# Patient Record
Sex: MALE | Race: WHITE | Employment: FULL TIME | URBAN - METROPOLITAN AREA
[De-identification: names, ages, dates, MRNs, and addresses within clinical notes are randomized per-mention and may not be internally consistent; named-entity substitution may affect disease eponyms.]

---

## 2017-01-06 ENCOUNTER — OFFICE VISIT (OUTPATIENT)
Dept: PEDIATRICS | Facility: CLINIC | Age: 47
End: 2017-01-06
Payer: COMMERCIAL

## 2017-01-06 VITALS
TEMPERATURE: 97.9 F | HEIGHT: 70 IN | BODY MASS INDEX: 40.16 KG/M2 | SYSTOLIC BLOOD PRESSURE: 120 MMHG | WEIGHT: 280.5 LBS | DIASTOLIC BLOOD PRESSURE: 90 MMHG | OXYGEN SATURATION: 98 % | HEART RATE: 91 BPM

## 2017-01-06 DIAGNOSIS — S76.011A STRAIN OF HIP FLEXOR, RIGHT, INITIAL ENCOUNTER: ICD-10-CM

## 2017-01-06 DIAGNOSIS — E78.5 HYPERLIPIDEMIA LDL GOAL <100: ICD-10-CM

## 2017-01-06 DIAGNOSIS — Z23 NEED FOR PROPHYLACTIC VACCINATION AND INOCULATION AGAINST INFLUENZA: ICD-10-CM

## 2017-01-06 DIAGNOSIS — I10 ESSENTIAL HYPERTENSION: ICD-10-CM

## 2017-01-06 PROCEDURE — 99214 OFFICE O/P EST MOD 30 MIN: CPT | Mod: 25 | Performed by: INTERNAL MEDICINE

## 2017-01-06 PROCEDURE — 90471 IMMUNIZATION ADMIN: CPT | Performed by: INTERNAL MEDICINE

## 2017-01-06 PROCEDURE — 90686 IIV4 VACC NO PRSV 0.5 ML IM: CPT | Performed by: INTERNAL MEDICINE

## 2017-01-06 RX ORDER — LISINOPRIL 20 MG/1
10 TABLET ORAL DAILY
Qty: 45 TABLET | Refills: 3 | Status: SHIPPED | OUTPATIENT
Start: 2017-01-06 | End: 2018-05-22

## 2017-01-06 RX ORDER — METFORMIN HCL 500 MG
1000 TABLET, EXTENDED RELEASE 24 HR ORAL 2 TIMES DAILY WITH MEALS
Qty: 360 TABLET | Refills: 3 | Status: SHIPPED | OUTPATIENT
Start: 2017-01-06 | End: 2018-05-22

## 2017-01-06 RX ORDER — SIMVASTATIN 20 MG
20 TABLET ORAL DAILY
Qty: 90 TABLET | Refills: 3 | Status: SHIPPED | OUTPATIENT
Start: 2017-01-06 | End: 2018-04-10

## 2017-01-06 NOTE — NURSING NOTE
"Chief Complaint   Patient presents with     Diabetes       Initial /90 mmHg  Pulse 91  Temp(Src) 97.9  F (36.6  C) (Oral)  Ht 5' 10\" (1.778 m)  Wt 280 lb 8 oz (127.234 kg)  BMI 40.25 kg/m2  SpO2 98% Estimated body mass index is 40.25 kg/(m^2) as calculated from the following:    Height as of this encounter: 5' 10\" (1.778 m).    Weight as of this encounter: 280 lb 8 oz (127.234 kg).  BP completed using cuff size: large   Karen Christianson MA// January 6, 2017 3:58 PM              "

## 2017-01-06 NOTE — MR AVS SNAPSHOT
After Visit Summary   1/6/2017    Jairo Escamilla    MRN: [id number]           Patient Information     Date Of Birth          1970        Visit Information        Provider Department      1/6/2017 4:00 PM Sandoval Wilkinson MD [facility] [place]        Today's Diagnoses     Need for prophylactic vaccination and inoculation against influenza    -  1     Uncontrolled type 2 diabetes mellitus without complication, with long-term current use of insulin (H)         Hyperlipidemia LDL goal <100         Essential hypertension           Care Instructions    Set up a follow up in 2 months. Fast for this.    IN meantime, increase your lantus ot 60, then 70, then 80, etc, until your AM sugars are nearer to 100.      Check you sugars before and after larger meals to ensure your humalog dosing is sufficient.    Sandoval Wilkinson MD  Internal Medicine and Pediatrics                    '  Hip Flexor Strain         What is a hip flexor strain?   A strain is a stretch or tear of a muscle or tendon, a band of tissue that connects muscle to bone. The tendon may be inflamed. Inflammation of a tendon is called tendonitis. The hip flexor muscles allow you to lift your knee and bend at the waist.   How does it occur?   Hip flexor strain occurs from overuse of the muscles that help you flex your knee or do high kicks. This injury occurs in bicyclists, athletes who jump or run with high knee kicks, athletes like soccer players who do forceful kicking activities, and people who practice the martial arts.   What are the symptoms?   You have pain in the upper groin region where the thigh meets the pelvis.   How is it diagnosed?   Your healthcare provider will examine your hip and thigh. You will have tenderness at the muscle and tendon.   How is it treated?   To treat this condition:   Put an ice pack, gel pack, or package of frozen vegetables, wrapped in a cloth on the area every 3 to 4 hours, for up to 20 minutes at a time.   You  could also do ice massage. To do this, first freeze water in a Styrofoam cup, then peel the top of the cup away to expose the ice. Hold the bottom of the cup and rub the ice over the area for 5 to 10 minutes. Do this 3 to 5 times a day for the first 2 days.   Take an anti-inflammatory medicine such as ibuprofen, or other medicine as directed by your provider. Nonsteroidal anti-inflammatory medicines (NSAIDs) may cause stomach bleeding and other problems. These risks increase with age. Read the label and take as directed. Unless recommended by your healthcare provider, do not take for more than 10 days.   Follow your provider's instructions for doing exercises to help you recover.   After you recover from your acute injury, use moist heat for 10 to 15 minutes at a time before you do warm-up and stretching exercises. Do not use heat if you have swelling.   While you are recovering from your injury, you will need to change your sport or activity to one that does not make your condition worse. For example, you may need to swim instead of bicycling or running.   How long will the effects last?   The length of recovery depends on many factors such as your age, health, and if you have had a previous hip flexor injury. Recovery time also depends on the severity of the injury. A mild hip flexor strain may recover within a few weeks, whereas a severe injury may take 6 weeks or longer to recover. You need to stop doing the activities that cause pain until the hip has healed. If you continue doing activities that cause pain, your symptoms will return and it will take longer to recover.   When can I return to my normal activities?   Everyone recovers from an injury at a different rate. Return to your activities depends on how soon your hip recovers, not by how many days or weeks it has been since your injury has occurred. In general, the longer you have symptoms before you start treatment, the longer it will take to get better.  The goal is to return to your normal activities as soon as is safely possible. If you return too soon you may worsen your injury.   You may safely return to your activities when, starting from the top of the list and progressing to the end, each of the following is true:   You have full range of motion in the leg on the injured side compared to the leg on the uninjured side.   You have full strength of the leg on the injured side compared to the leg on the uninjured side.   You can walk straight ahead without pain or limping.   How can I prevent a hip flexor strain?   Hip flexor strains are best prevented by warming up properly and doing stretching exercises before your activity. If you are a bicyclist make sure your seat is raised to the proper height.   Hip Flexor Strain Rehabilitation Exercises         You can begin stretching your hip muscles right away by doing the first 2 exercises. Make sure you feel just a mild discomfort during the stretches and not sharp pain. You may do the last 3 exercises when the pain is gone.   Hip flexor stretch: Kneel and then put one leg forward. Keep your foot flat on the floor. Flatten your lower back and lean your hips forward slightly until you feel a stretch at the front of your hip. Try to keep your body upright as you do this. Hold this position for 15 to 30 seconds. Repeat 3 times with each leg.   Quadriceps stretch: Stand an arm's length away from the wall with your injured leg farthest from the wall. Facing straight ahead, brace yourself by keeping one hand against the wall. With your other hand, grasp the ankle of your injured leg and pull your heel toward your buttocks. Don't arch or twist your back. Keep your knees together. Hold this stretch for 15 to 30 seconds.   Heel slide: Sit on a firm surface with your legs straight in front of you. Slowly slide the heel of your injured leg toward your buttock by pulling your knee toward your chest as you slide the heel. Return  to the starting position. Do 3 sets of 10.   Straight leg raise: Lie on your back with your legs straight out in front of you. Bend the knee on your uninjured side and place the foot flat on the floor. Tighten the thigh muscle on your injured side and lift your leg about 8 inches off the floor. Keep your leg straight and your thigh muscle tight. Slowly lower your leg back down to the floor. Do 3 sets of 10.   Resisted hip flexion: Stand facing away from a door. Tie a loop in one end of a piece of elastic tubing and put it around the ankle on your injured side. Tie a knot in the other end of the tubing and shut the knot in the door near the floor. Tighten the front of your thigh muscle and bring the leg with the tubing forward, keeping your leg straight. Return to the starting position. Do 3 sets of 10.   Published by [employer].  This content is reviewed periodically and is subject to change as new health information becomes available. The information is intended to inform and educate and is not a replacement for medical evaluation, advice, diagnosis or treatment by a healthcare professional.   Written by Jocelyne Ludwig MS, PT, and Duyen Christianson PT, [facility], [facility], for [employer].   ? 2010 [employer] and/or its affiliates. All Rights Reserved.   Copyright   [employer] 2011          Published by [employer].  This content is reviewed periodically and is subject to change as new health information becomes available. The information is intended to inform and educate and is not a replacement for medical evaluation, advice, diagnosis or treatment by a healthcare professional.   Written by Maurice Jose MD, for [employer].   ? 2010 [employer] and/or its affiliates. All Rights Reserved.   Copyright   [employer] 2011              Follow-ups after your visit        Who to contact     If you have questions or need follow up information about today's clinic visit or your schedule please  "contact [facility] [place] directly at [phone].  Normal or non-critical lab and imaging results will be communicated to you by MyChart, letter or phone within 4 business days after the clinic has received the results. If you do not hear from us within 7 days, please contact the clinic through [employer] or phone. If you have a critical or abnormal lab result, we will notify you by phone as soon as possible.  Submit refill requests through [employer] or call your pharmacy and they will forward the refill request to us. Please allow 3 business days for your refill to be completed.          Additional Information About Your Visit        [email or web address] Information     [employer] gives you secure access to your electronic health record. If you see a primary care provider, you can also send messages to your care team and make appointments. If you have questions, please call your primary care clinic.  If you do not have a primary care provider, please call [phone] and they will assist you.        Care EveryWhere ID     This is your Care EveryWhere ID. This could be used by other organizations to access your [place] medical records  [id number]        Your Vitals Were     Pulse Temperature Height BMI (Body Mass Index) Pulse Oximetry       91 97.9  F (36.6  C) (Oral) 5' 10\" (1.778 m) 40.25 kg/m2 98%        Blood Pressure from Last 3 Encounters:   01/06/17 120/90   07/11/16 118/76   05/09/16 133/87    Weight from Last 3 Encounters:   01/06/17 280 lb 8 oz (127.234 kg)   07/11/16 286 lb (129.729 kg)   05/09/16 283 lb (128.368 kg)              Today, you had the following     No orders found for display         Today's Medication Changes          These changes are accurate as of: 1/6/17  4:27 PM.  If you have any questions, ask your nurse or doctor.               Start taking these medicines.        Dose/Directions    order for DME   Used for:  Uncontrolled type 2 diabetes mellitus without complication, with long-term current " use of insulin (H)   Started by:  Sandoval Wilkinson MD        Equipment being ordered:   1 glucometer  90 Test strips for three times daily testing with 11 RF.  90 lancets for three times daily testing.   Quantity:  1 each   Refills:  0         These medicines have changed or have updated prescriptions.        Dose/Directions    * insulin glargine 100 UNIT/ML injection   Commonly known as:  LANTUS   This may have changed:  Another medication with the same name was added. Make sure you understand how and when to take each.   Used for:  Uncontrolled type 2 diabetes mellitus without complication, with long-term current use of insulin (H)   Changed by:  Sandoval Wilkinson MD        Dose:  50 Units   Inject 50 Units Subcutaneous At Bedtime   Quantity:  1 Month   Refills:  0       * insulin glargine 100 UNIT/ML injection   Commonly known as:  LANTUS SOLOSTAR   This may have changed:  You were already taking a medication with the same name, and this prescription was added. Make sure you understand how and when to take each.   Used for:  Uncontrolled type 2 diabetes mellitus without complication, with long-term current use of insulin (H)   Changed by:  Sandoval Wilkinson MD        60 units at bedtime.  .Increase by 10 units every 3 days, until your AM sugars are at or below 100.   Quantity:  30 mL   Refills:  11       * Notice:  This list has 2 medication(s) that are the same as other medications prescribed for you. Read the directions carefully, and ask your doctor or other care provider to review them with you.         Where to get your medicines      These medications were sent to [id number] - [place] - [address]  AT [address] & [address] [place] [address]     Phone:  [phone]    - insulin glargine 100 UNIT/ML injection  - insulin lispro 100 UNIT/ML injection  - lisinopril 20 MG tablet  - metFORMIN 500 MG 24 hr tablet  - simvastatin 20 MG tablet      Some of these will  need a paper prescription and others can be bought over the counter.  Ask your nurse if you have questions.     Bring a paper prescription for each of these medications    - order for DME             Primary Care Provider Office Phone # Fax #    Sandoval Wilkinson [phone]       [facility] [address] [place] [address]        Thank you!     Thank you for choosing [facility]  for your care. Our goal is always to provide you with excellent care. Hearing back from our patients is one way we can continue to improve our services. Please take a few minutes to complete the written survey that you may receive in the mail after your visit with us. Thank you!             Your Updated Medication List - Protect others around you: Learn how to safely use, store and throw away your medicines at www.disposemymeds.org.          This list is accurate as of: 1/6/17  4:27 PM.  Always use your most recent med list.                   Brand Name Dispense Instructions for use    ASPIRIN NOT PRESCRIBED    INTENTIONAL    0 each    Antiplatelet medication not prescribed intentionally due to subdural hematoma       * blood glucose monitoring test strip    ONE TOUCH ULTRA    100 each    Use to test blood sugars tid or as directed.       * blood glucose monitoring test strip    ONE TOUCH ULTRA    100 each    Use to test blood sugars tid or as directed.       * insulin glargine 100 UNIT/ML injection    LANTUS    1 Month    Inject 50 Units Subcutaneous At Bedtime       * insulin glargine 100 UNIT/ML injection    LANTUS SOLOSTAR    30 mL    60 units at bedtime.  .Increase by 10 units every 3 days, until your AM sugars are at or below 100.       insulin lispro 100 UNIT/ML injection    HumaLOG PEN    3 mL    For sugars 150-200: 2 units.  For 200-250, 4 units.  For 250-300, 6 unis.  For 300-350, 8 units.  Above 350, 10 units and call physician.       insulin pen needle 32G X 4 MM     360 each    Use 4 pen  needles daily or as directed.       lisinopril 20 MG tablet    PRINIVIL/ZESTRIL    45 tablet    Take 0.5 tablets (10 mg) by mouth daily       metFORMIN 500 MG 24 hr tablet    GLUCOPHAGE-XR    360 tablet    Take 2 tablets (1,000 mg) by mouth 2 times daily (with meals) lima.       order for DME      AIRSENSE 10 7-11 CM/H20 NASAL WISP FABRIC       order for DME     1 each    Equipment being ordered:   1 glucometer  90 Test strips for three times daily testing with 11 RF.  90 lancets for three times daily testing.       simvastatin 20 MG tablet    ZOCOR    90 tablet    Take 1 tablet (20 mg) by mouth daily       * Notice:  This list has 4 medication(s) that are the same as other medications prescribed for you. Read the directions carefully, and ask your doctor or other care provider to review them with you.

## 2017-01-06 NOTE — PROGRESS NOTES
SUBJECTIVE:                                                    Jairo Escamilla is a 46 year old male who presents to clinic today for the following health issues:      Diabetes Follow-up      Patient is checking blood sugars: not at all.  Has not checked in the 3 weeks due to misplaced meter, in the past checked twice daily     Diabetic concerns: other - medication not being effective      Symptoms of hypoglycemia (low blood sugar): none     Paresthesias (numbness or burning in feet) or sores: Yes burn when standing for long period of time      Date of last diabetic eye exam: 2016        Amount of exercise or physical activity: 2-3 days/week for an average of greater than 60 minutes    Problems taking medications regularly: no    Medication side effects: none    Diet: carbohydrate counting    Problems with right hip when active gets extremely sore in a short amount time x 8-9 months ago on and off     Problem list and histories reviewed & adjusted, as indicated.  Additional history: as documented    Lost glucometer; not testing sugars.  Feels like levels have remained high.  Last checked sugars were about 180-200 in the AM;     Checking after meals (2 hours) and     Patient Active Problem List   Diagnosis     Hyperlipidemia LDL goal <100     Hypertension  BP goal <140/90     Type 2 diabetes mellitus without complication (H)     Morbid obesity (H)     Subdural hematoma (H)     SDH (subdural hematoma) (H)     Past Surgical History   Procedure Laterality Date     No history of surgery         Social History   Substance Use Topics     Smoking status: Never Smoker      Smokeless tobacco: Never Used     Alcohol Use: 0.0 oz/week     0 Standard drinks or equivalent per week      Comment: rarely     Family History   Problem Relation Age of Onset     DIABETES Mother      DIABETES Maternal Aunt       of Diabetic complications     C.A.D. Father      in mid 60's     Cancer - colorectal No family hx of      Prostate Cancer  No family hx of      CEREBROVASCULAR DISEASE No family hx of          Current Outpatient Prescriptions   Medication Sig Dispense Refill     order for DME Equipment being ordered:     1 glucometer    90 Test strips for three times daily testing with 11 RF.    90 lancets for three times daily testing. 1 each 0     insulin lispro (HUMALOG PEN) 100 UNIT/ML injection For sugars 150-200: 2 units.  For 200-250, 4 units.  For 250-300, 6 unis.  For 300-350, 8 units.  Above 350, 10 units and call physician. 3 mL 11     simvastatin (ZOCOR) 20 MG tablet Take 1 tablet (20 mg) by mouth daily 90 tablet 3     metFORMIN (GLUCOPHAGE-XR) 500 MG 24 hr tablet Take 2 tablets (1,000 mg) by mouth 2 times daily (with meals) lima. 360 tablet 3     lisinopril (PRINIVIL/ZESTRIL) 20 MG tablet Take 0.5 tablets (10 mg) by mouth daily 45 tablet 3     insulin glargine (LANTUS SOLOSTAR) 100 UNIT/ML injection 60 units at bedtime.  .Increase by 10 units every 3 days, until your AM sugars are at or below 100. 30 mL 11     blood glucose monitoring (ONE TOUCH ULTRA) test strip Use to test blood sugars tid or as directed. 100 each 0     insulin pen needle 32G X 4 MM Use 4 pen needles daily or as directed. 360 each 3     insulin glargine (LANTUS) 100 UNIT/ML injection Inject 50 Units Subcutaneous At Bedtime 1 Month 0     [DISCONTINUED] insulin lispro (HUMALOG PEN) 100 UNIT/ML injection For sugars 150-200: 2 units.  For 200-250, 4 units.  For 250-300, 6 unis.  For 300-350, 8 units.  Above 350, 10 units and call physician. 1 Month 0     [DISCONTINUED] simvastatin (ZOCOR) 20 MG tablet Take 1 tablet (20 mg) by mouth daily 30 tablet 0     [DISCONTINUED] lisinopril (PRINIVIL/ZESTRIL) 20 MG tablet Take 0.5 tablets (10 mg) by mouth daily 15 tablet 0     [DISCONTINUED] metFORMIN (GLUCOPHAGE-XR) 500 MG 24 hr tablet Take 2 tablets (1,000 mg) by mouth 2 times daily (with meals) lima. 120 tablet 0     order for DME AIRSENSE 10  7-11 CM/H20  NASAL WISP FABRIC        "ASPIRIN NOT PRESCRIBED (INTENTIONAL) Antiplatelet medication not prescribed intentionally due to subdural hematoma 0 each 0     blood glucose monitoring (ONE TOUCH ULTRA) test strip Use to test blood sugars tid or as directed. 100 each prn     No Known Allergies  BP Readings from Last 3 Encounters:   01/06/17 120/90   07/11/16 118/76   05/09/16 133/87    Wt Readings from Last 3 Encounters:   01/06/17 280 lb 8 oz (127.234 kg)   07/11/16 286 lb (129.729 kg)   05/09/16 283 lb (128.368 kg)                  Labs reviewed in EPIC  Problem list, Medication list, Allergies, and Medical/Social/Surgical histories [facility] and updated as appropriate.    ROS:  C: NEGATIVE for fever, chills, change in weight  E/M: NEGATIVE for ear, mouth and throat problems  R: NEGATIVE for significant cough or SOB  CV: NEGATIVE for chest pain, palpitations or peripheral edema    OBJECTIVE:                                                    /90 mmHg  Pulse 91  Temp(Src) 97.9  F (36.6  C) (Oral)  Ht 5' 10\" (1.778 m)  Wt 280 lb 8 oz (127.234 kg)  BMI 40.25 kg/m2  SpO2 98%  Body mass index is 40.25 kg/(m^2).   GENERAL: healthy, alert, well nourished, well hydrated, no distress  HENT: ear canals- normal; TMs- normal; Nose- normal; Mouth- no ulcers, no lesions  NECK: no tenderness, no adenopathy, no asymmetry, no masses, no stiffness; thyroid- normal to palpation  RESP: lungs clear to auscultation - no rales, no rhonchi, no wheezes  CV: regular rates and rhythm, normal S1 S2, no S3 or S4 and no murmur, no click or rub -  ABDOMEN: soft, no tenderness, no  hepatosplenomegaly, no masses, normal bowel sounds    Diagnostic test results:  Diagnostic Test Results:  none      ASSESSMENT/PLAN:                                                    1. Need for prophylactic vaccination and inoculation against influenza      2. Uncontrolled type 2 diabetes mellitus without complication, with long-term current use of insulin (H)  Poor control.  " Titrate up on lantus and follow up in 2 months.  Monitor pre and post meal sugars better.   - order for DME; Equipment being ordered:     1 glucometer    90 Test strips for three times daily testing with 11 RF.    90 lancets for three times daily testing.  Dispense: 1 each; Refill: 0  - insulin lispro (HUMALOG PEN) 100 UNIT/ML injection; For sugars 150-200: 2 units.  For 200-250, 4 units.  For 250-300, 6 unis.  For 300-350, 8 units.  Above 350, 10 units and call physician.  Dispense: 3 mL; Refill: 11  - metFORMIN (GLUCOPHAGE-XR) 500 MG 24 hr tablet; Take 2 tablets (1,000 mg) by mouth 2 times daily (with meals) lima.  Dispense: 360 tablet; Refill: 3  - insulin glargine (LANTUS SOLOSTAR) 100 UNIT/ML injection; 60 units at bedtime.  .Increase by 10 units every 3 days, until your AM sugars are at or below 100.  Dispense: 30 mL; Refill: 11    3. Hyperlipidemia LDL goal <100  At LDL cholesterol goal.  Continue statin.   - simvastatin (ZOCOR) 20 MG tablet; Take 1 tablet (20 mg) by mouth daily  Dispense: 90 tablet; Refill: 3    4. Essential hypertension  At goal.   - lisinopril (PRINIVIL/ZESTRIL) 20 MG tablet; Take 0.5 tablets (10 mg) by mouth daily  Dispense: 45 tablet; Refill: 3    5. Strain of hip flexor, right, initial encounter  Exercises given; begin aleve (naproxen) and ice.       See Patient Instructions    Sandoval Wilkinson MD  [facility]    Injectable Influenza Immunization Documentation    1.  Is the person to be vaccinated sick today?  No    2. Does the person to be vaccinated have an allergy to eggs or to a component of the vaccine?  No    3. Has the person to be vaccinated today ever had a serious reaction to influenza vaccine in the past?  No    4. Has the person to be vaccinated ever had Guillain-[place] syndrome?  No     Form completed by Jaja

## 2017-02-06 ENCOUNTER — DOCUMENTATION ONLY (OUTPATIENT)
Dept: PEDIATRICS | Facility: CLINIC | Age: 47
End: 2017-02-06

## 2017-02-06 NOTE — PROGRESS NOTES
Reason for Call:  Form, our goal is to have forms completed with 72 hours, however, some forms may require a visit or additional information.    Type of letter, form or note:  medical    Who is the form from?: Patient    Where did the form come from: Patient or family brought in       What clinic location was the form placed at?: [place]    Where the form was placed: 's Box    What number is listed as a contact on the form?: [phone]       Additional comments: Please fax completed form to [phone].    Call taken on 2/6/2017 at 11:22 AM by Amanda Ernst

## 2017-03-06 ENCOUNTER — OFFICE VISIT (OUTPATIENT)
Dept: PEDIATRICS | Facility: CLINIC | Age: 47
End: 2017-03-06
Payer: COMMERCIAL

## 2017-03-06 VITALS
BODY MASS INDEX: 40.59 KG/M2 | DIASTOLIC BLOOD PRESSURE: 70 MMHG | WEIGHT: 283.56 LBS | HEIGHT: 70 IN | TEMPERATURE: 98.2 F | SYSTOLIC BLOOD PRESSURE: 110 MMHG | HEART RATE: 91 BPM | OXYGEN SATURATION: 96 %

## 2017-03-06 DIAGNOSIS — E11.65 TYPE 2 DIABETES MELLITUS WITH HYPERGLYCEMIA, WITH LONG-TERM CURRENT USE OF INSULIN (H): Primary | ICD-10-CM

## 2017-03-06 DIAGNOSIS — E66.01 MORBID OBESITY, UNSPECIFIED OBESITY TYPE (H): ICD-10-CM

## 2017-03-06 DIAGNOSIS — S06.5XAA SDH (SUBDURAL HEMATOMA) (H): ICD-10-CM

## 2017-03-06 DIAGNOSIS — Z79.4 TYPE 2 DIABETES MELLITUS WITH HYPERGLYCEMIA, WITH LONG-TERM CURRENT USE OF INSULIN (H): Primary | ICD-10-CM

## 2017-03-06 LAB
ALBUMIN SERPL-MCNC: 3.9 G/DL (ref 3.4–5)
ALP SERPL-CCNC: 49 U/L (ref 40–150)
ALT SERPL W P-5'-P-CCNC: 46 U/L (ref 0–70)
ANION GAP SERPL CALCULATED.3IONS-SCNC: 9 MMOL/L (ref 3–14)
AST SERPL W P-5'-P-CCNC: 23 U/L (ref 0–45)
BILIRUB SERPL-MCNC: 0.7 MG/DL (ref 0.2–1.3)
BUN SERPL-MCNC: 19 MG/DL (ref 7–30)
CALCIUM SERPL-MCNC: 8.9 MG/DL (ref 8.5–10.1)
CHLORIDE SERPL-SCNC: 105 MMOL/L (ref 94–109)
CHOLEST SERPL-MCNC: 142 MG/DL
CO2 SERPL-SCNC: 25 MMOL/L (ref 20–32)
CREAT SERPL-MCNC: 0.66 MG/DL (ref 0.66–1.25)
CREAT UR-MCNC: 76 MG/DL
GFR SERPL CREATININE-BSD FRML MDRD: ABNORMAL ML/MIN/1.7M2
GLUCOSE SERPL-MCNC: 151 MG/DL (ref 70–99)
HBA1C MFR BLD: 7.2 % (ref 4.3–6)
HDLC SERPL-MCNC: 35 MG/DL
LDLC SERPL CALC-MCNC: 65 MG/DL
MICROALBUMIN UR-MCNC: 5 MG/L
MICROALBUMIN/CREAT UR: 7.01 MG/G CR (ref 0–17)
NONHDLC SERPL-MCNC: 107 MG/DL
POTASSIUM SERPL-SCNC: 4.3 MMOL/L (ref 3.4–5.3)
PROT SERPL-MCNC: 7.7 G/DL (ref 6.8–8.8)
SODIUM SERPL-SCNC: 139 MMOL/L (ref 133–144)
TRIGL SERPL-MCNC: 209 MG/DL
TSH SERPL DL<=0.005 MIU/L-ACNC: 1.94 MU/L (ref 0.4–4)

## 2017-03-06 PROCEDURE — 99214 OFFICE O/P EST MOD 30 MIN: CPT | Performed by: INTERNAL MEDICINE

## 2017-03-06 PROCEDURE — 99207 C FOOT EXAM  NO CHARGE: CPT | Performed by: INTERNAL MEDICINE

## 2017-03-06 PROCEDURE — 84443 ASSAY THYROID STIM HORMONE: CPT | Performed by: INTERNAL MEDICINE

## 2017-03-06 PROCEDURE — 83036 HEMOGLOBIN GLYCOSYLATED A1C: CPT | Performed by: INTERNAL MEDICINE

## 2017-03-06 PROCEDURE — 80053 COMPREHEN METABOLIC PANEL: CPT | Performed by: INTERNAL MEDICINE

## 2017-03-06 PROCEDURE — 82043 UR ALBUMIN QUANTITATIVE: CPT | Performed by: INTERNAL MEDICINE

## 2017-03-06 PROCEDURE — 80061 LIPID PANEL: CPT | Performed by: INTERNAL MEDICINE

## 2017-03-06 PROCEDURE — 36415 COLL VENOUS BLD VENIPUNCTURE: CPT | Performed by: INTERNAL MEDICINE

## 2017-03-06 NOTE — NURSING NOTE
"Chief Complaint   Patient presents with     Diabetes       Initial /70 (BP Location: Right arm, Patient Position: Chair, Cuff Size: Adult Large)  Pulse 91  Temp 98.2  F (36.8  C) (Tympanic)  Ht 5' 10\" (1.778 m)  Wt 283 lb 9 oz (128.6 kg)  SpO2 96%  BMI 40.69 kg/m2 Estimated body mass index is 40.69 kg/(m^2) as calculated from the following:    Height as of this encounter: 5' 10\" (1.778 m).    Weight as of this encounter: 283 lb 9 oz (128.6 kg).  Medication Reconciliation: complete   Nataly Dunn CMA    "

## 2017-03-06 NOTE — MR AVS SNAPSHOT
After Visit Summary   3/6/2017    Jairo Escamilla    MRN: [id number]           Patient Information     Date Of Birth          1970        Visit Information        Provider Department      3/6/2017 8:40 AM Sandoval Wilkinson MD [facility]        Today's Diagnoses     Type 2 diabetes mellitus with hyperglycemia, with long-term current use of insulin (H)    -  1    Morbid obesity, unspecified obesity type (H)        SDH (subdural hematoma) (H)          Care Instructions    diabetes blood test (A1c) was 7.2  Today.    No change in meds.     Follow up in August for recheck.    Sandoval Wilkinson MD  Internal Medicine and Pediatrics           Follow-ups after your visit        Who to contact     If you have questions or need follow up information about today's clinic visit or your schedule please contact [facility] directly at [phone].  Normal or non-critical lab and imaging results will be communicated to you by Hoontohart, letter or phone within 4 business days after the clinic has received the results. If you do not hear from us within 7 days, please contact the clinic through Hoontohart or phone. If you have a critical or abnormal lab result, we will notify you by phone as soon as possible.  Submit refill requests through ImaginAb or call your pharmacy and they will forward the refill request to us. Please allow 3 business days for your refill to be completed.          Additional Information About Your Visit        MyChart Information     [employer] gives you secure access to your electronic health record. If you see a primary care provider, you can also send messages to your care team and make appointments. If you have questions, please call your primary care clinic.  If you do not have a primary care provider, please call [phone] and they will assist you.        Care EveryWhere ID     This is your Care EveryWhere ID. This could be used by other organizations to access your [facility]  "medical records  [id number]        Your Vitals Were     Pulse Temperature Height Pulse Oximetry BMI (Body Mass Index)       91 98.2  F (36.8  C) (Tympanic) 5' 10\" (1.778 m) 96% 40.69 kg/m2        Blood Pressure from Last 3 Encounters:   03/06/17 110/70   01/06/17 120/90   07/11/16 118/76    Weight from Last 3 Encounters:   03/06/17 283 lb 9 oz (128.6 kg)   01/06/17 280 lb 8 oz (127.2 kg)   07/11/16 286 lb (129.7 kg)              We Performed the Following     Albumin Random Urine Quantitative     Comprehensive metabolic panel (BMP + Alb, Alk Phos, ALT, AST, Total. Bili, TP)     FOOT EXAM     Hemoglobin A1c     Lipid Profile with reflex to direct LDL     TSH with free T4 reflex        Primary Care Provider Office Phone # Fax #    Sandoval Wilkinson [phone]       [address] [place] [address]        Thank you!     Thank you for choosing [facility]  for your care. Our goal is always to provide you with excellent care. Hearing back from our patients is one way we can continue to improve our services. Please take a few minutes to complete the written survey that you may receive in the mail after your visit with us. Thank you!             Your Updated Medication List - Protect others around you: Learn how to safely use, store and throw away your medicines at www.disposemymeds.org.          This list is accurate as of: 3/6/17  9:41 AM.  Always use your most recent med list.                   Brand Name Dispense Instructions for use    ASPIRIN NOT PRESCRIBED    INTENTIONAL    0 each    Antiplatelet medication not prescribed intentionally due to subdural hematoma       * blood glucose monitoring test strip    ONE TOUCH ULTRA    100 each    Use to test blood sugars tid or as directed.       * blood glucose monitoring test strip    ONE TOUCH ULTRA    100 each    Use to test blood sugars tid or as directed.       * insulin glargine 100 UNIT/ML injection    " LANTUS    1 Month    Inject 50 Units Subcutaneous At Bedtime       * insulin glargine 100 UNIT/ML injection    LANTUS SOLOSTAR    30 mL    60 units at bedtime.  .Increase by 10 units every 3 days, until your AM sugars are at or below 100.       insulin lispro 100 UNIT/ML injection    HumaLOG PEN    3 mL    For sugars 150-200: 2 units.  For 200-250, 4 units.  For 250-300, 6 unis.  For 300-350, 8 units.  Above 350, 10 units and call physician.       insulin pen needle 32G X 4 MM     360 each    Use 4 pen needles daily or as directed.       lisinopril 20 MG tablet    PRINIVIL/ZESTRIL    45 tablet    Take 0.5 tablets (10 mg) by mouth daily       metFORMIN 500 MG 24 hr tablet    GLUCOPHAGE-XR    360 tablet    Take 2 tablets (1,000 mg) by mouth 2 times daily (with meals) lima.       order for DME      AIRSENSE 10 7-11 CM/H20 NASAL WISP FABRIC       order for DME     1 each    Equipment being ordered:   1 glucometer  90 Test strips for three times daily testing with 11 RF.  90 lancets for three times daily testing.       simvastatin 20 MG tablet    ZOCOR    90 tablet    Take 1 tablet (20 mg) by mouth daily       * Notice:  This list has 4 medication(s) that are the same as other medications prescribed for you. Read the directions carefully, and ask your doctor or other care provider to review them with you.

## 2017-03-06 NOTE — PROGRESS NOTES
"  SUBJECTIVE:                                                    Jairo Escamilla is a 47 year old male who presents to clinic today for the following health issues:      Diabetes Follow-up    Patient is checking blood sugars: three times daily.   Blood sugar testing frequency justification: Uncontrolled diabetes  Results are as follows:         am - 172s         lunchtime - 162         dinner - 147    Diabetic concerns: other - discuss medications not working well for pt     Symptoms of hypoglycemia (low blood sugar): none     Paresthesias (numbness or burning in feet) or sores: Yes      Date of last diabetic eye exam: pt is due       Amount of exercise or physical activity: 3-4 days/week for an average of at least 1 hr daily    Problems taking medications regularly: No    Medication side effects: none  Diet: carbohydrate counting    Hip worsened after last visit; seen by chiropracter and physical therapy; much improved now.  Did a lot of muscle massage and exercising; adjusted back and hip.    diabetes mellitus:  Using humalog, at about a \"triple dose\"; using 10-12 units per meal.  After dinner will be 150s.      Lantus now at 60.  Sugars are still at about 172 before breakfast.       Problem list and histories reviewed & adjusted, as indicated.  Additional history: as documented    Patient Active Problem List   Diagnosis     Hyperlipidemia LDL goal <100     Hypertension  BP goal <140/90     Morbid obesity (H)     Subdural hematoma (H)     SDH (subdural hematoma) (H)     Type 2 diabetes mellitus with hyperglycemia, with long-term current use of insulin (H)     Past Surgical History   Procedure Laterality Date     No history of surgery         Social History   Substance Use Topics     Smoking status: Never Smoker     Smokeless tobacco: Never Used     Alcohol use 0.0 oz/week     0 Standard drinks or equivalent per week      Comment: rarely     Family History   Problem Relation Age of Onset     DIABETES Mother      " MARIEL. Father      in mid 60's     DIABETES Maternal Aunt       of Diabetic complications     Cancer - colorectal No family hx of      Prostate Cancer No family hx of      CEREBROVASCULAR DISEASE No family hx of          Current Outpatient Prescriptions   Medication Sig Dispense Refill     order for DME Equipment being ordered:     1 glucometer    90 Test strips for three times daily testing with 11 RF.    90 lancets for three times daily testing. 1 each 0     insulin lispro (HUMALOG PEN) 100 UNIT/ML injection For sugars 150-200: 2 units.  For 200-250, 4 units.  For 250-300, 6 unis.  For 300-350, 8 units.  Above 350, 10 units and call physician. 3 mL 11     simvastatin (ZOCOR) 20 MG tablet Take 1 tablet (20 mg) by mouth daily 90 tablet 3     metFORMIN (GLUCOPHAGE-XR) 500 MG 24 hr tablet Take 2 tablets (1,000 mg) by mouth 2 times daily (with meals) lima. 360 tablet 3     lisinopril (PRINIVIL/ZESTRIL) 20 MG tablet Take 0.5 tablets (10 mg) by mouth daily 45 tablet 3     insulin glargine (LANTUS SOLOSTAR) 100 UNIT/ML injection 60 units at bedtime.  .Increase by 10 units every 3 days, until your AM sugars are at or below 100. 30 mL 11     blood glucose monitoring (ONE TOUCH ULTRA) test strip Use to test blood sugars tid or as directed. 100 each 0     insulin glargine (LANTUS) 100 UNIT/ML injection Inject 50 Units Subcutaneous At Bedtime 1 Month 0     order for DME AIRSENSE 10  7-11 CM/H20  NASAL WISP FABRIC       ASPIRIN NOT PRESCRIBED (INTENTIONAL) Antiplatelet medication not prescribed intentionally due to subdural hematoma 0 each 0     insulin pen needle 32G X 4 MM Use 4 pen needles daily or as directed. 360 each 3     blood glucose monitoring (ONE TOUCH ULTRA) test strip Use to test blood sugars tid or as directed. 100 each prn     No Known Allergies  Recent Labs   Lab Test  16   1028  16   0827  16   0445   12/11/15   1612   12/02/15   1812  10/20/15   1303   14   1031   13   0835  "  A1C  9.2*   --   8.6*   --   8.4*   --    --   9.2*   < >  8.5*   < >  7.1*   LDL   --    --    --    --    --    --    --   54   --   53   --   56   HDL   --    --    --    --    --    --    --   39*   --   31*   --   28*   TRIG   --    --    --    --    --    --    --   212*   --   192*   --   173*   ALT   --    --    --    --    --    --   57  65   --   57   --    --    CR   --   0.70  0.72   < >   --    < >  0.73  0.71   < >  0.77   --   0.68   GFRESTIMATED   --   >90  Non  GFR Calc    >90  Non  GFR Calc     < >   --    < >  >90  Non  GFR Calc    >90  Non  GFR Calc     < >  >90   --   >90   GFRESTBLACK   --   >90   GFR Calc    >90   GFR Calc     < >   --    < >  >90   GFR Calc    >90   GFR Calc     < >  >90   --   >90   POTASSIUM   --   4.3  4.2   < >   --    < >  4.2  4.6   < >  4.7   --   4.7   TSH   --    --    --    --    --    --    --   1.52   --    --    --   1.87    < > = values in this interval not displayed.      BP Readings from Last 3 Encounters:   03/06/17 110/70   01/06/17 120/90   07/11/16 118/76    Wt Readings from Last 3 Encounters:   03/06/17 283 lb 9 oz (128.6 kg)   01/06/17 280 lb 8 oz (127.2 kg)   07/11/16 286 lb (129.7 kg)                  Labs reviewed in EPIC    Reviewed and updated as needed this visit by clinical staff  Tobacco  Allergies  Meds  Med Hx  Surg Hx  Fam Hx  Soc Hx      Reviewed and updated as needed this visit by Provider         ROS:  C: NEGATIVE for fever, chills, change in weight  E/M: NEGATIVE for ear, mouth and throat problems  R: NEGATIVE for significant cough or SOB  CV: NEGATIVE for chest pain, palpitations or peripheral edema    OBJECTIVE:                                                    /70 (BP Location: Right arm, Patient Position: Chair, Cuff Size: Adult Large)  Pulse 91  Temp 98.2  F (36.8  C) (Tympanic)  Ht 5' 10\" " (1.778 m)  Wt 283 lb 9 oz (128.6 kg)  SpO2 96%  BMI 40.69 kg/m2  Body mass index is 40.69 kg/(m^2).   GENERAL: healthy, alert, well nourished, well hydrated, no distress  HENT: ear canals- normal; TMs- normal; Nose- normal; Mouth- no ulcers, no lesions  NECK: no tenderness, no adenopathy, no asymmetry, no masses, no stiffness; thyroid- normal to palpation  RESP: lungs clear to auscultation - no rales, no rhonchi, no wheezes  CV: regular rates and rhythm, normal S1 S2, no S3 or S4 and no murmur, no click or rub -  ABDOMEN: soft, no tenderness, no  hepatosplenomegaly, no masses, normal bowel sounds    Diagnostic test results:  Diagnostic Test Results:  none      ASSESSMENT/PLAN:                                                    1. Type 2 diabetes mellitus with hyperglycemia, with long-term current use of insulin (H)  Improved control today.  Congratulated.  Parameters well controlled.  Continue secondary risk factor modification for BP, cholesterol, anticoagulation, and smoking cessation.   - Albumin Random Urine Quantitative  - Lipid Profile with reflex to direct LDL  - FOOT EXAM  - Comprehensive metabolic panel (BMP + Alb, Alk Phos, ALT, AST, Total. Bili, TP)  - TSH with free T4 reflex    2. Morbid obesity, unspecified obesity type (H)  Continue to focus on weight loss.     3. SDH:  Symptoms have resolved.  No further treatment needed.     See Patient Instructions    Sandoval Wilkinson MD  [facility]

## 2017-03-06 NOTE — PATIENT INSTRUCTIONS
diabetes blood test (A1c) was 7.2  Today.    No change in meds.     Follow up in August for recheck.    Sandoval Wilkinson MD  Internal Medicine and Pediatrics

## 2018-03-28 NOTE — TELEPHONE ENCOUNTER
"Requested Prescriptions   Pending Prescriptions Disp Refills     insulin glargine (LANTUS SOLOSTAR) 100 UNIT/ML injection  Last Written Prescription Date:  2017  Last Fill Quantity: 30mL,  # refills: 11   Last office visit: 3/6/2017 with prescribing provider:  Sandoval Wilkinson MD    Future Office Visit:     30 mL 11     Si units at bedtime.  .Increase by 10 units every 3 days, until your AM sugars are at or below 100.    Long Acting Insulin Protocol Failed    3/28/2018 11:21 AM       Failed - Blood pressure less than 140/90 in past 6 months    BP Readings from Last 3 Encounters:   17 110/70   17 120/90   16 118/76                Failed - LDL on file in past 12 months    Recent Labs   Lab Test  17   0912   LDL  65            Failed - Microalbumin on file in past 12 months    Recent Labs   Lab Test  17   0913   MICROL  5   UMALCR  7.01            Failed - Serum creatinine on file in past 12 months    Recent Labs   Lab Test  17   0912   CR  0.66            Failed - HgbA1C in past 3 or 6 months    Recent Labs   Lab Test  17   0912   A1C  7.2*            Failed - Recent (6 mo) or future (30 days) visit within the authorizing provider's specialty    Patient had office visit in the last 6 months or has a visit in the next 30 days with authorizing provider or within the authorizing provider's specialty.  See \"Patient Info\" tab in inbasket, or \"Choose Columns\" in Meds & Orders section of the refill encounter.           Passed - Patient is age 18 or older          "

## 2018-04-10 ENCOUNTER — TELEPHONE (OUTPATIENT)
Dept: PEDIATRICS | Facility: CLINIC | Age: 48
End: 2018-04-10

## 2018-04-10 DIAGNOSIS — E78.5 HYPERLIPIDEMIA LDL GOAL <100: ICD-10-CM

## 2018-04-10 NOTE — TELEPHONE ENCOUNTER
"Requested Prescriptions   Pending Prescriptions Disp Refills     simvastatin (ZOCOR) 20 MG tablet  Last Written Prescription Date:  1/6/17  Last Fill Quantity: 90 TABLET,  # refills: 3   Last office visit: 3/6/17 with prescribing provider:  LORRAINE   Future Office Visit:     90 tablet 3     Sig: Take 1 tablet (20 mg) by mouth daily    Statins Protocol Failed    4/10/2018  8:37 AM       Failed - LDL on file in past 12 months    Recent Labs   Lab Test  03/06/17   0912   LDL  65            Failed - Recent (12 mo) or future (30 days) visit within the authorizing provider's specialty    Patient had office visit in the last 12 months or has a visit in the next 30 days with authorizing provider or within the authorizing provider's specialty.  See \"Patient Info\" tab in inbasket, or \"Choose Columns\" in Meds & Orders section of the refill encounter.           Passed - No abnormal creatine kinase in past 12 months    No lab results found.            Passed - Patient is age 18 or older          "

## 2018-04-11 RX ORDER — SIMVASTATIN 20 MG
20 TABLET ORAL DAILY
Qty: 90 TABLET | Refills: 3 | Status: SHIPPED | OUTPATIENT
Start: 2018-04-11 | End: 2018-05-22

## 2018-04-11 NOTE — TELEPHONE ENCOUNTER
Routing refill request to provider for review/approval because:  Patient needs to be seen because it has been more than 1 year since last office visit.  Pt no showed to recent OV and was aware he needs OV  Benoit Calix RN, BSN

## 2018-04-16 NOTE — TELEPHONE ENCOUNTER
Message on [employer] viewed, closing encounter    Wilda Leblanc MA   April 16, 2018,  9:09 AM

## 2018-04-28 NOTE — TELEPHONE ENCOUNTER
"Requested Prescriptions   Pending Prescriptions Disp Refills     LANTUS SOLOSTAR 100 UNIT/ML soln [Pharmacy Med Name: LANTUS SOLOSTAR PEN INJ 3ML]  Last Written Prescription Date:  03/29/2018  Last Fill Quantity: 30 mL,  # refills: 0   Last office visit: 3/6/2017 with prescribing provider:  Sandoval Wilkinson MD    Future Office Visit:     30 mL 0     Sig: TAKE 60 UNITS AT AT BEDTIME. INCREASE BY 10 UNITS EVERY 3 DAYS, UNTIL YOUR SUGARS ARE AT OR BELOW 100    Long Acting Insulin Protocol Failed    4/28/2018  3:34 AM       Failed - Blood pressure less than 140/90 in past 6 months    BP Readings from Last 3 Encounters:   03/06/17 110/70   01/06/17 120/90   07/11/16 118/76                Failed - LDL on file in past 12 months    Recent Labs   Lab Test  03/06/17   0912   LDL  65            Failed - Microalbumin on file in past 12 months    Recent Labs   Lab Test  03/06/17   0913   MICROL  5   UMALCR  7.01            Failed - Serum creatinine on file in past 12 months    Recent Labs   Lab Test  03/06/17   0912   CR  0.66            Failed - HgbA1C in past 3 or 6 months    Recent Labs   Lab Test  03/06/17   0912   A1C  7.2*            Failed - Recent (6 mo) or future (30 days) visit within the authorizing provider's specialty    Patient had office visit in the last 6 months or has a visit in the next 30 days with authorizing provider or within the authorizing provider's specialty.  See \"Patient Info\" tab in inbasket, or \"Choose Columns\" in Meds & Orders section of the refill encounter.           Passed - Patient is age 18 or older          "

## 2018-05-03 RX ORDER — INSULIN GLARGINE 100 [IU]/ML
INJECTION, SOLUTION SUBCUTANEOUS
Qty: 30 ML | Refills: 0 | Status: SHIPPED | OUTPATIENT
Start: 2018-05-03 | End: 2018-12-24

## 2018-05-03 NOTE — TELEPHONE ENCOUNTER
Vaishnavi refill given 3/29/2018. No show for appt 3/30.    Please send to pharmacy if approved.    Sangita Ascencio RN

## 2018-05-08 ENCOUNTER — TELEPHONE (OUTPATIENT)
Dept: PEDIATRICS | Facility: CLINIC | Age: 48
End: 2018-05-08

## 2018-05-08 NOTE — TELEPHONE ENCOUNTER
Dr. Wilkinson will enter lab orders during his appointment.    Wilda Leblanc MA   May 8, 2018,  10:20 AM

## 2018-05-22 ENCOUNTER — OFFICE VISIT (OUTPATIENT)
Dept: PEDIATRICS | Facility: CLINIC | Age: 48
End: 2018-05-22
Payer: COMMERCIAL

## 2018-05-22 VITALS
SYSTOLIC BLOOD PRESSURE: 102 MMHG | DIASTOLIC BLOOD PRESSURE: 70 MMHG | OXYGEN SATURATION: 95 % | HEART RATE: 86 BPM | WEIGHT: 276 LBS | HEIGHT: 70 IN | TEMPERATURE: 98.2 F | BODY MASS INDEX: 39.51 KG/M2

## 2018-05-22 DIAGNOSIS — I10 ESSENTIAL HYPERTENSION: ICD-10-CM

## 2018-05-22 DIAGNOSIS — G62.9 PERIPHERAL POLYNEUROPATHY: ICD-10-CM

## 2018-05-22 DIAGNOSIS — E78.5 HYPERLIPIDEMIA LDL GOAL <100: ICD-10-CM

## 2018-05-22 DIAGNOSIS — E66.01 MORBID OBESITY (H): ICD-10-CM

## 2018-05-22 LAB
ALBUMIN SERPL-MCNC: 3.9 G/DL (ref 3.4–5)
ALP SERPL-CCNC: 54 U/L (ref 40–150)
ALT SERPL W P-5'-P-CCNC: 51 U/L (ref 0–70)
ANION GAP SERPL CALCULATED.3IONS-SCNC: 7 MMOL/L (ref 3–14)
AST SERPL W P-5'-P-CCNC: 31 U/L (ref 0–45)
BILIRUB SERPL-MCNC: 1.2 MG/DL (ref 0.2–1.3)
BUN SERPL-MCNC: 17 MG/DL (ref 7–30)
CALCIUM SERPL-MCNC: 9 MG/DL (ref 8.5–10.1)
CHLORIDE SERPL-SCNC: 104 MMOL/L (ref 94–109)
CHOLEST SERPL-MCNC: 132 MG/DL
CO2 SERPL-SCNC: 27 MMOL/L (ref 20–32)
CREAT SERPL-MCNC: 0.73 MG/DL (ref 0.66–1.25)
CREAT UR-MCNC: 97 MG/DL
GFR SERPL CREATININE-BSD FRML MDRD: >90 ML/MIN/1.7M2
GLUCOSE SERPL-MCNC: 158 MG/DL (ref 70–99)
HBA1C MFR BLD: 8.7 % (ref 0–5.6)
HDLC SERPL-MCNC: 30 MG/DL
LDLC SERPL CALC-MCNC: 52 MG/DL
MICROALBUMIN UR-MCNC: 5 MG/L
MICROALBUMIN/CREAT UR: 5.56 MG/G CR (ref 0–17)
NONHDLC SERPL-MCNC: 102 MG/DL
POTASSIUM SERPL-SCNC: 4.8 MMOL/L (ref 3.4–5.3)
PROT SERPL-MCNC: 7.8 G/DL (ref 6.8–8.8)
SODIUM SERPL-SCNC: 138 MMOL/L (ref 133–144)
TRIGL SERPL-MCNC: 248 MG/DL
TSH SERPL DL<=0.005 MIU/L-ACNC: 1.78 MU/L (ref 0.4–4)

## 2018-05-22 PROCEDURE — 83036 HEMOGLOBIN GLYCOSYLATED A1C: CPT | Performed by: INTERNAL MEDICINE

## 2018-05-22 PROCEDURE — 36415 COLL VENOUS BLD VENIPUNCTURE: CPT | Performed by: INTERNAL MEDICINE

## 2018-05-22 PROCEDURE — 80061 LIPID PANEL: CPT | Performed by: INTERNAL MEDICINE

## 2018-05-22 PROCEDURE — 84443 ASSAY THYROID STIM HORMONE: CPT | Performed by: INTERNAL MEDICINE

## 2018-05-22 PROCEDURE — 80053 COMPREHEN METABOLIC PANEL: CPT | Performed by: INTERNAL MEDICINE

## 2018-05-22 PROCEDURE — 82043 UR ALBUMIN QUANTITATIVE: CPT | Performed by: INTERNAL MEDICINE

## 2018-05-22 PROCEDURE — 99214 OFFICE O/P EST MOD 30 MIN: CPT | Performed by: INTERNAL MEDICINE

## 2018-05-22 RX ORDER — SIMVASTATIN 20 MG
20 TABLET ORAL DAILY
Qty: 90 TABLET | Refills: 3 | Status: SHIPPED | OUTPATIENT
Start: 2018-05-22 | End: 2019-01-11

## 2018-05-22 RX ORDER — METFORMIN HCL 500 MG
1000 TABLET, EXTENDED RELEASE 24 HR ORAL 2 TIMES DAILY WITH MEALS
Qty: 360 TABLET | Refills: 3 | Status: SHIPPED | OUTPATIENT
Start: 2018-05-22 | End: 2019-01-11

## 2018-05-22 RX ORDER — LISINOPRIL 20 MG/1
10 TABLET ORAL DAILY
Qty: 45 TABLET | Refills: 3 | Status: ON HOLD | OUTPATIENT
Start: 2018-05-22 | End: 2018-12-27

## 2018-05-22 NOTE — MR AVS SNAPSHOT
"              After Visit Summary   5/22/2018    Jairo Escamilla    MRN: [id number]           Patient Information     Date Of Birth          1970        Visit Information        Provider Department      5/22/2018 8:20 AM Sandoval Wilkinson MD [facility] [place]        Today's Diagnoses     Uncontrolled type 2 diabetes mellitus without complication, with long-term current use of insulin (H)    -  1    Essential hypertension        Hyperlipidemia LDL goal <100        Peripheral polyneuropathy        Morbid obesity (H)          Care Instructions    Lab work downstairs today.  Directions:  As you walk through the first floor, you'll see (on the right) first the pharmacy, then some bathrooms, then the \"Lab and Imaging\" area. Give them your name at the window there and wait for them to call you.     Refills for 1 year.    Follow up in 6 months for diabetes mellitus.    Follow up with weight loss center this spring/summer.           Follow-ups after your visit        Additional Services     BARIATRIC ADULT REFERRAL       Your provider has referred you to: PAULOG: [facility] [phone]  [email or web address]    Please be aware that coverage of these services is subject to the terms and limitations of your health insurance plan.  Call member services at your health plan with any benefit or coverage questions.      Please bring the following with you to your appointment:      (1) List of current medications   (2) This referral request   (3) Any documents/labs given to you for this referral                  Follow-up notes from your care team     Return in about 6 months (around 11/22/2018) for Diabetes Followup.      Who to contact     If you have questions or need follow up information about today's clinic visit or your schedule please contact [facility] [place] directly at " "[phone].  Normal or non-critical lab and imaging results will be communicated to you by [employer], letter or phone within 4 business days after the clinic has received the results. If you do not hear from us within 7 days, please contact the clinic through [employer] or phone. If you have a critical or abnormal lab result, we will notify you by phone as soon as possible.  Submit refill requests through [employer] or call your pharmacy and they will forward the refill request to us. Please allow 3 business days for your refill to be completed.          Additional Information About Your Visit        [employer] Information     [employer] gives you secure access to your electronic health record. If you see a primary care provider, you can also send messages to your care team and make appointments. If you have questions, please call your primary care clinic.  If you do not have a primary care provider, please call [phone] and they will assist you.        Care EveryWhere ID     This is your Care EveryWhere ID. This could be used by other organizations to access your [place] medical records  [id number]        Your Vitals Were     Pulse Temperature Height Pulse Oximetry BMI (Body Mass Index)       86 98.2  F (36.8  C) (Oral) 5' 10\" (1.778 m) 95% 39.6 kg/m2        Blood Pressure from Last 3 Encounters:   05/22/18 102/70   03/06/17 110/70   01/06/17 120/90    Weight from Last 3 Encounters:   05/22/18 276 lb (125.2 kg)   03/06/17 283 lb 9 oz (128.6 kg)   01/06/17 280 lb 8 oz (127.2 kg)              We Performed the Following     Albumin Random Urine Quantitative with Creat Ratio     BARIATRIC ADULT REFERRAL     Comprehensive metabolic panel     Hemoglobin A1c     Lipid panel reflex to direct LDL Fasting     TSH with free T4 reflex          Today's Medication Changes          These changes are accurate as of 5/22/18  8:49 AM.  If you have any questions, ask your nurse or doctor.               Start taking these medicines.        " Dose/Directions    insulin aspart 100 UNIT/ML injection   Commonly known as:  NovoLOG FLEXPEN   Used for:  Uncontrolled type 2 diabetes mellitus without complication, with long-term current use of insulin (H)   Started by:  Sandoval Wilkinson MD        15 units before breakfast, 15 units before lunch, 15 units before dinner   Quantity:  45 mL   Refills:  3         These medicines have changed or have updated prescriptions.        Dose/Directions    * insulin glargine 100 UNIT/ML injection   Commonly known as:  LANTUS   This may have changed:  Another medication with the same name was added. Make sure you understand how and when to take each.   Used for:  Uncontrolled type 2 diabetes mellitus without complication, with long-term current use of insulin (H)   Changed by:  Sandoval Wilkinson MD        Dose:  50 Units   Inject 50 Units Subcutaneous At Bedtime   Quantity:  1 Month   Refills:  0       * LANTUS SOLOSTAR 100 UNIT/ML injection   This may have changed:  Another medication with the same name was added. Make sure you understand how and when to take each.   Used for:  Uncontrolled type 2 diabetes mellitus without complication, with long-term current use of insulin (H)   Generic drug:  insulin glargine   Changed by:  Sandoval Wilkinson MD        TAKE 60 UNITS AT AT BEDTIME. INCREASE BY 10 UNITS EVERY 3 DAYS, UNTIL YOUR SUGARS ARE AT OR BELOW 100   Quantity:  30 mL   Refills:  0       * insulin glargine 100 UNIT/ML injection   Commonly known as:  LANTUS SOLOSTAR   This may have changed:  You were already taking a medication with the same name, and this prescription was added. Make sure you understand how and when to take each.   Used for:  Uncontrolled type 2 diabetes mellitus without complication, with long-term current use of insulin (H)   Changed by:  Sandoval Wilkinson MD        Dose:  60 Units   Inject 60 Units Subcutaneous At Bedtime   Quantity:  45 mL   Refills:  3       * Notice:  This list has 3 medication(s) that are the  same as other medications prescribed for you. Read the directions carefully, and ask your doctor or other care provider to review them with you.      Stop taking these medicines if you haven't already. Please contact your care team if you have questions.     insulin lispro 100 UNIT/ML injection   Commonly known as:  HumaLOG PEN   Stopped by:  Sandoval Wilkinson MD                Where to get your medicines      These medications were sent to [id number] - [place], [place] - [address]  AT [address]  [address] [place] [address]     Phone:  [phone]     insulin aspart 100 UNIT/ML injection    insulin glargine 100 UNIT/ML injection    lisinopril 20 MG tablet    metFORMIN 500 MG 24 hr tablet    simvastatin 20 MG tablet                Primary Care Provider Office Phone # Fax #    Sandoval Wilkinson [phone] [address] [place] [address]        Equal Access to Services     [facility]: Hadii aad ku hadasho Soomaali, waaxda luqadaha, qaybta kaalmada adeegyada, waxay idiin hayaan adeeg kharash la'aan . So [facility] [phone].    ATENCIÓN: Si habla español, tiene a comer disposición servicios gratuitos de asistencia lingüística. [facility] [phone].    We comply with applicable federal civil rights laws and [place] laws. We do not discriminate on the basis of race, color, national origin, age, disability, sex, sexual orientation, or gender identity.            Thank you!     Thank you for choosing [facility]  for your care. Our goal is always to provide you with excellent care. Hearing back from our patients is one way we can continue to improve our services. Please take a few minutes to complete the written survey that you may receive in the mail after your visit with us. Thank you!             Your Updated Medication List - Protect others around you: Learn how to safely use, store and throw away your medicines at  www.disposemymeds.org.          This list is accurate as of 5/22/18  8:49 AM.  Always use your most recent med list.                   Brand Name Dispense Instructions for use Diagnosis    ASPIRIN NOT PRESCRIBED    INTENTIONAL    0 each    Antiplatelet medication not prescribed intentionally due to subdural hematoma    Diabetes type 2, uncontrolled (H)       * blood glucose monitoring test strip    ONETOUCH ULTRA    100 each    Use to test blood sugars tid or as directed.    Type 2 diabetes mellitus without complication (H)       * blood glucose monitoring test strip    ONETOUCH ULTRA    100 each    Use to test blood sugars tid or as directed.    Uncontrolled type 2 diabetes mellitus without complication, with long-term current use of insulin (H)       insulin aspart 100 UNIT/ML injection    NovoLOG FLEXPEN    45 mL    15 units before breakfast, 15 units before lunch, 15 units before dinner    Uncontrolled type 2 diabetes mellitus without complication, with long-term current use of insulin (H)       * insulin glargine 100 UNIT/ML injection    LANTUS    1 Month    Inject 50 Units Subcutaneous At Bedtime    Uncontrolled type 2 diabetes mellitus without complication, with long-term current use of insulin (H)       * LANTUS SOLOSTAR 100 UNIT/ML injection   Generic drug:  insulin glargine     30 mL    TAKE 60 UNITS AT AT BEDTIME. INCREASE BY 10 UNITS EVERY 3 DAYS, UNTIL YOUR SUGARS ARE AT OR BELOW 100    Uncontrolled type 2 diabetes mellitus without complication, with long-term current use of insulin (H)       * insulin glargine 100 UNIT/ML injection    LANTUS SOLOSTAR    45 mL    Inject 60 Units Subcutaneous At Bedtime    Uncontrolled type 2 diabetes mellitus without complication, with long-term current use of insulin (H)       insulin pen needle 32G X 4 MM     360 each    Use 4 pen needles daily or as directed.    Type 2 diabetes mellitus without complication (H)       lisinopril 20 MG tablet    PRINIVIL/ZESTRIL    45  tablet    Take 0.5 tablets (10 mg) by mouth daily    Essential hypertension       metFORMIN 500 MG 24 hr tablet    GLUCOPHAGE-XR    360 tablet    Take 2 tablets (1,000 mg) by mouth 2 times daily (with meals) lima.    Uncontrolled type 2 diabetes mellitus without complication, with long-term current use of insulin (H)       order for DME      AIRSENSE 10 7-11 CM/H20 NASAL WISP FABRIC        order for DME     1 each    Equipment being ordered:   1 glucometer  90 Test strips for three times daily testing with 11 RF.  90 lancets for three times daily testing.    Uncontrolled type 2 diabetes mellitus without complication, with long-term current use of insulin (H)       simvastatin 20 MG tablet    ZOCOR    90 tablet    Take 1 tablet (20 mg) by mouth daily    Hyperlipidemia LDL goal <100       * Notice:  This list has 5 medication(s) that are the same as other medications prescribed for you. Read the directions carefully, and ask your doctor or other care provider to review them with you.

## 2018-05-22 NOTE — PROGRESS NOTES
"  SUBJECTIVE:   Jairo Escamilla is a 48 year old male who presents to clinic today for the following health issues:      Follow up Diabetes, HTN and Hyperlipidemia    Pt checks sugars 1-2 times per day, readings typically around 200.  Does not check BP.  Concerns that his medications aren't working as well as they should and that he is getting incorrect readings.  Taking medications as prescribed \"most of the time\", no SE.  *Next Rx will have to switch back to Novolog per insurance*.  Last diabetic eye exam January 2017, normal.  Exercising 3-4 days per week for at least two hours.  Pt states his diet could be better, regular.      Fasting 11 hours.     Feels like does not understand what is going on with his blood sugars; at times, he is doing well with diet, and levels \"dont make sense\".      Frustrated by poor weight loss.  In [employer].      Drinks minimal calories.  Most of his drinks are water or zero catherine drinks.      Eats a small breakfast.  Lunch is usually [employer], Often snacks on Convenience store snacks. Donut or chips.  Will often have dinner at home.  No significant fast foods.      BP Readings from Last 2 Encounters:   03/06/17 110/70   01/06/17 120/90     Hemoglobin A1C (%)   Date Value   03/06/2017 7.2 (H)   12/28/2016 9.2 (H)     LDL Cholesterol Calculated (mg/dL)   Date Value   03/06/2017 65   10/20/2015 54     Needs to change insulin to Novolog due to coverage.       Problem list and histories reviewed & adjusted, as indicated.  Additional history: as documented    Patient Active Problem List   Diagnosis     Hyperlipidemia LDL goal <100     Hypertension  BP goal <140/90     Morbid obesity (H)     SDH (subdural hematoma) (H)     Type 2 diabetes mellitus with hyperglycemia, with long-term current use of insulin (H)     Peripheral polyneuropathy     Past Surgical History:   Procedure Laterality Date     NO HISTORY OF SURGERY         Social History   Substance Use Topics     Smoking status: Never " Smoker     Smokeless tobacco: Never Used     Alcohol use 0.0 oz/week     0 Standard drinks or equivalent per week      Comment: 1-2 drinks every 3-4 weeks     Family History   Problem Relation Age of Onset     DIABETES Mother      C.A.D. Father      in mid 60's     DIABETES Maternal Aunt       of Diabetic complications     Cancer - colorectal No family hx of      Prostate Cancer No family hx of      CEREBROVASCULAR DISEASE No family hx of          Current Outpatient Prescriptions   Medication Sig Dispense Refill     ASPIRIN NOT PRESCRIBED (INTENTIONAL) Antiplatelet medication not prescribed intentionally due to subdural hematoma 0 each 0     blood glucose monitoring (ONE TOUCH ULTRA) test strip Use to test blood sugars tid or as directed. 100 each prn     blood glucose monitoring (ONE TOUCH ULTRA) test strip Use to test blood sugars tid or as directed. 100 each 0     insulin aspart (NOVOLOG FLEXPEN) 100 UNIT/ML injection 15 units before breakfast, 15 units before lunch, 15 units before dinner 45 mL 3     insulin glargine (LANTUS SOLOSTAR) 100 UNIT/ML pen Inject 60 Units Subcutaneous At Bedtime 45 mL 3     insulin glargine (LANTUS) 100 UNIT/ML injection Inject 50 Units Subcutaneous At Bedtime 1 Month 0     insulin pen needle 32G X 4 MM Use 4 pen needles daily or as directed. 360 each 3     LANTUS SOLOSTAR 100 UNIT/ML soln TAKE 60 UNITS AT AT BEDTIME. INCREASE BY 10 UNITS EVERY 3 DAYS, UNTIL YOUR SUGARS ARE AT OR BELOW 100 30 mL 0     lisinopril (PRINIVIL/ZESTRIL) 20 MG tablet Take 0.5 tablets (10 mg) by mouth daily 45 tablet 3     metFORMIN (GLUCOPHAGE-XR) 500 MG 24 hr tablet Take 2 tablets (1,000 mg) by mouth 2 times daily (with meals) lima. 360 tablet 3     order for DME AIRSENSE 10  7-11 CM/H20  NASAL WISP FABRIC       order for DME Equipment being ordered:     1 glucometer    90 Test strips for three times daily testing with 11 RF.    90 lancets for three times daily testing. 1 each 0     simvastatin (ZOCOR)  20 MG tablet Take 1 tablet (20 mg) by mouth daily 90 tablet 3     [DISCONTINUED] lisinopril (PRINIVIL/ZESTRIL) 20 MG tablet Take 0.5 tablets (10 mg) by mouth daily 45 tablet 3     [DISCONTINUED] metFORMIN (GLUCOPHAGE-XR) 500 MG 24 hr tablet Take 2 tablets (1,000 mg) by mouth 2 times daily (with meals) lima. 360 tablet 3     [DISCONTINUED] simvastatin (ZOCOR) 20 MG tablet Take 1 tablet (20 mg) by mouth daily 90 tablet 3     No Known Allergies  Recent Labs   Lab Test  03/06/17   0912  12/28/16   1028  02/29/16   0827  02/28/16   0445   12/02/15   1812  10/20/15   1303   05/30/14   1031   A1C  7.2*  9.2*   --   8.6*   < >   --   9.2*   < >  8.5*   LDL  65   --    --    --    --    --   54   --   53   HDL  35*   --    --    --    --    --   39*   --   31*   TRIG  209*   --    --    --    --    --   212*   --   192*   ALT  46   --    --    --    --   57  65   --   57   CR  0.66   --   0.70  0.72   < >  0.73  0.71   < >  0.77   GFRESTIMATED  >90  Non  GFR Calc     --   >90  Non  GFR Calc    >90  Non  GFR Calc     < >  >90  Non  GFR Calc    >90  Non  GFR Calc     < >  >90   GFRESTBLACK  >90   GFR Calc     --   >90   GFR Calc    >90   GFR Calc     < >  >90   GFR Calc    >90   GFR Calc     < >  >90   POTASSIUM  4.3   --   4.3  4.2   < >  4.2  4.6   < >  4.7   TSH  1.94   --    --    --    --    --   1.52   --    --     < > = values in this interval not displayed.      BP Readings from Last 3 Encounters:   05/22/18 102/70   03/06/17 110/70   01/06/17 120/90    Wt Readings from Last 3 Encounters:   05/22/18 276 lb (125.2 kg)   03/06/17 283 lb 9 oz (128.6 kg)   01/06/17 280 lb 8 oz (127.2 kg)                  Labs reviewed in EPIC    Reviewed and updated as needed this visit by clinical staff  Tobacco  Allergies  Meds  Med Hx  Surg Hx  Fam Hx  Soc Hx      Reviewed  "and updated as needed this visit by Provider         ROS:  CONSTITUTIONAL: NEGATIVE for fever, chills, change in weight  ENT/MOUTH: NEGATIVE for ear, mouth and throat problems  RESP: NEGATIVE for significant cough or SOB  CV: NEGATIVE for chest pain, palpitations or peripheral edema    OBJECTIVE:                                                    /70 (BP Location: Right arm, Cuff Size: Adult Large)  Pulse 86  Temp 98.2  F (36.8  C) (Oral)  Ht 5' 10\" (1.778 m)  Wt 276 lb (125.2 kg)  SpO2 95%  BMI 39.6 kg/m2  Body mass index is 39.6 kg/(m^2).   GENERAL: healthy, alert, well nourished, well hydrated, no distress  HENT: ear canals- normal; TMs- normal; Nose- normal; Mouth- no ulcers, no lesions  NECK: no tenderness, no adenopathy, no asymmetry, no masses, no stiffness; thyroid- normal to palpation  RESP: lungs clear to auscultation - no rales, no rhonchi, no wheezes  CV: regular rates and rhythm, normal S1 S2, no S3 or S4 and no murmur, no click or rub -  ABDOMEN: soft, no tenderness, no  hepatosplenomegaly, no masses, normal bowel sounds  lower extremity:  Mild loss of sensation peripherally    Diagnostic test results:  Diagnostic Test Results:  none      ASSESSMENT/PLAN:                                                    1. Uncontrolled type 2 diabetes mellitus without complication, with long-term current use of insulin (H)  Parameters well controlled.  Continue secondary risk factor modification for BP, cholesterol, anticoagulation, and smoking cessation.   - insulin aspart (NOVOLOG FLEXPEN) 100 UNIT/ML injection; 15 units before breakfast, 15 units before lunch, 15 units before dinner  Dispense: 45 mL; Refill: 3  - insulin glargine (LANTUS SOLOSTAR) 100 UNIT/ML pen; Inject 60 Units Subcutaneous At Bedtime  Dispense: 45 mL; Refill: 3  - metFORMIN (GLUCOPHAGE-XR) 500 MG 24 hr tablet; Take 2 tablets (1,000 mg) by mouth 2 times daily (with meals) lima.  Dispense: 360 tablet; Refill: 3  - Hemoglobin A1c  - TSH " with free T4 reflex  - Albumin Random Urine Quantitative with Creat Ratio    2. Essential hypertension  Well controlled.   - lisinopril (PRINIVIL/ZESTRIL) 20 MG tablet; Take 0.5 tablets (10 mg) by mouth daily  Dispense: 45 tablet; Refill: 3  - Comprehensive metabolic panel    3. Hyperlipidemia LDL goal <100  Continue statin.   - simvastatin (ZOCOR) 20 MG tablet; Take 1 tablet (20 mg) by mouth daily  Dispense: 90 tablet; Refill: 3  - Lipid panel reflex to direct LDL Fasting    4. Peripheral polyneuropathy  Beginnings of lower extremity loss of sensation.     5. Morbid obesity (H)  Referred to weight management for further treatment; patient frustrated with his slow (but good) results.   - BARIATRIC ADULT REFERRAL      See Patient Instructions    Sandoval Wilkinson MD  [facility]

## 2018-05-22 NOTE — PATIENT INSTRUCTIONS
"Lab work downstairs today.  Directions:  As you walk through the first floor, you'll see (on the right) first the pharmacy, then some bathrooms, then the \"Lab and Imaging\" area. Give them your name at the window there and wait for them to call you.     Refills for 1 year.    Follow up in 6 months for diabetes mellitus.    Follow up with weight loss center this spring/summer.   "

## 2018-05-24 ENCOUNTER — MYC MEDICAL ADVICE (OUTPATIENT)
Dept: PEDIATRICS | Facility: CLINIC | Age: 48
End: 2018-05-24

## 2018-05-24 DIAGNOSIS — E66.01 MORBID OBESITY (H): Primary | ICD-10-CM

## 2018-05-24 NOTE — TELEPHONE ENCOUNTER
Abhishek review-patient needs a referral for medical weight loss clinic, pended the option for medical weight loss.  Please sign if ok.  We need to give patient the referral info.  Nataly Aponte RN  Message handled by Nurse Triage.

## 2018-05-24 NOTE — TELEPHONE ENCOUNTER
LM with referral information below  MyChart message sent as well.     Wilda Leblanc MA   May 24, 2018,  3:56 PM

## 2018-05-24 NOTE — TELEPHONE ENCOUNTER
Ordered.    Please refer:    [facility] [phone].

## 2018-08-06 ENCOUNTER — OFFICE VISIT (OUTPATIENT)
Dept: SURGERY | Facility: CLINIC | Age: 48
End: 2018-08-06
Payer: COMMERCIAL

## 2018-08-06 VITALS
WEIGHT: 281.5 LBS | BODY MASS INDEX: 40.3 KG/M2 | OXYGEN SATURATION: 95 % | HEART RATE: 86 BPM | RESPIRATION RATE: 12 BRPM | SYSTOLIC BLOOD PRESSURE: 130 MMHG | HEIGHT: 70 IN | DIASTOLIC BLOOD PRESSURE: 82 MMHG

## 2018-08-06 DIAGNOSIS — E11.65 TYPE 2 DIABETES MELLITUS WITH HYPERGLYCEMIA, WITH LONG-TERM CURRENT USE OF INSULIN (H): ICD-10-CM

## 2018-08-06 DIAGNOSIS — I10 HYPERTENSION, UNSPECIFIED TYPE: ICD-10-CM

## 2018-08-06 DIAGNOSIS — Z79.4 TYPE 2 DIABETES MELLITUS WITH HYPERGLYCEMIA, WITH LONG-TERM CURRENT USE OF INSULIN (H): ICD-10-CM

## 2018-08-06 DIAGNOSIS — E66.01 MORBID OBESITY (H): Primary | ICD-10-CM

## 2018-08-06 PROCEDURE — 99205 OFFICE O/P NEW HI 60 MIN: CPT | Performed by: FAMILY MEDICINE

## 2018-08-06 RX ORDER — PHENTERMINE HYDROCHLORIDE 37.5 MG/1
TABLET ORAL
Qty: 90 TABLET | Refills: 0 | Status: SHIPPED | OUTPATIENT
Start: 2018-08-06 | End: 2018-12-24

## 2018-08-06 NOTE — PATIENT INSTRUCTIONS

## 2018-08-06 NOTE — MR AVS SNAPSHOT
After Visit Summary   8/6/2018    Jairo Escamilla    MRN: [id number]           Patient Information     Date Of Birth          1970        Visit Information        Provider Department      8/6/2018 8:30 AM Angeles Meade MD [facility] - [facility]      Today's Diagnoses     Morbid obesity (H)    -  1    Type 2 diabetes mellitus with hyperglycemia, with long-term current use of insulin (H)        Hypertension, unspecified type          Care Instructions    Eat Better ? Move More ? Live Well    Eat 3 nutrient-rich meals each day     Don t skip meals--it will cause you to overeat later in the day!     Eating fiber (vegetables/fruits/whole grains) and protein with meals helps you stay full longer     Choose foods with less than 10 grams of sugar and 5 grams of fat per serving to prevent excess calories and weight re-gain   Eat around the same times each day to develop a routine eating schedule    Avoid snacking unless physically hungry.   Planned snacks: 1-2 times per day and no more than 150 calories    Eat protein first    Protein helps with healing, maintaining adequate muscle mass, reducing hunger and optimizing nutritional status    Aim for 60-80 grams of protein per day   Fill up on Fiber    Fiber comes from plants--fruits, veggies, whole grains, nuts/seeds and beans    Fiber is low in calories, high in phytonutrients and helps you stay full longer    Aim for 25-35 grams per day by eating fiber with meals and snacks  Eat S-[id number]    Take 20-30 minutes to eat each meal by taking small bites, chewing foods to applesauce consistency or 20-30 times before you swallow    Eating foods too fast can delay satiety/fullness signals and increase overeating   ? Slow down your eating by using toddler utensils, putting your fork/spoon down between bites and not watching TV or emailing during meals!   Keep a Journal          Writing  down what you eat, how you feel and when you are active helps you identify new changes to work on from week to week          Look for ways to cut 100 calories from your current diet 2-3 times per day  Drink 64 ounces of 0-Calorie drinks between meals    Water    Zero calorie Propel  or Vitamin Water      SoBe Lifewater  Zero Calories    Crystal Light , Sugar-Free Chirag-Aid , and other sugar-free lemonade or flavored allen    Keep Caffeine to less than 300mg per day ie: 3-6oz cups coffee     Work up to 45-60 minutes of physical activity most days of the week    Helps with losing weight and prevent regaining those extra pounds!     Do a combo of cardio (walking/water exercises) and strength training (lifting weights/Vinyasa yoga)    Avoid Mindless Eating    Be present when you eat--take note of the smell, taste and quality of your food    Make a list of alternative activities you could do to prevent eating out of boredom/stress  ? Go for a walk, call a friend, chew gum, paint your nails, re-organize the garage, etc              Follow-ups after your visit        Your next 10 appointments already scheduled     Sep 24, 2018  8:30 AM CDT   New Weight Management Visit with Rose Marie Cesar Diet 3, RD   [facility] ([facility])    [address] [place] [id number] [phone]              Who to contact     If you have questions or need follow up information about today's clinic visit or your schedule please contact [facility] directly at [phone].  Normal or non-critical lab and imaging results will be communicated to you by MyChart, letter or phone within 4 business days after the clinic has received the results. If you do not hear from us within 7 days, please contact the clinic through MyChart or phone. If you have a critical or abnormal lab result, we will notify you by phone as soon as possible.  Submit  "refill requests through [employer] or call your pharmacy and they will forward the refill request to us. Please allow 3 business days for your refill to be completed.          Additional Information About Your Visit        [employer] Information     [employer] gives you secure access to your electronic health record. If you see a primary care provider, you can also send messages to your care team and make appointments. If you have questions, please call your primary care clinic.  If you do not have a primary care provider, please call [phone] and they will assist you.        Care EveryWhere ID     This is your Care EveryWhere ID. This could be used by other organizations to access your [facility] medical records  [id number]        Your Vitals Were     Pulse Respirations Height Pulse Oximetry BMI (Body Mass Index)       86 12 5' 10\" (1.778 m) 95% 40.39 kg/m2        Blood Pressure from Last 3 Encounters:   08/06/18 130/82   05/22/18 102/70   03/06/17 110/70    Weight from Last 3 Encounters:   08/06/18 281 lb 8 oz (127.7 kg)   05/22/18 276 lb (125.2 kg)   03/06/17 283 lb 9 oz (128.6 kg)              Today, you had the following     No orders found for display         Today's Medication Changes          These changes are accurate as of 8/6/18  9:24 AM.  If you have any questions, ask your nurse or doctor.               Start taking these medicines.        Dose/Directions    phentermine 37.5 MG tablet   Commonly known as:  ADIPEX-P   Used for:  Morbid obesity (H)   Started by:  Angeles Meade MD        Take 1/2 tablet every morning with breakfast. Increase to 1 tablet after 1 week if needed.   Quantity:  90 tablet   Refills:  0            Where to get your medicines      Some of these will need a paper prescription and others can be bought over the counter.  Ask your nurse if you have questions.     Bring a paper prescription for each of these medications     phentermine 37.5 MG tablet                Primary Care " Provider Office Phone # Fax #    Sandoval Wilkinson [phone] [address] [place] [address]        Equal Access to Services     REMY EH : Hadluis joo paula nettieroxy Solemuel, wasimonda luqadaha, qaybta kaalmada seth, savanah sood laemilygabriella joão. So [facility] [phone].    ATENCIÓN: Si habla español, tiene a comer disposición servicios gratuitos de asistencia lingüística. Llame al [phone].    We comply with applicable federal civil rights laws and [place] laws. We do not discriminate on the basis of race, color, national origin, age, disability, sex, sexual orientation, or gender identity.            Thank you!     Thank you for choosing [facility]  for your care. Our goal is always to provide you with excellent care. Hearing back from our patients is one way we can continue to improve our services. Please take a few minutes to complete the written survey that you may receive in the mail after your visit with us. Thank you!             Your Updated Medication List - Protect others around you: Learn how to safely use, store and throw away your medicines at www.disposemymeds.org.          This list is accurate as of 8/6/18  9:24 AM.  Always use your most recent med list.                   Brand Name Dispense Instructions for use Diagnosis    ASPIRIN NOT PRESCRIBED    INTENTIONAL    0 each    Antiplatelet medication not prescribed intentionally due to subdural hematoma    Diabetes type 2, uncontrolled (H)       * blood glucose monitoring test strip    ONETOUCH ULTRA    100 each    Use to test blood sugars tid or as directed.    Type 2 diabetes mellitus without complication (H)       * blood glucose monitoring test strip    ONETOUCH ULTRA    100 each    Use to test blood sugars tid or as directed.    Uncontrolled type 2 diabetes mellitus without complication, with long-term current use of insulin (H)       insulin aspart 100 UNIT/ML  injection    NovoLOG FLEXPEN    45 mL    15 units before breakfast, 15 units before lunch, 15 units before dinner    Uncontrolled type 2 diabetes mellitus without complication, with long-term current use of insulin (H)       * insulin glargine 100 UNIT/ML injection    LANTUS    1 Month    Inject 50 Units Subcutaneous At Bedtime    Uncontrolled type 2 diabetes mellitus without complication, with long-term current use of insulin (H)       * LANTUS SOLOSTAR 100 UNIT/ML injection   Generic drug:  insulin glargine     30 mL    TAKE 60 UNITS AT AT BEDTIME. INCREASE BY 10 UNITS EVERY 3 DAYS, UNTIL YOUR SUGARS ARE AT OR BELOW 100    Uncontrolled type 2 diabetes mellitus without complication, with long-term current use of insulin (H)       * insulin glargine 100 UNIT/ML injection    LANTUS SOLOSTAR    45 mL    Inject 60 Units Subcutaneous At Bedtime    Uncontrolled type 2 diabetes mellitus without complication, with long-term current use of insulin (H)       insulin pen needle 32G X 4 MM     360 each    Use 4 pen needles daily or as directed.    Type 2 diabetes mellitus without complication (H)       lisinopril 20 MG tablet    PRINIVIL/ZESTRIL    45 tablet    Take 0.5 tablets (10 mg) by mouth daily    Essential hypertension       metFORMIN 500 MG 24 hr tablet    GLUCOPHAGE-XR    360 tablet    Take 2 tablets (1,000 mg) by mouth 2 times daily (with meals) lima.    Uncontrolled type 2 diabetes mellitus without complication, with long-term current use of insulin (H)       order for DME      AIRSENSE 10 7-11 CM/H20 NASAL WISP FABRIC        order for DME     1 each    Equipment being ordered:   1 glucometer  90 Test strips for three times daily testing with 11 RF.  90 lancets for three times daily testing.    Uncontrolled type 2 diabetes mellitus without complication, with long-term current use of insulin (H)       phentermine 37.5 MG tablet    ADIPEX-P    90 tablet    Take 1/2 tablet every morning with breakfast. Increase to 1  tablet after 1 week if needed.    Morbid obesity (H)       simvastatin 20 MG tablet    ZOCOR    90 tablet    Take 1 tablet (20 mg) by mouth daily    Hyperlipidemia LDL goal <100       * Notice:  This list has 5 medication(s) that are the same as other medications prescribed for you. Read the directions carefully, and ask your doctor or other care provider to review them with you.

## 2018-08-06 NOTE — PROGRESS NOTES
HPI:  Jairo Escamilla is a 48 year old year old male who I was asked to see by Dr. Sandoval Wilkinson for medical bariatric consultation. Weight related co-morbidities include   Patient Active Problem List   Diagnosis     Hyperlipidemia LDL goal <100     Hypertension  BP goal <140/90     Morbid obesity (H)     SDH (subdural hematoma) (H)     Type 2 diabetes mellitus with hyperglycemia, with long-term current use of insulin (H)     Peripheral polyneuropathy   .  His BMI is Body mass index is 40.39 kg/(m^2)..      Assessment/ Plan:  Jairo is a 48 year old year old male who presents for medical weight management.     Diagnosis Comments   1. Morbid obesity (H)  We discussed healthy habits to assist with weight loss. We discussed medication that may assist with weight loss. Phentermine was prescribed. Risks/ benefits and possible side effects were discussed and questions were answered. Written information was given. He was encouraged to try to get a new mask for his CPAP machine. Untreated sleep apnea can interfere with weight loss.     2. Type 2 diabetes mellitus with hyperglycemia, with long-term current use of insulin (H)  We discussed insulin resistance and how it can interfere with weight loss efforts. We discussed dietary changes to reduce insulin resistance. We discussed the use of metformin. Patient may benefit from trying liraglutide in the future is it may allow him to decrease his insulin doseage.   3. Hypertension, unspecified type  Controlled on current medication. Weight loss should help with this.         Follow up: in 1 month with our dietician and in 2 months with myself      >60 minutes spent with patient, > 50% spent in counseling          Counseling:  We discussed [facility] Bariatric Basics including:  -eating 3 meals daily  -eating protein first  -eating slowly, chewing food well  -avoiding/limiting calorie containing beverages  -choosing wheat, not white with breads, crackers, pastas, kevin, bagels,  tortillas, rice  -limiting carbohydrates in general  -limiting restaurant or cafeteria eating to twice a week or less    We discussed the importance of restorative sleep and stress management in maintaining a healthy weight.    We reviewed medications associated with weight gain.    We discussed insulin resistance and glycemic index as it relates to appetite and weight control.     We discussed the National Weight Control Registry healthy weight maintenance strategies and ways to optimize metabolism.  We discussed the importance of physical activity including cardiovascular and strength training in maintaining a healthier weight and explored viable options.    We discussed medications available for weight loss including phentermine, phendimetrazine, topamax, qsymia, lorcaserin, contrave, diethylproprion, and orlistat. We discussed the risks and benefits of each. We discussed indications, contraindications, potential side effects, and estimated costs of each. Literature was offered.    History Surrouding Consultation:  Struggles with weight started at age childhood  His weight at age 20 was 200#  He has had several past supervised and unsupervised weight loss attempts  The most weight lost was: 90#  Unfortunately there was not durable weight maintenance.  History of bulimia, anorexia, or binge eating disorder? no  If Present has eating disorder been in remission at least 3 years? na    Dietary History  Meals per day: 2  Snacks: occasional  Typical Snack: donut or candy bar  Who does the grocery shopping? Patient   Who does the cooking? patient  A typical meal includes: B: skips or muffin or cereal (makes him feel more sluggish) L: cheeseburger or chicken sandwich or soup sometimes soup  D: cut back on fast food recently, now pasta or tator tot hotdish  Regular Pop: none  Juice: occasional  Caffeine: diet soda, jung sugar free peach tea  Amount of restaurant eating per week: 12  Eating a the table with the TV off?  occasionally    Physical Activity Patterns  Current physical activity routine includes: professional  (can get over 20,000 steps a day), in the winter he does cardio and weights in the gym, swims    Limitations from being physically active on a regular basis includes: time in the summer limits him getting to the gym, fatigue        PMH:  Past Medical History:   Diagnosis Date     Diabetes mellitus (adult onset)      Diabetes mellitus, type 2 (H) 2008     Hypertension      Subdural hematoma (H)        Past Surgical Hx:  Past Surgical History:   Procedure Laterality Date     NO HISTORY OF SURGERY         Medication:  Current Outpatient Prescriptions   Medication     blood glucose monitoring (ONE TOUCH ULTRA) test strip     blood glucose monitoring (ONE TOUCH ULTRA) test strip     insulin aspart (NOVOLOG FLEXPEN) 100 UNIT/ML injection     insulin glargine (LANTUS SOLOSTAR) 100 UNIT/ML pen     insulin glargine (LANTUS) 100 UNIT/ML injection     insulin pen needle 32G X 4 MM     LANTUS SOLOSTAR 100 UNIT/ML soln     lisinopril (PRINIVIL/ZESTRIL) 20 MG tablet     metFORMIN (GLUCOPHAGE-XR) 500 MG 24 hr tablet     order for DME     simvastatin (ZOCOR) 20 MG tablet     ASPIRIN NOT PRESCRIBED (INTENTIONAL)     order for DME     No current facility-administered medications for this visit.        Allergies:Review of patient's allergies indicates no known allergies.    Family Hx:  Family History   Problem Relation Age of Onset     Diabetes Mother      C.A.D. Father      in mid 60's     Diabetes Maternal Aunt       of Diabetic complications     Cancer - colorectal No family hx of      Prostate Cancer No family hx of      Cerebrovascular Disease No family hx of        Social Hx:  Social History     Social History     Marital status: Single     Spouse name: N/A     Number of children: N/A     Years of education: N/A     Occupational History     Not on file.     Social History Main Topics     Smoking status:  "Never Smoker     Smokeless tobacco: Never Used     Alcohol use 0.0 oz/week     0 Standard drinks or equivalent per week      Comment: 1-2 drinks every 3-4 weeks     Drug use: No     Sexual activity: Yes     Other Topics Concern      Service No     Blood Transfusions No     Caffeine Concern No     Occupational Exposure No     Hobby Hazards No     Sleep Concern No     Stress Concern Yes     work related     Weight Concern Yes     Special Diet Yes     medium carb (40-75g per meal)     Back Care No     Exercise Yes     Bike Helmet No     Seat Belt Yes     Self-Exams No     Parent/Sibling W/ Cabg, Mi Or Angioplasty Before 65f 55m? No     Social History Narrative    3/28/2013: Single, .  Lives with his 20 year old son.  Also has an 18 year old son and 22 year old daughter.  Works as a  for [employer].  Active in sports (playing, officiating, watching): softball, football, hockey.       Tobacco Use Hx:  History   Smoking Status     Never Smoker   Smokeless Tobacco     Never Used       ROS  General  Fatigue: yes  Sleep Quality:poor at home, up every 2-3 hours, can fall back asleep: on the road he sleeps well  HEENT  Hx of glaucoma: no  Vision changes: no  Cardiovascular  Chest Pain with Exertion: no  Palpitations: rarely  Hx of heart disease: no  Pulmonary  Shortness of breath at rest: occasionally  Shortness of breath with exertion: yes  Snoring: yes, known sleep apnea, tried using CPAP 1 1/2 years ago, didn't tolerate, was unable to get a new mask through his insurance  Stop-bang score: na  [place] Score: na  Gastrointestinal  Heartburn: no  Abdominal pain: no  Psychiatric  Moods Stable: yes  Endocrine  Polydipsia: occasionally  Polyuria: yes  Neurologic:  Hx of seizures: no  Dermatologic  Rashes: no      /82 (BP Location: Left arm, Patient Position: Sitting, Cuff Size: Adult Large)  Pulse 86  Resp 12  Ht 5' 10\" (1.778 m)  Wt 281 lb 8 oz (127.7 kg)  SpO2 95%  BMI " 40.39 kg/m2  Wt Readings from Last 2 Encounters:   08/06/18 281 lb 8 oz (127.7 kg)   05/22/18 276 lb (125.2 kg)     Body mass index is 40.39 kg/(m^2).        Physical Exam:    GEN: Alert and oriented in no acute distress.   HEENT: PERRLA, mucous membranes moist. Airway adequate  NECK: Supple without LAD or thyromegaly. Carotid bruits absent.  LUNGS: CTA without wheezes or crackles, good air movement throughout  CV: RRR no MRG  ABDOMEN: significant protuberance, BS normal, non tender to palpation, no rebound or guarding, no HSM  SKIN: no rashes, no skin tags, moderate acanthosis nigrans  EXTREMITIES: no edema, dorsalis pedis palpable    Labs:    Lab Results   Component Value Date    WBC 4.6 02/29/2016    HGB 15.3 02/29/2016    HCT 43.6 02/29/2016    MCV 84 02/29/2016     02/29/2016     Lab Results   Component Value Date    CHOL 132 05/22/2018    CHOL 142 03/06/2017    CHOL 135 10/20/2015     Lab Results   Component Value Date    HDL 30 (L) 05/22/2018    HDL 35 (L) 03/06/2017    HDL 39 (L) 10/20/2015     No components found for: LDLCALC  Lab Results   Component Value Date    TRIG 248 (H) 05/22/2018    TRIG 209 (H) 03/06/2017    TRIG 212 (H) 10/20/2015     No components found for: CHOLHDL  Lab Results   Component Value Date    ALT 51 05/22/2018    AST 31 05/22/2018    ALKPHOS 54 05/22/2018     No components found for: HGBA1C  No components found for: [id number]

## 2018-09-03 ENCOUNTER — RADIANT APPOINTMENT (OUTPATIENT)
Dept: GENERAL RADIOLOGY | Facility: CLINIC | Age: 48
End: 2018-09-03
Attending: PHYSICIAN ASSISTANT
Payer: COMMERCIAL

## 2018-09-03 ENCOUNTER — OFFICE VISIT (OUTPATIENT)
Dept: URGENT CARE | Facility: URGENT CARE | Age: 48
End: 2018-09-03
Payer: COMMERCIAL

## 2018-09-03 VITALS
SYSTOLIC BLOOD PRESSURE: 112 MMHG | TEMPERATURE: 98.6 F | HEART RATE: 104 BPM | WEIGHT: 278 LBS | OXYGEN SATURATION: 95 % | BODY MASS INDEX: 39.89 KG/M2 | DIASTOLIC BLOOD PRESSURE: 84 MMHG

## 2018-09-03 DIAGNOSIS — M25.561 ACUTE PAIN OF RIGHT KNEE: Primary | ICD-10-CM

## 2018-09-03 PROCEDURE — 73562 X-RAY EXAM OF KNEE 3: CPT | Mod: RT

## 2018-09-03 PROCEDURE — 99213 OFFICE O/P EST LOW 20 MIN: CPT | Performed by: PHYSICIAN ASSISTANT

## 2018-09-03 ASSESSMENT — PAIN SCALES - GENERAL: PAINLEVEL: EXTREME PAIN (8)

## 2018-09-03 NOTE — PROGRESS NOTES
SUBJECTIVE:   Jairo Escamilla is a 48 year old male presenting for evaluation of   Chief Complaint   Patient presents with     Knee Pain     right, softball injury x 3 days ago, softball hit it.    .    MS Injury/Pain    Onset of symptoms was 2 day(s) ago.  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      Patient experienced immediate pain. He was able to get up and walk. He still was able to umpire the rest of the weekend umpiring.  Course of symptoms is worsening.    Severity moderately severe  Current and Associated symptoms: medial right knee pain where the direct blow was. pain and stiffness worsening today. He admits to bruising. He is able to move the knee with full ROM, but with some pain. No knee instability- does not feel like it is going to give out on him.   He did travel via plane home this morning. He denies long periods of immobilization- flights were broken up and were short. No PMH blood clots.   Denies calf pain. No hip or ankle pain.  Aggravating Factors: movements, direct pressure  Therapies to improve symptoms include: ice and ibuprofen  This is the first time this type of problem has occurred for this patient.   No PMH knee surgeries. Did partially tear his right ACL in high school but never had surgery.    ROS  See HPI    PMH:  Past Medical History:   Diagnosis Date     Diabetes mellitus (adult onset)      Diabetes mellitus, type 2 (H) 5/14/2008     Hypertension      Subdural hematoma (H)      Patient Active Problem List    Diagnosis Date Noted     Peripheral polyneuropathy 05/22/2018     Priority: Medium     Type 2 diabetes mellitus with hyperglycemia, with long-term current use of insulin (H) 03/06/2017     Priority: Medium     SDH (subdural hematoma) (H) 02/27/2016     Priority: Medium     Morbid obesity (H) 10/21/2015     Priority: Medium     Hyperlipidemia  LDL goal <100 03/28/2013     Priority: Medium     Hypertension  BP goal <140/90 03/28/2013     Priority: Medium         Current medications:  Current Outpatient Prescriptions   Medication Sig Dispense Refill     ASPIRIN NOT PRESCRIBED (INTENTIONAL) Antiplatelet medication not prescribed intentionally due to subdural hematoma 0 each 0     blood glucose monitoring (ONE TOUCH ULTRA) test strip Use to test blood sugars tid or as directed. 100 each 0     blood glucose monitoring (ONE TOUCH ULTRA) test strip Use to test blood sugars tid or as directed. 100 each prn     insulin aspart (NOVOLOG FLEXPEN) 100 UNIT/ML injection 15 units before breakfast, 15 units before lunch, 15 units before dinner 45 mL 3     insulin glargine (LANTUS SOLOSTAR) 100 UNIT/ML pen Inject 60 Units Subcutaneous At Bedtime 45 mL 3     insulin glargine (LANTUS) 100 UNIT/ML injection Inject 50 Units Subcutaneous At Bedtime 1 Month 0     insulin pen needle 32G X 4 MM Use 4 pen needles daily or as directed. 360 each 3     LANTUS SOLOSTAR 100 UNIT/ML soln TAKE 60 UNITS AT AT BEDTIME. INCREASE BY 10 UNITS EVERY 3 DAYS, UNTIL YOUR SUGARS ARE AT OR BELOW 100 30 mL 0     lisinopril (PRINIVIL/ZESTRIL) 20 MG tablet Take 0.5 tablets (10 mg) by mouth daily 45 tablet 3     metFORMIN (GLUCOPHAGE-XR) 500 MG 24 hr tablet Take 2 tablets (1,000 mg) by mouth 2 times daily (with meals) lima. 360 tablet 3     order for DME Equipment being ordered:     1 glucometer    90 Test strips for three times daily testing with 11 RF.    90 lancets for three times daily testing. 1 each 0     order for DME AIRSENSE 10  7-11 CM/H20  NASAL WISP FABRIC       phentermine (ADIPEX-P) 37.5 MG tablet Take 1/2 tablet every morning with breakfast. Increase to 1 tablet after 1 week if needed. 90 tablet 0     simvastatin (ZOCOR) 20 MG tablet Take 1 tablet (20 mg) by mouth daily 90 tablet 3       Surgical History:  Past Surgical History:   Procedure Laterality Date     NO HISTORY OF SURGERY          Family history:  Family History   Problem Relation Age of Onset     Diabetes Mother      C.A.D. Father      in mid 60's     Diabetes Maternal Aunt       of Diabetic complications     Cancer - colorectal No family hx of      Prostate Cancer No family hx of      Cerebrovascular Disease No family hx of          Social History:  Social History   Substance Use Topics     Smoking status: Never Smoker     Smokeless tobacco: Never Used     Alcohol use 0.0 oz/week     0 Standard drinks or equivalent per week      Comment: 1-2 drinks every 3-4 weeks           OBJECTIVE  /84 (BP Location: Right arm, Patient Position: Sitting, Cuff Size: Adult Large)  Pulse 104  Temp 98.6  F (37  C) (Tympanic)  Wt 278 lb (126.1 kg)  SpO2 95%  BMI 39.89 kg/m2    Physical Exam    General: well-appearing, no acute distress.   Neck: supple  Lungs:  No distress. Lungs CTAB.  Heart: RRR. No m/r/g.  Muscloloskeletal: Right lower extremity: large, purple, baseball sized hematoma with associated tenderness and edema on the medial right knee, overlying medial joint line. No knee joint instability. No joint effusion. No tenderness of patella. No calf tenderness. Knee AROM intact for flexion to 110 degrees, extension to 10 degrees. Gait: limp favoring left leg.  Neuro: Alert. Sensation to light touch intact in distal right lower extremity.  Psych: Normal mood and affect.          Labs:  Results for orders placed or performed in visit on 18 (from the past 24 hour(s))   XR Knee Right 3 Views    Narrative    XR KNEE RT 3 VW 9/3/2018 1:58 PM    HISTORY: Pain.    COMPARISON: None.      Impression    IMPRESSION: No evidence of acute fracture or malalignment.     NORA LEVY MD       X-Ray was done, my findings are: no knee fracture or dislocation.         ASSESSMENT/PLAN:      ICD-10-CM    1. Acute pain of right knee M25.561 XR Knee Right 3 Views        Medical Decision Making:    Serious Comorbid Conditions:  none    Differential  Diagnosis:   -knee fracture  -hematoma  -DVT - considered, less likely due to history of direct trauma to the area - direct explanation for the hematoma. Do not think that venous US needs to occur today.      He has a large hematoma at the medial knee. No signs or symptoms of knee joint instability nor ligamentous tear on exam today.  Xrays negative for fracture.  Do not think that drainage of hematoma is indicated today as it has only been present for 2 days due to acute injury and likely will heal on its own in time.  Discussed negative xrays with patient. Recommend ice, compression, ibuprofen.  F/up with PCP if no improvement in 3-5 days.    At the end of the encounter, I discussed all available results with patient. Discussed diagnosis and treatment plan.   Return precautions provided to patient and printed below in patient instructions.  Patient understood and agreed to plan. Patient was appropriate for discharge.        Patient Instructions   Your xrays were negative for fracture today.    Your injury is most consistent with a large hematoma due to the blow.    Please continue icing and compression wrap to help with swelling.    Take ibuprofen 600mg every 6-8 hours with food for pain.    Elevate the leg to help decrease swelling.    Follow up with your primary care provider if no improvement in 3-5 days, sooner if worsening.            Regina Tubbs PA-C  09/03/18 2:08 PM

## 2018-09-03 NOTE — MR AVS SNAPSHOT
After Visit Summary   9/3/2018    Jairo Escamilla    MRN: [id number]           Patient Information     Date Of Birth          1970        Visit Information        Provider Department      9/3/2018 12:10 PM Regina Tubbs PA-C [facility]        Today's Diagnoses     Acute pain of right knee    -  1      Care Instructions    Your xrays were negative for fracture today.    Your injury is most consistent with a large hematoma due to the blow.    Please continue icing and compression wrap to help with swelling.    Take ibuprofen 600mg every 6-8 hours with food for pain.    Elevate the leg to help decrease swelling.    Follow up with your primary care provider if no improvement in 3-5 days, sooner if worsening.          Follow-ups after your visit        Follow-up notes from your care team     Return if symptoms worsen or fail to improve.      Your next 10 appointments already scheduled     Sep 24, 2018  8:30 AM CDT   New Weight Management Visit with Rose Marie Cesar Diet 3, RD   [facility])    [address] [place] [id number] [phone]            Oct 22, 2018 10:00 AM CDT   Return Weight Management Visit with MARGI Meade MD   [facility])    [address] [place] [id number] [phone]              Who to contact     If you have questions or need follow up information about today's clinic visit or your schedule please contact [facility] directly at [phone].  Normal or non-critical lab and imaging results will be communicated to you by MyChart, letter or phone within 4 business days after the clinic has received the results. If you do not hear from us within 7 days, please contact the clinic through MyChart or phone. If you have a critical or abnormal lab result, we  will notify you by phone as soon as possible.  Submit refill requests through [employer] or call your pharmacy and they will forward the refill request to us. Please allow 3 business days for your refill to be completed.          Additional Information About Your Visit        [employer] Information     [employer] gives you secure access to your electronic health record. If you see a primary care provider, you can also send messages to your care team and make appointments. If you have questions, please call your primary care clinic.  If you do not have a primary care provider, please call [phone] and they will assist you.        Care EveryWhere ID     This is your Care EveryWhere ID. This could be used by other organizations to access your [place] medical records  [id number]        Your Vitals Were     Pulse Temperature Pulse Oximetry BMI (Body Mass Index)          104 98.6  F (37  C) (Tympanic) 95% 39.89 kg/m2         Blood Pressure from Last 3 Encounters:   09/03/18 112/84   08/06/18 130/82   05/22/18 102/70    Weight from Last 3 Encounters:   09/03/18 278 lb (126.1 kg)   08/06/18 281 lb 8 oz (127.7 kg)   05/22/18 276 lb (125.2 kg)              We Performed the Following     XR Knee Right 3 Views        Primary Care Provider Office Phone # Fax #    Sandoval Wilkinson [phone] [address] [place] [address]        Equal Access to Services     [facility] AH: Hadii aad ku hadasho Soomaali, waaxda luqadaha, qaybta kaalmada adegustaboda, savanah simpson . So [facility] [phone].    ATENCIÓN: Si habla español, tiene a comer disposición servicios gratuitos de asistencia lingüística. Llame al [phone].    We comply with applicable federal civil rights laws and [place] laws. We do not discriminate on the basis of race, color, national origin, age, disability, sex, sexual orientation, or gender identity.            Thank you!     Thank you for choosing [facility] [place]  [facility]  for your care. Our goal is always to provide you with excellent care. Hearing back from our patients is one way we can continue to improve our services. Please take a few minutes to complete the written survey that you may receive in the mail after your visit with us. Thank you!             Your Updated Medication List - Protect others around you: Learn how to safely use, store and throw away your medicines at www.disposemymeds.org.          This list is accurate as of 9/3/18  2:08 PM.  Always use your most recent med list.                   Brand Name Dispense Instructions for use Diagnosis    ASPIRIN NOT PRESCRIBED    INTENTIONAL    0 each    Antiplatelet medication not prescribed intentionally due to subdural hematoma    Diabetes type 2, uncontrolled (H)       * blood glucose monitoring test strip    ONETOUCH ULTRA    100 each    Use to test blood sugars tid or as directed.    Type 2 diabetes mellitus without complication (H)       * blood glucose monitoring test strip    ONETOUCH ULTRA    100 each    Use to test blood sugars tid or as directed.    Uncontrolled type 2 diabetes mellitus without complication, with long-term current use of insulin (H)       insulin aspart 100 UNIT/ML injection    NovoLOG FLEXPEN    45 mL    15 units before breakfast, 15 units before lunch, 15 units before dinner    Uncontrolled type 2 diabetes mellitus without complication, with long-term current use of insulin (H)       * insulin glargine 100 UNIT/ML injection    LANTUS    1 Month    Inject 50 Units Subcutaneous At Bedtime    Uncontrolled type 2 diabetes mellitus without complication, with long-term current use of insulin (H)       * LANTUS SOLOSTAR 100 UNIT/ML injection   Generic drug:  insulin glargine     30 mL    TAKE 60 UNITS AT AT BEDTIME. INCREASE BY 10 UNITS EVERY 3 DAYS, UNTIL YOUR SUGARS ARE AT OR BELOW 100    Uncontrolled type 2 diabetes mellitus without complication, with long-term current use of insulin  (H)       * insulin glargine 100 UNIT/ML injection    LANTUS SOLOSTAR    45 mL    Inject 60 Units Subcutaneous At Bedtime    Uncontrolled type 2 diabetes mellitus without complication, with long-term current use of insulin (H)       insulin pen needle 32G X 4 MM     360 each    Use 4 pen needles daily or as directed.    Type 2 diabetes mellitus without complication (H)       lisinopril 20 MG tablet    PRINIVIL/ZESTRIL    45 tablet    Take 0.5 tablets (10 mg) by mouth daily    Essential hypertension       metFORMIN 500 MG 24 hr tablet    GLUCOPHAGE-XR    360 tablet    Take 2 tablets (1,000 mg) by mouth 2 times daily (with meals) lima.    Uncontrolled type 2 diabetes mellitus without complication, with long-term current use of insulin (H)       order for DME      AIRSENSE 10 7-11 CM/H20 NASAL WISP FABRIC        order for DME     1 each    Equipment being ordered:   1 glucometer  90 Test strips for three times daily testing with 11 RF.  90 lancets for three times daily testing.    Uncontrolled type 2 diabetes mellitus without complication, with long-term current use of insulin (H)       phentermine 37.5 MG tablet    ADIPEX-P    90 tablet    Take 1/2 tablet every morning with breakfast. Increase to 1 tablet after 1 week if needed.    Morbid obesity (H)       simvastatin 20 MG tablet    ZOCOR    90 tablet    Take 1 tablet (20 mg) by mouth daily    Hyperlipidemia LDL goal <100       * Notice:  This list has 5 medication(s) that are the same as other medications prescribed for you. Read the directions carefully, and ask your doctor or other care provider to review them with you.

## 2018-09-03 NOTE — PATIENT INSTRUCTIONS
Your xrays were negative for fracture today.    Your injury is most consistent with a large hematoma due to the blow.    Please continue icing and compression wrap to help with swelling.    Take ibuprofen 600mg every 6-8 hours with food for pain.    Elevate the leg to help decrease swelling.    Follow up with your primary care provider if no improvement in 3-5 days, sooner if worsening.

## 2018-12-23 ENCOUNTER — HOSPITAL ENCOUNTER (INPATIENT)
Facility: CLINIC | Age: 48
LOS: 4 days | Discharge: HOME | DRG: 392 | End: 2018-12-27
Attending: EMERGENCY MEDICINE | Admitting: HOSPITALIST
Payer: COMMERCIAL

## 2018-12-23 ENCOUNTER — APPOINTMENT (OUTPATIENT)
Dept: CT IMAGING | Facility: CLINIC | Age: 48
DRG: 392 | End: 2018-12-23
Attending: EMERGENCY MEDICINE
Payer: COMMERCIAL

## 2018-12-23 DIAGNOSIS — R65.21 SEPTIC SHOCK (H): ICD-10-CM

## 2018-12-23 DIAGNOSIS — A41.9 SEPTIC SHOCK (H): ICD-10-CM

## 2018-12-23 DIAGNOSIS — E08.10 DIABETIC KETOACIDOSIS WITHOUT COMA ASSOCIATED WITH DIABETES MELLITUS DUE TO UNDERLYING CONDITION (H): ICD-10-CM

## 2018-12-23 LAB
ALBUMIN SERPL-MCNC: 3.8 G/DL (ref 3.4–5)
ALBUMIN SERPL-MCNC: NORMAL G/DL (ref 3.4–5)
ALP SERPL-CCNC: 63 U/L (ref 40–150)
ALP SERPL-CCNC: NORMAL U/L (ref 40–150)
ALT SERPL W P-5'-P-CCNC: 45 U/L (ref 0–70)
ALT SERPL W P-5'-P-CCNC: NORMAL U/L (ref 0–70)
ANION GAP SERPL CALCULATED.3IONS-SCNC: 14 MMOL/L (ref 3–14)
ANION GAP SERPL CALCULATED.3IONS-SCNC: NORMAL MMOL/L (ref 6–17)
AST SERPL W P-5'-P-CCNC: 28 U/L (ref 0–45)
AST SERPL W P-5'-P-CCNC: NORMAL U/L (ref 0–45)
BASOPHILS # BLD AUTO: 0 10E9/L (ref 0–0.2)
BASOPHILS NFR BLD AUTO: 0.1 %
BILIRUB SERPL-MCNC: 0.7 MG/DL (ref 0.2–1.3)
BILIRUB SERPL-MCNC: NORMAL MG/DL (ref 0.2–1.3)
BUN SERPL-MCNC: 25 MG/DL (ref 7–30)
BUN SERPL-MCNC: NORMAL MG/DL (ref 7–30)
CALCIUM SERPL-MCNC: 9.5 MG/DL (ref 8.5–10.1)
CALCIUM SERPL-MCNC: NORMAL MG/DL (ref 8.5–10.1)
CHLORIDE SERPL-SCNC: 100 MMOL/L (ref 94–109)
CHLORIDE SERPL-SCNC: NORMAL MMOL/L (ref 94–109)
CO2 SERPL-SCNC: 20 MMOL/L (ref 20–32)
CO2 SERPL-SCNC: NORMAL MMOL/L (ref 20–32)
CREAT SERPL-MCNC: 1.5 MG/DL (ref 0.66–1.25)
CREAT SERPL-MCNC: NORMAL MG/DL (ref 0.66–1.25)
DIFFERENTIAL METHOD BLD: ABNORMAL
EOSINOPHIL # BLD AUTO: 0 10E9/L (ref 0–0.7)
EOSINOPHIL NFR BLD AUTO: 0.1 %
ERYTHROCYTE [DISTWIDTH] IN BLOOD BY AUTOMATED COUNT: 13.3 % (ref 10–15)
GFR SERPL CREATININE-BSD FRML MDRD: 55 ML/MIN/{1.73_M2}
GFR SERPL CREATININE-BSD FRML MDRD: NORMAL ML/MIN/{1.73_M2}
GLUCOSE BLDC GLUCOMTR-MCNC: 328 MG/DL (ref 70–99)
GLUCOSE BLDC GLUCOMTR-MCNC: 373 MG/DL (ref 70–99)
GLUCOSE SERPL-MCNC: 393 MG/DL (ref 70–99)
GLUCOSE SERPL-MCNC: NORMAL MG/DL (ref 70–99)
HCT VFR BLD AUTO: 53.9 % (ref 40–53)
HGB BLD-MCNC: 19 G/DL (ref 13.3–17.7)
IMM GRANULOCYTES # BLD: 0.1 10E9/L (ref 0–0.4)
IMM GRANULOCYTES NFR BLD: 0.5 %
INTERPRETATION ECG - MUSE: NORMAL
LACTATE BLD-SCNC: 5.2 MMOL/L (ref 0.7–2)
LIPASE SERPL-CCNC: 132 U/L (ref 73–393)
LIPASE SERPL-CCNC: NORMAL U/L (ref 73–393)
LYMPHOCYTES # BLD AUTO: 1.2 10E9/L (ref 0.8–5.3)
LYMPHOCYTES NFR BLD AUTO: 5.1 %
MCH RBC QN AUTO: 30.1 PG (ref 26.5–33)
MCHC RBC AUTO-ENTMCNC: 35.3 G/DL (ref 31.5–36.5)
MCV RBC AUTO: 85 FL (ref 78–100)
MONOCYTES # BLD AUTO: 1.7 10E9/L (ref 0–1.3)
MONOCYTES NFR BLD AUTO: 7.5 %
NEUTROPHILS # BLD AUTO: 19.7 10E9/L (ref 1.6–8.3)
NEUTROPHILS NFR BLD AUTO: 86.7 %
NRBC # BLD AUTO: 0 10*3/UL
NRBC BLD AUTO-RTO: 0 /100
PLATELET # BLD AUTO: 265 10E9/L (ref 150–450)
POTASSIUM SERPL-SCNC: 5.5 MMOL/L (ref 3.4–5.3)
POTASSIUM SERPL-SCNC: NORMAL MMOL/L (ref 3.4–5.3)
PROT SERPL-MCNC: 8.3 G/DL (ref 6.8–8.8)
PROT SERPL-MCNC: NORMAL G/DL (ref 6.8–8.8)
RBC # BLD AUTO: 6.31 10E12/L (ref 4.4–5.9)
SODIUM SERPL-SCNC: 134 MMOL/L (ref 133–144)
SODIUM SERPL-SCNC: NORMAL MMOL/L (ref 133–144)
WBC # BLD AUTO: 22.8 10E9/L (ref 4–11)

## 2018-12-23 PROCEDURE — 96375 TX/PRO/DX INJ NEW DRUG ADDON: CPT

## 2018-12-23 PROCEDURE — 99285 EMERGENCY DEPT VISIT HI MDM: CPT | Mod: 25

## 2018-12-23 PROCEDURE — 80053 COMPREHEN METABOLIC PANEL: CPT | Performed by: EMERGENCY MEDICINE

## 2018-12-23 PROCEDURE — 21400002 ZZH R&B CCU CICU CRITICAL

## 2018-12-23 PROCEDURE — 74177 CT ABD & PELVIS W/CONTRAST: CPT

## 2018-12-23 PROCEDURE — 85025 COMPLETE CBC W/AUTO DIFF WBC: CPT | Performed by: EMERGENCY MEDICINE

## 2018-12-23 PROCEDURE — 87040 BLOOD CULTURE FOR BACTERIA: CPT | Performed by: EMERGENCY MEDICINE

## 2018-12-23 PROCEDURE — 87181 SC STD AGAR DILUTION PER AGT: CPT | Performed by: EMERGENCY MEDICINE

## 2018-12-23 PROCEDURE — 25000128 H RX IP 250 OP 636: Performed by: EMERGENCY MEDICINE

## 2018-12-23 PROCEDURE — 00000146 ZZHCL STATISTIC GLUCOSE BY METER IP

## 2018-12-23 PROCEDURE — 36415 COLL VENOUS BLD VENIPUNCTURE: CPT

## 2018-12-23 PROCEDURE — 82010 KETONE BODYS QUAN: CPT | Performed by: EMERGENCY MEDICINE

## 2018-12-23 PROCEDURE — 83605 ASSAY OF LACTIC ACID: CPT | Performed by: EMERGENCY MEDICINE

## 2018-12-23 PROCEDURE — 87493 C DIFF AMPLIFIED PROBE: CPT | Performed by: EMERGENCY MEDICINE

## 2018-12-23 PROCEDURE — 87076 CULTURE ANAEROBE IDENT EACH: CPT | Performed by: EMERGENCY MEDICINE

## 2018-12-23 PROCEDURE — 93005 ELECTROCARDIOGRAM TRACING: CPT

## 2018-12-23 PROCEDURE — 83690 ASSAY OF LIPASE: CPT | Performed by: EMERGENCY MEDICINE

## 2018-12-23 PROCEDURE — 25000125 ZZHC RX 250: Performed by: EMERGENCY MEDICINE

## 2018-12-23 PROCEDURE — 87800 DETECT AGNT MULT DNA DIREC: CPT | Performed by: EMERGENCY MEDICINE

## 2018-12-23 PROCEDURE — 25000128 H RX IP 250 OP 636

## 2018-12-23 PROCEDURE — 96361 HYDRATE IV INFUSION ADD-ON: CPT

## 2018-12-23 PROCEDURE — 96365 THER/PROPH/DIAG IV INF INIT: CPT | Mod: 59

## 2018-12-23 RX ORDER — ONDANSETRON 2 MG/ML
4 INJECTION INTRAMUSCULAR; INTRAVENOUS EVERY 30 MIN PRN
Status: DISCONTINUED | OUTPATIENT
Start: 2018-12-23 | End: 2018-12-24

## 2018-12-23 RX ORDER — PIPERACILLIN SODIUM, TAZOBACTAM SODIUM 4; .5 G/20ML; G/20ML
4.5 INJECTION, POWDER, LYOPHILIZED, FOR SOLUTION INTRAVENOUS ONCE
Status: COMPLETED | OUTPATIENT
Start: 2018-12-23 | End: 2018-12-23

## 2018-12-23 RX ORDER — SODIUM CHLORIDE, SODIUM LACTATE, POTASSIUM CHLORIDE, CALCIUM CHLORIDE 600; 310; 30; 20 MG/100ML; MG/100ML; MG/100ML; MG/100ML
INJECTION, SOLUTION INTRAVENOUS CONTINUOUS
Status: DISCONTINUED | OUTPATIENT
Start: 2018-12-23 | End: 2018-12-24

## 2018-12-23 RX ORDER — IOPAMIDOL 755 MG/ML
135 INJECTION, SOLUTION INTRAVASCULAR ONCE
Status: COMPLETED | OUTPATIENT
Start: 2018-12-23 | End: 2018-12-23

## 2018-12-23 RX ORDER — SODIUM CHLORIDE 9 MG/ML
1000 INJECTION, SOLUTION INTRAVENOUS CONTINUOUS
Status: DISCONTINUED | OUTPATIENT
Start: 2018-12-23 | End: 2018-12-24

## 2018-12-23 RX ADMIN — VANCOMYCIN HYDROCHLORIDE 1500 MG: 5 INJECTION, POWDER, LYOPHILIZED, FOR SOLUTION INTRAVENOUS at 22:20

## 2018-12-23 RX ADMIN — SODIUM CHLORIDE, POTASSIUM CHLORIDE, SODIUM LACTATE AND CALCIUM CHLORIDE 3879 ML: 600; 310; 30; 20 INJECTION, SOLUTION INTRAVENOUS at 21:56

## 2018-12-23 RX ADMIN — IOPAMIDOL 135 ML: 755 INJECTION, SOLUTION INTRAVENOUS at 22:10

## 2018-12-23 RX ADMIN — ONDANSETRON 4 MG: 2 INJECTION INTRAMUSCULAR; INTRAVENOUS at 22:09

## 2018-12-23 RX ADMIN — SODIUM CHLORIDE 80 ML: 9 INJECTION, SOLUTION INTRAVENOUS at 22:10

## 2018-12-23 RX ADMIN — SODIUM CHLORIDE 1000 ML: 9 INJECTION, SOLUTION INTRAVENOUS at 21:43

## 2018-12-23 RX ADMIN — PIPERACILLIN SODIUM,TAZOBACTAM SODIUM 4.5 G: 4; .5 INJECTION, POWDER, FOR SOLUTION INTRAVENOUS at 21:55

## 2018-12-23 ASSESSMENT — ENCOUNTER SYMPTOMS
NAUSEA: 1
ABDOMINAL PAIN: 1
DYSURIA: 0
DIFFICULTY URINATING: 0
FEVER: 1
DIARRHEA: 1
CHILLS: 1
ABDOMINAL DISTENTION: 0
VOMITING: 1

## 2018-12-24 PROBLEM — K52.9 GASTROENTERITIS: Status: ACTIVE | Noted: 2018-12-24

## 2018-12-24 LAB
ALBUMIN SERPL-MCNC: 3.2 G/DL (ref 3.4–5)
ALBUMIN UR-MCNC: 100 MG/DL
ALP SERPL-CCNC: 46 U/L (ref 40–150)
ALT SERPL W P-5'-P-CCNC: 40 U/L (ref 0–70)
ANION GAP SERPL CALCULATED.3IONS-SCNC: 10 MMOL/L (ref 3–14)
APPEARANCE UR: CLEAR
AST SERPL W P-5'-P-CCNC: 30 U/L (ref 0–45)
BASOPHILS # BLD AUTO: 0 10E9/L (ref 0–0.2)
BASOPHILS NFR BLD AUTO: 0.1 %
BILIRUB SERPL-MCNC: 1 MG/DL (ref 0.2–1.3)
BILIRUB UR QL STRIP: NEGATIVE
BUN SERPL-MCNC: 22 MG/DL (ref 7–30)
C DIFF TOX B STL QL: NEGATIVE
CALCIUM SERPL-MCNC: 8.5 MG/DL (ref 8.5–10.1)
CHLORIDE SERPL-SCNC: 105 MMOL/L (ref 94–109)
CO2 SERPL-SCNC: 24 MMOL/L (ref 20–32)
COLOR UR AUTO: ABNORMAL
CREAT SERPL-MCNC: 1.05 MG/DL (ref 0.66–1.25)
DIFFERENTIAL METHOD BLD: ABNORMAL
EOSINOPHIL # BLD AUTO: 0 10E9/L (ref 0–0.7)
EOSINOPHIL NFR BLD AUTO: 0.1 %
ERYTHROCYTE [DISTWIDTH] IN BLOOD BY AUTOMATED COUNT: 13.6 % (ref 10–15)
GFR SERPL CREATININE-BSD FRML MDRD: 84 ML/MIN/{1.73_M2}
GLUCOSE BLDC GLUCOMTR-MCNC: 104 MG/DL (ref 70–99)
GLUCOSE BLDC GLUCOMTR-MCNC: 107 MG/DL (ref 70–99)
GLUCOSE BLDC GLUCOMTR-MCNC: 110 MG/DL (ref 70–99)
GLUCOSE BLDC GLUCOMTR-MCNC: 115 MG/DL (ref 70–99)
GLUCOSE BLDC GLUCOMTR-MCNC: 129 MG/DL (ref 70–99)
GLUCOSE BLDC GLUCOMTR-MCNC: 134 MG/DL (ref 70–99)
GLUCOSE BLDC GLUCOMTR-MCNC: 136 MG/DL (ref 70–99)
GLUCOSE BLDC GLUCOMTR-MCNC: 140 MG/DL (ref 70–99)
GLUCOSE BLDC GLUCOMTR-MCNC: 149 MG/DL (ref 70–99)
GLUCOSE BLDC GLUCOMTR-MCNC: 168 MG/DL (ref 70–99)
GLUCOSE BLDC GLUCOMTR-MCNC: 169 MG/DL (ref 70–99)
GLUCOSE BLDC GLUCOMTR-MCNC: 171 MG/DL (ref 70–99)
GLUCOSE BLDC GLUCOMTR-MCNC: 189 MG/DL (ref 70–99)
GLUCOSE BLDC GLUCOMTR-MCNC: 194 MG/DL (ref 70–99)
GLUCOSE BLDC GLUCOMTR-MCNC: 206 MG/DL (ref 70–99)
GLUCOSE BLDC GLUCOMTR-MCNC: 227 MG/DL (ref 70–99)
GLUCOSE BLDC GLUCOMTR-MCNC: 237 MG/DL (ref 70–99)
GLUCOSE BLDC GLUCOMTR-MCNC: 252 MG/DL (ref 70–99)
GLUCOSE BLDC GLUCOMTR-MCNC: 266 MG/DL (ref 70–99)
GLUCOSE BLDC GLUCOMTR-MCNC: 287 MG/DL (ref 70–99)
GLUCOSE BLDC GLUCOMTR-MCNC: 342 MG/DL (ref 70–99)
GLUCOSE BLDC GLUCOMTR-MCNC: 346 MG/DL (ref 70–99)
GLUCOSE BLDC GLUCOMTR-MCNC: 366 MG/DL (ref 70–99)
GLUCOSE SERPL-MCNC: 269 MG/DL (ref 70–99)
GLUCOSE UR STRIP-MCNC: >1000 MG/DL
HBA1C MFR BLD: 10.1 % (ref 0–5.6)
HCT VFR BLD AUTO: 46.2 % (ref 40–53)
HGB BLD-MCNC: 15.7 G/DL (ref 13.3–17.7)
HGB UR QL STRIP: NEGATIVE
IMM GRANULOCYTES # BLD: 0 10E9/L (ref 0–0.4)
IMM GRANULOCYTES NFR BLD: 0.3 %
KETONES BLD-SCNC: 1.1 MMOL/L (ref 0–0.6)
KETONES UR STRIP-MCNC: 10 MG/DL
LACTATE BLD-SCNC: 3.5 MMOL/L (ref 0.7–2)
LACTATE BLD-SCNC: 3.6 MMOL/L (ref 0.7–2)
LACTATE BLD-SCNC: 4.9 MMOL/L (ref 0.7–2)
LACTATE BLD-SCNC: 5.4 MMOL/L (ref 0.7–2)
LEUKOCYTE ESTERASE UR QL STRIP: NEGATIVE
LYMPHOCYTES # BLD AUTO: 0.3 10E9/L (ref 0.8–5.3)
LYMPHOCYTES NFR BLD AUTO: 2.9 %
MAGNESIUM SERPL-MCNC: 1.8 MG/DL (ref 1.6–2.3)
MCH RBC QN AUTO: 29.3 PG (ref 26.5–33)
MCHC RBC AUTO-ENTMCNC: 34 G/DL (ref 31.5–36.5)
MCV RBC AUTO: 86 FL (ref 78–100)
MONOCYTES # BLD AUTO: 0.7 10E9/L (ref 0–1.3)
MONOCYTES NFR BLD AUTO: 6.5 %
MUCOUS THREADS #/AREA URNS LPF: PRESENT /LPF
NEUTROPHILS # BLD AUTO: 10.1 10E9/L (ref 1.6–8.3)
NEUTROPHILS NFR BLD AUTO: 90.1 %
NITRATE UR QL: NEGATIVE
NRBC # BLD AUTO: 0 10*3/UL
NRBC BLD AUTO-RTO: 0 /100
PH UR STRIP: 5.5 PH (ref 5–7)
PHOSPHATE SERPL-MCNC: 3.3 MG/DL (ref 2.5–4.5)
PLATELET # BLD AUTO: 183 10E9/L (ref 150–450)
POTASSIUM SERPL-SCNC: 4.2 MMOL/L (ref 3.4–5.3)
PROCALCITONIN SERPL-MCNC: 1.2 NG/ML
PROT SERPL-MCNC: 6.8 G/DL (ref 6.8–8.8)
RBC # BLD AUTO: 5.35 10E12/L (ref 4.4–5.9)
RBC #/AREA URNS AUTO: 1 /HPF (ref 0–2)
SODIUM SERPL-SCNC: 139 MMOL/L (ref 133–144)
SOURCE: ABNORMAL
SP GR UR STRIP: 1.04 (ref 1–1.03)
SPECIMEN SOURCE: NORMAL
SQUAMOUS #/AREA URNS AUTO: <1 /HPF (ref 0–1)
UROBILINOGEN UR STRIP-MCNC: NORMAL MG/DL (ref 0–2)
VANCOMYCIN SERPL-MCNC: 11.4 MG/L
WBC # BLD AUTO: 11.2 10E9/L (ref 4–11)
WBC #/AREA URNS AUTO: 1 /HPF (ref 0–5)

## 2018-12-24 PROCEDURE — 80202 ASSAY OF VANCOMYCIN: CPT | Performed by: HOSPITALIST

## 2018-12-24 PROCEDURE — 81001 URINALYSIS AUTO W/SCOPE: CPT | Performed by: EMERGENCY MEDICINE

## 2018-12-24 PROCEDURE — 00000146 ZZHCL STATISTIC GLUCOSE BY METER IP

## 2018-12-24 PROCEDURE — 25000128 H RX IP 250 OP 636: Performed by: HOSPITALIST

## 2018-12-24 PROCEDURE — 25000128 H RX IP 250 OP 636: Performed by: EMERGENCY MEDICINE

## 2018-12-24 PROCEDURE — 87086 URINE CULTURE/COLONY COUNT: CPT | Performed by: EMERGENCY MEDICINE

## 2018-12-24 PROCEDURE — 84100 ASSAY OF PHOSPHORUS: CPT | Performed by: HOSPITALIST

## 2018-12-24 PROCEDURE — 21400002 ZZH R&B CCU CICU CRITICAL

## 2018-12-24 PROCEDURE — 83735 ASSAY OF MAGNESIUM: CPT | Performed by: HOSPITALIST

## 2018-12-24 PROCEDURE — 84145 PROCALCITONIN (PCT): CPT | Performed by: HOSPITALIST

## 2018-12-24 PROCEDURE — 83605 ASSAY OF LACTIC ACID: CPT | Performed by: HOSPITALIST

## 2018-12-24 PROCEDURE — 96376 TX/PRO/DX INJ SAME DRUG ADON: CPT

## 2018-12-24 PROCEDURE — 80053 COMPREHEN METABOLIC PANEL: CPT | Performed by: HOSPITALIST

## 2018-12-24 PROCEDURE — 80048 BASIC METABOLIC PNL TOTAL CA: CPT | Performed by: HOSPITALIST

## 2018-12-24 PROCEDURE — 85025 COMPLETE CBC W/AUTO DIFF WBC: CPT | Performed by: HOSPITALIST

## 2018-12-24 PROCEDURE — 36415 COLL VENOUS BLD VENIPUNCTURE: CPT | Performed by: HOSPITALIST

## 2018-12-24 PROCEDURE — 25000131 ZZH RX MED GY IP 250 OP 636 PS 637: Performed by: EMERGENCY MEDICINE

## 2018-12-24 PROCEDURE — 96367 TX/PROPH/DG ADDL SEQ IV INF: CPT

## 2018-12-24 PROCEDURE — 99223 1ST HOSP IP/OBS HIGH 75: CPT | Mod: AI | Performed by: HOSPITALIST

## 2018-12-24 PROCEDURE — 83036 HEMOGLOBIN GLYCOSYLATED A1C: CPT | Performed by: HOSPITALIST

## 2018-12-24 PROCEDURE — 99222 1ST HOSP IP/OBS MODERATE 55: CPT | Performed by: SURGERY

## 2018-12-24 PROCEDURE — 25000131 ZZH RX MED GY IP 250 OP 636 PS 637: Performed by: HOSPITALIST

## 2018-12-24 PROCEDURE — 40000886 ZZH STATISTIC STEP DOWN HRS DAY

## 2018-12-24 PROCEDURE — 40000885 ZZH STATISTIC STEP DOWN HRS EVENING

## 2018-12-24 RX ORDER — LIDOCAINE 40 MG/G
CREAM TOPICAL
Status: DISCONTINUED | OUTPATIENT
Start: 2018-12-24 | End: 2018-12-25 | Stop reason: ALTCHOICE

## 2018-12-24 RX ORDER — DEXTROSE MONOHYDRATE 25 G/50ML
25-50 INJECTION, SOLUTION INTRAVENOUS
Status: DISCONTINUED | OUTPATIENT
Start: 2018-12-24 | End: 2018-12-27 | Stop reason: HOSPADM

## 2018-12-24 RX ORDER — PROCHLORPERAZINE 25 MG
25 SUPPOSITORY, RECTAL RECTAL EVERY 12 HOURS PRN
Status: DISCONTINUED | OUTPATIENT
Start: 2018-12-24 | End: 2018-12-27 | Stop reason: HOSPADM

## 2018-12-24 RX ORDER — POTASSIUM CHLORIDE 29.8 MG/ML
20 INJECTION INTRAVENOUS
Status: DISCONTINUED | OUTPATIENT
Start: 2018-12-24 | End: 2018-12-27 | Stop reason: HOSPADM

## 2018-12-24 RX ORDER — MAGNESIUM SULFATE HEPTAHYDRATE 40 MG/ML
4 INJECTION, SOLUTION INTRAVENOUS EVERY 4 HOURS PRN
Status: DISCONTINUED | OUTPATIENT
Start: 2018-12-24 | End: 2018-12-27 | Stop reason: HOSPADM

## 2018-12-24 RX ORDER — POTASSIUM CHLORIDE 7.45 MG/ML
10 INJECTION INTRAVENOUS
Status: DISCONTINUED | OUTPATIENT
Start: 2018-12-24 | End: 2018-12-27 | Stop reason: HOSPADM

## 2018-12-24 RX ORDER — NICOTINE POLACRILEX 4 MG
15-30 LOZENGE BUCCAL
Status: DISCONTINUED | OUTPATIENT
Start: 2018-12-24 | End: 2018-12-27 | Stop reason: HOSPADM

## 2018-12-24 RX ORDER — POTASSIUM CL/LIDO/0.9 % NACL 10MEQ/0.1L
10 INTRAVENOUS SOLUTION, PIGGYBACK (ML) INTRAVENOUS
Status: DISCONTINUED | OUTPATIENT
Start: 2018-12-24 | End: 2018-12-27 | Stop reason: HOSPADM

## 2018-12-24 RX ORDER — HEPARIN SODIUM 5000 [USP'U]/.5ML
5000 INJECTION, SOLUTION INTRAVENOUS; SUBCUTANEOUS EVERY 12 HOURS
Status: DISCONTINUED | OUTPATIENT
Start: 2018-12-24 | End: 2018-12-27 | Stop reason: HOSPADM

## 2018-12-24 RX ORDER — PROCHLORPERAZINE MALEATE 5 MG
10 TABLET ORAL EVERY 6 HOURS PRN
Status: DISCONTINUED | OUTPATIENT
Start: 2018-12-24 | End: 2018-12-27 | Stop reason: HOSPADM

## 2018-12-24 RX ORDER — PIPERACILLIN SODIUM, TAZOBACTAM SODIUM 4; .5 G/20ML; G/20ML
4.5 INJECTION, POWDER, LYOPHILIZED, FOR SOLUTION INTRAVENOUS EVERY 6 HOURS
Status: DISCONTINUED | OUTPATIENT
Start: 2018-12-24 | End: 2018-12-25

## 2018-12-24 RX ORDER — HYDROMORPHONE HYDROCHLORIDE 1 MG/ML
.3-.5 INJECTION, SOLUTION INTRAMUSCULAR; INTRAVENOUS; SUBCUTANEOUS
Status: DISCONTINUED | OUTPATIENT
Start: 2018-12-24 | End: 2018-12-27 | Stop reason: HOSPADM

## 2018-12-24 RX ORDER — NALOXONE HYDROCHLORIDE 0.4 MG/ML
.1-.4 INJECTION, SOLUTION INTRAMUSCULAR; INTRAVENOUS; SUBCUTANEOUS
Status: DISCONTINUED | OUTPATIENT
Start: 2018-12-24 | End: 2018-12-27 | Stop reason: HOSPADM

## 2018-12-24 RX ORDER — ONDANSETRON 2 MG/ML
4 INJECTION INTRAMUSCULAR; INTRAVENOUS EVERY 6 HOURS PRN
Status: DISCONTINUED | OUTPATIENT
Start: 2018-12-24 | End: 2018-12-27 | Stop reason: HOSPADM

## 2018-12-24 RX ORDER — POTASSIUM CHLORIDE 1.5 G/1.58G
20-40 POWDER, FOR SOLUTION ORAL
Status: DISCONTINUED | OUTPATIENT
Start: 2018-12-24 | End: 2018-12-27 | Stop reason: HOSPADM

## 2018-12-24 RX ORDER — OXYCODONE HYDROCHLORIDE 5 MG/1
5-10 TABLET ORAL EVERY 6 HOURS PRN
Status: DISCONTINUED | OUTPATIENT
Start: 2018-12-24 | End: 2018-12-27 | Stop reason: HOSPADM

## 2018-12-24 RX ORDER — POTASSIUM CHLORIDE 1500 MG/1
20-40 TABLET, EXTENDED RELEASE ORAL
Status: DISCONTINUED | OUTPATIENT
Start: 2018-12-24 | End: 2018-12-27 | Stop reason: HOSPADM

## 2018-12-24 RX ORDER — ONDANSETRON 4 MG/1
4 TABLET, ORALLY DISINTEGRATING ORAL EVERY 6 HOURS PRN
Status: DISCONTINUED | OUTPATIENT
Start: 2018-12-24 | End: 2018-12-27 | Stop reason: HOSPADM

## 2018-12-24 RX ORDER — SODIUM CHLORIDE 9 MG/ML
INJECTION, SOLUTION INTRAVENOUS CONTINUOUS
Status: DISCONTINUED | OUTPATIENT
Start: 2018-12-24 | End: 2018-12-26

## 2018-12-24 RX ORDER — NITROGLYCERIN 0.4 MG/1
0.4 TABLET SUBLINGUAL EVERY 5 MIN PRN
Status: DISCONTINUED | OUTPATIENT
Start: 2018-12-24 | End: 2018-12-25 | Stop reason: ALTCHOICE

## 2018-12-24 RX ORDER — ACETAMINOPHEN 325 MG/1
650 TABLET ORAL EVERY 4 HOURS PRN
Status: DISCONTINUED | OUTPATIENT
Start: 2018-12-24 | End: 2018-12-27 | Stop reason: HOSPADM

## 2018-12-24 RX ADMIN — HEPARIN SODIUM 5000 UNITS: 10000 INJECTION, SOLUTION INTRAVENOUS; SUBCUTANEOUS at 21:01

## 2018-12-24 RX ADMIN — SODIUM CHLORIDE 4.5 UNITS/HR: 9 INJECTION, SOLUTION INTRAVENOUS at 01:00

## 2018-12-24 RX ADMIN — SODIUM CHLORIDE: 9 INJECTION, SOLUTION INTRAVENOUS at 03:54

## 2018-12-24 RX ADMIN — SODIUM CHLORIDE 1000 ML: 9 INJECTION, SOLUTION INTRAVENOUS at 05:46

## 2018-12-24 RX ADMIN — PIPERACILLIN SODIUM,TAZOBACTAM SODIUM 4.5 G: 4; .5 INJECTION, POWDER, FOR SOLUTION INTRAVENOUS at 03:54

## 2018-12-24 RX ADMIN — SODIUM CHLORIDE 1000 ML: 9 INJECTION, SOLUTION INTRAVENOUS at 18:48

## 2018-12-24 RX ADMIN — ONDANSETRON 4 MG: 2 INJECTION INTRAMUSCULAR; INTRAVENOUS at 01:14

## 2018-12-24 RX ADMIN — PIPERACILLIN SODIUM,TAZOBACTAM SODIUM 4.5 G: 4; .5 INJECTION, POWDER, FOR SOLUTION INTRAVENOUS at 09:54

## 2018-12-24 RX ADMIN — VANCOMYCIN HYDROCHLORIDE 1500 MG: 5 INJECTION, POWDER, LYOPHILIZED, FOR SOLUTION INTRAVENOUS at 21:00

## 2018-12-24 RX ADMIN — SODIUM CHLORIDE 6 UNITS/HR: 9 INJECTION, SOLUTION INTRAVENOUS at 08:11

## 2018-12-24 RX ADMIN — PIPERACILLIN SODIUM,TAZOBACTAM SODIUM 4.5 G: 4; .5 INJECTION, POWDER, FOR SOLUTION INTRAVENOUS at 16:03

## 2018-12-24 RX ADMIN — VANCOMYCIN HYDROCHLORIDE 1500 MG: 5 INJECTION, POWDER, LYOPHILIZED, FOR SOLUTION INTRAVENOUS at 06:42

## 2018-12-24 RX ADMIN — SODIUM CHLORIDE 1000 ML: 9 INJECTION, SOLUTION INTRAVENOUS at 06:55

## 2018-12-24 RX ADMIN — SODIUM CHLORIDE: 9 INJECTION, SOLUTION INTRAVENOUS at 15:25

## 2018-12-24 RX ADMIN — SODIUM CHLORIDE 3 UNITS/HR: 9 INJECTION, SOLUTION INTRAVENOUS at 15:35

## 2018-12-24 RX ADMIN — SODIUM CHLORIDE 1000 ML: 9 INJECTION, SOLUTION INTRAVENOUS at 07:46

## 2018-12-24 RX ADMIN — SODIUM CHLORIDE 4 UNITS/HR: 9 INJECTION, SOLUTION INTRAVENOUS at 16:41

## 2018-12-24 RX ADMIN — VANCOMYCIN HYDROCHLORIDE 1500 MG: 5 INJECTION, POWDER, LYOPHILIZED, FOR SOLUTION INTRAVENOUS at 13:27

## 2018-12-24 RX ADMIN — SODIUM CHLORIDE 1000 ML: 9 INJECTION, SOLUTION INTRAVENOUS at 01:42

## 2018-12-24 RX ADMIN — PIPERACILLIN SODIUM,TAZOBACTAM SODIUM 4.5 G: 4; .5 INJECTION, POWDER, FOR SOLUTION INTRAVENOUS at 23:10

## 2018-12-24 RX ADMIN — HEPARIN SODIUM 5000 UNITS: 10000 INJECTION, SOLUTION INTRAVENOUS; SUBCUTANEOUS at 09:54

## 2018-12-24 ASSESSMENT — ACTIVITIES OF DAILY LIVING (ADL)
ADLS_ACUITY_SCORE: 12
ADLS_ACUITY_SCORE: 12
ADLS_ACUITY_SCORE: 10
ADLS_ACUITY_SCORE: 12
ADLS_ACUITY_SCORE: 10

## 2018-12-24 NOTE — PROGRESS NOTES
SPIRITUAL HEALTH SERVICES Progress Note  [facility]    Visit per pt request at admission. Pt said he when he is feeling good, he doesn't pay much attention to his health, and so his diabetes is not being controlled well.  He said that although he is extremely successful in his professional and personal life, he has a hard time managing his own health.  He said he isn't sure why that is, and he would like to get some help trying to figure that out.  Pt said he has a strong darcy in Derek as his Savior, but doesn't attend Islam and keeps his darcy rather private.  He said he would like to get information about spiritual direction.       offered listening, support and prayer.   complied resources for spiritual direction for pt.      Pt said he realizes that he has to be more aware of his physical health, and stated that his mother has diabetes, and that his aunt  from diabetes complications.  He said he realizes that he has to consider his physical health very seriously.      SH remains available as needs arise.      Marlyn Apple  Chaplain Resident

## 2018-12-24 NOTE — PLAN OF CARE
Pt arrived from ED at 0145 with insulin drip infusing. Pt has remained in algorithm 1 for the duration of the shift. Vital Signs stable ex tachycardic (low 100s). Telemetry sinus tachycardia. Alert and Oriented. Lung sounds clear, pt has strong dry cough, he stated that the cough started 2 weeks ago. Bowel sounds active and audible. Passing flatus, pt had 4 loose/mucus/undigested food in stool, incontinent of stool. Clear liquid diet, having large amount of oral intake. Denies nausea. Adequate urinary output via urinal at bedside. CMS to baseline (baseline numbness in bilateral lower extremities). Up with stand by assist. Pt has denied pain. Lactic still increased at 5.4, MD aware (see provider notification note) lactic recheck at 0830.

## 2018-12-24 NOTE — ED NOTES
DATE:  12/23/2018   TIME OF RECEIPT FROM LAB:  21:48  LAB TEST:  Lactic acid  LAB VALUE:  5.2  RESULTS GIVEN WITH READ-BACK TO (PROVIDER): Dr Valles  TIME LAB VALUE REPORTED TO PROVIDER:   21:48

## 2018-12-24 NOTE — PROGRESS NOTES
Hopsitalist update (please refer to today's H&P):  Improving abd pain  Wbc is trending down.  Cr normalized.  Lactate improving.  Surgery input noted.    Cont current care as outlined by Dr saenz earlier this morning.

## 2018-12-24 NOTE — ED NOTES
Bed: ED21  Expected date: 12/23/18  Expected time: 9:00 PM  Means of arrival: Ambulance  Comments:  HE Amb 48M flu symptoms

## 2018-12-24 NOTE — H&P
Admitted:     12/23/2018      CHIEF COMPLAINT:  Nausea, vomiting, diarrhea and abdominal pain.     HISTORY OF PRESENT ILLNESS:  Jairo Escamilla is a 48-year-old very pleasant male with medical history significant for insulin-dependent diabetes mellitus type 2, hyperlipidemia, peripheral neuropathy who was brought to the ER by EMS for evaluation of above symptoms.  I discussed with the patient and also discussed with Dr. Belkis Bradshaw, ED attending, who evaluated the patient in ER for further information.    According to the patient, he had 2 of the hot dogs today at around 11:00 a.m. and starting 2:00 p.m., he started having abdominal pain. It was generalized constant pain, 7/10, associated with nausea, vomiting and diarrhea.  He had 5-6 episodes of watery diarrhea without any blood or mucus in the stool. He also had 3-4 episodes of nonbloody vomiting.  The patient tried Pepto-Bismol and Imodium at home without any relief.  He was trying to go to the bathroom and while standing, felt dizzy and passed out briefly, which he thinks was just for 1-2 seconds.  Denies fall injury, hitting head.  Denies chest pain, shortness of breath.  Denies any history of recent infection and antibiotic use.  He lives with his girlfriend and son, but they did not have the hot dogs that he ate. He had gotten 2 packets ate one 2 days ago and the second sealed pack was opened yesterday.  He did not have symptoms with the first packet.     The patient felt feverish but did not take temperature.     In ER, the patient was evaluated by Dr. Bradshaw.  He checked in with a systolic blood pressure of 80s, though not noted in Epic.  His temperature was 99.1.  He was tachycardic to 1-teens.  Lab workup including CBC, CMP, lactic acid, blood sugars, UA, CT abdomen, pelvis and chest x-ray were done.  Significant ones include acute kidney injury with hyperkalemia, marked leukocytosis of 22.8 hemoconcentration with elevated Hb/Hct, blood sugar was  elevated at 393 and lactic acid was elevated at 5.2.  Lipase was negative.  UA was without evidence of infection.  CT abdomen and pelvis with contrast as well was negative for any acute intra-abdominal process including perforation, diverticulitis, colitis, ischemic bowel.  The patient is receiving IV hydration per sepsis protocol and was started on vancomycin and Zosyn.  Repeat lactic acid did not worsen, but no significant change and is 4.9.  I discussed with Dr. Mckoy from General Surgery, recommended continued conservative treatment in the absence of concerning finding on CT at this point, and the patient is being [place] [facility] [place]  He was also started on insulin drip in ER.      REVIEW OF SYSTEMS:  All 10-point systems were reviewed and is negative other than mentioned in HPI.      PAST MEDICAL HISTORY:   1.  Insulin-dependent diabetes mellitus.   2.  History of subdural hematoma.   3.  Hyperlipidemia.   4.  Hypertension.      SOCIAL HISTORY:  Never smoked.  Drinks occasionally.  Denies recreational drug use.  Lives with his girlfriend and son.      FAMILY HISTORY:  Father had MI in 60s,  at age 67.  Mom and maternal aunt with diabetes.      ALLERGIES:  NO KNOWN DRUG ALLERGIES.      HOME MEDICATIONS:   1.  NovoLog 15 units premeal x 3.   2.  Insulin Glargine 50 units subQ at bedtime.   3.  Lisinopril 10 mg by mouth daily.   4.  Metformin 1 gram by mouth b.i.d.   5.  Simvastatin 20 mg by mouth daily.      PHYSICAL EXAMINATION:   GENERAL:  The patient is alert, awake, oriented, appears comfortable.   VITAL SIGNS:  Blood pressure 121/52, heart rate 116, temperature 98, respirations 26, pulse ox 97 on room air.   HEENT:  PERRLA, EOMI.  Oral mucosa is very dry.   NECK:  Supple.   LUNGS:  Bilateral equal air entry.  Clear to auscultation.  Normal work of breathing, remains on room air.   CARDIOVASCULAR:  S1, S2, regular, tachycardic, no murmur.   ABDOMEN:  Soft, distended/obese.  Mild diffuse tenderness, but  no guarding or rigidity or rebound tenderness.  Bowel tones are hyperactive.   MUSCULOSKELETAL:  No edema.   NEUROLOGIC:  Alert and oriented x 3.  No focal neurological deficits noted.      LABORATORY DATA:     1.  As above.  Significant ones include white cell count of 22.8, hemoglobin 19, platelet 262.  Blood sugars in 300s.  Lactic acid 5.2, which is 4.9 on repeat.  Ketones serum 1.1.  Potassium 5.5 and creatinine 1.5.  UA with sugar ketones and albumin, but no evidence of infection.   2.  Blood cultures were sent from ER.      IMAGIN.  CT abdomen and pelvis with IV contrast without acute intra-abdominal process.   2.  A 12-lead EKG shows a sinus tachycardia.      ASSESSMENT AND PLAN:  Jairo Escamilla is a 48-year-old male with insulin-dependent diabetes mellitus, obesity, hyperlipidemia, was brought to the ER due to nausea, vomiting, diarrhea, abdominal pain and syncope.      1.  Acute gastroenteritis versus possible sepsis.   -- Lactic acidosis.   Patient with nausea, vomiting, diarrhea after eating hotdog.  Generalized abdominal pain without significant CT finding.  No recent antibiotic exposure.  Lactic acid is markedly elevated, but CT abdomen and pelvis without perforation or ischemia.  No fever.  Elevated WBC, but likely hemoconcentration.  Suspect lactic acidosis is likely due to dehydration, hypotension, poor organ perfusion and metformin related instead of actual sepsis.  The patient is receiving aggressive IV hydration.  Blood pressure has improved and is stable, remains tachycardic.   -- Continue aggressive IV hydration.   -- Was started on vancomycin and Zosyn in the ER after blood culture, I will continue for at least 24 hours until further temperature and blood cultures monitored for 24 hours.  If negative, likely will discontinue.   -- Stool studies for bacteria and virus panel and C. difficile as well ordered.   -- Monitor with telemetry.     -- I have discussed with Dr. Mckoy from  General Surgery, consult evaluation requested.   -- Follow labs including lactic acid, CBC and BMP.   -- [place] [facility] [place]     2.  Syncope.  The patient had dizziness while he was standing and reports a syncopal episode.  Reportedly, his systolic blood pressure was in the 80s when he presented to the ER.  Suspect this is due to dehydration and hypotension.  No neuro deficit and denies other neurological symptoms.  Continue to monitor, blood pressure is stable now.  IV hydration will be continued until tachycardia resolves.     3.  Acute kidney injury and mild hyperkalemia.  His baseline creatinine was normal 6 months ago.  Presents with a creatinine of 1.5 and potassium of 5.5.  Suspect this is due to dehydration in combination with ACE inhibitor.   -- IV hydration as above.   -- Followup BMP.  Expect both his hyperkalemia and ALEJANDRO will resolve with hydration.   -- Holding ACE inhibitor for now.     4.  Diabetes mellitus, insulin-dependent.  The patient takes Insulin Glargine 50 units subQ at bedtime and premeal NovoLog 15 units t.i.d.   -- Given his abdominal pain, nausea, vomiting and diarrhea, I will place him on insulin drip until his GI symptoms improve and able to advance diet.   -- Hold his PTA subQ insulin regimen.   -- Also, hold metformin given acute kidney injury and lactic acidosis.     5.  Reported history of hypertension.   -- Hypertension listed as his medical problem, but the patient says he was never diagnosed with it.  It is possible he is on lisinopril given his diabetes and not hypertension.   -- Holding as above.     6.  Hyperlipidemia.  Resume simvastatin once p.o. intake better.     7.  Obesity: per PCP.     8.  Deep venous thrombosis prophylaxis, SCDs.     -- subcutaneous heparin.      CODE STATUS:  Full code by default.      DISPOSITION:  I anticipate 2-4 days of hospital stay.  Care plan discussed with the patient.         MONTY GALVEZ MD             D: 12/24/2018   T:  2018   MT: NTS      Name:     WENDY MARTINEZ   MRN:      [id number]        Account:      [id number]   :      1970        Admitted:     2018                   Document: [id number]       cc: Sandoval Wilkinson MD

## 2018-12-24 NOTE — ED NOTES
"[facility]  ED Nurse Handoff Report    ED Chief complaint: Nausea, Vomiting, & Diarrhea and Fever      ED Diagnosis:   Final diagnoses:   Septic shock (H)       Code Status: hospitalist to address    Allergies: No Known Allergies    Activity level - Baseline/Home:  Independent    Activity Level - Current:   Stand with Assist     Needed?: No    Isolation: Yes  Infection: Not Applicable  C-Diff Pending  Bariatric?: No    Vital Signs:   Vitals:    12/23/18 2120 12/23/18 2149 12/23/18 2250 12/23/18 2315   BP: 100/55  121/64 121/52   Pulse: 110  115    Resp: 20 17 26   Temp: 99.1  F (37.3  C)  98  F (36.7  C)    TempSrc: Oral  Oral    SpO2: 94%  97% 97%   Weight:  129.3 kg (285 lb)         Cardiac Rhythm: ,        Pain level: 0-10 Pain Scale: 7    Is this patient confused?: No   Does this patient have a guardian?  No         If yes, is there guardianship documents in the Epic \"Code/ACP\" activity?  No         Guardian Notified?  N/A  [place] - Suicide Severity Rating Scale Completed?  Yes  If yes, what color did the patient score?  White    Patient Report: Initial Complaint: abdominal pain with n/v/d  Focused Assessment: sudden onset abdominal pain with n/v/d this afternoon. Elevated glucose. Pt hypotensive on arrival to ED and elevated lactic acid.  Tests Performed:   Results for orders placed or performed during the hospital encounter of 12/23/18   CT Abdomen Pelvis w Contrast    Narrative    CT ABDOMEN AND PELVIS WITH CONTRAST December 23, 2018 10:13 PM     HISTORY: Severe abdominal pain, low back pain.    COMPARISON: March 19, 2013.    TECHNIQUE: Volumetric helical acquisition of CT images from the lung  bases through the symphysis pubis after the administration of 135mL  Isovue-370 intravenous contrast. Radiation dose for this scan was  reduced using automated exposure control, adjustment of the mA and/or  kV according to patient size, or iterative reconstruction technique.    FINDINGS: " The liver, bilateral kidneys and adrenal glands, pancreas,  and spleen demonstrate no worrisome focal lesion. No hydronephrosis.  No diverticulitis. No appendicitis. Normal caliber aorta. Unremarkable  gallbladder. At least moderate fatty infiltration of the liver. There  is no free fluid in the abdomen or pelvis. No free air in the abdomen.  Bone windows reveal no destructive lesions. There are no abdominal or  pelvic lymph nodes that are abnormal by size criteria. The visualized  lung bases are unremarkable. There are no dilated loops of small bowel  or colon.      Impression    IMPRESSION: No acute process demonstrated within the abdomen and  pelvis.    JOHN SINGH MD   CBC with platelets differential   Result Value Ref Range    WBC 22.8 (H) 4.0 - 11.0 10e9/L    RBC Count 6.31 (H) 4.4 - 5.9 10e12/L    Hemoglobin 19.0 (H) 13.3 - 17.7 g/dL    Hematocrit 53.9 (H) 40.0 - 53.0 %    MCV 85 78 - 100 fl    MCH 30.1 26.5 - 33.0 pg    MCHC 35.3 31.5 - 36.5 g/dL    RDW 13.3 10.0 - 15.0 %    Platelet Count 265 150 - 450 10e9/L    Diff Method Automated Method     % Neutrophils 86.7 %    % Lymphocytes 5.1 %    % Monocytes 7.5 %    % Eosinophils 0.1 %    % Basophils 0.1 %    % Immature Granulocytes 0.5 %    Nucleated RBCs 0 0 /100    Absolute Neutrophil 19.7 (H) 1.6 - 8.3 10e9/L    Absolute Lymphocytes 1.2 0.8 - 5.3 10e9/L    Absolute Monocytes 1.7 (H) 0.0 - 1.3 10e9/L    Absolute Eosinophils 0.0 0.0 - 0.7 10e9/L    Absolute Basophils 0.0 0.0 - 0.2 10e9/L    Abs Immature Granulocytes 0.1 0 - 0.4 10e9/L    Absolute Nucleated RBC 0.0    Comprehensive metabolic panel   Result Value Ref Range    Sodium Canceled, Test credited, specimen discarded 133 - 144 mmol/L    Potassium Canceled, Test credited, specimen discarded 3.4 - 5.3 mmol/L    Chloride Canceled, Test credited, specimen discarded 94 - 109 mmol/L    Carbon Dioxide Canceled, Test credited, specimen discarded 20 - 32 mmol/L    Anion Gap Canceled, Test credited, specimen  discarded 6 - 17 mmol/L    Glucose Canceled, Test credited, specimen discarded 70 - 99 mg/dL    Urea Nitrogen Canceled, Test credited, specimen discarded 7 - 30 mg/dL    Creatinine Canceled, Test credited, specimen discarded 0.66 - 1.25 mg/dL    GFR Estimate Canceled, Test credited, specimen discarded >60 mL/min/[1.73_m2]    GFR Estimate If Black Canceled, Test credited, specimen discarded >60 mL/min/[1.73_m2]    Calcium Canceled, Test credited, specimen discarded 8.5 - 10.1 mg/dL    Bilirubin Total Canceled, Test credited, specimen discarded 0.2 - 1.3 mg/dL    Albumin Canceled, Test credited, specimen discarded 3.4 - 5.0 g/dL    Protein Total Canceled, Test credited, specimen discarded 6.8 - 8.8 g/dL    Alkaline Phosphatase Canceled, Test credited, specimen discarded 40 - 150 U/L    ALT Canceled, Test credited, specimen discarded 0 - 70 U/L    AST Canceled, Test credited, specimen discarded 0 - 45 U/L   Lipase   Result Value Ref Range    Lipase Canceled, Test credited, specimen discarded 73 - 393 U/L   Lactic acid whole blood   Result Value Ref Range    Lactic Acid 5.2 (HH) 0.7 - 2.0 mmol/L   Comprehensive metabolic panel   Result Value Ref Range    Sodium 134 133 - 144 mmol/L    Potassium 5.5 (H) 3.4 - 5.3 mmol/L    Chloride 100 94 - 109 mmol/L    Carbon Dioxide 20 20 - 32 mmol/L    Anion Gap 14 3 - 14 mmol/L    Glucose 393 (H) 70 - 99 mg/dL    Urea Nitrogen 25 7 - 30 mg/dL    Creatinine 1.50 (H) 0.66 - 1.25 mg/dL    GFR Estimate 55 (L) >60 mL/min/[1.73_m2]    GFR Estimate If Black 63 >60 mL/min/[1.73_m2]    Calcium 9.5 8.5 - 10.1 mg/dL    Bilirubin Total 0.7 0.2 - 1.3 mg/dL    Albumin 3.8 3.4 - 5.0 g/dL    Protein Total 8.3 6.8 - 8.8 g/dL    Alkaline Phosphatase 63 40 - 150 U/L    ALT 45 0 - 70 U/L    AST 28 0 - 45 U/L   Lipase   Result Value Ref Range    Lipase 132 73 - 393 U/L   Glucose by meter   Result Value Ref Range    Glucose 328 (H) 70 - 99 mg/dL   Glucose by meter   Result Value Ref Range    Glucose 373  (H) 70 - 99 mg/dL   EKG 12 lead   Result Value Ref Range    Interpretation ECG Click View Image link to view waveform and result      Abnormal Results: see above- elevated WBCs, glucose and lactic acid. Diarrhea- c-diff pending.  Treatments provided: see MAR    Family Comments: family went home    OBS brochure/video discussed/provided to patient/family: N/A              Name of person given brochure if not patient:               Relationship to patient:     ED Medications:   Medications   0.9% sodium chloride BOLUS (0 mLs Intravenous Stopped 12/23/18 2217)     Followed by   sodium chloride 0.9% infusion (not administered)   ondansetron (ZOFRAN) injection 4 mg (4 mg Intravenous Given 12/23/18 2209)   lactated ringers infusion (not administered)   vancomycin (VANCOCIN) 1,500 mg in sodium chloride 0.9 % 250 mL intermittent infusion (1,500 mg Intravenous New Bag 12/23/18 2220)   insulin 1 unit/1 mL in NS (NovoLIN, HumuLIN Regular) drip -ED DKA algorithm (not administered)   lactated ringers BOLUS 3,879 mL (3,879 mLs Intravenous New Bag 12/23/18 2156)   piperacillin-tazobactam (ZOSYN) 4.5 g vial to attach to  mL bag (0 g Intravenous Stopped 12/23/18 2200)   iopamidol (ISOVUE-370) solution 135 mL (135 mLs Intravenous Given 12/23/18 2210)   CT scan flush (80 mLs Intravenous Given 12/23/18 2210)       Drips infusing?:  Yes    For the majority of the shift this patient was Green.   Interventions performed were N/A.    Severe Sepsis OR Septic Shock Diagnosis Present:   Yes    Per the ED Provider, Time Zero for severe sepsis or septic shock is:  21:48    3 Hour Severe Sepsis Bundle Completion:  1. Initial Lactic Acid Result:   Recent Labs   Lab Test 12/23/18 2124 12/05/15  0740 12/04/15  1413   LACT 5.2* 2.8* 3.1*     2. Blood Cultures before Antibiotics: Yes  3. Broad Spectrum Antibiotics Administered:     Anti-infectives (From now, onward)    Start     Dose/Rate Route Frequency Ordered Stop    12/23/18 2154   vancomycin (VANCOCIN) 1,500 mg in sodium chloride 0.9 % 250 mL intermittent infusion      1,500 mg  over 90 Minutes Intravenous ONCE 12/23/18 5801          4. 3rd liter of LR infusing ml of IV fluids have been given so far      6 Hour Severe Sepsis Bundle Completion:    1. Repeat Lactic Acid Level:   Last result   Lab Results   Component Value Date    LACT 5.2 (HH) 12/23/2018     2. Patient currently on Vasopressors =  No    To be done/followed up on inpatient unit:  continuation of LR infusion     ED NURSE PHONE NUMBER: [phone]

## 2018-12-24 NOTE — CONSULTS
[facility]  General Surgery Consultation         Manuel Federico Leelee Escamilla MRN# [id number]   YOB: 1970 Age: 48 year old      Date of Admission:  12/23/2018  Date of Consult: 12/24/2018         Assessment and Plan:   Patient is a 48 year old male with nausea, vomiting, and diarrhea    PLAN:  I have personally reviewed his imaging studies which showed no acute abnormalities.  He likely is suffering from a severe gastroenteritis with diffuse diarrhea and vomiting causing severe dehydration.  He has received multiple liters of saline and his lactic acid is starting to decrease.  He does not have any significant abdominal tenderness and overall has greatly improved since admission making ischemia unlikely cause of his current symptoms.  I would recommend continued hydration and care per the hospitalist.  The surgical team will continue to follow for any changes.          Chief Complaint:     Chief Complaint   Patient presents with     Nausea, Vomiting, & Diarrhea     Fever          History of Present Illness:   Patient is a 48 year old male who I was asked to see by Dr. Dennis  for evaluation of abdominal pain.  The patient awoke this morning without complaints.  He had 2 hotdogs around 11:00 this morning and developed immediate abdominal discomfort with sev.  Ere nausea, vomiting, and diarrhea.  He states he went to the bathroom and had multiple episodes of watery diarrhea which she describes as brown with mucus.  He also had multiple episodes of vomiting.  He also had an episode where he passed out briefly due to above symptoms.  He denied any injury from the fall.  He came to the hospital via EMS.  He denies any sick contacts and his family members did not have above ailments.  He denies any dietary changes other than eating hot dogs which she has had in the past without problems.  In the emergency room he was found to have lactic acidosis and underwent a CT that did not show  any abnormalities.  He had multiple liters of saline but unfortunately had a continued lactic acidosis.  He was admitted to [facility] [place] and was monitored overnight.  His lactic acid has now decreased and his symptoms are greatly improved.  He states he has minimal abdominal pain but still has nausea with ongoing diarrhea.  Patient denies fevers, chills, jaundice, headache, SOB, chest pain.          Physical Exam:   Blood pressure 95/45, pulse 105, temperature 98.8  F (37.1  C), resp. rate 10, weight 129.3 kg (285 lb), SpO2 97 %.  285 lbs 0 oz  General: Generally appears well.  Psych: Alert and Oriented.  Normal affect  Neurological: grossly intact  Eyes: Sclera clear  Respiratory:  Lungs with good air excursion  Cardiovascular:  normal peripheral pulses  GI: Abdomen Obese. Mild distention. Minimal tenderness throughout  Lymphatic/Hematologic/Immune:  No femoral or cervical lymphadenopathy.  Integumentary:  No rashes       Past Medical History:     Past Medical History:   Diagnosis Date     Diabetes mellitus (adult onset)      Diabetes mellitus, type 2 (H) 5/14/2008     Hypertension      Subdural hematoma (H)           Past Surgical History:     Past Surgical History:   Procedure Laterality Date     NO HISTORY OF SURGERY            Current Medications:           heparin  5,000 Units Subcutaneous Q12H     [START ON 12/25/2018] influenza vaccine adult (product based on age)  0.5 mL Intramuscular Prior to discharge     piperacillin-tazobactam  4.5 g Intravenous Q6H     sodium chloride (PF)  3 mL Intracatheter Q8H     vancomycin (VANCOCIN) IV  1,500 mg Intravenous Q8H     acetaminophen, IV fluid REPLACEMENT ONLY, glucose **OR** dextrose **OR** glucagon, HYDROmorphone, lidocaine 4%, lidocaine (buffered or not buffered), magnesium sulfate, melatonin, naloxone, nitroGLYcerin, ondansetron **OR** ondansetron, oxyCODONE, potassium chloride, potassium chloride with lidocaine, potassium chloride, potassium chloride, potassium chloride,  prochlorperazine **OR** prochlorperazine **OR** prochlorperazine, sodium chloride (PF)       Home Medications:     Prior to Admission medications    Medication Sig Last Dose Taking? Auth Provider   insulin aspart (NOVOLOG FLEXPEN) 100 UNIT/ML injection 15 units before breakfast, 15 units before lunch, 15 units before dinner 2018 at Unknown time Yes Sandoval Wilkinson MD   insulin glargine (LANTUS SOLOSTAR) 100 UNIT/ML pen Inject 60 Units Subcutaneous At Bedtime 2018 at HS Yes Sandoval Wilkinson MD   lisinopril (PRINIVIL/ZESTRIL) 20 MG tablet Take 0.5 tablets (10 mg) by mouth daily 2018 at PM Yes Sandoval Wilkinson MD   metFORMIN (GLUCOPHAGE-XR) 500 MG 24 hr tablet Take 2 tablets (1,000 mg) by mouth 2 times daily (with meals) lima. 12/15/2018 Yes Sandoval Wilkinson MD   simvastatin (ZOCOR) 20 MG tablet Take 1 tablet (20 mg) by mouth daily 2018 Yes Sandoval Wilkinson MD   ASPIRIN NOT PRESCRIBED (INTENTIONAL) Antiplatelet medication not prescribed intentionally due to subdural hematoma   Sandoval Wilkinson MD   blood glucose monitoring (ONE TOUCH ULTRA) test strip Use to test blood sugars tid or as directed.   Sandoval Wilkinson MD   blood glucose monitoring (ONE TOUCH ULTRA) test strip Use to test blood sugars tid or as directed.   Sandoval Wilkinson MD   insulin pen needle 32G X 4 MM Use 4 pen needles daily or as directed.   Sandoval Wilkinson MD   order for DME Equipment being ordered:     1 glucometer    90 Test strips for three times daily testing with 11 RF.    90 lancets for three times daily testing.   Sandoval Wilkinson MD   order for DME [employer] 10  7-11 CM/H20  [employer]   Reported, Patient          Allergies:   No Known Allergies       Family History:     Family History   Problem Relation Age of Onset     Diabetes Mother      C.A.D. Father         in mid 60's     Diabetes Maternal Aunt          of Diabetic complications     Cancer - colorectal No family hx of      Prostate Cancer No family hx of       Cerebrovascular Disease No family hx of          Social History:   Jairo Escamilla  reports that  has never smoked. he has never used smokeless tobacco. He reports that he drinks alcohol. He reports that he does not use drugs.        Review of Systems:   The 10 point Review of Systems is negative other than noted in the HPI.       Labs/Imaging   All new lab and imaging data was reviewed.   Manuel Mckoy M.D.  [facility]

## 2018-12-24 NOTE — PROVIDER NOTIFICATION
DATE:  12/24/2018   TIME OF RECEIPT FROM LAB:  0533  LAB TEST:  Lactic  LAB VALUE: 5.4  RESULTS GIVEN WITH READ-BACK TO Dr. Rios     TIME LAB VALUE REPORTED TO PROVIDER:

## 2018-12-24 NOTE — ED NOTES
DATE:  12/24/2018   TIME OF RECEIPT FROM LAB:  0004  LAB TEST:  Lactic acid  LAB VALUE:  4.9  RESULTS GIVEN WITH READ-BACK TO (PROVIDER):  Belkis Bradshaw MD  TIME LAB VALUE REPORTED TO PROVIDER:   0005

## 2018-12-24 NOTE — PHARMACY-ADMISSION MEDICATION HISTORY
Admission Medication History    Admission medication history interview status for the 12/23/2018 admission is complete. See EPIC admission navigator for prior to admission medications     Medication history source reliability:Good    Actions taken by pharmacist (provider contacted, etc):None     Additional medication history information not noted on PTA med list :None    Medication reconciliation/reorder completed by provider prior to medication history? No    Time spent in this activity: 10 minutes    Prior to Admission medications    Medication Sig Last Dose Taking? Auth Provider   insulin aspart (NOVOLOG FLEXPEN) 100 UNIT/ML injection 15 units before breakfast, 15 units before lunch, 15 units before dinner 12/22/2018 at Unknown time Yes Sandoval Wilkinson MD   insulin glargine (LANTUS SOLOSTAR) 100 UNIT/ML pen Inject 60 Units Subcutaneous At Bedtime 12/22/2018 at HS Yes Sandoval Wilkinson MD   lisinopril (PRINIVIL/ZESTRIL) 20 MG tablet Take 0.5 tablets (10 mg) by mouth daily 12/22/2018 at PM Yes Sandoval Wilkinson MD   metFORMIN (GLUCOPHAGE-XR) 500 MG 24 hr tablet Take 2 tablets (1,000 mg) by mouth 2 times daily (with meals) lima. 12/15/2018 Yes Sandoval Wilkinson MD   simvastatin (ZOCOR) 20 MG tablet Take 1 tablet (20 mg) by mouth daily 12/22/2018 Yes Sandoval Wilkinson MD   ASPIRIN NOT PRESCRIBED (INTENTIONAL) Antiplatelet medication not prescribed intentionally due to subdural hematoma   Sandoval Wilkinson MD   blood glucose monitoring (ONE TOUCH ULTRA) test strip Use to test blood sugars tid or as directed.   Sandoval Wilkinson MD   blood glucose monitoring (ONE TOUCH ULTRA) test strip Use to test blood sugars tid or as directed.   Sandoval Wilkinson MD   insulin pen needle 32G X 4 MM Use 4 pen needles daily or as directed.   Sandoval Wilkinson MD   order for DME Equipment being ordered:     1 glucometer    90 Test strips for three times daily testing with 11 RF.    90 lancets for three times daily testing.   Sandoval Wilkinson MD   order for DME  [employer] 10  7-11 CM/H20  [facility] [employer]   Reported, Patient       Sandoval Negrete, PharmD

## 2018-12-24 NOTE — PROVIDER NOTIFICATION
Paged Dr. Dennis in regards to Lactic 5.4. Orders obtained to bolus patient, 1000ml over 1 hr. Hemoglobin dropped from 19 to 15.7, Provider stated no concerns at this time as it is most likely dilutional.

## 2018-12-24 NOTE — PROGRESS NOTES
Hospitalist udpate:    Lactic acid remains elevated, 5.2>>4.9>>5.4.  Reevaluated patient.   No nausea, drinking water, still thirsty. BP stable. HR in 110s  4 loose BM since coming to floor, no blood. C diff negative.   Patient feels better overall, abd pain is better and minimal.  Abd is soft and non tender.   He does not appear toxic. And remains afebrile.     Cr better, urine output 500 ml  WBC down to near normal.    I will give him 2 more Liter NS bolus and recheck lactic again after the 2L IVF, ordered for 8:30 am.  Continue to monitor.     Discussed with patient and JUNIOR Dennis MD  Hospitalist

## 2018-12-24 NOTE — ED PROVIDER NOTES
History     Chief Complaint:  Nausea, Emesis, Diarrhea, and Fever     The history is provided by the patient.      Jairo Escamilla is a type II diabetic 48 year old male with past medical history pertinent for hypertension, hyperlipidemia, and subdural hematoma who presents to the emergency department today for evaluation of nausea, emesis, diarrhea, and fever. Patient reports sudden onset of severe nausea at 1400 with associated diarrhea (6-8 episodes as of today of watery stool without blood). He reports he has vomited 4 times since 1600. Patient reports initial secondary abdoimnal pain but claims it is currently improved in the ED. Additionally, patient notes associated fever and chills earlier this afternoon and asserts he has been intermittently having episodes of coughing spells for the last week or so. Patient is currently incontinent of stool. He denies any chest pain, dysuria, difficulty urinating, abdominal distension, other complaints, recent travel antibiotics, history of hernias and abdominal surgeries or  history of similar discomfort and DKA. He states he took his insulin earlier this morning at 1000 and has not taken his long-acting yet as he usually takes it before bed. Patient states he had hot dog at home around noon today.     Allergies:  No Known Drug Allergies    Medications:    Lantus  Prinivil  Metformin  Zocor  Adipex  Novolog      Past Medical History:    Hyperlipidemia  Type 2 diabetes mellitus  Hypertension  Subdural hematoma  Peripheral polyneuropathy    Past Surgical History:    History reviewed. No pertinent past surgical history.     Family History:    Mother: diabetes  Father: CAD  Maternal aunt: diabetes  No family history of cerebrovascular disease, colorectal cancer, and prostate cancer    Social History:  The patient was accompanied to the ED by girlfriend.  Patient works at [employer].  Smoking Status: Never Smoker  Smokeless Tobacco: Never Used  Alcohol Use: Positive     Review  of Systems   Constitutional: Positive for chills and fever.   Cardiovascular: Negative for chest pain.   Gastrointestinal: Positive for abdominal pain, diarrhea, nausea and vomiting. Negative for abdominal distention.   Genitourinary: Negative for difficulty urinating and dysuria.   All other systems reviewed and are negative.      Physical Exam     Patient Vitals for the past 24 hrs:   BP Temp Temp src Pulse Heart Rate Resp SpO2 Weight   12/24/18 0152 104/65 98  F (36.7  C) Oral -- 125 22 94 % --   12/23/18 2315 121/52 -- -- -- 115 26 97 % --   12/23/18 2250 121/64 98  F (36.7  C) Oral 115 115 17 97 % --   12/23/18 2149 -- -- -- -- -- -- -- 129.3 kg (285 lb)   12/23/18 2120 100/55 99.1  F (37.3  C) Oral 110 -- 20 94 % --     Physical Exam  Nursing note and vitals reviewed.  Constitutional:  Appears ill-appearing but well-developed and well-nourished.   HENT:   Head:    Atraumatic.   Mouth/Throat:   Oropharynx is clear and moist. No oropharyngeal exudate.   Eyes:    Pupils are equal, round, and reactive to light.   Neck:    Normal range of motion. Neck supple.      No tracheal deviation present. No thyromegaly present.   Cardiovascular:  Tachycardia rate, regular rhythm, no murmur   Pulmonary/Chest: Breath sounds are clear and equal without wheezes or crackles.  Abdominal:   Soft. Bowel sounds are normal. Exhibits no distension and      no mass. There is diffuse abdominal tenderness with guarding but no rebound.   Musculoskeletal:  Exhibits no edema.   Lymphadenopathy:  No cervical adenopathy.   Neurological:   Alert and oriented to person, place, and time.   Skin:    Skin is warm and dry. No rash noted. No pallor.     Emergency Department Course     ECG:  ECG taken at 2155, ECG read at 2159  Sinus tachycardia  Left anterior fascicular block  Cannot rule out inferior infarct (masked by fascicular block?), age undetermined  Cannot rule out anterior infarct, age undetermined  Abnormal ECG  Rate 109 bpm. IN interval  140 ms. QRS duration 106 ms. QT/QTc 350/471 ms. P-R-T axes 44 -81 45.    Imaging:  Radiology findings were communicated with the patient who voiced understanding of the findings.    CT Abdomen Pelvis w Contrast  No acute process demonstrated within the abdomen and  pelvis.  JOHN SINGH MD  Reading per radiology    Laboratory:  Laboratory findings were communicated with the patient who voiced understanding of the findings.    Glucose by meter (Collected 2251): 373 (H)  Glucose by meter (Collected at 2132): 328 (H)  UA: glucose >1000 (A), urineketon 10 (A), specific gravity 1.041 (H), protein albumin 100 (A), mucous present (A) o/w WNL  Urine culture aerobic bacterial: Pending   Blood culture x2: Pending  Ketone beta-hydroxybutyrate quantitative: 1.1 (H)  C diff toxin B PCR: Negative  CMP: K 5.5 (H), glucose 393 (H), creatinine 1.50 (H), GFR 55 (L) o/w WNL   Lipase: 132  CBC: WBC 22.8 (H), HGB 19.0 (H),   Lactic Acid (Resulted: 2148): 5.2 (HH)  Lactic Acid (Resulted: 0004): 4.9 (HH)    Interventions:  2143 NS 1L IV  2155 Zosyn 4.5 g IV  2156 LR 3879 mL IV  2209 Zofran 4 mg IV  2220 Vancomycin 1500 mg IV  0100 Insulin 4.5 units/hr IV     Emergency Department Course:    2146 IV was inserted and blood was drawn for laboratory testing, results above.    2155 The patient was sent for a CT while in the emergency department, results above.     2157 Nursing notes and vitals reviewed.    2230 I performed an exam of the patient as documented above.     2308 I spoke with Dr. Dennis of the hospitalist service regarding patient's presentation, findings, and plan of care.    0002 Patient's lactate remains elevated. Dr. Dennis expressed need for surgery consult.     0131 I personally reviewed the laboratory and imaging results with the patient and answered all related questions prior to admission.    Impression & Plan      Medical Decision Making:  I found the patient to have signs and symptoms of septic shock with cycling  intraabdominal cause. He was given Zosyn and Vancomycin IV and aggressive fluid hydration and addmited to the [facility] under the care of the hospitalist, Dr. Dennis. His CT abdomen/pelvis is unremarkable and he does not have an acute abdomen. His urine does not show any signs of infection and there is no sign of pneumonia.     Diagnosis:    ICD-10-CM    1. Septic shock (H) A41.9 Blood culture    R65.21 Blood culture     UA with Microscopic     Urine Culture Aerobic Bacterial     Ketone Beta-Hydroxybutyrate Quantitative     Clostridium difficile toxin B PCR     Lactic acid     Glucose by meter     Glucose by meter     Glucose by meter     Glucose by meter     Glucose by meter     Glucose by meter     Glucose by meter     Glucose by meter     Glucose by meter     Glucose by meter     Glucose by meter     Glucose by meter     Lactic acid whole blood     CBC with platelets differential     Hemoglobin A1c     Procalcitonin     Glucose by meter     Glucose by meter     Comprehensive metabolic panel     Comprehensive metabolic panel     Glucose by meter     Glucose by meter     Magnesium     Magnesium     Phosphorus     Phosphorus     Glucose by meter     Glucose by meter     CANCELED: Enteric Bacteria and Virus Panel by PIETER Stool     CANCELED: CBC with platelets differential     CANCELED: Comprehensive metabolic panel     CANCELED: Magnesium     CANCELED: Phosphorus   2. Diabetic ketoacidosis without coma associated with diabetes mellitus due to underlying condition (H) E08.10        Disposition:   The patient is admitted into the care of Dr. Dennis.    CMS Diagnoses:   The patient has signs of Septic Shock as evidenced by:    1. Presence of Sepsis, AND  2. Lactic Acid level greater than or equal to 4    Time septic shock diagnosis confirmed = 2124 as this was the time when Lactate was resulted and the level was greater than or equal to 4    3 Hour Septic Shock Bundle Completion:  1. Initial Lactic Acid Result:   Recent Labs    Lab Test 12/23/18  2344 12/23/18  2124 12/05/15  0740   LACT 4.9* 5.2* 2.8*     2. Blood Cultures before Antibiotics: Yes  3. Broad Spectrum Antibiotics Administered: Yes     Anti-infectives (From now, onward)    Start     Dose/Rate Route Frequency Ordered Stop    12/24/18 0400  piperacillin-tazobactam (ZOSYN) 4.5 g vial to attach to  mL bag      4.5 g  over 30 Minutes Intravenous EVERY 6 HOURS 12/24/18 0138          4. 4879 ml of IV fluids.  Ideal body weight: 73 kg (160 lb 15 oz)  Adjusted ideal body weight: 95.5 kg (210 lb 9 oz)  Severe Sepsis reassessment:  1. Repeat Lactic Acid Level: 0004  2. MAP>65 after initial IVF bolus, will continue to monitor fluid status and vital signs  I attest to having performed a repeat sepsis exam and assessment of perfusion at 0015 and the results demonstrate no change.     Critical Care Time for this patient, exclusive of procedures, is 45 minutes.     Scribe Disclosure:  I, Mic Phan, am serving as a scribe at 10:04 PM on 12/23/2018 to document services personally performed by Belkis Bradshaw MD based on my observations and the provider's statements to me.     EMERGENCY DEPARTMENT       Belkis Bradshaw MD  12/24/18 1462

## 2018-12-25 ENCOUNTER — APPOINTMENT (OUTPATIENT)
Dept: GENERAL RADIOLOGY | Facility: CLINIC | Age: 48
DRG: 392 | End: 2018-12-25
Attending: INTERNAL MEDICINE
Payer: COMMERCIAL

## 2018-12-25 ENCOUNTER — APPOINTMENT (OUTPATIENT)
Dept: CT IMAGING | Facility: CLINIC | Age: 48
DRG: 392 | End: 2018-12-25
Attending: INTERNAL MEDICINE
Payer: COMMERCIAL

## 2018-12-25 LAB
ANION GAP SERPL CALCULATED.3IONS-SCNC: 8 MMOL/L (ref 3–14)
BACTERIA SPEC CULT: NO GROWTH
BUN SERPL-MCNC: 8 MG/DL (ref 7–30)
C COLI+JEJUNI+LARI FUSA STL QL NAA+PROBE: NOT DETECTED
CALCIUM SERPL-MCNC: 7.4 MG/DL (ref 8.5–10.1)
CHLORIDE SERPL-SCNC: 110 MMOL/L (ref 94–109)
CO2 SERPL-SCNC: 24 MMOL/L (ref 20–32)
CREAT SERPL-MCNC: 0.78 MG/DL (ref 0.66–1.25)
EC STX1 GENE STL QL NAA+PROBE: NOT DETECTED
EC STX2 GENE STL QL NAA+PROBE: NOT DETECTED
ENTERIC PATHOGEN COMMENT: ABNORMAL
GFR SERPL CREATININE-BSD FRML MDRD: >90 ML/MIN/{1.73_M2}
GLUCOSE BLDC GLUCOMTR-MCNC: 101 MG/DL (ref 70–99)
GLUCOSE BLDC GLUCOMTR-MCNC: 116 MG/DL (ref 70–99)
GLUCOSE BLDC GLUCOMTR-MCNC: 119 MG/DL (ref 70–99)
GLUCOSE BLDC GLUCOMTR-MCNC: 120 MG/DL (ref 70–99)
GLUCOSE BLDC GLUCOMTR-MCNC: 124 MG/DL (ref 70–99)
GLUCOSE BLDC GLUCOMTR-MCNC: 128 MG/DL (ref 70–99)
GLUCOSE BLDC GLUCOMTR-MCNC: 130 MG/DL (ref 70–99)
GLUCOSE BLDC GLUCOMTR-MCNC: 138 MG/DL (ref 70–99)
GLUCOSE BLDC GLUCOMTR-MCNC: 148 MG/DL (ref 70–99)
GLUCOSE BLDC GLUCOMTR-MCNC: 166 MG/DL (ref 70–99)
GLUCOSE BLDC GLUCOMTR-MCNC: 174 MG/DL (ref 70–99)
GLUCOSE BLDC GLUCOMTR-MCNC: 189 MG/DL (ref 70–99)
GLUCOSE SERPL-MCNC: 134 MG/DL (ref 70–99)
LACTATE BLD-SCNC: 1.3 MMOL/L (ref 0.7–2)
Lab: NORMAL
NOROV GI+II ORF1-ORF2 JNC STL QL NAA+PR: ABNORMAL
POTASSIUM SERPL-SCNC: 3.7 MMOL/L (ref 3.4–5.3)
PROCALCITONIN SERPL-MCNC: 0.59 NG/ML
RVA NSP5 STL QL NAA+PROBE: NOT DETECTED
SALMONELLA SP RPOD STL QL NAA+PROBE: NOT DETECTED
SHIGELLA SP+EIEC IPAH STL QL NAA+PROBE: NOT DETECTED
SODIUM SERPL-SCNC: 142 MMOL/L (ref 133–144)
SPECIMEN SOURCE: NORMAL
V CHOL+PARA RFBL+TRKH+TNAA STL QL NAA+PR: NOT DETECTED
Y ENTERO RECN STL QL NAA+PROBE: NOT DETECTED

## 2018-12-25 PROCEDURE — 40000886 ZZH STATISTIC STEP DOWN HRS DAY

## 2018-12-25 PROCEDURE — 00000146 ZZHCL STATISTIC GLUCOSE BY METER IP

## 2018-12-25 PROCEDURE — 84145 PROCALCITONIN (PCT): CPT | Performed by: INTERNAL MEDICINE

## 2018-12-25 PROCEDURE — 25000132 ZZH RX MED GY IP 250 OP 250 PS 637: Performed by: HOSPITALIST

## 2018-12-25 PROCEDURE — 87506 IADNA-DNA/RNA PROBE TQ 6-11: CPT | Performed by: HOSPITALIST

## 2018-12-25 PROCEDURE — 25000132 ZZH RX MED GY IP 250 OP 250 PS 637: Performed by: INTERNAL MEDICINE

## 2018-12-25 PROCEDURE — 80048 BASIC METABOLIC PNL TOTAL CA: CPT | Performed by: INTERNAL MEDICINE

## 2018-12-25 PROCEDURE — 70450 CT HEAD/BRAIN W/O DYE: CPT

## 2018-12-25 PROCEDURE — 83605 ASSAY OF LACTIC ACID: CPT | Performed by: HOSPITALIST

## 2018-12-25 PROCEDURE — 25000128 H RX IP 250 OP 636: Performed by: HOSPITALIST

## 2018-12-25 PROCEDURE — 99232 SBSQ HOSP IP/OBS MODERATE 35: CPT | Performed by: SURGERY

## 2018-12-25 PROCEDURE — 25000131 ZZH RX MED GY IP 250 OP 636 PS 637: Performed by: INTERNAL MEDICINE

## 2018-12-25 PROCEDURE — 21400002 ZZH R&B CCU CICU CRITICAL

## 2018-12-25 PROCEDURE — 71046 X-RAY EXAM CHEST 2 VIEWS: CPT

## 2018-12-25 PROCEDURE — 36415 COLL VENOUS BLD VENIPUNCTURE: CPT | Performed by: INTERNAL MEDICINE

## 2018-12-25 PROCEDURE — 25000131 ZZH RX MED GY IP 250 OP 636 PS 637: Performed by: HOSPITALIST

## 2018-12-25 PROCEDURE — 36415 COLL VENOUS BLD VENIPUNCTURE: CPT | Performed by: HOSPITALIST

## 2018-12-25 PROCEDURE — 99232 SBSQ HOSP IP/OBS MODERATE 35: CPT | Performed by: INTERNAL MEDICINE

## 2018-12-25 RX ORDER — LOPERAMIDE HCL 2 MG
2 CAPSULE ORAL 4 TIMES DAILY PRN
Status: DISCONTINUED | OUTPATIENT
Start: 2018-12-25 | End: 2018-12-27 | Stop reason: HOSPADM

## 2018-12-25 RX ORDER — DEXTROSE MONOHYDRATE 25 G/50ML
25-50 INJECTION, SOLUTION INTRAVENOUS
Status: DISCONTINUED | OUTPATIENT
Start: 2018-12-25 | End: 2018-12-25

## 2018-12-25 RX ORDER — BENZONATATE 100 MG/1
100 CAPSULE ORAL 3 TIMES DAILY PRN
Status: DISCONTINUED | OUTPATIENT
Start: 2018-12-25 | End: 2018-12-27 | Stop reason: HOSPADM

## 2018-12-25 RX ORDER — SIMVASTATIN 20 MG
20 TABLET ORAL DAILY
Status: DISCONTINUED | OUTPATIENT
Start: 2018-12-25 | End: 2018-12-27 | Stop reason: HOSPADM

## 2018-12-25 RX ORDER — NICOTINE POLACRILEX 4 MG
15-30 LOZENGE BUCCAL
Status: DISCONTINUED | OUTPATIENT
Start: 2018-12-25 | End: 2018-12-25

## 2018-12-25 RX ORDER — SIMVASTATIN 20 MG
20 TABLET ORAL DAILY
Status: CANCELLED | OUTPATIENT
Start: 2018-12-25

## 2018-12-25 RX ADMIN — PIPERACILLIN SODIUM,TAZOBACTAM SODIUM 4.5 G: 4; .5 INJECTION, POWDER, FOR SOLUTION INTRAVENOUS at 04:01

## 2018-12-25 RX ADMIN — SODIUM CHLORIDE: 9 INJECTION, SOLUTION INTRAVENOUS at 02:58

## 2018-12-25 RX ADMIN — SODIUM CHLORIDE 1.5 UNITS/HR: 9 INJECTION, SOLUTION INTRAVENOUS at 04:04

## 2018-12-25 RX ADMIN — ACETAMINOPHEN 650 MG: 325 TABLET, FILM COATED ORAL at 05:39

## 2018-12-25 RX ADMIN — INSULIN GLARGINE 30 UNITS: 100 INJECTION, SOLUTION SUBCUTANEOUS at 22:33

## 2018-12-25 RX ADMIN — INSULIN ASPART 1 UNITS: 100 INJECTION, SOLUTION INTRAVENOUS; SUBCUTANEOUS at 18:28

## 2018-12-25 RX ADMIN — HEPARIN SODIUM 5000 UNITS: 10000 INJECTION, SOLUTION INTRAVENOUS; SUBCUTANEOUS at 12:43

## 2018-12-25 RX ADMIN — SIMVASTATIN 20 MG: 20 TABLET, FILM COATED ORAL at 22:29

## 2018-12-25 RX ADMIN — INSULIN ASPART 1 UNITS: 100 INJECTION, SOLUTION INTRAVENOUS; SUBCUTANEOUS at 12:42

## 2018-12-25 RX ADMIN — VANCOMYCIN HYDROCHLORIDE 1500 MG: 5 INJECTION, POWDER, LYOPHILIZED, FOR SOLUTION INTRAVENOUS at 06:59

## 2018-12-25 ASSESSMENT — ACTIVITIES OF DAILY LIVING (ADL)
ADLS_ACUITY_SCORE: 12

## 2018-12-25 NOTE — PLAN OF CARE
Vtials stable. Pain controlled with tylenol. Insulin drip continued. LS clear and equal. Diet clear Liqs. Up with assist . BS active; loose stools improving.

## 2018-12-25 NOTE — PROGRESS NOTES
Notified by nursing that patient with lactic acid 3.6 (improving from admission). Admitted for acute gastroenteritis vs sepsis. Has been receiving antibiotics and IVF. Soft BPs during the day, but improved during the day. Otherwise has remained afebrile, borderline tachycardic near 100.  Initially elevated lactic acid was thought to be related to dehydration, hypotension, poor organ perfusion, metformin.     Plan: Give 1L NS IVF bolus. Recheck lactic acid with AM labs.  Discussed with nursing.

## 2018-12-25 NOTE — PROGRESS NOTES
General Surgery Progress Note    S:  Feeling much better this am. No longer any nausea or vomiting and abdomen is sore but no significant pain. Continues to have diarrhea but less frequent now.     O:  Physical exam  General: Alert, NAD  B/P: 127/73, T: 98, P: 84, R: 14  CV: Extremities WWP. RRR  Resp: Breathing comfortably on RA  Abd: protuberant, round, soft, non tender to palpation, no rebound, no peritoneal signs  Extremities: Pulses bounding in bilateral radials    Labs - Lactate is 1.3 <--3.5 --5.4    A/P:  Jairo Escamilla is a 48 year old male admitted for nausea, vomiting, and diarrhea likely due to gastroenteritis with elevated lactated due to dehydration. Much improved with improved lactate.     - Continue to monitor for now. No surgical interventions indicated.   - ok to trial clears   - will continue to follow along    Jerome Bhagat MD  General Surgery PGY4  12/25/18  8:49 AM

## 2018-12-25 NOTE — PLAN OF CARE
Pt a/o x 4. Soft BP, tachy HR. Tele was sinus tach with BBB. Denies pain. Pt is IMC status, has had sepsis protocol fires. Lactic is continuing to trend down. IVF infusing. IV antibiotics, insulin gtt infusing. Currently algorithm 3. Has been incontinent of bowel. Tolerating clear liquid diet, up with SBA.

## 2018-12-25 NOTE — PROGRESS NOTES
[facility]    Hospitalist Progress Note    Assessment & Plan      Jairo Escamilla is a 48-year-old male with insulin-dependent diabetes mellitus, obesity, hyperlipidemia, was brought to the ER due to nausea, vomiting, diarrhea, abdominal pain and syncope.     Acute severe gastroenteritis  Lactic acidosis most likely due to #1 above.   -Presenting with nausea, vomiting, diarrhea after eating hotdog.    -Generalized abdominal pain without significant CT finding.  -Appreciate general surgery consult, no concerns for surgical abdomen.   -No recent antibiotic exposure.    -No fever. Elevated WBC, but likely hemoconcentration.    -Suspect lactic acidosis is likely due to dehydration, hypotension, poor organ perfusion and metformin related.  Lactic acidosis now has resolved  -Stool for C. difficile negative  -Patient complaining of dry cough for the past 3 or 4 weeks, chest x-ray without any acute findings  -No other obvious source of infection apparent, discontinue vancomycin and Zosyn and monitor off antibiotics.  -Most likely this was viral acute gastroenteritis  -Enteric panel pending  -Patient still having loose bowel movements but improving, continue to monitor stool output  -Advance diet as tolerated.  -Discontinue IMC.    Syncope.    -The patient had dizziness while he was standing and reports a syncopal episode. Reportedly, his systolic blood pressure was in the 80s when he presented to the ER.    -Suspect this is due to dehydration and hypotension.    -No neuro deficit and denies other neurological symptoms.    -Normal sinus rhythm on telemetry, blood pressure have since been stable, no further presyncope/syncope symptoms.  -Patient complained of headache this morning, reportedly had a history of spontaneous subdural hematoma 3 years ago, appears concerned, will check a CT head.    Acute kidney injury and mild hyperkalemia.    -Presents with a creatinine of 1.5 and potassium of 5.5.  Suspect this  is due to dehydration in combination with ACE inhibitor.   -Improved with IV hydration    Diabetes mellitus, insulin-dependent.    -Hemoglobin A1c of 10.1  -PTA on metformin, insulin Glargine 60 units subQ at bedtime and premeal NovoLog 15 units t.i.d.   -Hold metformin  -High intensity sliding scale  -Resume Lantus at 30 units at bedtime.    Hyperlipidemia.     -Continue simvastatin     Obstructive sleep apnea:  -Noncompliant with CPAP    Morbid obesity:  -BMI more than 40  -Weight loss and lifestyle modification as outpatient.        # Pain Assessment:  Current Pain Score 12/25/2018   Patient currently in pain? denies   Pain score (0-10) -   Pain location -   Pain descriptors -   Jairo ridley pain level was assessed and he currently denies pain.        D/W: RN  DVT Prophylaxis: Heparin SQ  Code Status: Full Code    Disposition: Expected discharge in 1-2 days    Kelvin Persaud MD    Interval History   Still having loose bowel movements although overall improving.  Mild abdominal pain.  Denies nausea or vomiting.  Dry cough.  No chest pain.  Afebrile.    -Data reviewed today: I reviewed all new labs and imaging results over the last 24 hours. I personally reviewed the EKG tracing showing Normal sinus rhythm.      Physical Exam   Temp: 98  F (36.7  C) Temp src: Oral BP: 127/73 Pulse: 84 Heart Rate: 82 Resp: 14 SpO2: 95 % O2 Device: None (Room air)    Vitals:    12/23/18 2149   Weight: 129.3 kg (285 lb)     Vital Signs with Ranges  Temp:  [98  F (36.7  C)-98.3  F (36.8  C)] 98  F (36.7  C)  Pulse:  [] 84  Heart Rate:  [] 82  Resp:  [8-25] 14  BP: ()/(45-76) 127/73  SpO2:  [93 %-100 %] 95 %  I/O last 3 completed shifts:  In: 1850 [P.O.:350; I.V.:1500]  Out: 1000 [Urine:1000]    Constitutional: AAOX3, NAD  HEENT: No pallor or icterus  Neck- Supple, Good ROM, No JVD  Respiratory:  No crackles, No wheezes, CTA B/L, Normal WOB  Cardiovascular: RRR, No murmur  GI: Soft, Non- tender, BS- normoactive, No  Guarding/rebound/rigidity  Skin/Integument: Warm and dry, no rashes  MSK: No joint deformity or swelling, no edema  Neuro: CN- grossly intact        Medications       IV fluid REPLACEMENT ONLY       sodium chloride 150 mL/hr at 12/25/18 0730         heparin  5,000 Units Subcutaneous Q12H     influenza vaccine adult (product based on age)  0.5 mL Intramuscular Prior to discharge     insulin aspart  1-10 Units Subcutaneous TID AC     insulin aspart  1-7 Units Subcutaneous At Bedtime     insulin glargine  30 Units Subcutaneous At Bedtime     piperacillin-tazobactam  4.5 g Intravenous Q6H     simvastatin  20 mg Oral Daily       Data     Recent Labs   Lab 12/24/18  0445 12/23/18  2146 12/23/18  2124   WBC 11.2*  --  22.8*   HGB 15.7  --  19.0*   MCV 86  --  85     --  265    134 Canceled, Test credited, specimen discarded   POTASSIUM 4.2 5.5* Canceled, Test credited, specimen discarded   CHLORIDE 105 100 Canceled, Test credited, specimen discarded   CO2 24 20 Canceled, Test credited, specimen discarded   BUN 22 25 Canceled, Test credited, specimen discarded   CR 1.05 1.50* Canceled, Test credited, specimen discarded   ANIONGAP 10 14 Canceled, Test credited, specimen discarded   LUKASZ 8.5 9.5 Canceled, Test credited, specimen discarded   * 393* Canceled, Test credited, specimen discarded   ALBUMIN 3.2* 3.8 Canceled, Test credited, specimen discarded   PROTTOTAL 6.8 8.3 Canceled, Test credited, specimen discarded   BILITOTAL 1.0 0.7 Canceled, Test credited, specimen discarded   ALKPHOS 46 63 Canceled, Test credited, specimen discarded   ALT 40 45 Canceled, Test credited, specimen discarded   AST 30 28 Canceled, Test credited, specimen discarded   LIPASE  --  132 Canceled, Test credited, specimen discarded       No results found for this or any previous visit (from the past 24 hour(s)).

## 2018-12-25 NOTE — PHARMACY-VANCOMYCIN DOSING SERVICE
Pharmacy Vancomycin Note  Date of Service 2018  Patient's  1970   48 year old, male    Indication: Sepsis  Goal Trough Level: 15-20 mg/L  Day of Therapy: 2  Current Vancomycin regimen:  1500 mg IV q8h    Current estimated CrCl = Estimated Creatinine Clearance: 118.7 mL/min (based on SCr of 1.05 mg/dL).    Creatinine for last 3 days  2018:  9:24 PM Creatinine Canceled, Test credited, specimen discarded mg/dL;  9:46 PM Creatinine 1.50 mg/dL  2018:  4:45 AM Creatinine 1.05 mg/dL    Recent Vancomycin Levels (past 3 days)  2018:  9:07 PM Vancomycin Level 11.4 mg/L    Vancomycin IV Administrations (past 72 hours)                   vancomycin (VANCOCIN) 1,500 mg in sodium chloride 0.9 % 250 mL intermittent infusion (mg) 1,500 mg New Bag 18 2100     1,500 mg New Bag  1327     1,500 mg New Bag  0642    vancomycin (VANCOCIN) 1,500 mg in sodium chloride 0.9 % 250 mL intermittent infusion (mg) 1,500 mg New Bag 18 2220                Nephrotoxins and other renal medications (From now, onward)    Start     Dose/Rate Route Frequency Ordered Stop    18 0600  vancomycin (VANCOCIN) 1,500 mg in sodium chloride 0.9 % 250 mL intermittent infusion      1,500 mg  over 90 Minutes Intravenous EVERY 8 HOURS 18 0311      18 0400  piperacillin-tazobactam (ZOSYN) 4.5 g vial to attach to  mL bag      4.5 g  over 30 Minutes Intravenous EVERY 6 HOURS 18 0138               Contrast Orders - past 72 hours (72h ago, onward)    Start     Dose/Rate Route Frequency Ordered Stop    18 2156  iopamidol (ISOVUE-370) solution 135 mL      135 mL Intravenous ONCE 18 2155 18 2210          Interpretation of levels and current regimen:  Trough level is  Subtherapeutic but not yet at steady state    Has serum creatinine changed > 50% in last 72 hours: No    Urine output:  unable to determine    Renal Function: Improving    Plan:  1.  Continue Current Dose  2.   Pharmacy will check trough levels as appropriate in 1-3 Days.    3. Serum creatinine levels will be ordered daily for the first week of therapy and at least twice weekly for subsequent weeks.      Sandoval RoaD        .

## 2018-12-25 NOTE — PLAN OF CARE
VSS, denies pain, up SBA, tolerating full liquids, IV SL, voiding, BM x 2, watery, BS hyperactive, intermittent strong non productive cough. Head CT, Chest X-ray complete, Echo pending, BG monitored, IMC Discontinued.

## 2018-12-26 ENCOUNTER — APPOINTMENT (OUTPATIENT)
Dept: CARDIOLOGY | Facility: CLINIC | Age: 48
DRG: 392 | End: 2018-12-26
Attending: INTERNAL MEDICINE
Payer: COMMERCIAL

## 2018-12-26 LAB
GLUCOSE BLDC GLUCOMTR-MCNC: 157 MG/DL (ref 70–99)
GLUCOSE BLDC GLUCOMTR-MCNC: 157 MG/DL (ref 70–99)
GLUCOSE BLDC GLUCOMTR-MCNC: 178 MG/DL (ref 70–99)
GLUCOSE BLDC GLUCOMTR-MCNC: 189 MG/DL (ref 70–99)
GLUCOSE BLDC GLUCOMTR-MCNC: 231 MG/DL (ref 70–99)
PROCALCITONIN SERPL-MCNC: 0.36 NG/ML

## 2018-12-26 PROCEDURE — 25000132 ZZH RX MED GY IP 250 OP 250 PS 637: Performed by: INTERNAL MEDICINE

## 2018-12-26 PROCEDURE — 25500064 ZZH RX 255 OP 636: Performed by: HOSPITALIST

## 2018-12-26 PROCEDURE — 36415 COLL VENOUS BLD VENIPUNCTURE: CPT | Performed by: INTERNAL MEDICINE

## 2018-12-26 PROCEDURE — 25000131 ZZH RX MED GY IP 250 OP 636 PS 637: Performed by: INTERNAL MEDICINE

## 2018-12-26 PROCEDURE — 90686 IIV4 VACC NO PRSV 0.5 ML IM: CPT | Performed by: HOSPITALIST

## 2018-12-26 PROCEDURE — 12000000 ZZH R&B MED SURG/OB

## 2018-12-26 PROCEDURE — 25000132 ZZH RX MED GY IP 250 OP 250 PS 637: Performed by: HOSPITALIST

## 2018-12-26 PROCEDURE — 99232 SBSQ HOSP IP/OBS MODERATE 35: CPT | Performed by: INTERNAL MEDICINE

## 2018-12-26 PROCEDURE — 25000128 H RX IP 250 OP 636: Performed by: HOSPITALIST

## 2018-12-26 PROCEDURE — 00000146 ZZHCL STATISTIC GLUCOSE BY METER IP

## 2018-12-26 PROCEDURE — 93306 TTE W/DOPPLER COMPLETE: CPT | Mod: 26 | Performed by: INTERNAL MEDICINE

## 2018-12-26 PROCEDURE — 40000264 ECHOCARDIOGRAM COMPLETE

## 2018-12-26 PROCEDURE — 87040 BLOOD CULTURE FOR BACTERIA: CPT | Performed by: INTERNAL MEDICINE

## 2018-12-26 PROCEDURE — 12000007 ZZH R&B INTERMEDIATE

## 2018-12-26 PROCEDURE — 84145 PROCALCITONIN (PCT): CPT | Performed by: INTERNAL MEDICINE

## 2018-12-26 RX ORDER — PANTOPRAZOLE SODIUM 40 MG/1
40 TABLET, DELAYED RELEASE ORAL
Status: DISCONTINUED | OUTPATIENT
Start: 2018-12-26 | End: 2018-12-27 | Stop reason: HOSPADM

## 2018-12-26 RX ADMIN — SIMVASTATIN 20 MG: 20 TABLET, FILM COATED ORAL at 20:10

## 2018-12-26 RX ADMIN — INSULIN ASPART 2 UNITS: 100 INJECTION, SOLUTION INTRAVENOUS; SUBCUTANEOUS at 13:40

## 2018-12-26 RX ADMIN — INFLUENZA A VIRUS A/MICHIGAN/45/2015 X-275 (H1N1) ANTIGEN (FORMALDEHYDE INACTIVATED), INFLUENZA A VIRUS A/SINGAPORE/INFIMH-16-0019/2016 IVR-186 (H3N2) ANTIGEN (FORMALDEHYDE INACTIVATED), INFLUENZA B VIRUS B/PHUKET/3073/2013 ANTIGEN (FORMALDEHYDE INACTIVATED), AND INFLUENZA B VIRUS B/MARYLAND/15/2016 BX-69A ANTIGEN (FORMALDEHYDE INACTIVATED) 0.5 ML: 15; 15; 15; 15 INJECTION, SUSPENSION INTRAMUSCULAR at 14:06

## 2018-12-26 RX ADMIN — PANTOPRAZOLE SODIUM 40 MG: 40 TABLET, DELAYED RELEASE ORAL at 18:33

## 2018-12-26 RX ADMIN — LOPERAMIDE HYDROCHLORIDE 2 MG: 2 CAPSULE ORAL at 06:58

## 2018-12-26 RX ADMIN — BENZONATATE 100 MG: 100 CAPSULE ORAL at 00:48

## 2018-12-26 RX ADMIN — INSULIN ASPART 1 UNITS: 100 INJECTION, SOLUTION INTRAVENOUS; SUBCUTANEOUS at 18:44

## 2018-12-26 RX ADMIN — HEPARIN SODIUM 5000 UNITS: 10000 INJECTION, SOLUTION INTRAVENOUS; SUBCUTANEOUS at 14:05

## 2018-12-26 RX ADMIN — HUMAN ALBUMIN MICROSPHERES AND PERFLUTREN 3 ML: 10; .22 INJECTION, SOLUTION INTRAVENOUS at 13:58

## 2018-12-26 RX ADMIN — INSULIN ASPART 2 UNITS: 100 INJECTION, SOLUTION INTRAVENOUS; SUBCUTANEOUS at 09:15

## 2018-12-26 RX ADMIN — HEPARIN SODIUM 5000 UNITS: 10000 INJECTION, SOLUTION INTRAVENOUS; SUBCUTANEOUS at 00:48

## 2018-12-26 RX ADMIN — INSULIN GLARGINE 45 UNITS: 100 INJECTION, SOLUTION SUBCUTANEOUS at 21:17

## 2018-12-26 ASSESSMENT — ACTIVITIES OF DAILY LIVING (ADL)
ADLS_ACUITY_SCORE: 12

## 2018-12-26 NOTE — PLAN OF CARE
Transfer from . VSS. Afebrile, no pain. Tele NSR. Positive blood cultures-MD aware. On room air. No BM's since transfer. Blood sugar 178. Good appetite. independently up in the room.

## 2018-12-26 NOTE — PROVIDER NOTIFICATION
Paged Dr Persaud regarding positive blood cultures from right arm on 12/23, gram positive cocci in clusters.

## 2018-12-26 NOTE — PLAN OF CARE
Vtials stable. Denies pain. LS clear. Diet Regular. Up IND. BS active;  1 loose BM overnight. Norovirus positive. Initiated Contact precautions with [facility] from [facility]. Voiding. Tolerating regular diet.

## 2018-12-26 NOTE — PROGRESS NOTES
[facility]    Hospitalist Progress Note    Assessment & Plan      Jairo Escamilla is a 48-year-old male with insulin-dependent diabetes mellitus, obesity, hyperlipidemia, was brought to the ER due to nausea, vomiting, diarrhea, abdominal pain and syncope.     Acute severe gastroenteritis  Lactic acidosis most likely due to #1 above.   -Presenting with nausea, vomiting, diarrhea after eating hotdog.    -Generalized abdominal pain without significant CT finding.  -Appreciate general surgery consult, no concerns for surgical abdomen.   -No recent antibiotic exposure.    -No fever. Elevated WBC, but likely hemoconcentration.    -Suspect lactic acidosis is likely due to dehydration, hypotension, poor organ perfusion and metformin related.  Lactic acidosis now has resolved  -Stool for C. difficile negative  -Initially received vancomycin and Zosyn, off antibiotics since 12/26  -Stool positive for norovirus  -Diarrhea much improved    Positive blood culture:  -Blood culture positive for gram-positive cocci in clusters on third day of incubation.  Likely this represents contamination  -Await final identification  -Hold off on further antibiotics at this time.    Syncope.    -The patient had dizziness while he was standing and reports a syncopal episode. Reportedly, his systolic blood pressure was in the 80s when he presented to the ER.    -Suspect this is due to dehydration and hypotension.    -No neuro deficit and denies other neurological symptoms.    -Normal sinus rhythm on telemetry, blood pressure have since been stable, no further presyncope/syncope symptoms.  -Head CT negative for acute findings  -Awaiting echocardiogram    Acute kidney injury and mild hyperkalemia.    -Presents with a creatinine of 1.5 and potassium of 5.5.  Suspect this is due to dehydration in combination with ACE inhibitor.   -Improved with IV hydration    Diabetes mellitus, insulin-dependent.    -Hemoglobin A1c of 10.1  -PTA on  metformin, insulin Glargine 60 units subQ at bedtime and premeal NovoLog 15 units t.i.d.   -Hold metformin and lisinopril(reportedly on it not for hypertension)  -High intensity sliding scale  -Increase Lantus to 45 units at bedtime.    Hyperlipidemia.     -Continue simvastatin     Obstructive sleep apnea:  -Noncompliant with CPAP    Morbid obesity:  -BMI more than 40  -Weight loss and lifestyle modification as outpatient.        # Pain Assessment:  Current Pain Score 12/26/2018   Patient currently in pain? denies   Pain score (0-10) -   Pain location -   Pain descriptors -   Jairo ridley pain level was assessed and he currently denies pain.        D/W: RN  DVT Prophylaxis: Heparin SQ  Code Status: Full Code    Disposition: Expected discharge 12/27 if positive blood culture is determined to be a contaminant.    Kelvin Persaud MD    Interval History   One episode of loose bowel movement this morning otherwise diarrhea much improved.  Denies abdominal pain.  No nausea or vomiting.  No chest pain lightheadedness or dizziness.  Denies abdominal pain.  Endorses intermittent dry cough.    -Data reviewed today: I reviewed all new labs and imaging results over the last 24 hours. I personally reviewed the EKG tracing showing Normal sinus rhythm.      Physical Exam   Temp: 98  F (36.7  C) Temp src: Oral BP: 126/83 Pulse: 77 Heart Rate: 88 Resp: 18 SpO2: 96 % O2 Device: None (Room air)    Vitals:    12/23/18 2149 12/25/18 0400 12/26/18 0710   Weight: 129.3 kg (285 lb) 133.3 kg (293 lb 14.4 oz) 129.5 kg (285 lb 7.9 oz)     Vital Signs with Ranges  Temp:  [97.8  F (36.6  C)-98.5  F (36.9  C)] 98  F (36.7  C)  Pulse:  [74-91] 77  Heart Rate:  [78-88] 88  Resp:  [16-18] 18  BP: (126-144)/(77-87) 126/83  SpO2:  [95 %-98 %] 96 %  I/O last 3 completed shifts:  In: 1110 [P.O.:780; I.V.:330]  Out: -     Constitutional: AAOX3, NAD  Respiratory:  No crackles, No wheezes, CTA B/L, Normal WOB  Cardiovascular: RRR, No murmur  GI: Soft, Non-  tender, BS- normoactive, No Guarding/rebound/rigidity  Skin/Integument: Warm and dry, no rashes  MSK: No joint deformity or swelling, no edema  Neuro: CN- grossly intact        Medications       IV fluid REPLACEMENT ONLY           heparin  5,000 Units Subcutaneous Q12H     influenza vaccine adult (product based on age)  0.5 mL Intramuscular Prior to discharge     insulin aspart  1-10 Units Subcutaneous TID AC     insulin aspart  1-7 Units Subcutaneous At Bedtime     insulin glargine  30 Units Subcutaneous At Bedtime     simvastatin  20 mg Oral Daily       Data     Recent Labs   Lab 12/25/18  0926 12/24/18  0445 12/23/18  2146 12/23/18  2124   WBC  --  11.2*  --  22.8*   HGB  --  15.7  --  19.0*   MCV  --  86  --  85   PLT  --  183  --  265    139 134 Canceled, Test credited, specimen discarded   POTASSIUM 3.7 4.2 5.5* Canceled, Test credited, specimen discarded   CHLORIDE 110* 105 100 Canceled, Test credited, specimen discarded   CO2 24 24 20 Canceled, Test credited, specimen discarded   BUN 8 22 25 Canceled, Test credited, specimen discarded   CR 0.78 1.05 1.50* Canceled, Test credited, specimen discarded   ANIONGAP 8 10 14 Canceled, Test credited, specimen discarded   LUKASZ 7.4* 8.5 9.5 Canceled, Test credited, specimen discarded   * 269* 393* Canceled, Test credited, specimen discarded   ALBUMIN  --  3.2* 3.8 Canceled, Test credited, specimen discarded   PROTTOTAL  --  6.8 8.3 Canceled, Test credited, specimen discarded   BILITOTAL  --  1.0 0.7 Canceled, Test credited, specimen discarded   ALKPHOS  --  46 63 Canceled, Test credited, specimen discarded   ALT  --  40 45 Canceled, Test credited, specimen discarded   AST  --  30 28 Canceled, Test credited, specimen discarded   LIPASE  --   --  132 Canceled, Test credited, specimen discarded       No results found for this or any previous visit (from the past 24 hour(s)).

## 2018-12-26 NOTE — PROGRESS NOTES
Cross Cover:  Patient requesting cough suppressant for dry cough  Plan: Trial robitussin or benzonatate PRN

## 2018-12-27 VITALS
RESPIRATION RATE: 16 BRPM | WEIGHT: 287.48 LBS | HEART RATE: 76 BPM | TEMPERATURE: 97.8 F | DIASTOLIC BLOOD PRESSURE: 80 MMHG | BODY MASS INDEX: 41.25 KG/M2 | OXYGEN SATURATION: 95 % | SYSTOLIC BLOOD PRESSURE: 120 MMHG

## 2018-12-27 LAB
ERYTHROCYTE [DISTWIDTH] IN BLOOD BY AUTOMATED COUNT: 13 % (ref 10–15)
GLUCOSE BLDC GLUCOMTR-MCNC: 163 MG/DL (ref 70–99)
GLUCOSE BLDC GLUCOMTR-MCNC: 168 MG/DL (ref 70–99)
HCT VFR BLD AUTO: 40.4 % (ref 40–53)
HGB BLD-MCNC: 14.1 G/DL (ref 13.3–17.7)
MCH RBC QN AUTO: 29.1 PG (ref 26.5–33)
MCHC RBC AUTO-ENTMCNC: 34.9 G/DL (ref 31.5–36.5)
MCV RBC AUTO: 84 FL (ref 78–100)
PLATELET # BLD AUTO: 148 10E9/L (ref 150–450)
RBC # BLD AUTO: 4.84 10E12/L (ref 4.4–5.9)
WBC # BLD AUTO: 3.7 10E9/L (ref 4–11)

## 2018-12-27 PROCEDURE — 99239 HOSP IP/OBS DSCHRG MGMT >30: CPT | Performed by: INTERNAL MEDICINE

## 2018-12-27 PROCEDURE — 85027 COMPLETE CBC AUTOMATED: CPT | Performed by: INTERNAL MEDICINE

## 2018-12-27 PROCEDURE — 25000128 H RX IP 250 OP 636: Performed by: HOSPITALIST

## 2018-12-27 PROCEDURE — 25000132 ZZH RX MED GY IP 250 OP 250 PS 637: Performed by: INTERNAL MEDICINE

## 2018-12-27 PROCEDURE — 00000146 ZZHCL STATISTIC GLUCOSE BY METER IP

## 2018-12-27 PROCEDURE — 36415 COLL VENOUS BLD VENIPUNCTURE: CPT | Performed by: INTERNAL MEDICINE

## 2018-12-27 RX ADMIN — INSULIN ASPART 2 UNITS: 100 INJECTION, SOLUTION INTRAVENOUS; SUBCUTANEOUS at 08:57

## 2018-12-27 RX ADMIN — HEPARIN SODIUM 5000 UNITS: 10000 INJECTION, SOLUTION INTRAVENOUS; SUBCUTANEOUS at 01:10

## 2018-12-27 RX ADMIN — PANTOPRAZOLE SODIUM 40 MG: 40 TABLET, DELAYED RELEASE ORAL at 06:25

## 2018-12-27 ASSESSMENT — ACTIVITIES OF DAILY LIVING (ADL)
ADLS_ACUITY_SCORE: 12

## 2018-12-27 NOTE — PLAN OF CARE
A&O x 3 forgetful at times, VSS on RA, pain controlled with oral analgesics, drsg CDI, CMS intact per baseline, voiding adequately in patent dove, right elbow red and inflamed,, continue to monitor.

## 2018-12-27 NOTE — PLAN OF CARE
A&O x 4, VSS on RA,, up independent , voiding adequately in urinal, IV SL,  1 loose BM,  tolerating reg diet  given insulin coverage continue to monitor.

## 2018-12-27 NOTE — PLAN OF CARE
Discharge    Patient discharged to home via car with friend  Care plan note*  Discharge instructions reviewed, copy sent with pt. Questions answered. IV removed. Belongings accounted for.    Listed belongings gathered and returned to patient. yes  Care Plan and Patient education resolved: yes  Prescriptions if needed, hard copies sent with patient  NA  Home and hospital acquired medications returned to patient: yes  Medication Bin checked and emptied on discharge yes  Follow up appointment made for patient: pt will make his appointment

## 2018-12-27 NOTE — PLAN OF CARE
A&O x4. VSS on RA. Ind in room. Pt reports still having diarrhea stools, but less frequently. Denied pain, SOB, N/V. . Enteric iso maintained. PIV SL. If BC neg can dc today.

## 2018-12-27 NOTE — DISCHARGE SUMMARY
[facility]    Discharge Summary  Hospitalist    Date of Admission:  12/23/2018  Date of Discharge:  12/27/2018  Discharging Provider: Kelvin Persaud MD    Discharge Diagnoses     Acute severe gastroenteritis due to Norovirus  Lactic acidosis most likely due to #1 above.   Pseudo-bacteremia  Syncope likely due to severe dehydration and hypotension.    Acute kidney injury  Mild hyperkalemia.    Diabetes mellitus type II, insulin-dependent.    Hyperlipidemia.     Obstructive sleep apnea  Morbid obesity      Hospital Course   Jairo Escamilla is a 48-year-old male with insulin-dependent diabetes mellitus, obesity, hyperlipidemia, was brought to the ER due to nausea, vomiting, diarrhea, abdominal pain and syncope.      Acute severe gastroenteritis due to norovirus  Lactic acidosis most likely due to #1 above.   -Presenting with nausea, vomiting, diarrhea after eating hotdog.    -Generalized abdominal pain without significant CT finding.  -Appreciate general surgery consult, no concerns for surgical abdomen.   -No recent antibiotic exposure.    -No fever. Elevated WBC, but likely hemoconcentration.    -Thick acid most likely due to severe dehydration  -Stool positive for Norovirus  -Stool for C. difficile negative  -Initially received vancomycin and Zosyn, off antibiotics since 12/26  -Diarrhea improving, stool better formed by the time of discharge.  Tolerating oral intake without any problem.     Positive blood culture:  -Blood culture positive for gram-positive cocci in clusters on third day of incubation.   -Preliminary report was positive for staphylococci other than staph aureus, staph epidermidis and staph lugdunensis.  More than likely this represents coagulase-negative staphylococci-a contaminant.  -This was 1 out of 2 sets, repeat blood culture negative.  -Discussed with Dr. Kim, infectious disease with whom I reviewed culture data and patient's presentation, he agreed that this was a  contamination and there was no need for further workup or antibiotics.  Moreover the patient did not have any fever clinically he was improving and the clinical presentation was more than likely due to the viral infection described above.     Syncope.    -The patient had dizziness while he was standing and reports a syncopal episode. Reportedly, his systolic blood pressure was in the 80s when he presented to the ER.    -No neuro deficit and denies other neurological symptoms.    -Normal sinus rhythm on telemetry, blood pressure have since been stable, no further presyncope/syncope symptoms.  -Echo was unremarkable  -Head CT negative for acute findings(which was actually done because he had on hospital day 2 and had a prior history of spontaneous subdural hematoma of which the patient was concerned about.)  -No further headaches.  No other neurological symptoms reported.     Acute kidney injury and mild hyperkalemia.    -Presents with a creatinine of 1.5 and potassium of 5.5.  Suspect this is due to dehydration in combination with ACE inhibitor.   -Improved with IV hydration  -Recommend continuing to hold lisinopril for at least another week.  And resume after PCP follow-up in a week.    Diabetes mellitus, insulin-dependent.    -Hemoglobin A1c of 10.1  -PTA on metformin, insulin Glargine 60 units subQ at bedtime and premeal NovoLog 15 units t.i.d.   -Resume prior to admission regimen at discharge.     Hyperlipidemia.     -Continue simvastatin      Obstructive sleep apnea:  -Noncompliant with CPAP     Morbid obesity:  -BMI more than 40  -Weight loss and lifestyle modification as outpatient.        # Discharge Pain Plan:   - Patient currently has NO PAIN and is not being prescribed pain medications on discharge.      Kelvin Persaud MD    Significant Results and Procedures   See below    Pending Results     Unresulted Labs Ordered in the Past 30 Days of this Admission     Date and Time Order Name Status Description     12/26/2018 1155 Blood culture Preliminary     12/23/2018 2142 Blood culture Preliminary     12/23/2018 2142 Blood culture Preliminary           Code Status   Full Code       Primary Care Physician   Sandoval Wilkinson    Physical Exam   Temp: 97.8  F (36.6  C) Temp src: Oral BP: 120/80 Pulse: 76 Heart Rate: 77 Resp: 16 SpO2: 95 % O2 Device: None (Room air)      Constitutional: AAOX3, NAD  Respiratory: CTA B/L, Normal WOB  Cardiovascular: RRR, No murmur  GI: Soft, Non- tender, BS- normoactive  Skin/Integument: Warm and dry, no rashes  MSK: No joint deformity or swelling, no edema  Neuro: CN- grossly intact     Discharge Disposition   Discharged to home  Condition at discharge: Stable    Consultations This Hospital Stay   PHARMACY TO DOSE VANCO  SOCIAL WORK IP CONSULT  PHARMACY TO DOSE VANCO  PHARMACY IP CONSULT  SURGERY GENERAL IP CONSULT    Time Spent on this Encounter   IKelvin, personally saw the patient today and spent greater than 30 minutes discharging this patient.    Discharge Orders      Follow-up and recommended labs and tests    Follow up with primary care provider, Sandoval Wilkinson, within 7 days for hospital follow- up.     Activity    Your activity upon discharge: activity as tolerated     Full Code     Diet    Follow this diet upon discharge: Orders Placed This Encounter      Advance Diet as Tolerated: Regular Diet Adult; Regular Diet Adult; 1110-1170 Calories: Moderate Consistent CHO (4-6 CHO units/meal)       Discharge Medications   Current Discharge Medication List      CONTINUE these medications which have NOT CHANGED    Details   insulin aspart (NOVOLOG FLEXPEN) 100 UNIT/ML injection 15 units before breakfast, 15 units before lunch, 15 units before dinner  Qty: 45 mL, Refills: 3    Associated Diagnoses: Uncontrolled type 2 diabetes mellitus without complication, with long-term current use of insulin (H)      insulin glargine (LANTUS SOLOSTAR) 100 UNIT/ML pen Inject 60 Units Subcutaneous At  Bedtime  Qty: 45 mL, Refills: 3    Associated Diagnoses: Uncontrolled type 2 diabetes mellitus without complication, with long-term current use of insulin (H)      metFORMIN (GLUCOPHAGE-XR) 500 MG 24 hr tablet Take 2 tablets (1,000 mg) by mouth 2 times daily (with meals) lima.  Qty: 360 tablet, Refills: 3    Associated Diagnoses: Uncontrolled type 2 diabetes mellitus without complication, with long-term current use of insulin (H)      simvastatin (ZOCOR) 20 MG tablet Take 1 tablet (20 mg) by mouth daily  Qty: 90 tablet, Refills: 3    Associated Diagnoses: Hyperlipidemia LDL goal <100      !! blood glucose monitoring (ONE TOUCH ULTRA) test strip Use to test blood sugars tid or as directed.  Qty: 100 each, Refills: 0    Associated Diagnoses: Uncontrolled type 2 diabetes mellitus without complication, with long-term current use of insulin (H)      !! blood glucose monitoring (ONE TOUCH ULTRA) test strip Use to test blood sugars tid or as directed.  Qty: 100 each, Refills: prn    Associated Diagnoses: Type 2 diabetes mellitus without complication (H)      insulin pen needle 32G X 4 MM Use 4 pen needles daily or as directed.  Qty: 360 each, Refills: 3    Associated Diagnoses: Type 2 diabetes mellitus without complication (H)      !! order for DME Equipment being ordered:     1 glucometer    90 Test strips for three times daily testing with 11 RF.    90 lancets for three times daily testing.  Qty: 1 each, Refills: 0    Associated Diagnoses: Uncontrolled type 2 diabetes mellitus without complication, with long-term current use of insulin (H)      !! order for DME [employer] 10  7-11 CM/H20  NASAL [employer]       !! - Potential duplicate medications found. Please discuss with provider.      STOP taking these medications       ASPIRIN NOT PRESCRIBED (INTENTIONAL) Comments:   Reason for Stopping:         lisinopril (PRINIVIL/ZESTRIL) 20 MG tablet Comments:   Reason for Stopping:             Allergies   No Known Allergies  Data    Most Recent 3 CBC's:  Recent Labs   Lab Test 12/27/18  0746 12/24/18  0445 12/23/18  2124   WBC 3.7* 11.2* 22.8*   HGB 14.1 15.7 19.0*   MCV 84 86 85   * 183 265      Most Recent 3 BMP's:  Recent Labs   Lab Test 12/25/18  0926 12/24/18  0445 12/23/18  2146    139 134   POTASSIUM 3.7 4.2 5.5*   CHLORIDE 110* 105 100   CO2 24 24 20   BUN 8 22 25   CR 0.78 1.05 1.50*   ANIONGAP 8 10 14   LUKASZ 7.4* 8.5 9.5   * 269* 393*     Most Recent 2 LFT's:  Recent Labs   Lab Test 12/24/18  0445 12/23/18  2146   AST 30 28   ALT 40 45   ALKPHOS 46 63   BILITOTAL 1.0 0.7     Most Recent INR's and Anticoagulation Dosing History:  Anticoagulation Dose History     Recent Dosing and Labs Latest Ref Rng & Units 2/27/2016    INR 0.86 - 1.14 1.05        Most Recent 3 Troponin's:No lab results found.  Most Recent Cholesterol Panel:  Recent Labs   Lab Test 05/22/18  0852   CHOL 132   LDL 52   HDL 30*   TRIG 248*     Most Recent 6 Bacteria Isolates From Any Culture (See EPIC Reports for Culture Details):  Recent Labs   Lab Test 12/26/18  1216 12/24/18  0012 12/23/18  2146 12/23/18  2124 03/20/13  1900 03/19/13  1755   CULT No growth after 15 hours No growth Cultured on the 3rd day of incubation:  Gram positive cocci in clusters  *  Critical Value/Significant Value, preliminary result only, called to and read back by  Elaine Gill RN  12/26/18 AA    (Note)  POSITIVE for Staphylococci other than S.aureus, S.epidermidis and  S.lugdunensis, by [employer] multiplex nucleic acid test.  Coagulase-negative staphylococci are the most common venipuncture or  collection associated skin CONTAMINANTS grown in blood cultures.  Final identification and antimicrobial susceptibility testing will be  verified by standard methods.    Specimen tested with 8aweekigene multiplex, gram-positive blood culture  nucleic acid test for the following targets: Staph aureus, Staph  epidermidis, Staph lugdunensis, other Staph species,  Enterococcus  faecalis, Enterococcus faecium, Streptococcus species, S. agalactiae,  S. anginosus grp., S. pneumoniae, S. pyogenes, Listeria sp., mecA  (methicillin resistance) and Magalie/B (vancomycin resistance).    Critical Value/Significant Value called to and read back by Selene Gill RN at [id number] on [id number] at 1314.  JRT   No growth after 4 days No Salmonella, Shigella, Campylobacter, E. coli O157, Aeromonas, or Plesiomonas  isolated. No growth     Most Recent TSH, T4 and A1c Labs:  Recent Labs   Lab Test 12/24/18  0445 05/22/18  0852   TSH  --  1.78   A1C 10.1* 8.7*       Results for orders placed or performed during the hospital encounter of 12/23/18   CT Abdomen Pelvis w Contrast    Narrative    CT ABDOMEN AND PELVIS WITH CONTRAST December 23, 2018 10:13 PM     HISTORY: Severe abdominal pain, low back pain.    COMPARISON: March 19, 2013.    TECHNIQUE: Volumetric helical acquisition of CT images from the lung  bases through the symphysis pubis after the administration of 135mL  Isovue-370 intravenous contrast. Radiation dose for this scan was  reduced using automated exposure control, adjustment of the mA and/or  kV according to patient size, or iterative reconstruction technique.    FINDINGS: The liver, bilateral kidneys and adrenal glands, pancreas,  and spleen demonstrate no worrisome focal lesion. No hydronephrosis.  No diverticulitis. No appendicitis. Normal caliber aorta. Unremarkable  gallbladder. At least moderate fatty infiltration of the liver. There  is no free fluid in the abdomen or pelvis. No free air in the abdomen.  Bone windows reveal no destructive lesions. There are no abdominal or  pelvic lymph nodes that are abnormal by size criteria. The visualized  lung bases are unremarkable. There are no dilated loops of small bowel  or colon.      Impression    IMPRESSION: No acute process demonstrated within the abdomen and  pelvis.    JOHN SINGH MD   XR Chest 2 Views    Narrative    CHEST TWO  VIEWS  12/25/2018 9:17 AM     HISTORY: Cough    COMPARISON: 10/9/2015      Impression    IMPRESSION: Normal. No change.    GUANACO LACY MD   CT Head w/o Contrast    Narrative    CT SCAN OF THE HEAD WITHOUT CONTRAST   12/25/2018 10:46 AM     HISTORY: Acute headache, worse headache of life.    TECHNIQUE:  Axial images of the head and coronal reformations without  IV contrast material. Radiation dose for this scan was reduced using  automated exposure control, adjustment of the mA and/or kV according  to patient size, or iterative reconstruction technique.    COMPARISON: 4/26/2016    FINDINGS:  The ventricles are normal in size, shape and configuration.   The brain parenchyma and subarachnoid spaces are normal. There is no  evidence of intracranial hemorrhage, mass, acute infarct or anomaly.     The visualized portions of the sinuses and mastoids appear normal.  There is no evidence of trauma.      Impression    IMPRESSION: Normal CT scan of the head.  No change.      GUANACO LACY MD

## 2018-12-27 NOTE — CONSULTS
SW consult received for discharge planning. SW reviewed chart. Discussed with bedside RN. No discharge needs noted. Pt discharging home today. SW will remain available if needed.      ROSS Wilkins, LGSW  [id number]

## 2018-12-27 NOTE — PLAN OF CARE
A&Ox4. VSS. RA. Lungs clear. Bowel sounds hyperactive. 1x diarrhea today. Independent in room. Tolerating mod carb diet. Denies nausea/vomiting. Denies pain.

## 2018-12-28 ENCOUNTER — TELEPHONE (OUTPATIENT)
Dept: PEDIATRICS | Facility: CLINIC | Age: 48
End: 2018-12-28

## 2018-12-28 NOTE — TELEPHONE ENCOUNTER
"ED / Discharge Outreach Protocol    Patient Contact    Attempt # 1    Was call answered?  Yes.  \"May I please speak with <patient name>\"  Is patient available?   Yes    ED/Discharge Protocol    \"Hi, my name is Paulina Prajapati, a registered nurse, and I am calling on behalf of Dr. Wilkinson's office at [place].  I am calling to follow up and see how things are going for you after your recent visit.\"    \"I see that you were in the (ER/UC/IP) on 12/23/18.    How are you doing now that you are home?\" \"Doing pretty good\"    Is patient experiencing symptoms that may require a hospital visit?  no    Discharge Instructions    \"Let's review your discharge instructions.  What is/are the follow-up recommendations?  Pt. Response: Reviewed discharge instructions    \"Were you instructed to make a follow-up appointment?\"  Pt. Response: Yes.  Pt states that he will call back next week to schedule appt, when he has his schedule.       \"When you see the provider, I would recommend that you bring your discharge instructions with you.    Medications    \"How many new medications are you on since your hospitalization/ED visit?\"    2 or more - Epic MTM referral needed  \"How many of your current medicines changed (dose, timing, name, etc.) while you were in the hospital/ED visit?\"   0-1  \"Do you have questions about your medications?\"   No  \"Were you newly diagnosed with heart failure, COPD, diabetes or did you have a heart attack?\"   No  For patients on insulin: \"Did you start on insulin in the hospital or did you have your insulin dose changed?\"   No    Medication reconciliation completed? Yes    Was MTM referral placed (*Make sure to put transitions as reason for referral)?   No    Call Summary    \"Do you have any questions or concerns about your condition or care plan at the moment?\"    No    Patient was in ER 1 in the past year (assess appropriateness of ER visits.)      \"If you have questions or things don't continue to improve, we " "encourage you contact us through the main clinic number,  [phone].  Even if the clinic is not open, triage nurses are available 24/7 to help you.     We would like you to know that our clinic has extended hours (provide information).  We also have urgent care (provide details on closest location and hours/contact info)\"      \"Thank you for your time and take care!\"      "

## 2018-12-28 NOTE — TELEPHONE ENCOUNTER
Please contact patient for In-patient follow up.  [phone] (home)     Visit date: 12/23- D/C'd 12/27/18  Diagnosis listed:Septic Shock (H)  Number of visits in past 12 months:ED0/IP 1

## 2018-12-29 LAB
BACTERIA SPEC CULT: NO GROWTH
Lab: NORMAL
SPECIMEN SOURCE: NORMAL

## 2018-12-30 LAB
BACTERIA SPEC CULT: ABNORMAL
Lab: ABNORMAL
SPECIMEN SOURCE: ABNORMAL

## 2019-01-01 LAB
BACTERIA SPEC CULT: NO GROWTH
Lab: NORMAL
SPECIMEN SOURCE: NORMAL

## 2019-01-11 ENCOUNTER — OFFICE VISIT (OUTPATIENT)
Dept: PEDIATRICS | Facility: CLINIC | Age: 49
End: 2019-01-11
Payer: COMMERCIAL

## 2019-01-11 VITALS
BODY MASS INDEX: 40.05 KG/M2 | WEIGHT: 279.1 LBS | DIASTOLIC BLOOD PRESSURE: 82 MMHG | TEMPERATURE: 97.7 F | HEART RATE: 90 BPM | SYSTOLIC BLOOD PRESSURE: 123 MMHG

## 2019-01-11 DIAGNOSIS — E78.5 HYPERLIPIDEMIA LDL GOAL <100: ICD-10-CM

## 2019-01-11 DIAGNOSIS — I10 HYPERTENSION, UNSPECIFIED TYPE: ICD-10-CM

## 2019-01-11 DIAGNOSIS — E11.65 TYPE 2 DIABETES MELLITUS WITH HYPERGLYCEMIA, WITH LONG-TERM CURRENT USE OF INSULIN (H): ICD-10-CM

## 2019-01-11 DIAGNOSIS — E87.20 LACTIC ACIDOSIS: Primary | ICD-10-CM

## 2019-01-11 DIAGNOSIS — Z79.4 TYPE 2 DIABETES MELLITUS WITH HYPERGLYCEMIA, WITH LONG-TERM CURRENT USE OF INSULIN (H): ICD-10-CM

## 2019-01-11 LAB — HBA1C MFR BLD: 8.1 % (ref 0–5.6)

## 2019-01-11 PROCEDURE — 80053 COMPREHEN METABOLIC PANEL: CPT | Performed by: INTERNAL MEDICINE

## 2019-01-11 PROCEDURE — 99495 TRANSJ CARE MGMT MOD F2F 14D: CPT | Performed by: INTERNAL MEDICINE

## 2019-01-11 PROCEDURE — 83036 HEMOGLOBIN GLYCOSYLATED A1C: CPT | Performed by: INTERNAL MEDICINE

## 2019-01-11 PROCEDURE — 36415 COLL VENOUS BLD VENIPUNCTURE: CPT | Performed by: INTERNAL MEDICINE

## 2019-01-11 RX ORDER — METFORMIN HCL 500 MG
1000 TABLET, EXTENDED RELEASE 24 HR ORAL 2 TIMES DAILY WITH MEALS
Qty: 360 TABLET | Refills: 3 | Status: SHIPPED | OUTPATIENT
Start: 2019-01-11 | End: 2020-03-17

## 2019-01-11 RX ORDER — LISINOPRIL 10 MG/1
10 TABLET ORAL DAILY
Qty: 90 TABLET | Refills: 3 | Status: SHIPPED | OUTPATIENT
Start: 2019-01-11 | End: 2020-03-17

## 2019-01-11 RX ORDER — SIMVASTATIN 20 MG
20 TABLET ORAL DAILY
Qty: 90 TABLET | Refills: 3 | Status: SHIPPED | OUTPATIENT
Start: 2019-01-11 | End: 2020-03-17

## 2019-01-11 NOTE — PATIENT INSTRUCTIONS
"Lab work downstairs today.  Directions:  As you walk through the first floor, you'll see (on the right) first the pharmacy, then some bathrooms, then the \"Lab and Imaging\" area. Give them your name at the window there and wait for them to call you.     If your diabetes blood test (A1c) is still above 7, let's increase your insulin:    May increase your lantus by 10 units each night to achieve a fasting level of less 100.    May incrase your novolog by 2-4 units with meals to achieve a 1 hour post meal sugar of 150 or less.   "

## 2019-01-11 NOTE — PROGRESS NOTES
SUBJECTIVE:   Jairo Escamilla is a 48 year old male who presents to clinic today for the following health issues:        Hospital Follow-up Visit:    Hospital/Nursing Home/IP Rehab Facility: [facility]  Date of Admission: 12/23/2018  Date of Discharge: 12/27/2018  Reason(s) for Admission: Gastroenteritis            Problems taking medications regularly:  Hospital took him off Lisinopril       Medication changes since discharge: none       Problems adhering to non-medication therapy:  None    Summary of hospitalization:  [facility] discharge summary reviewed  Diagnostic Tests/Treatments reviewed.  Follow up needed: none  Other Healthcare Providers Involved in Patient s Care:         None  Update since discharge: improved.     Post Discharge Medication Reconciliation: discharge medications reconciled, continue medications without change.  Plan of care communicated with patient     Coding guidelines for this visit:  Type of Medical   Decision Making Face-to-Face Visit       within 7 Days of discharge Face-to-Face Visit        within 14 days of discharge   Moderate Complexity [id number]   High Complexity [id number]          Was sick for about 3 hours and vomiting and diarrhea, then developed syncope and dizziness;   Found to be dehydrated with lactic acidosis.     Hospital Course     Jairo Escamilla is a 48-year-old male with insulin-dependent diabetes mellitus, obesity, hyperlipidemia, was brought to the ER due to nausea, vomiting, diarrhea, abdominal pain and syncope.      Acute severe gastroenteritis due to norovirus  Lactic acidosis most likely due to #1 above.   -Presenting with nausea, vomiting, diarrhea after eating hotdog.    -Generalized abdominal pain without significant CT finding.  -Appreciate general surgery consult, no concerns for surgical abdomen.   -No recent antibiotic exposure.    -No fever. Elevated WBC, but likely hemoconcentration.    -Thick acid most likely due to severe  dehydration  -Stool positive for Norovirus  -Stool for C. difficile negative  -Initially received vancomycin and Zosyn, off antibiotics since 12/26  -Diarrhea improving, stool better formed by the time of discharge.  Tolerating oral intake without any problem.     Positive blood culture:  -Blood culture positive for gram-positive cocci in clusters on third day of incubation.   -Preliminary report was positive for staphylococci other than staph aureus, staph epidermidis and staph lugdunensis.  More than likely this represents coagulase-negative staphylococci-a contaminant.  -This was 1 out of 2 sets, repeat blood culture negative.  -Discussed with Dr. Kim, infectious disease with whom I reviewed culture data and patient's presentation, he agreed that this was a contamination and there was no need for further workup or antibiotics.  Moreover the patient did not have any fever clinically he was improving and the clinical presentation was more than likely due to the viral infection described above.     Syncope.    -The patient had dizziness while he was standing and reports a syncopal episode. Reportedly, his systolic blood pressure was in the 80s when he presented to the ER.    -No neuro deficit and denies other neurological symptoms.    -Normal sinus rhythm on telemetry, blood pressure have since been stable, no further presyncope/syncope symptoms.  -Echo was unremarkable  -Head CT negative for acute findings(which was actually done because he had on hospital day 2 and had a prior history of spontaneous subdural hematoma of which the patient was concerned about.)  -No further headaches.  No other neurological symptoms reported.     Acute kidney injury and mild hyperkalemia.    -Presents with a creatinine of 1.5 and potassium of 5.5.  Suspect this is due to dehydration in combination with ACE inhibitor.   -Improved with IV hydration  -Recommend continuing to hold lisinopril for at least another week.  And resume  "after PCP follow-up in a week.     Diabetes mellitus, insulin-dependent.    -Hemoglobin A1c of 10.1  -PTA on metformin, insulin Glargine 60 units subQ at bedtime and premeal NovoLog 15 units t.i.d.   -Resume prior to admission regimen at discharge.     Hyperlipidemia.     -Continue simvastatin      Obstructive sleep apnea:  -Noncompliant with CPAP     Morbid obesity:  -BMI more than 40  -Weight loss and lifestyle modification as outpatient.      Has been feeling very well this past week.  Feels the best he's felt \"in as long as he can remember\".  Good enrgy level.    AM sugars:  Running 147, but this is down from 170-200.  Before dinner was 109.     Insulin dosin lantus, and novolog 5-10 three times daily.    Back on metformin.     Problem list and histories reviewed & adjusted, as indicated.  Additional history: as documented    Patient Active Problem List   Diagnosis     Hyperlipidemia LDL goal <100     Hypertension  BP goal <140/90     Morbid obesity (H)     SDH (subdural hematoma) (H)     Type 2 diabetes mellitus with hyperglycemia, with long-term current use of insulin (H)     Peripheral polyneuropathy     Gastroenteritis     Past Surgical History:   Procedure Laterality Date     NO HISTORY OF SURGERY         Social History     Tobacco Use     Smoking status: Never Smoker     Smokeless tobacco: Never Used   Substance Use Topics     Alcohol use: Yes     Alcohol/week: 0.0 oz     Comment: 1-2 drinks every 3-4 weeks     Family History   Problem Relation Age of Onset     Diabetes Mother      C.A.D. Father         in mid 60's     Diabetes Maternal Aunt          of Diabetic complications     Cancer - colorectal No family hx of      Prostate Cancer No family hx of      Cerebrovascular Disease No family hx of          Current Outpatient Medications   Medication Sig Dispense Refill     blood glucose monitoring (ONE TOUCH ULTRA) test strip Use to test blood sugars tid or as directed. 100 each 0     blood glucose " monitoring (ONE TOUCH ULTRA) test strip Use to test blood sugars tid or as directed. 100 each prn     insulin aspart (NOVOLOG FLEXPEN) 100 UNIT/ML injection 15 units before breakfast, 15 units before lunch, 15 units before dinner 45 mL 3     insulin glargine (LANTUS SOLOSTAR) 100 UNIT/ML pen Inject 60 Units Subcutaneous At Bedtime 45 mL 3     insulin pen needle 32G X 4 MM Use 4 pen needles daily or as directed. 360 each 3     metFORMIN (GLUCOPHAGE-XR) 500 MG 24 hr tablet Take 2 tablets (1,000 mg) by mouth 2 times daily (with meals) lima. 360 tablet 3     order for DME Equipment being ordered:     1 glucometer    90 Test strips for three times daily testing with 11 RF.    90 lancets for three times daily testing. 1 each 0     order for DME AIRSENSE 10  7-11 CM/H20  NASAL WISP FABRIC       simvastatin (ZOCOR) 20 MG tablet Take 1 tablet (20 mg) by mouth daily 90 tablet 3     No Known Allergies  Recent Labs   Lab Test 12/25/18  0926 12/24/18  0445 12/23/18  2146 12/23/18  2124 05/22/18  0852 03/06/17  0912  10/20/15  1303   A1C  --  10.1*  --   --  8.7* 7.2*   < > 9.2*   LDL  --   --   --   --  52 65  --  54   HDL  --   --   --   --  30* 35*  --  39*   TRIG  --   --   --   --  248* 209*  --  212*   ALT  --  40 45 Canceled, Test credited, specimen discarded 51 46   < > 65   CR 0.78 1.05 1.50* Canceled, Test credited, specimen discarded 0.73 0.66   < > 0.71   GFRESTIMATED >90 84 55* Canceled, Test credited, specimen discarded >90 >90  Non African American GFR Calc     < > >90  Non  GFR Calc     GFRESTBLACK >90 >90 63 Canceled, Test credited, specimen discarded >90 >90  African American GFR Calc     < > >90   GFR Calc     POTASSIUM 3.7 4.2 5.5* Canceled, Test credited, specimen discarded 4.8 4.3   < > 4.6   TSH  --   --   --   --  1.78 1.94  --  1.52    < > = values in this interval not displayed.      BP Readings from Last 3 Encounters:   01/11/19 123/82   12/27/18 120/80   09/03/18 112/84     Wt Readings from Last 3 Encounters:   01/11/19 126.6 kg (279 lb 1.6 oz)   12/27/18 130.4 kg (287 lb 7.7 oz)   09/03/18 126.1 kg (278 lb)                    Reviewed and updated as needed this visit by clinical staff       Reviewed and updated as needed this visit by Provider         ROS:  CONSTITUTIONAL: NEGATIVE for fever, chills, change in weight  ENT/MOUTH: NEGATIVE for ear, mouth and throat problems  RESP: NEGATIVE for significant cough or SOB  CV: NEGATIVE for chest pain, palpitations or peripheral edema    OBJECTIVE:                                                    /82   Pulse 90   Temp 97.7  F (36.5  C) (Tympanic)   Wt 126.6 kg (279 lb 1.6 oz)   BMI 40.05 kg/m    Body mass index is 40.05 kg/m .   GENERAL: healthy, alert, well nourished, well hydrated, no distress  HENT: ear canals- normal; TMs- normal; Nose- normal; Mouth- no ulcers, no lesions  NECK: no tenderness, no adenopathy, no asymmetry, no masses, no stiffness; thyroid- normal to palpation  RESP: lungs clear to auscultation - no rales, no rhonchi, no wheezes  CV: regular rates and rhythm, normal S1 S2, no S3 or S4 and no murmur, no click or rub -  ABDOMEN: soft, no tenderness, no  hepatosplenomegaly, no masses, normal bowel sounds    Diagnostic test results:  Diagnostic Test Results:  none      ASSESSMENT/PLAN:                                                    Lactic acidosis:  Symptoms seem to have resolved mostly.  Will rechekc labs today.     1. Type 2 diabetes mellitus with hyperglycemia, with long-term current use of insulin (H)  Poor control based on recent diabetes blood test (A1c), but this could have been falsely elevated with hemoconcentration.   - ASPIRIN NOT PRESCRIBED (INTENTIONAL); Please choose reason not prescribed, below  - Hemoglobin A1c  - Comprehensive metabolic panel    2. Hypertension, unspecified type  At goal.  Resulme low dose ACE inhibitor.    - lisinopril (PRINIVIL/ZESTRIL) 10 MG tablet; Take 1 tablet (10 mg)  "by mouth daily  Dispense: 90 tablet; Refill: 3    3. Hyperlipidemia LDL goal <100    - simvastatin (ZOCOR) 20 MG tablet; Take 1 tablet (20 mg) by mouth daily  Dispense: 90 tablet; Refill: 3    4. Uncontrolled type 2 diabetes mellitus without complication, with long-term current use of insulin (H)  Patient Instructions   Lab work downstairs today.  Directions:  As you walk through the first floor, you'll see (on the right) first the pharmacy, then some bathrooms, then the \"Lab and Imaging\" area. Give them your name at the window there and wait for them to call you.     If your diabetes blood test (A1c) is still above 7, let's increase your insulin:    May increase your lantus by 10 units each night to achieve a fasting level of less 100.    May incrase your novolog by 2-4 units with meals to achieve a 1 hour post meal sugar of 150 or less.      - metFORMIN (GLUCOPHAGE-XR) 500 MG 24 hr tablet; Take 2 tablets (1,000 mg) by mouth 2 times daily (with meals) lima.  Dispense: 360 tablet; Refill: 3      See Patient Instructions    Sandoval Wilkinson MD  [facility] [place]  "

## 2019-01-11 NOTE — LETTER
[facility]  [address]  [place] [address]                  [phone]   January 11, 2019    Jairo Escamilla  [address] [place] [address]  [place] [address]      Jairo,     Your diabetes blood test (A1c) is better than the hospital result showed, although still above goal.  It was 8.1, down from 10.1     Let's still plan to increse your lantus and your novolog as planned, and see you back in 3 months.     Hope you're well.     Sandoval Wilkinson MD   Internal Medicine and Pediatrics        Results for orders placed or performed in visit on 01/11/19   Hemoglobin A1c   Result Value Ref Range    Hemoglobin A1C 8.1 (H) 0 - 5.6 %

## 2019-01-12 LAB
ALBUMIN SERPL-MCNC: 3.9 G/DL (ref 3.4–5)
ALP SERPL-CCNC: 45 U/L (ref 40–150)
ALT SERPL W P-5'-P-CCNC: 73 U/L (ref 0–70)
ANION GAP SERPL CALCULATED.3IONS-SCNC: 6 MMOL/L (ref 3–14)
AST SERPL W P-5'-P-CCNC: 43 U/L (ref 0–45)
BILIRUB SERPL-MCNC: 1.2 MG/DL (ref 0.2–1.3)
BUN SERPL-MCNC: 12 MG/DL (ref 7–30)
CALCIUM SERPL-MCNC: 8.9 MG/DL (ref 8.5–10.1)
CHLORIDE SERPL-SCNC: 107 MMOL/L (ref 94–109)
CO2 SERPL-SCNC: 26 MMOL/L (ref 20–32)
CREAT SERPL-MCNC: 0.75 MG/DL (ref 0.66–1.25)
GFR SERPL CREATININE-BSD FRML MDRD: >90 ML/MIN/{1.73_M2}
GLUCOSE SERPL-MCNC: 161 MG/DL (ref 70–99)
POTASSIUM SERPL-SCNC: 4.3 MMOL/L (ref 3.4–5.3)
PROT SERPL-MCNC: 7.2 G/DL (ref 6.8–8.8)
SODIUM SERPL-SCNC: 139 MMOL/L (ref 133–144)

## 2019-02-06 ENCOUNTER — TRANSFERRED RECORDS (OUTPATIENT)
Dept: HEALTH INFORMATION MANAGEMENT | Facility: CLINIC | Age: 49
End: 2019-02-06

## 2019-02-10 ENCOUNTER — TRANSFERRED RECORDS (OUTPATIENT)
Dept: HEALTH INFORMATION MANAGEMENT | Facility: CLINIC | Age: 49
End: 2019-02-10

## 2019-04-19 NOTE — PROGRESS NOTES
SUBJECTIVE:   Jairo Escamilla is a 49 year old male who presents to clinic today for the following   health issues:      Diabetes Follow-up    Patient is checking blood sugars: trying to do once daily.  Results are as follows:         120-150 last couple days have been around 200    Diabetic concerns: None and blood sugar frequently over 200     Symptoms of hypoglycemia (low blood sugar): none     Paresthesias (numbness or burning in feet) or sores: Yes numbness     Date of last diabetic eye exam: 3/6/19    Diabetes Management Resources    Hyperlipidemia Follow-Up      Rate your low fat/cholesterol diet?: good    Taking statin?  Yes, no muscle aches from statin    Other lipid medications/supplements?:  none    Hypertension Follow-up      Outpatient blood pressures are not being checked.    Low Salt Diet: not monitoring salt    BP Readings from Last 2 Encounters:   01/11/19 123/82   12/27/18 120/80     Hemoglobin A1C (%)   Date Value   01/11/2019 8.1 (H)   12/24/2018 10.1 (H)     LDL Cholesterol Calculated (mg/dL)   Date Value   05/22/2018 52   03/06/2017 65       Amount of exercise or physical activity: 5-6 hours/ week    Problems taking medications regularly: No    Medication side effects: none    Diet: regular (no restrictions)    Felt like had been doing very well, with excellent sugars, up until about 3 weeks ago.      Then his season started up again, and went 2 weeks without checking much at all; most recently, sugars higher than before.  Traveling more.  When was checking, his average as 135.      AMs were 120 or so.  After meals would run 160-180.     Now taking 70 lantus, and 15 three times daily of novolog.      Still with high sugars, thought mostly to be due to dietary indescretions.     Additional history: as documented    Reviewed  and updated as needed this visit by clinical staff         Reviewed and updated as needed this visit by Provider         Patient Active Problem List   Diagnosis      Hyperlipidemia LDL goal <100     Hypertension  BP goal <140/90     Morbid obesity (H)     SDH (subdural hematoma) (H)     Type 2 diabetes mellitus with hyperglycemia, with long-term current use of insulin (H)     Peripheral polyneuropathy     Gastroenteritis     Past Surgical History:   Procedure Laterality Date     NO HISTORY OF SURGERY         Social History     Tobacco Use     Smoking status: Never Smoker     Smokeless tobacco: Never Used   Substance Use Topics     Alcohol use: Yes     Alcohol/week: 0.0 oz     Comment: Very little and infrequently     Family History   Problem Relation Age of Onset     Diabetes Mother      Hypertension Mother      Cerebrovascular Disease Mother      Obesity Mother      C.A.D. Father         in mid 60's     Cerebrovascular Disease Father      Diabetes Maternal Aunt          of Diabetic complications     Cancer - colorectal No family hx of      Prostate Cancer No family hx of      Cerebrovascular Disease No family hx of          Current Outpatient Medications   Medication Sig Dispense Refill     ASPIRIN NOT PRESCRIBED (INTENTIONAL) Please choose reason not prescribed, below       blood glucose monitoring (ONE TOUCH ULTRA) test strip Use to test blood sugars tid or as directed. 100 each 0     blood glucose monitoring (ONE TOUCH ULTRA) test strip Use to test blood sugars tid or as directed. 100 each prn     insulin aspart (NOVOLOG FLEXPEN) 100 UNIT/ML pen Inject 15 Units Subcutaneous 3 times daily (with meals) 15 units before breakfast, 15 units before lunch, 15 units before dinner 45 mL 3     insulin glargine (LANTUS SOLOSTAR PEN) 100 UNIT/ML pen Inject 70 Units Subcutaneous At Bedtime 45 mL 5     insulin pen needle 32G X 4 MM Use 4 pen needles daily or as directed. 360 each 3     lisinopril (PRINIVIL/ZESTRIL) 10 MG tablet Take 1 tablet (10 mg) by mouth daily 90 tablet 3     metFORMIN (GLUCOPHAGE-XR) 500 MG 24 hr tablet Take 2 tablets (1,000 mg) by mouth 2 times daily (with  "meals) lima. 360 tablet 3     order for DME Equipment being ordered:     1 glucometer    90 Test strips for three times daily testing with 11 RF.    90 lancets for three times daily testing. 1 each 0     order for DME AIRSENSE 10  7-11 CM/H20  NASAL WISP FABRIC       simvastatin (ZOCOR) 20 MG tablet Take 1 tablet (20 mg) by mouth daily 90 tablet 3     No Known Allergies  Recent Labs   Lab Test 01/11/19  1052 12/25/18  0926 12/24/18  0445 12/23/18  2146  05/22/18  0852 03/06/17  0912  10/20/15  1303   A1C 8.1*  --  10.1*  --   --  8.7* 7.2*   < > 9.2*   LDL  --   --   --   --   --  52 65  --  54   HDL  --   --   --   --   --  30* 35*  --  39*   TRIG  --   --   --   --   --  248* 209*  --  212*   ALT 73*  --  40 45   < > 51 46   < > 65   CR 0.75 0.78 1.05 1.50*   < > 0.73 0.66   < > 0.71   GFRESTIMATED >90 >90 84 55*   < > >90 >90  Non African American GFR Calc     < > >90  Non  GFR Calc     GFRESTBLACK >90 >90 >90 63   < > >90 >90  African American GFR Calc     < > >90   GFR Calc     POTASSIUM 4.3 3.7 4.2 5.5*   < > 4.8 4.3   < > 4.6   TSH  --   --   --   --   --  1.78 1.94  --  1.52    < > = values in this interval not displayed.      BP Readings from Last 3 Encounters:   04/22/19 120/74   01/11/19 123/82   12/27/18 120/80    Wt Readings from Last 3 Encounters:   04/22/19 123.9 kg (273 lb 1.6 oz)   01/11/19 126.6 kg (279 lb 1.6 oz)   12/27/18 130.4 kg (287 lb 7.7 oz)                  Labs reviewed in EPIC    ROS:  CONSTITUTIONAL: NEGATIVE for fever, chills, change in weight  ENT/MOUTH: NEGATIVE for ear, mouth and throat problems  RESP: NEGATIVE for significant cough or SOB  CV: NEGATIVE for chest pain, palpitations or peripheral edema    OBJECTIVE:                                                    /74 (BP Location: Right arm, Patient Position: Sitting, Cuff Size: Adult Large)   Pulse 90   Temp 98.5  F (36.9  C) (Oral)   Ht 1.778 m (5' 10\")   Wt 123.9 kg (273 lb 1.6 oz)   " SpO2 97%   BMI 39.19 kg/m    Body mass index is 39.19 kg/m .   GENERAL: healthy, alert, well nourished, well hydrated, no distress  HENT: ear canals- normal; TMs- normal; Nose- normal; Mouth- no ulcers, no lesions  NECK: no tenderness, no adenopathy, no asymmetry, no masses, no stiffness; thyroid- normal to palpation  RESP: lungs clear to auscultation - no rales, no rhonchi, no wheezes  CV: regular rates and rhythm, normal S1 S2, no S3 or S4 and no murmur, no click or rub -  ABDOMEN: soft, no tenderness, no  hepatosplenomegaly, no masses, normal bowel sounds    Diagnostic test results:  Diagnostic Test Results:  none      ASSESSMENT/PLAN:                                                    1. Screening for diabetic retinopathy  Done in Feb 2019.     2. Screening for diabetic peripheral neuropathy    - FOOT EXAM  NO CHARGE [28856.114]    3. 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    With respect to novolog, we can also incrase to, say, 17 units three times daily, as long as your post meal sugars are above 150.    But more effective, limiting carbs will help more.    If at goal, we can go 6 months; if not, 3 months.    Sandoavl Wilkinson MD  Internal Medicine and Pediatrics        - insulin glargine (LANTUS SOLOSTAR PEN) 100 UNIT/ML pen; Inject 70 Units Subcutaneous At Bedtime  Dispense: 45 mL; Refill: 5  - insulin aspart (NOVOLOG FLEXPEN) 100 UNIT/ML pen; Inject 15 Units Subcutaneous 3 times daily (with meals) 15 units before breakfast, 15 units before lunch, 15 units before dinner  Dispense: 45 mL; Refill: 3  - Hemoglobin A1c  - Comprehensive metabolic panel  - Lipid panel reflex to direct LDL Fasting  - Albumin Random Urine  Quantitative with Creat Ratio  - TSH with free T4 reflex      See Patient Instructions    Sandoval Wilkinson MD  [facility]

## 2019-04-22 ENCOUNTER — OFFICE VISIT (OUTPATIENT)
Dept: PEDIATRICS | Facility: CLINIC | Age: 49
End: 2019-04-22
Payer: COMMERCIAL

## 2019-04-22 VITALS
HEART RATE: 90 BPM | WEIGHT: 273.1 LBS | SYSTOLIC BLOOD PRESSURE: 120 MMHG | OXYGEN SATURATION: 97 % | DIASTOLIC BLOOD PRESSURE: 74 MMHG | BODY MASS INDEX: 39.1 KG/M2 | TEMPERATURE: 98.5 F | HEIGHT: 70 IN

## 2019-04-22 DIAGNOSIS — Z13.89 SCREENING FOR DIABETIC PERIPHERAL NEUROPATHY: ICD-10-CM

## 2019-04-22 DIAGNOSIS — Z13.5 SCREENING FOR DIABETIC RETINOPATHY: ICD-10-CM

## 2019-04-22 LAB
ALBUMIN SERPL-MCNC: 3.8 G/DL (ref 3.4–5)
ALP SERPL-CCNC: 64 U/L (ref 40–150)
ALT SERPL W P-5'-P-CCNC: 48 U/L (ref 0–70)
ANION GAP SERPL CALCULATED.3IONS-SCNC: 5 MMOL/L (ref 3–14)
AST SERPL W P-5'-P-CCNC: 23 U/L (ref 0–45)
BILIRUB SERPL-MCNC: 0.8 MG/DL (ref 0.2–1.3)
BUN SERPL-MCNC: 12 MG/DL (ref 7–30)
CALCIUM SERPL-MCNC: 8.9 MG/DL (ref 8.5–10.1)
CHLORIDE SERPL-SCNC: 107 MMOL/L (ref 94–109)
CHOLEST SERPL-MCNC: 136 MG/DL
CO2 SERPL-SCNC: 26 MMOL/L (ref 20–32)
CREAT SERPL-MCNC: 0.83 MG/DL (ref 0.66–1.25)
CREAT UR-MCNC: 97 MG/DL
GFR SERPL CREATININE-BSD FRML MDRD: >90 ML/MIN/{1.73_M2}
GLUCOSE SERPL-MCNC: 264 MG/DL (ref 70–99)
HBA1C MFR BLD: 8.2 % (ref 0–5.6)
HDLC SERPL-MCNC: 31 MG/DL
LDLC SERPL CALC-MCNC: 57 MG/DL
MICROALBUMIN UR-MCNC: 10 MG/L
MICROALBUMIN/CREAT UR: 10.57 MG/G CR (ref 0–17)
NONHDLC SERPL-MCNC: 105 MG/DL
POTASSIUM SERPL-SCNC: 4.5 MMOL/L (ref 3.4–5.3)
PROT SERPL-MCNC: 7.9 G/DL (ref 6.8–8.8)
SODIUM SERPL-SCNC: 138 MMOL/L (ref 133–144)
TRIGL SERPL-MCNC: 241 MG/DL
TSH SERPL DL<=0.005 MIU/L-ACNC: 2.35 MU/L (ref 0.4–4)

## 2019-04-22 PROCEDURE — 84443 ASSAY THYROID STIM HORMONE: CPT | Performed by: INTERNAL MEDICINE

## 2019-04-22 PROCEDURE — 80053 COMPREHEN METABOLIC PANEL: CPT | Performed by: INTERNAL MEDICINE

## 2019-04-22 PROCEDURE — 99214 OFFICE O/P EST MOD 30 MIN: CPT | Performed by: INTERNAL MEDICINE

## 2019-04-22 PROCEDURE — 80061 LIPID PANEL: CPT | Performed by: INTERNAL MEDICINE

## 2019-04-22 PROCEDURE — 36415 COLL VENOUS BLD VENIPUNCTURE: CPT | Performed by: INTERNAL MEDICINE

## 2019-04-22 PROCEDURE — 83036 HEMOGLOBIN GLYCOSYLATED A1C: CPT | Performed by: INTERNAL MEDICINE

## 2019-04-22 PROCEDURE — 82043 UR ALBUMIN QUANTITATIVE: CPT | Performed by: INTERNAL MEDICINE

## 2019-04-22 PROCEDURE — 99207 C FOOT EXAM  NO CHARGE: CPT | Performed by: INTERNAL MEDICINE

## 2019-04-22 ASSESSMENT — MIFFLIN-ST. JEOR: SCORE: 2120.02

## 2019-04-22 NOTE — PATIENT INSTRUCTIONS
If your diabetes blood test (A1c) is still high, we can increase your lantus to 75 then 80, keep your AM sugars below 100.    With respect to novolog, we can also incrase to, say, 17 units three times daily, as long as your post meal sugars are above 150.    But more effective, limiting carbs will help more.    If at goal, we can go 6 months; if not, 3 months.    Sandoval Wilkinson MD  Internal Medicine and Pediatrics

## 2019-07-18 ENCOUNTER — TRANSFERRED RECORDS (OUTPATIENT)
Dept: HEALTH INFORMATION MANAGEMENT | Facility: CLINIC | Age: 49
End: 2019-07-18

## 2019-09-28 ENCOUNTER — HEALTH MAINTENANCE LETTER (OUTPATIENT)
Age: 49
End: 2019-09-28

## 2019-11-11 ENCOUNTER — MYC MEDICAL ADVICE (OUTPATIENT)
Dept: PEDIATRICS | Facility: CLINIC | Age: 49
End: 2019-11-11

## 2019-11-11 ENCOUNTER — TELEPHONE (OUTPATIENT)
Dept: PEDIATRICS | Facility: CLINIC | Age: 49
End: 2019-11-11

## 2019-11-11 NOTE — TELEPHONE ENCOUNTER
Panel Management Review      Patient has the following on his problem list:   Diabetes    ASA: Passed - intentionally not prescribed    Last A1C  Lab Results   Component Value Date    A1C 8.2 04/22/2019    A1C 8.1 01/11/2019    A1C 10.1 12/24/2018    A1C 8.7 05/22/2018    A1C 7.2 03/06/2017     A1C tested: FAILED    Last LDL:    Lab Results   Component Value Date    CHOL 136 04/22/2019     Lab Results   Component Value Date    HDL 31 04/22/2019     Lab Results   Component Value Date    LDL 57 04/22/2019     Lab Results   Component Value Date    TRIG 241 04/22/2019     Lab Results   Component Value Date    CHOLHDLRATIO 3.5 10/20/2015     Lab Results   Component Value Date    NHDL 105 04/22/2019       Is the patient on a Statin? YES             Is the patient on Aspirin? YES    Medications     HMG CoA Reductase Inhibitors     simvastatin (ZOCOR) 20 MG tablet       Salicylates     ASPIRIN NOT PRESCRIBED (INTENTIONAL)             Last three blood pressure readings:  BP Readings from Last 3 Encounters:   04/22/19 120/74   01/11/19 123/82   12/27/18 120/80       Date of last diabetes office visit: 04/22/2019     Tobacco History:     History   Smoking Status     Never Smoker   Smokeless Tobacco     Never Used           Composite cancer screening    Summary:    Patient is due/failing the following:   A1C    Action needed:   Patient needs office visit for diabetic check.    Type of outreach:    Sent [employer] message.    Questions for provider review:    None                                                                                                                                    MONTSE THOMAS MA on 11/11/2019 at 2:47 PM

## 2020-03-03 NOTE — PATIENT INSTRUCTIONS
Set up a follow up in 2 months. Fast for this.    IN meantime, increase your lantus ot 60, then 70, then 80, etc, until your AM sugars are nearer to 100.      Check you sugars before and after larger meals to ensure your humalog dosing is sufficient.    Sandoval Wilkinson MD  Internal Medicine and Pediatrics                    '  Hip Flexor Strain         What is a hip flexor strain?   A strain is a stretch or tear of a muscle or tendon, a band of tissue that connects muscle to bone. The tendon may be inflamed. Inflammation of a tendon is called tendonitis. The hip flexor muscles allow you to lift your knee and bend at the waist.   How does it occur?   Hip flexor strain occurs from overuse of the muscles that help you flex your knee or do high kicks. This injury occurs in bicyclists, athletes who jump or run with high knee kicks, athletes like soccer players who do forceful kicking activities, and people who practice the martial arts.   What are the symptoms?   You have pain in the upper groin region where the thigh meets the pelvis.   How is it diagnosed?   Your healthcare provider will examine your hip and thigh. You will have tenderness at the muscle and tendon.   How is it treated?   To treat this condition:   Put an ice pack, gel pack, or package of frozen vegetables, wrapped in a cloth on the area every 3 to 4 hours, for up to 20 minutes at a time.   You could also do ice massage. To do this, first freeze water in a Styrofoam cup, then peel the top of the cup away to expose the ice. Hold the bottom of the cup and rub the ice over the area for 5 to 10 minutes. Do this 3 to 5 times a day for the first 2 days.   Take an anti-inflammatory medicine such as ibuprofen, or other medicine as directed by your provider. Nonsteroidal anti-inflammatory medicines (NSAIDs) may cause stomach bleeding and other problems. These risks increase with age. Read the label and take as directed. Unless recommended by your healthcare  provider, do not take for more than 10 days.   Follow your provider's instructions for doing exercises to help you recover.   After you recover from your acute injury, use moist heat for 10 to 15 minutes at a time before you do warm-up and stretching exercises. Do not use heat if you have swelling.   While you are recovering from your injury, you will need to change your sport or activity to one that does not make your condition worse. For example, you may need to swim instead of bicycling or running.   How long will the effects last?   The length of recovery depends on many factors such as your age, health, and if you have had a previous hip flexor injury. Recovery time also depends on the severity of the injury. A mild hip flexor strain may recover within a few weeks, whereas a severe injury may take 6 weeks or longer to recover. You need to stop doing the activities that cause pain until the hip has healed. If you continue doing activities that cause pain, your symptoms will return and it will take longer to recover.   When can I return to my normal activities?   Everyone recovers from an injury at a different rate. Return to your activities depends on how soon your hip recovers, not by how many days or weeks it has been since your injury has occurred. In general, the longer you have symptoms before you start treatment, the longer it will take to get better. The goal is to return to your normal activities as soon as is safely possible. If you return too soon you may worsen your injury.   You may safely return to your activities when, starting from the top of the list and progressing to the end, each of the following is true:   You have full range of motion in the leg on the injured side compared to the leg on the uninjured side.   You have full strength of the leg on the injured side compared to the leg on the uninjured side.   You can walk straight ahead without pain or limping.   How can I prevent a hip flexor  strain?   Hip flexor strains are best prevented by warming up properly and doing stretching exercises before your activity. If you are a bicyclist make sure your seat is raised to the proper height.   Hip Flexor Strain Rehabilitation Exercises         You can begin stretching your hip muscles right away by doing the first 2 exercises. Make sure you feel just a mild discomfort during the stretches and not sharp pain. You may do the last 3 exercises when the pain is gone.   Hip flexor stretch: Kneel and then put one leg forward. Keep your foot flat on the floor. Flatten your lower back and lean your hips forward slightly until you feel a stretch at the front of your hip. Try to keep your body upright as you do this. Hold this position for 15 to 30 seconds. Repeat 3 times with each leg.   Quadriceps stretch: Stand an arm's length away from the wall with your injured leg farthest from the wall. Facing straight ahead, brace yourself by keeping one hand against the wall. With your other hand, grasp the ankle of your injured leg and pull your heel toward your buttocks. Don't arch or twist your back. Keep your knees together. Hold this stretch for 15 to 30 seconds.   Heel slide: Sit on a firm surface with your legs straight in front of you. Slowly slide the heel of your injured leg toward your buttock by pulling your knee toward your chest as you slide the heel. Return to the starting position. Do 3 sets of 10.   Straight leg raise: Lie on your back with your legs straight out in front of you. Bend the knee on your uninjured side and place the foot flat on the floor. Tighten the thigh muscle on your injured side and lift your leg about 8 inches off the floor. Keep your leg straight and your thigh muscle tight. Slowly lower your leg back down to the floor. Do 3 sets of 10.   Resisted hip flexion: Stand facing away from a door. Tie a loop in one end of a piece of elastic tubing and put it around the ankle on your injured  side. Tie a knot in the other end of the tubing and shut the knot in the door near the floor. Tighten the front of your thigh muscle and bring the leg with the tubing forward, keeping your leg straight. Return to the starting position. Do 3 sets of 10.   Published by [employer].  This content is reviewed periodically and is subject to change as new health information becomes available. The information is intended to inform and educate and is not a replacement for medical evaluation, advice, diagnosis or treatment by a healthcare professional.   Written by Jocelyne Ludwig, MS, PT, and Duyen Christianson PT, [facility], [facility], for [employer].   ? 2010 [facility] and/or its affiliates. All Rights Reserved.   Copyright   [employer] 2011          Published by [employer].  This content is reviewed periodically and is subject to change as new health information becomes available. The information is intended to inform and educate and is not a replacement for medical evaluation, advice, diagnosis or treatment by a healthcare professional.   Written by Maurice Jose MD, for [employer].   ? 2010 [facility] and/or its affiliates. All Rights Reserved.   Copyright   [employer] 2011         No

## 2020-03-15 ENCOUNTER — HEALTH MAINTENANCE LETTER (OUTPATIENT)
Age: 50
End: 2020-03-15

## 2020-03-17 ENCOUNTER — VIRTUAL VISIT (OUTPATIENT)
Dept: FAMILY MEDICINE | Facility: OTHER | Age: 50
End: 2020-03-17

## 2020-03-17 ENCOUNTER — HOSPITAL ENCOUNTER (EMERGENCY)
Facility: CLINIC | Age: 50
Discharge: HOME | End: 2020-03-17
Attending: EMERGENCY MEDICINE | Admitting: EMERGENCY MEDICINE
Payer: COMMERCIAL

## 2020-03-17 ENCOUNTER — NURSE TRIAGE (OUTPATIENT)
Dept: NURSING | Facility: CLINIC | Age: 50
End: 2020-03-17

## 2020-03-17 ENCOUNTER — VIRTUAL VISIT (OUTPATIENT)
Dept: PEDIATRICS | Facility: CLINIC | Age: 50
End: 2020-03-17
Payer: COMMERCIAL

## 2020-03-17 VITALS
SYSTOLIC BLOOD PRESSURE: 150 MMHG | HEIGHT: 70 IN | BODY MASS INDEX: 38.51 KG/M2 | TEMPERATURE: 97.9 F | OXYGEN SATURATION: 96 % | DIASTOLIC BLOOD PRESSURE: 96 MMHG | RESPIRATION RATE: 20 BRPM | WEIGHT: 269 LBS

## 2020-03-17 DIAGNOSIS — E78.5 HYPERLIPIDEMIA LDL GOAL <100: ICD-10-CM

## 2020-03-17 DIAGNOSIS — E11.9 TYPE 2 DIABETES MELLITUS WITHOUT COMPLICATION, WITH LONG-TERM CURRENT USE OF INSULIN (H): ICD-10-CM

## 2020-03-17 DIAGNOSIS — J06.9 UPPER RESPIRATORY TRACT INFECTION, UNSPECIFIED TYPE: ICD-10-CM

## 2020-03-17 DIAGNOSIS — Z79.4 TYPE 2 DIABETES MELLITUS WITHOUT COMPLICATION, WITH LONG-TERM CURRENT USE OF INSULIN (H): ICD-10-CM

## 2020-03-17 DIAGNOSIS — I10 HYPERTENSION, UNSPECIFIED TYPE: ICD-10-CM

## 2020-03-17 PROCEDURE — 99443 ZZC PHYSICIAN TELEPHONE EVALUATION 21-30 MIN: CPT | Performed by: INTERNAL MEDICINE

## 2020-03-17 PROCEDURE — 99282 EMERGENCY DEPT VISIT SF MDM: CPT

## 2020-03-17 RX ORDER — LISINOPRIL 10 MG/1
10 TABLET ORAL DAILY
Qty: 90 TABLET | Refills: 1 | Status: SHIPPED | OUTPATIENT
Start: 2020-03-17 | End: 2020-05-22

## 2020-03-17 RX ORDER — INSULIN ASPART 100 [IU]/ML
20 INJECTION, SOLUTION INTRAVENOUS; SUBCUTANEOUS
Qty: 18 ML | Refills: 11 | Status: SHIPPED | OUTPATIENT
Start: 2020-03-17 | End: 2020-05-22

## 2020-03-17 RX ORDER — SIMVASTATIN 20 MG
20 TABLET ORAL DAILY
Qty: 90 TABLET | Refills: 1 | Status: SHIPPED | OUTPATIENT
Start: 2020-03-17 | End: 2020-05-22

## 2020-03-17 RX ORDER — METFORMIN HCL 500 MG
1000 TABLET, EXTENDED RELEASE 24 HR ORAL 2 TIMES DAILY WITH MEALS
Qty: 360 TABLET | Refills: 1 | Status: SHIPPED | OUTPATIENT
Start: 2020-03-17 | End: 2020-05-22

## 2020-03-17 ASSESSMENT — MIFFLIN-ST. JEOR: SCORE: 2096.43

## 2020-03-17 ASSESSMENT — ENCOUNTER SYMPTOMS
SHORTNESS OF BREATH: 1
COUGH: 1

## 2020-03-17 NOTE — ED TRIAGE NOTES
"1 month ago had \"the flu\" with symptoms of coughing, body aches and fatigue.  Symptoms improved but 2-3 days ago coughing and shortness of breath began again.   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  Patient alert and oriented x3.  Airway, breathing and circulation intact.  "

## 2020-03-17 NOTE — ED PROVIDER NOTES
"  History     Chief Complaint:  Cough and Shortness of Breath    HPI   Jairo Escamilla is a 50 year old male with history of diabetes who presents to the emergency department today with cough and shortness of breath. The patient was sick with the flu about 1 month ago and at that time had cough, body aches, and fatigue. He improved, but then 2-3 days ago he started having cough and shortness of breath again. The shortness of breath is only present during fits of cough, but not otherwise. 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     Allergies:  No Known Drug Allergies     Medications:    Insulin  Lisinopril  Metformin  Zocor     Past Medical History:    Diabetes  Hypertension  Pneumonia   Subdural hematoma   Gastroenteritis   Peripheral polyneuropathy   Morbid obesity  Hyperlipidemia      Past Surgical History:    History reviewed. No pertinent past surgical history.    Family History:    Mother - diabetes, hypertension, cerebrovascular disease, obesity   Father - CAD, cerebrovascular disease  Aunt - diabetes     Social History:  The patient was alone.  Smoking Status: Never  Smokeless Tobacco: Never  Alcohol Use: Yes   Marital Status:  Single     Review of Systems   Respiratory: Positive for cough and shortness of breath.    All other systems reviewed and are negative.        Physical Exam     Patient Vitals for the past 24 hrs:   BP Temp Temp src Heart Rate Resp SpO2 Height Weight   03/17/20 1055 (!) 150/96 97.9  F (36.6  C) Oral 101 20 96 % 1.778 m (5' 10\") 122 kg (269 lb)      Physical Exam  General: The patient is alert, in no respiratory distress. Ross cough. Large BMI.     HENT: Mucous membranes moist.    Cardiovascular: Regular rate and rhythm. Good pulses in all four extremities. Normal capillary refill and skin turgor.     Respiratory: Lungs are clear. No nasal flaring. No retractions. No wheezing, no " crackles.    Gastrointestinal: Abdomen soft. No guarding, no rebound. No palpable hernias.     Musculoskeletal: No gross deformity.     Skin: No rashes or petechiae.     Neurologic: The patient is alert and oriented x3. GCS 15. No testable cranial nerve deficit. Follows commands with clear and appropriate speech. Gives appropriate answers. Good strength in all extremities. No gross neurologic deficit. Gross sensation intact. Pupils are round and reactive. No meningismus.     Lymphatic: No cervical adenopathy. No lower extremity swelling.    Psychiatric: The patient is non-tearful.   Emergency Department Course   Emergency Department Course:  Nursing notes and vitals reviewed.  1108: I performed an exam of the patient as documented above.   1126: Findings and plan explained to the Patient. Patient discharged home with instructions regarding supportive care, medications, and reasons to return. The importance of close follow-up was reviewed.   I personally answered all related questions prior to discharge.      Impression & Plan    Medical Decision Making:  The patient reports a history of diabetes and has had multiple exposures in his works to other people.  We discussed the positive coronavirus unfortunately the [facility] is not recommending testing at this point I discussed this with the patient.  I considered the symptoms and consider possibly of pneumonia PE CHF diabetic ketoacidosis as well as other possible causes.  The patient did not want a chest x-ray or other laboratory studies.  He is otherwise in no signs of respiratory distress.  I did hear his dry cough here and I think it is unlikely he has pneumonia.  He was placed in self quarantine and was discharged home in good condition with strict instruction return if he should worsen or change.    Diagnosis:    ICD-10-CM    1. Upper respiratory tract infection, unspecified type  J06.9        Disposition:  discharged to home     Anni  Disclosure:  I, Emerita Barajas MD, am serving as a scribe at 11:17 AM on 3/17/2020 to document services personally performed by Douglas Grayson MD based on my observations and the provider's statements to me.    3/17/2020   [facility] EMERGENCY DEPARTMENT       Douglas Grayson MD  03/17/20 4225

## 2020-03-17 NOTE — DISCHARGE INSTRUCTIONS
Discharge Instructions  Upper Respiratory Infection    The upper respiratory tract includes the sinuses, nasal passages, pharynx, and larynx. A URI, or upper respiratory infection, is an infection of any of the parts of the upper airway. Symptoms include runny nose, congestion, sore throat, cough, and fever. URIs are almost always caused by a virus. Antibiotics do not help with virus infections, so are not used for an ordinary URI. A URI is very contagious through coughing and nasal secretions; make sure you wash your hands often and clean surfaces after sneezing, coughing or touching them.  Viruses can live on surfaces for up to 3 days.      Return to the Emergency Department if:  Any of the symptoms you have get much worse.  You seem very sick, like being too weak to get up.  You have any new symptoms, especially serious things like chest pain.   You are short of breath.   You have a severe headache.  You are vomiting so much you can t keep fluids or medicines down.  You have confusion or seem unusually drowsy.  You have a seizure or convulsion.    Follow-up:    You should start to improve in 3 - 5 days.  A cough can linger for up to six weeks, but overall you should be feeling much better.  See your doctor if you have a fever for more than 3 days, or if you are not feeling better within 5 days.      What can I do to help myself?  Fill any prescriptions the doctor gave you and take them right away  If you have a fever, get plenty of rest and drink lots of fluids, especially water. Using a humidifier or saline nose spray will also help loosen secretions.   What clothes or blankets you have on won t change your fever. Do what is comfortable for you.  Bathing or sponging in lukewarm water may help you feel better.  Tylenol  (acetaminophen), Motrin  (ibuprofen), or Advil  (ibuprofen) help bring fever down and may help you feel more comfortable. Be sure to read and follow the package directions, and ask your doctor if  you have questions.  Do not drink alcohol.  Decongestants may help you feel better. You may use decongestant nose sprays Afrin  (oxymetazoline) or Carmine-Synephrine  (phenylephrine hydrochloride) for up to 3 days, or may use a decongestant tablet like Sudafed  (pseudoephedrine).  If you were given a prescription for medicine here today, be sure to read all of the information (including the package insert) that comes with your prescription.  This will include important information about the medicine, its side effects, and any warnings that you need to know about.  The pharmacist who fills the prescription can provide more information and answer questions you may have about the medicine.  If you have questions or concerns that the pharmacist cannot address, please call or return to the Emergency Department.   Opioid Medication Information    Pain medications are among the most commonly prescribed medicines, so we are including this information for all our patients. If you did not receive pain medication or get a prescription for pain medicine, you can ignore it.     You may have been given a prescription for an opioid (narcotic) pain medicine and/or have received a pain medicine while here in the Emergency Department. These medicines can make you drowsy or impaired. You must not drive, operate dangerous equipment, or engage in any other dangerous activities while taking these medications. If you drive while taking these medications, you could be arrested for DUI, or driving under the influence. Do not drink any alcohol while you are taking these medications.     Opioid pain medications can cause addiction. If you have a history of chemical dependency of any type, you are at a higher risk of becoming addicted to pain medications.  Only take these prescribed medications to treat your pain when all other options have been tried. Take it for as short a time and as few doses as possible. Store your pain pills in a secure  place, as they are frequently stolen and provide a dangerous opportunity for children or visitors in your house to start abusing these powerful medications. We will not replace any lost or stolen medicine.  As soon as your pain is better, you should flush all your remaining medication.     Many prescription pain medications contain Tylenol  (acetaminophen), including Vicodin , Tylenol #3 , Norco , Lortab , and Percocet .  You should not take any extra pills of Tylenol  if you are using these prescription medications or you can get very sick.  Do not ever take more than 3000 mg of acetaminophen in any 24 hour period.    All opioids tend to cause constipation. Drink plenty of water and eat foods that have a lot of fiber, such as fruits, vegetables, prune juice, apple juice and high fiber cereal.  Take a laxative if you don t move your bowels at least every other day. Miralax , Milk of Magnesia, Colace , or Senna  can be used to keep you regular.      Remember that you can always come back to the Emergency Department if you are not able to see your regular doctor in the amount of time listed above, if you get any new symptoms, or if there is anything that worries you.     Quarantine for 14 days.

## 2020-03-17 NOTE — PROGRESS NOTES
"Date: 2020 06:35:15  Clinician: Wilda Santa  Clinician NPI: [id number]  Patient: Jairo Jaramillo  Patient : 1970  Patient Address: [facility] [place] [address]  Patient Phone: [phone]  Visit Protocol: URI  Patient Summary:  Jairo is a 50 year old ( : 1970 ) male who initiated a Visit for COVID-19 (Coronavirus) evaluation and screening. When asked the question \"Please sign me up to receive news, health information and promotions. \", Jairo responded \"No\".    Jairo states his symptoms started gradually 3-6 days ago. After his symptoms started, they improved and then got worse again.   His symptoms consist of wheezing, a sore throat, a cough, malaise, and rhinitis.   Symptom details     Nasal secretions: The color of his mucus is clear, white, and blood-tinged.    Cough: aJiro coughs almost every minute and his cough is not more bothersome at night. Phlegm does not come into his throat when he coughs. He does not believe his cough is caused by post-nasal drip.     Sore throat: Jairo reports having mild throat pain (1-3 on a 10 point pain scale), does not have exudate on his tonsils, and can swallow liquids. He is not sure if the lymph nodes in his neck are enlarged. A rash has not appeared on the skin since the sore throat started.     Wheezing: Jairo has not ever been diagnosed with asthma. The wheezing does not interfere with his normal daily activities.     Jairo denies having nasal congestion, teeth pain, fever, chills, ear pain, headache, facial pain or pressure, and myalgias. He also denies taking antibiotic medication for the symptoms and having recent facial or sinus surgery in the past 60 days.   Precipitating events  Jaior is not sure if he has been exposed to someone with strep throat. He has recently been exposed to someone with influenza. Jairo has been in close contact with the following high risk individuals: adults 65 or older, pregnant women, and people with " asthma, heart disease or diabetes.   Pertinent COVID-19 (Coronavirus) information  Jairo has not traveled internationally or to the areas where COVID-19 (Coronavirus) is widespread in the last 14 days before the start of his symptoms.   Jairo has not had a close contact with a laboratory-confirmed COVID-19 patient within 14 days of symptom onset. He also has not had a close contact with a suspected COVID-19 patient within 14 days of symptom onset.   Jairo is not a healthcare worker and does not work in a healthcare facility.   Triage Point(s) temporarily suspended for COVID-19 (Coronavirus) screening  Jairo reported the following symptoms which were previously protocol referral points. These protocol referral points have temporarily been removed for purposes of COVID-19 (Coronavirus) screening.   Difficulty breathing even when resting and can only speak in phrase(s)   Pertinent medical history  Jairo does not need a return to work/school note.   Weight: 269 lbs   Jairo does not smoke or use smokeless tobacco.   Additional information as reported by the patient (free text): Very persistent cough, shortness of breath.  Getting worse.  Suspected of having flu about 4 weeks ago was bed riden and out of work for 6 days. 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  I have no idea of status of people I have come in close contact with on daily basis.  Very concerned based on having been Ill a few weeks ago and now getting this and cough and breathing getting worse each day over the last few days. I am diabetic as well.   Weight: 269 lbs    MEDICATIONS: simvastatin oral, lisinopril oral, metformin oral, Lantus Solostar U-100 Insulin subcutaneous, Novolog Flexpen U-100 Insulin aspart subcutaneous, ALLERGIES: NKDA  Clinician Response:  Dear Jairo,   Dear Jairo Jaramillo,  Based on the information you have provided, you do have symptoms that could indicate you have coronavirus, but based on your symptoms,  you need to be seen in the Emergency Department (ED).   Leave now. Drive carefully. Follow these instructions:    You should go to the closest ED (not urgent care or urgency center).  Another adult should drive you to the ED.  If able, call ahead to the ED you will go to ([facility] [phone]) to inform them of your symptoms and possible diagnosis of Coronavirus and get instructions about what entrance you should use to avoid going into the waiting area and risking infecting others.  If you cannot call ahead, send in your  inside first or tell the first person you meet in the ED that you have symptoms of coronavirus so you can be directed to the appropriate entrance and not be in the general waiting room.   From now until you arrive in the ED - Isolate Yourself:    Isolate yourself while traveling.  Do Not allow any visitors within 6 feet.  Do Not make any stops.  Do Not go to work or school.  Do Not go to [employer],  centers, shopping, or other public places.  Do Not shake hands.  Avoid close contact with others (hugging, kissing).   Protect Others:    Cover Your Mouth and Nose with a mask, disposable tissue or wash cloth to avoid spreading germs to others.  Wash your hands and face frequently with soap and water   For more information about COVID19 and options for caring for yourself at home, please visit the CDC website at https://www.cdc.gov/coronavirus/2019-ncov/about/steps-when-sick.html  For more options for care at [facility], please visit our website at [email or web address]       Diagnosis: Cough  Diagnosis ICD: R05

## 2020-03-17 NOTE — LETTER
March 17, 2020      Jairo Escamilla  [address] [address]        To Whom It May Concern:    Jairo Escamilla was seen in our clinic. He was advised to have COVID testing, however, the supply kits are not availalbe at this time.  We are recommending a 14 day quarantine due to the possibility of COVID-19.      Sincerely,        Sandoval Wilkinson MD

## 2020-03-17 NOTE — PROGRESS NOTES
"Jairo Escamilla is a 50 year old male who is being evaluated via a billable telephone visit.      The patient has been notified of following:     \"This telephone visit will be conducted via a call between you and your physician/provider. We have found that certain health care needs can be provided without the need for a physical exam.  This service lets us provide the care you need with a short phone conversation.  If a prescription is necessary we can send it directly to your pharmacy.  If lab work is needed we can place an order for that and you can then stop by our lab to have the test done at a later time.    If during the course of the call the physician/provider feels a telephone visit is not appropriate, you will not be charged for this service.\"       Jairo Escamilla complains of    Chief Complaint   Patient presents with     RECHECK       Patient noted he has been traveling and got in contact with [facility], was referred to go to ER to be tested for COVID -19, patient was sent home for 14 day quarantine due to limited testing.         I have reviewed and updated the patient's Past Medical History, Social History, Family History and Medication List.    ALLERGIES  Patient has no known allergies.    Lakisha Hernandez CMA    (MA signature)    Additional provider notes:     Began with GI symptoms for the last 3-4 months; on and off.  No matter what he eats--larger meals or smaller, will get diarrhea.  One month ago, was out of work with \"influenza\", muscle aches and significant fatigue.  In that time frame, lost 10-12 pounds. Improved, but had a nagging cough.  Last few days, cough has gotten a bit worse; hurts in chest and throat.  Not clearing secretions well.  Called [facility] and was directed to emergency room, but no testing kits available.  So needs a letter stating he should be in quarantine.     Is \"way past due for diabetes care\".  No insurance until recently.  Last diabetes blood test (A1c) was 4/20 and was 8.2.  " Has been out of meds for last 6 weeks.   Had been on 70 units lantus at night, and novolog 15 units three times daily.       Assessment/Plan:  (I10) Hypertension, unspecified type  Comment:   Plan: lisinopril (ZESTRIL) 10 MG tablet        At goal.    (E11.65,  Z79.4) Uncontrolled type 2 diabetes mellitus without complication, with long-term current use of insulin (H)  Comment:   Plan: metFORMIN (GLUCOPHAGE-XR) 500 MG 24 hr tablet,         insulin glargine (LANTUS SOLOSTAR) 100 UNIT/ML         pen, insulin aspart (NOVOLOG FLEXPEN) 100         UNIT/ML pen, order for DME, Comprehensive         metabolic panel, Hemoglobin A1c, Albumin Random        Urine Quantitative with Creat Ratio, TSH with         free T4 reflex, Lipid panel reflex to direct         LDL Fasting        Poor control is likely, due to being off meds.  Resume meds and recheck lab work in next 3 months.     (E78.5) Hyperlipidemia LDL goal <100  Comment:   Plan: simvastatin (ZOCOR) 20 MG tablet        Continue statin.     (E11.9,  Z79.4) Type 2 diabetes mellitus without complication, with long-term current use of insulin (H)  Comment:   Plan: blood glucose (ONETOUCH ULTRA) test strip              I have reviewed the note as documented above.  This accurately captures the substance of my conversation with the patient.      Phone call contact time  Call Started at 1:45  Call Ended at 2:08    Sandoval Wilkinson MD

## 2020-03-17 NOTE — LETTER
March 17, 2020      To Whom It May Concern:      Jairo Escamilla was seen in our Emergency Department today, 03/17/20.  I expect his condition to improve over the next 2-3 days.  He may return to work/school when improved.    Sincerely,        Mao Camejo RN

## 2020-03-17 NOTE — TELEPHONE ENCOUNTER
Patient was contacted by [facility] to go to ED    Symptoms really bad cough, sob , month ago sick with flu , over last few days progressively worse, in retial and work with public , going to ED [facility] now.    Called ahead to [facility] ED     Katelyn Chen RN  [facility] Nurse Advisor

## 2020-03-30 ENCOUNTER — MYC MEDICAL ADVICE (OUTPATIENT)
Dept: PEDIATRICS | Facility: CLINIC | Age: 50
End: 2020-03-30

## 2020-03-30 NOTE — LETTER
March 31, 2020      Jairo Escamilla  [address] [address]        To Whom It May Concern:    Jairo Escamilla was seen in our clinic. He may return to work without restrictions.      Sincerely,        Sandoval Wilkinson MD

## 2020-04-14 ENCOUNTER — MYC REFILL (OUTPATIENT)
Dept: PEDIATRICS | Facility: CLINIC | Age: 50
End: 2020-04-14

## 2020-04-14 DIAGNOSIS — E11.9 TYPE 2 DIABETES MELLITUS WITHOUT COMPLICATION (H): ICD-10-CM

## 2020-04-15 NOTE — TELEPHONE ENCOUNTER
Prescription approved per G Refill Protocol.    Maria De Jesus Salas RN, [facility] Triage

## 2020-05-06 NOTE — TELEPHONE ENCOUNTER
Please see pt's most recent MC message    I apologize for the delay in getting this to you in the proper format.  The Deadline to have this faxed back to the [facility] is May 10th.  Thank you for your help in this very important matter.     MA/TC: please fax the request form for the patient.    Thank you  Rufus Mast RN on 5/6/2020 at 10:15 AM

## 2020-05-18 LAB — HBA1C MFR BLD: 8.2 % (ref 0–5.6)

## 2020-05-18 PROCEDURE — 84443 ASSAY THYROID STIM HORMONE: CPT | Performed by: INTERNAL MEDICINE

## 2020-05-18 PROCEDURE — 80053 COMPREHEN METABOLIC PANEL: CPT | Performed by: INTERNAL MEDICINE

## 2020-05-18 PROCEDURE — 82043 UR ALBUMIN QUANTITATIVE: CPT | Performed by: INTERNAL MEDICINE

## 2020-05-18 PROCEDURE — 36415 COLL VENOUS BLD VENIPUNCTURE: CPT | Performed by: INTERNAL MEDICINE

## 2020-05-18 PROCEDURE — 80061 LIPID PANEL: CPT | Performed by: INTERNAL MEDICINE

## 2020-05-18 PROCEDURE — 83036 HEMOGLOBIN GLYCOSYLATED A1C: CPT | Performed by: INTERNAL MEDICINE

## 2020-05-19 LAB
ALBUMIN SERPL-MCNC: 3.6 G/DL (ref 3.4–5)
ALP SERPL-CCNC: 45 U/L (ref 40–150)
ALT SERPL W P-5'-P-CCNC: 53 U/L (ref 0–70)
ANION GAP SERPL CALCULATED.3IONS-SCNC: 10 MMOL/L (ref 3–14)
AST SERPL W P-5'-P-CCNC: 33 U/L (ref 0–45)
BILIRUB SERPL-MCNC: 1 MG/DL (ref 0.2–1.3)
BUN SERPL-MCNC: 16 MG/DL (ref 7–30)
CALCIUM SERPL-MCNC: 8.6 MG/DL (ref 8.5–10.1)
CHLORIDE SERPL-SCNC: 102 MMOL/L (ref 94–109)
CHOLEST SERPL-MCNC: 114 MG/DL
CO2 SERPL-SCNC: 25 MMOL/L (ref 20–32)
CREAT SERPL-MCNC: 0.69 MG/DL (ref 0.66–1.25)
CREAT UR-MCNC: 68 MG/DL
GFR SERPL CREATININE-BSD FRML MDRD: >90 ML/MIN/{1.73_M2}
GLUCOSE SERPL-MCNC: 160 MG/DL (ref 70–99)
HDLC SERPL-MCNC: 34 MG/DL
LDLC SERPL CALC-MCNC: 52 MG/DL
MICROALBUMIN UR-MCNC: 6 MG/L
MICROALBUMIN/CREAT UR: 9.45 MG/G CR (ref 0–17)
NONHDLC SERPL-MCNC: 80 MG/DL
POTASSIUM SERPL-SCNC: 4 MMOL/L (ref 3.4–5.3)
PROT SERPL-MCNC: 7.5 G/DL (ref 6.8–8.8)
SODIUM SERPL-SCNC: 137 MMOL/L (ref 133–144)
TRIGL SERPL-MCNC: 141 MG/DL
TSH SERPL DL<=0.005 MIU/L-ACNC: 1.25 MU/L (ref 0.4–4)

## 2020-05-22 ENCOUNTER — VIRTUAL VISIT (OUTPATIENT)
Dept: PEDIATRICS | Facility: CLINIC | Age: 50
End: 2020-05-22
Payer: COMMERCIAL

## 2020-05-22 DIAGNOSIS — I10 HYPERTENSION, UNSPECIFIED TYPE: ICD-10-CM

## 2020-05-22 DIAGNOSIS — E78.5 HYPERLIPIDEMIA LDL GOAL <100: ICD-10-CM

## 2020-05-22 PROCEDURE — 99214 OFFICE O/P EST MOD 30 MIN: CPT | Mod: 95 | Performed by: INTERNAL MEDICINE

## 2020-05-22 RX ORDER — SIMVASTATIN 20 MG
20 TABLET ORAL DAILY
Qty: 90 TABLET | Refills: 3 | Status: SHIPPED | OUTPATIENT
Start: 2020-05-22 | End: 2021-06-15

## 2020-05-22 RX ORDER — LISINOPRIL 10 MG/1
10 TABLET ORAL DAILY
Qty: 90 TABLET | Refills: 3 | Status: SHIPPED | OUTPATIENT
Start: 2020-05-22 | End: 2021-06-15

## 2020-05-22 RX ORDER — INSULIN LISPRO 100 [IU]/ML
25 INJECTION, SOLUTION INTRAVENOUS; SUBCUTANEOUS
Qty: 12 ML | Refills: 5 | Status: SHIPPED | OUTPATIENT
Start: 2020-05-22 | End: 2020-09-13

## 2020-05-22 RX ORDER — METFORMIN HCL 500 MG
1000 TABLET, EXTENDED RELEASE 24 HR ORAL 2 TIMES DAILY WITH MEALS
Qty: 360 TABLET | Refills: 3 | Status: SHIPPED | OUTPATIENT
Start: 2020-05-22 | End: 2021-06-15

## 2020-05-22 NOTE — PATIENT INSTRUCTIONS
"It was good \"seeing you\" for our virtual visit today.  Here's what we discussed:      Set up a nurse only blood pressure check:  You can schedule this by calling  or using your [employer] account, if you have it.    Let's change to humalog once novolog runs out.    We'll begin bydureon Once weekly;    Follow up 3 months for another blood pressure, and diabetes mellitus check up.    Sandoval Wilkinson MD  Internal Medicine and Pediatrics         "

## 2020-05-22 NOTE — PROGRESS NOTES
"  Jairo Escamilla is a 50 year old male who is being evaluated via a billable video visit.      The patient has been notified of following:     \"This video visit will be conducted via a call between you and your physician/provider. We have found that certain health care needs can be provided without the need for an in-person physical exam.  This service lets us provide the care you need with a video conversation.  If a prescription is necessary we can send it directly to your pharmacy.  If lab work is needed we can place an order for that and you can then stop by our lab to have the test done at a later time.    Video visits are billed at different rates depending on your insurance coverage.  Please reach out to your insurance provider with any questions.    If during the course of the call the physician/provider feels a video visit is not appropriate, you will not be charged for this service.\"    Patient has given verbal consent for Video visit? Yes    How would you like to obtain your AVS? SitaAntimony    Patient would like the video invitation sent by: Text to cell phone: [phone]    Will anyone else be joining your video visit? No    Subjective     Jairo Escamilla is a 50 year old male who presents today via video visit for the following health issues:    History of Present Illness        Diabetes:   He presents for follow up of diabetes.  He is checking home blood glucose two times daily. He checks blood glucose before meals and at bedtime.  Blood glucose is sometimes over 200 and never under 70. When his blood glucose is low, the patient is asymptomatic for confusion, blurred vision, lethargy and reports not feeling dizzy, shaky, or weak.  He is concerned about blood sugar frequently over 200.  He is having numbness in feet. The patient has not had a diabetic eye exam in the last 12 months.         He eats 0-1 servings of fruits and vegetables daily.He consumes 1 sweetened beverage(s) daily.He exercises with enough " effort to increase his heart rate 20 to 29 minutes per day.  He exercises with enough effort to increase his heart rate 4 days per week. He is missing 1 dose(s) of medications per week.  He is not taking prescribed medications regularly due to remembering to take.    Hyperlipidemia Follow-Up      Are you regularly taking any medication or supplement to lower your cholesterol?   Yes- simvastatin    Are you having muscle aches or other side effects that you think could be caused by your cholesterol lowering medication?  No    Hypertension Follow-up      Do you check your blood pressure regularly outside of the clinic? No     Are you following a low salt diet? No    Last week, sugars had been n the 200s for a few weeks.     CHecks in the AM when wakes;  150 in the AM now, improved from before.      Has been on lantus 70 nightly before bed, and novolog 25 twice daily.  Feels like generally tests twice daily.     Needed to change from novolog to humalog.  Not gaining any weight.           Video Start Time: 9:00        Patient Active Problem List   Diagnosis     Hyperlipidemia LDL goal <100     Hypertension  BP goal <140/90     Morbid obesity (H)     SDH (subdural hematoma) (H)     Uncontrolled type 2 diabetes mellitus without complication, with long-term current use of insulin (H)     Peripheral polyneuropathy     Gastroenteritis     Past Surgical History:   Procedure Laterality Date     NO HISTORY OF SURGERY         Social History     Tobacco Use     Smoking status: Never Smoker     Smokeless tobacco: Never Used   Substance Use Topics     Alcohol use: Yes     Alcohol/week: 0.0 standard drinks     Comment: Very little and infrequently     Family History   Problem Relation Age of Onset     Diabetes Mother      Hypertension Mother      Cerebrovascular Disease Mother      Obesity Mother      C.A.D. Father         in mid 60's     Cerebrovascular Disease Father      Diabetes Maternal Aunt          of Diabetic  complications     Cancer - colorectal No family hx of      Prostate Cancer No family hx of          Current Outpatient Medications   Medication Sig Dispense Refill     exenatide ER (BYDUREON) 2 MG pen Inject 2 mg Subcutaneous every 7 days 4 each 3     insulin glargine (LANTUS SOLOSTAR) 100 UNIT/ML pen Inject 70 Units Subcutaneous At Bedtime 45 mL 5     insulin lispro (HUMALOG KWIKPEN) 100 UNIT/ML (1 unit dial) KWIKPEN Inject 25 Units Subcutaneous 3 times daily (before meals) 12 mL 5     lisinopril (ZESTRIL) 10 MG tablet Take 1 tablet (10 mg) by mouth daily 90 tablet 3     metFORMIN (GLUCOPHAGE-XR) 500 MG 24 hr tablet Take 2 tablets (1,000 mg) by mouth 2 times daily (with meals) lima. 360 tablet 3     simvastatin (ZOCOR) 20 MG tablet Take 1 tablet (20 mg) by mouth daily 90 tablet 3     blood glucose (ONETOUCH ULTRA) test strip Use to test blood sugars tid or as directed. 100 each prn     blood glucose monitoring (ONE TOUCH ULTRA) test strip Use to test blood sugars tid or as directed. 100 each 0     insulin pen needle (32G X 4 MM) 32G X 4 MM miscellaneous Use 4 pen needles daily or as directed. 360 each 0     order for DME Equipment being ordered:     1 glucometer    90 Test strips for three times daily testing with 11 RF.    90 lancets for three times daily testing. 1 each 0     order for DME [employer]IRS[employer] 10  7-11 CM/H20  NA[employer]       No Known Allergies  Recent Labs   Lab Test 05/18/20  1159 04/22/19  0812 01/11/19  1052  05/22/18  0852   A1C 8.2* 8.2* 8.1*   < > 8.7*   LDL 52 57  --   --  52   HDL 34* 31*  --   --  30*   TRIG 141 241*  --   --  248*   ALT 53 48 73*   < > 51   CR 0.69 0.83 0.75   < > 0.73   GFRESTIMATED >90 >90 >90   < > >90   GFRESTBLACK >90 >90 >90   < > >90   POTASSIUM 4.0 4.5 4.3   < > 4.8   TSH 1.25 2.35  --   --  1.78    < > = values in this interval not displayed.      BP Readings from Last 3 Encounters:   03/17/20 (!) 150/96   04/22/19 120/74   01/11/19 123/82    Wt Readings from Last  "3 Encounters:   03/17/20 122 kg (269 lb)   04/22/19 123.9 kg (273 lb 1.6 oz)   01/11/19 126.6 kg (279 lb 1.6 oz)                    Reviewed and updated as needed this visit by Provider         Review of Systems   CONSTITUTIONAL: NEGATIVE for fever, chills, change in weight  INTEGUMENTARY/SKIN: NEGATIVE for worrisome rashes, moles or lesions  EYES: NEGATIVE for vision changes or irritation  ENT/MOUTH: NEGATIVE for ear, mouth and throat problems  RESP: NEGATIVE for significant cough or SOB  BREAST: NEGATIVE for masses, tenderness or discharge  CV: NEGATIVE for chest pain, palpitations or peripheral edema  GI: NEGATIVE for nausea, abdominal pain, heartburn, or change in bowel habits  : NEGATIVE for frequency, dysuria, or hematuria  MUSCULOSKELETAL: NEGATIVE for significant arthralgias or myalgia  NEURO: NEGATIVE for weakness, dizziness or paresthesias  ENDOCRINE: NEGATIVE for temperature intolerance, skin/hair changes  HEME: NEGATIVE for bleeding problems  PSYCHIATRIC: NEGATIVE for changes in mood or affect      Objective    There were no vitals taken for this visit.  Estimated body mass index is 38.6 kg/m  as calculated from the following:    Height as of 3/17/20: 1.778 m (5' 10\").    Weight as of 3/17/20: 122 kg (269 lb).  Physical Exam     GENERAL: Healthy, alert and no distress  EYES: Eyes grossly normal to inspection.  No discharge or erythema, or obvious scleral/conjunctival abnormalities.  RESP: No audible wheeze, cough, or visible cyanosis.  No visible retractions or increased work of breathing.    SKIN: Visible skin clear. No significant rash, abnormal pigmentation or lesions.  NEURO: Cranial nerves grossly intact.  Mentation and speech appropriate for age.  PSYCH: Mentation appears normal, affect normal/bright, judgement and insight intact, normal speech and appearance well-groomed.      Diagnostic Test Results:  Labs reviewed in Epic        Assessment & Plan     1. Uncontrolled type 2 diabetes mellitus " "without complication, with long-term current use of insulin (H)  Still not controlled with respect to his diabetes blood test (A1c).  Will add bydureon and follow up in 3 months.    - insulin lispro (HUMALOG KWIKPEN) 100 UNIT/ML (1 unit dial) KWIKPEN; Inject 25 Units Subcutaneous 3 times daily (before meals)  Dispense: 12 mL; Refill: 5  - exenatide ER (BYDUREON) 2 MG pen; Inject 2 mg Subcutaneous every 7 days  Dispense: 4 each; Refill: 3  - metFORMIN (GLUCOPHAGE-XR) 500 MG 24 hr tablet; Take 2 tablets (1,000 mg) by mouth 2 times daily (with meals) lima.  Dispense: 360 tablet; Refill: 3  - insulin glargine (LANTUS SOLOSTAR) 100 UNIT/ML pen; Inject 70 Units Subcutaneous At Bedtime  Dispense: 45 mL; Refill: 5    2. Hyperlipidemia LDL goal <100  contineu with statin.   - simvastatin (ZOCOR) 20 MG tablet; Take 1 tablet (20 mg) by mouth daily  Dispense: 90 tablet; Refill: 3    3. Hypertension, unspecified type  Not at goal.   To schedule nurse only blood pressure recheck for next week.  Could add hydrochlorothiazide or increase ACE inhibitor.   - lisinopril (ZESTRIL) 10 MG tablet; Take 1 tablet (10 mg) by mouth daily  Dispense: 90 tablet; Refill: 3     BMI:   Estimated body mass index is 38.6 kg/m  as calculated from the following:    Height as of 3/17/20: 1.778 m (5' 10\").    Weight as of 3/17/20: 122 kg (269 lb).           Patient Instructions   It was good \"seeing you\" for our virtual visit today.  Here's what we discussed:      Set up a nurse only blood pressure check:  You can schedule this by calling  or using your [employer] account, if you have it.    Let's change to humalog once novolog runs out.    We'll begin bydureon Once weekly;    Follow up 3 months for another blood pressure, and diabetes mellitus check up.    Sandoval Wilkinson MD  Internal Medicine and Pediatrics             Return in about 3 months (around 8/22/2020) for Diabetes Followup.    Sandoval Wilkinson MD  [facility]      Video-Visit " Details    Type of service:  Video Visit    Video End Time:9:16 AM    Originating Location (pt. Location): Home    Distant Location (provider location):  [facility]     Platform used for Video Visit: [facility]    No follow-ups on file.       Sandoval Wilkinson MD

## 2020-07-20 ENCOUNTER — MYC MEDICAL ADVICE (OUTPATIENT)
Dept: PEDIATRICS | Facility: CLINIC | Age: 50
End: 2020-07-20

## 2020-07-20 DIAGNOSIS — E11.9 TYPE 2 DIABETES MELLITUS WITHOUT COMPLICATION, WITH LONG-TERM CURRENT USE OF INSULIN (H): Primary | ICD-10-CM

## 2020-07-20 DIAGNOSIS — Z79.4 TYPE 2 DIABETES MELLITUS WITHOUT COMPLICATION, WITH LONG-TERM CURRENT USE OF INSULIN (H): Primary | ICD-10-CM

## 2020-07-20 NOTE — TELEPHONE ENCOUNTER
Patient is requesting Bydureon be ordered as 3 month supply to fit with insurance.     Order pended for provider review.     Rob Joy RN on 7/20/2020 at 12:34 PM

## 2020-07-29 ENCOUNTER — MYC MEDICAL ADVICE (OUTPATIENT)
Dept: PEDIATRICS | Facility: CLINIC | Age: 50
End: 2020-07-29

## 2020-07-29 NOTE — TELEPHONE ENCOUNTER
3 month supply sent to patient's pharmacy on 7/20/20. Replied to patient's Sherpaa message.    Thanks!  Silvia CONTRERAS RN, BSN

## 2020-08-07 NOTE — PROGRESS NOTES
"  Pre-Visit Planning     Future Appointments   Date Time Provider Department Center   8/11/2020  8:30 AM Sandoval Wilkinson MD [facility]     Arrival Time for this Appointment:  8:30 AM   Appointment Notes for this encounter:   ED issues    Questionnaires Reviewed/Assigned  No additional questionnaires are needed      Patient preferred phone number: [phone]    Patient contact not needed.     Last visit with Dr. Wilkinson 5/22/20 Virtual Visit:  \"1. Uncontrolled type 2 diabetes mellitus without complication, with long-term current use of insulin (H)  Still not controlled with respect to his diabetes blood test (A1c).  Will add bydureon and follow up in 3 months.    - insulin lispro (HUMALOG KWIKPEN) 100 UNIT/ML (1 unit dial) KWIKPEN; Inject 25 Units Subcutaneous 3 times daily (before meals)  Dispense: 12 mL; Refill: 5  - exenatide ER (BYDUREON) 2 MG pen; Inject 2 mg Subcutaneous every 7 days  Dispense: 4 each; Refill: 3  - metFORMIN (GLUCOPHAGE-XR) 500 MG 24 hr tablet; Take 2 tablets (1,000 mg) by mouth 2 times daily (with meals) lima.  Dispense: 360 tablet; Refill: 3  - insulin glargine (LANTUS SOLOSTAR) 100 UNIT/ML pen; Inject 70 Units Subcutaneous At Bedtime  Dispense: 45 mL; Refill: 5     2. Hyperlipidemia LDL goal <100  contineu with statin.   - simvastatin (ZOCOR) 20 MG tablet; Take 1 tablet (20 mg) by mouth daily  Dispense: 90 tablet; Refill: 3     3. Hypertension, unspecified type  Not at goal.   To schedule nurse only blood pressure recheck for next week.  Could add hydrochlorothiazide or increase ACE inhibitor.   - lisinopril (ZESTRIL) 10 MG tablet; Take 1 tablet (10 mg) by mouth daily  Dispense: 90 tablet; Refill: 3\"    Rob Joy RN on 8/7/2020 at 8:42 AM      "

## 2020-08-10 ASSESSMENT — PATIENT HEALTH QUESTIONNAIRE - PHQ9
10. IF YOU CHECKED OFF ANY PROBLEMS, HOW DIFFICULT HAVE THESE PROBLEMS MADE IT FOR YOU TO DO YOUR WORK, TAKE CARE OF THINGS AT HOME, OR GET ALONG WITH OTHER PEOPLE: SOMEWHAT DIFFICULT
SUM OF ALL RESPONSES TO PHQ QUESTIONS 1-9: 11
SUM OF ALL RESPONSES TO PHQ QUESTIONS 1-9: 11

## 2020-08-11 ENCOUNTER — VIRTUAL VISIT (OUTPATIENT)
Dept: PEDIATRICS | Facility: CLINIC | Age: 50
End: 2020-08-11
Payer: COMMERCIAL

## 2020-08-11 ENCOUNTER — ALLIED HEALTH/NURSE VISIT (OUTPATIENT)
Dept: PEDIATRICS | Facility: CLINIC | Age: 50
End: 2020-08-11

## 2020-08-11 VITALS — SYSTOLIC BLOOD PRESSURE: 120 MMHG | DIASTOLIC BLOOD PRESSURE: 78 MMHG

## 2020-08-11 DIAGNOSIS — N52.9 ERECTILE DYSFUNCTION, UNSPECIFIED ERECTILE DYSFUNCTION TYPE: Primary | ICD-10-CM

## 2020-08-11 DIAGNOSIS — E11.65 TYPE 2 DIABETES MELLITUS WITH HYPERGLYCEMIA, WITH LONG-TERM CURRENT USE OF INSULIN (H): ICD-10-CM

## 2020-08-11 DIAGNOSIS — Z01.30 BP CHECK: Primary | ICD-10-CM

## 2020-08-11 DIAGNOSIS — I10 HYPERTENSION, UNSPECIFIED TYPE: ICD-10-CM

## 2020-08-11 DIAGNOSIS — Z79.4 TYPE 2 DIABETES MELLITUS WITH HYPERGLYCEMIA, WITH LONG-TERM CURRENT USE OF INSULIN (H): ICD-10-CM

## 2020-08-11 DIAGNOSIS — E78.5 HYPERLIPIDEMIA LDL GOAL <100: ICD-10-CM

## 2020-08-11 PROBLEM — E11.9 DIABETES MELLITUS, TYPE 2 (H): Status: ACTIVE | Noted: 2020-08-11

## 2020-08-11 PROCEDURE — 99207 ZZC NO CHARGE NURSE ONLY: CPT | Performed by: INTERNAL MEDICINE

## 2020-08-11 RX ORDER — SILDENAFIL CITRATE 20 MG/1
40 TABLET ORAL DAILY PRN
Qty: 30 TABLET | Refills: 5 | Status: SHIPPED | OUTPATIENT
Start: 2020-08-11 | End: 2022-01-03

## 2020-08-11 ASSESSMENT — PATIENT HEALTH QUESTIONNAIRE - PHQ9: SUM OF ALL RESPONSES TO PHQ QUESTIONS 1-9: 11

## 2020-08-11 NOTE — PROGRESS NOTES
Jairo Escamilla was evaluated at [facility] on August 11, 2020 at which time his blood pressure was:    BP Readings from Last 3 Encounters:   08/11/20 120/78   03/17/20 (!) 150/96   04/22/19 120/74     Pulse Readings from Last 3 Encounters:   04/22/19 90   01/11/19 90   12/27/18 76       Reviewed lifestyle modifications for blood pressure control and reduction: including making healthy food choices, managing weight, getting regular exercise, smoking cessation, reducing alcohol consumption, monitoring blood pressure regularly.     Symptoms: None    BP Goal:< 140/90 mmHg    BP Assessment:  BP at goal    Potential Reasons for BP too high: NA - Not applicable    BP Follow-Up Plan: Recheck BP in 6 months at pharmacy    Recommendation to Provider: pt came to pharmacy for BP check. Was in clinic about 2 months ago and BP was a little higher than normal per patient. MD wanted to have it rechecked. Today it was 120/78, no symptoms or changed to report. He states he takes his meds daily.     Note completed by:   Jennifer Enriquez, Julio C  [facility] [place]  [phone]

## 2020-08-11 NOTE — PATIENT INSTRUCTIONS
"It was good talking with you earlier today.  Here's a summary of what we discussed:    1)  Set up both a \"Lab Appointment\" (to get your blood and/or urine tests done) and a blood pressure recheck at the pharmacy.  You can schedule these by calling  or using your [employer] account, if you have it.     2)  Let's start sildenafil at 40 mg as needed.  Follow up if not workin;    3) if your diabetes blood test (A1c) is good and blood pressure is at goal, then we can wait 6 months for your next check up.    Sandoval Wilkinson MD  Internal Medicine and Pediatrics     "

## 2020-08-11 NOTE — PROGRESS NOTES
"Jairo Escamilla is a 50 year old male who is being evaluated via a billable video visit.      The patient has been notified of following:     \"This video visit will be conducted via a call between you and your physician/provider. We have found that certain health care needs can be provided without the need for an in-person physical exam.  This service lets us provide the care you need with a video conversation.  If a prescription is necessary we can send it directly to your pharmacy.  If lab work is needed we can place an order for that and you can then stop by our lab to have the test done at a later time.    Video visits are billed at different rates depending on your insurance coverage.  Please reach out to your insurance provider with any questions.    If during the course of the call the physician/provider feels a video visit is not appropriate, you will not be charged for this service.\"    Patient has given verbal consent for Video visit? Yes  How would you like to obtain your AVS? MyChart  If you are dropped from the video visit, the video invite should be resent to: Text to cell phone: [phone]  Will anyone else be joining your video visit? No    Subjective     Jairo Escamilla is a 50 year old male who presents today via video visit for the following health issues:    History of Present Illness        He eats 0-1 servings of fruits and vegetables daily.He consumes 0 sweetened beverage(s) daily.He exercises with enough effort to increase his heart rate 10 to 19 minutes per day.  He exercises with enough effort to increase his heart rate 4 days per week. He is missing 1 dose(s) of medications per week.  He is not taking prescribed medications regularly due to remembering to take.    Patient stated ED concerns have been going on for about a year, would like to know options for medications.  Some time ago, seen by a doc and placed on a medicine that he believes started the issues  Can usually achieve an " erection, but not maintain it.  Starts to get frustrated.      Thinks taht the bydureon is helping a lot.  HIs morning sugars are closer to 145 or so for last 4-5 weeks.  Several days has even been near 100.   No significant side effects.  The first week had some pain at injection site.  Some nausea early on as well.     Video Start Time: 8:20        Patient Active Problem List   Diagnosis     Hyperlipidemia LDL goal <100     Hypertension  BP goal <140/90     Morbid obesity (H)     SDH (subdural hematoma) (H)     Uncontrolled type 2 diabetes mellitus without complication, with long-term current use of insulin     Peripheral polyneuropathy     Gastroenteritis     Diabetes mellitus, type 2 (H)     Past Surgical History:   Procedure Laterality Date     NO HISTORY OF SURGERY         Social History     Tobacco Use     Smoking status: Never Smoker     Smokeless tobacco: Never Used   Substance Use Topics     Alcohol use: Yes     Alcohol/week: 0.0 standard drinks     Comment: Very little and infrequently     Family History   Problem Relation Age of Onset     Diabetes Mother      Hypertension Mother      Cerebrovascular Disease Mother      Obesity Mother      C.A.D. Father         in mid 60's     Cerebrovascular Disease Father      Diabetes Maternal Aunt          of Diabetic complications     Cancer - colorectal No family hx of      Prostate Cancer No family hx of          Current Outpatient Medications   Medication Sig Dispense Refill     ASPIRIN NOT PRESCRIBED (INTENTIONAL) Please choose reason not prescribed, below       exenatide ER (BYDUREON) 2 MG pen Inject 2 mg Subcutaneous every 7 days 12 each 3     insulin glargine (LANTUS SOLOSTAR) 100 UNIT/ML pen Inject 70 Units Subcutaneous At Bedtime 45 mL 5     insulin lispro (HUMALOG KWIKPEN) 100 UNIT/ML (1 unit dial) KWIKPEN Inject 25 Units Subcutaneous 3 times daily (before meals) 12 mL 5     lisinopril (ZESTRIL) 10 MG tablet Take 1 tablet (10 mg) by mouth daily 90  tablet 3     metFORMIN (GLUCOPHAGE-XR) 500 MG 24 hr tablet Take 2 tablets (1,000 mg) by mouth 2 times daily (with meals) lima. 360 tablet 3     sildenafil (REVATIO) 20 MG tablet Take 2 tablets (40 mg) by mouth daily as needed (ED) 30 tablet 5     simvastatin (ZOCOR) 20 MG tablet Take 1 tablet (20 mg) by mouth daily 90 tablet 3     blood glucose (ONETOUCH ULTRA) test strip Use to test blood sugars tid or as directed. 100 each prn     blood glucose monitoring (ONE TOUCH ULTRA) test strip Use to test blood sugars tid or as directed. 100 each 0     insulin pen needle (32G X 4 MM) 32G X 4 MM miscellaneous Use 4 pen needles daily or as directed. 360 each 0     order for DME Equipment being ordered:     1 glucometer    90 Test strips for three times daily testing with 11 RF.    90 lancets for three times daily testing. 1 each 0     order for DME AIRS[employer] 10  7-11 CM/H20  [employer]       No Known Allergies  Recent Labs   Lab Test 05/18/20  1159 04/22/19  0812 01/11/19  1052  05/22/18  0852   A1C 8.2* 8.2* 8.1*   < > 8.7*   LDL 52 57  --   --  52   HDL 34* 31*  --   --  30*   TRIG 141 241*  --   --  248*   ALT 53 48 73*   < > 51   CR 0.69 0.83 0.75   < > 0.73   GFRESTIMATED >90 >90 >90   < > >90   GFRESTBLACK >90 >90 >90   < > >90   POTASSIUM 4.0 4.5 4.3   < > 4.8   TSH 1.25 2.35  --   --  1.78    < > = values in this interval not displayed.      BP Readings from Last 3 Encounters:   03/17/20 (!) 150/96   04/22/19 120/74   01/11/19 123/82    Wt Readings from Last 3 Encounters:   03/17/20 122 kg (269 lb)   04/22/19 123.9 kg (273 lb 1.6 oz)   01/11/19 126.6 kg (279 lb 1.6 oz)                    Reviewed and updated as needed this visit by Provider         Review of Systems   CONSTITUTIONAL: NEGATIVE for fever, chills, change in weight  INTEGUMENTARY/SKIN: NEGATIVE for worrisome rashes, moles or lesions  EYES: NEGATIVE for vision changes or irritation  ENT/MOUTH: NEGATIVE for ear, mouth and throat problems  RESP:  NEGATIVE for significant cough or SOB  BREAST: NEGATIVE for masses, tenderness or discharge  CV: NEGATIVE for chest pain, palpitations or peripheral edema  GI: NEGATIVE for nausea, abdominal pain, heartburn, or change in bowel habits  : NEGATIVE for frequency, dysuria, or hematuria  MUSCULOSKELETAL: NEGATIVE for significant arthralgias or myalgia  NEURO: NEGATIVE for weakness, dizziness or paresthesias  ENDOCRINE: NEGATIVE for temperature intolerance, skin/hair changes  HEME: NEGATIVE for bleeding problems  PSYCHIATRIC: NEGATIVE for changes in mood or affect      Objective             Physical Exam     GENERAL: Healthy, alert and no distress  EYES: Eyes grossly normal to inspection.  No discharge or erythema, or obvious scleral/conjunctival abnormalities.  RESP: No audible wheeze, cough, or visible cyanosis.  No visible retractions or increased work of breathing.    SKIN: Visible skin clear. No significant rash, abnormal pigmentation or lesions.  NEURO: Cranial nerves grossly intact.  Mentation and speech appropriate for age.  PSYCH: Mentation appears normal, affect normal/bright, judgement and insight intact, normal speech and appearance well-groomed.      Diagnostic Test Results:  Labs reviewed in Epic        Assessment & Plan     1. Erectile dysfunction, unspecified erectile dysfunction type  New onset 1 year ago.  Begin trial of viagra; follow up if not working.   - sildenafil (REVATIO) 20 MG tablet; Take 2 tablets (40 mg) by mouth daily as needed (ED)  Dispense: 30 tablet; Refill: 5    2. Type 2 diabetes mellitus with hyperglycemia, with long-term current use of insulin (H)  Now on bydureon; if blood pressure and diabetes blood test (A1c) at goal, can continue current meds and follow up in 6 months.   - Hemoglobin A1c; Future    3.  Hypertension:  Poor control; consider increasing meds or adding calcium channel blocker if not at goal at pharmacy.    4.  Cholesterol; controlled. Continue statin.     "  Depression Screening Follow Up    PHQ 8/10/2020   PHQ-9 Total Score 11   Q9: Thoughts of better off dead/self-harm past 2 weeks Not at all     Last PHQ-9 8/10/2020   1.  Little interest or pleasure in doing things 2   2.  Feeling down, depressed, or hopeless 1   3.  Trouble falling or staying asleep, or sleeping too much 2   4.  Feeling tired or having little energy 3   5.  Poor appetite or overeating 2   6.  Feeling bad about yourself 1   7.  Trouble concentrating 0   8.  Moving slowly or restless 0   Q9: Thoughts of better off dead/self-harm past 2 weeks 0   PHQ-9 Total Score 11       Follow Up Actions Taken  Crisis resource information provided in After Visit Summary         Patient Instructions   It was good talking with you earlier today.  Here's a summary of what we discussed:    1)  Set up both a \"Lab Appointment\" (to get your blood and/or urine tests done) and a blood pressure recheck at the pharmacy.  You can schedule these by calling  or using your [employer] account, if you have it.     2)  Let's start sildenafil at 40 mg as needed.  Follow up if not workin;    3) if your diabetes blood test (A1c) is good and blood pressure is at goal, then we can wait 6 months for your next check up.    Sandoval Wilkinson MD  Internal Medicine and Pediatrics         Return in about 6 months (around 2/11/2021) for Diabetes Followup.    Sandoval Wilkinson MD  [facility] [place]      Video-Visit Details    Type of service:  Video Visit    Video End Time:8:39 AM    Originating Location (pt. Location): Home    D[facility]     Platform used for Video Visit: Yosi    No follow-ups on file.       Sandoval Wilkinson MD        "

## 2020-08-17 DIAGNOSIS — Z79.4 TYPE 2 DIABETES MELLITUS WITH HYPERGLYCEMIA, WITH LONG-TERM CURRENT USE OF INSULIN (H): ICD-10-CM

## 2020-08-17 DIAGNOSIS — E11.65 TYPE 2 DIABETES MELLITUS WITH HYPERGLYCEMIA, WITH LONG-TERM CURRENT USE OF INSULIN (H): ICD-10-CM

## 2020-08-17 LAB — HBA1C MFR BLD: 7.6 % (ref 0–5.6)

## 2020-08-17 PROCEDURE — 83036 HEMOGLOBIN GLYCOSYLATED A1C: CPT | Performed by: INTERNAL MEDICINE

## 2020-08-17 PROCEDURE — 36415 COLL VENOUS BLD VENIPUNCTURE: CPT | Performed by: INTERNAL MEDICINE

## 2020-08-18 NOTE — RESULT ENCOUNTER NOTE
Dear WENDY,    Your A1C came back much improved, but not quite at your goal of < 7%.  I recommend you recheck and follow-up with Dr. Wilkinson in 3 months, instead of 6 months.  You may want to schedule a virtual encounter with him sooner to discuss additional changes or increases to your medications.    Please feel free to call with any questions.      Sincerely,    Milind Dugan MD

## 2020-09-13 ENCOUNTER — MYC REFILL (OUTPATIENT)
Dept: PEDIATRICS | Facility: CLINIC | Age: 50
End: 2020-09-13

## 2020-09-13 DIAGNOSIS — Z79.4 TYPE 2 DIABETES MELLITUS WITH HYPERGLYCEMIA, WITH LONG-TERM CURRENT USE OF INSULIN (H): Primary | ICD-10-CM

## 2020-09-13 DIAGNOSIS — E11.65 TYPE 2 DIABETES MELLITUS WITH HYPERGLYCEMIA, WITH LONG-TERM CURRENT USE OF INSULIN (H): Primary | ICD-10-CM

## 2020-09-15 RX ORDER — INSULIN LISPRO 100 [IU]/ML
25 INJECTION, SOLUTION INTRAVENOUS; SUBCUTANEOUS
Qty: 12 ML | Refills: 5 | Status: SHIPPED | OUTPATIENT
Start: 2020-09-15 | End: 2022-01-03

## 2020-09-15 NOTE — TELEPHONE ENCOUNTER
Routing refill request to provider for review/approval because:  Unable to sign script due to diagnosis code issue. Please review and advise.     Luli Clemons RN on 9/15/2020 at 9:18 AM

## 2020-11-18 ENCOUNTER — VIRTUAL VISIT (OUTPATIENT)
Dept: FAMILY MEDICINE | Facility: OTHER | Age: 50
End: 2020-11-18

## 2020-11-18 NOTE — PROGRESS NOTES
"Date: 2020 10:32:27  Clinician: Mitra Omalley  Clinician NPI: [id number]  Patient: Jairo Jaramillo  Patient : 1970  Patient Address: [facility] [place] [address]  Patient Phone: [phone]  Visit Protocol: URI  Patient Summary:  Jairo is a 50 year old ( : 1970 ) male who initiated a [facility] Visit for COVID-19 (Coronavirus) evaluation and screening. When asked the question \"Please sign me up to receive news, health information and promotions. \", Jairo responded \"No\".    Jairo states his symptoms started 1-2 days ago.   His symptoms consist of malaise, diarrhea, and a cough. He is experiencing mild difficulty breathing with activities but can speak normally in full sentences.   Symptom details   Cough: Jairo coughs every 5-10 minutes and his cough is not more bothersome at night. Phlegm does not come into his throat when he coughs. He does not believe his cough is caused by post-nasal drip.    Jairo denies having vomiting, rhinitis, facial pain or pressure, myalgias, chills, sore throat, teeth pain, ageusia, ear pain, headache, wheezing, fever, nasal congestion, nausea, and anosmia. He also denies taking antibiotic medication in the past month and having recent facial or sinus surgery in the past 60 days.   Precipitating events  He has not recently been exposed to someone with influenza. Jairo has not been in close contact with any high risk individuals.   Pertinent COVID-19 (Coronavirus) information  Jairo does not work or volunteer as healthcare worker or a . In the past 14 days, Jairo has not worked or volunteered at a healthcare facility or group living setting.   In the past 14 days, he also has not lived in a congregate living setting.   Jairo has not had a close contact with a laboratory-confirmed COVID-19 patient within 14 days of symptom onset.    Since 2019, Jairo has not been tested for COVID-19 and has had upper respiratory infection (URI) or " influenza-like illness.      Date(s) of previous URI or influenza-like illness (free-text): March 2019     Symptoms Jairo experienced during previous URI or influenza-like illness as reported by the patient (free-text): Cough, shortness of breath, runny nose, no energy, rundown        Pertinent medical history  Jairo does not need a return to work/school note.   Weight: 273 lbs   Jairo does not smoke or use smokeless tobacco.   Weight: 273 lbs    MEDICATIONS: Bydureon subcutaneous, Lantus Solostar U-100 Insulin subcutaneous, Novolog Flexpen U-100 Insulin aspart subcutaneous, simvastatin oral, metformin oral, lisinopril oral, ALLERGIES: NKDA  Clinician Response:  Dear Jairo,         Your symptoms show that you may have coronavirus (COVID-19). This&nbsp;illness can cause fever, cough and trouble breathing. Many people get a mild case and get better on their own. Some people can get very sick.  What should I do?  We would like to test you for this virus.  1. Please call [phone] to schedule your visit. Explain that you were [facility] have [facility] COVID-19 [facility]. Be ready to share your [facility] visit ID number. Do not schedule your appointment until you have had at least 2 days of symptoms or you may receive a false negative result.  The following will serve as your written order for this COVID Test, ordered by me, for the indication of suspected COVID [Z20.828]: The test will be ordered in [facility], our electronic health record, after you are scheduled. It will show as ordered and authorized by Pieter Real MD.  Order: COVID-19 (Coronavirus) PCR for SYMPTOMATIC testing from [facility].    2. When it's time for your COVID test:  Stay at least 6 feet away from others. (If someone will drive you to your test, stay in the backseat, as far away from the  as you can.)  Cover your mouth and nose with a mask, tissue or washcloth.  Go straight to the testing site. Don't make any stops on the way there or back.    3.Starting  "now:&nbsp;Stay home and away from others (self-isolate) until:   You've had&nbsp;no&nbsp;fever---and no medicine that reduces fever---for one full day (24 hours).&nbsp;And...  Your other symptoms have gotten better. For example, your cough or breathing has improved.&nbsp;And...  At least&nbsp;10 days&nbsp;have passed since your symptoms started.    During this time, don't leave the house except for testing or medical care.   Stay in your own room, even for meals. Use your own bathroom if you can.  Stay away from others in your home. No hugging, kissing or shaking hands. No visitors.  Don't go to work, school or anywhere else.   Clean \"high touch\" surfaces often (doorknobs, counters, handles, etc.). Use a household cleaning spray or wipes. You'll find a full list of  on the EPA website:&nbsp;www.epa.gov/pesticide-registration/list-n-disinfectants-use-against-sars-cov-2.   Cover your mouth and nose with a mask, tissue or washcloth to avoid spreading germs.  Wash your hands and face often. Use soap and water.  Caregivers in these groups are at risk for severe illness due to COVID-19:  o People 65 years and older  o People who live in a nursing home or long-term care facility  o People with chronic disease (lung, heart, cancer, diabetes, kidney, liver, immunologic)  o People who have a weakened immune system, including those who:   Are in cancer treatment  Take medicine that weakens the immune system, such as corticosteroids  Had a bone marrow or organ transplant  Have an immune deficiency  Have poorly controlled HIV or AIDS  Are obese (body mass index of 40 or higher)  Smoke regularly   o Caregivers should wear gloves while washing dishes, handling laundry and cleaning bedrooms and bathrooms.  o Use caution when washing and drying laundry: Don't shake dirty laundry, and use the warmest water setting that you can.  o For more tips, go to&nbsp;www.cdc.gov/coronavirus/2019-ncov/downloads/10Things.pdf.   How can I " take care of myself?    Get lots of rest. Drink extra fluids&nbsp;(unless a doctor has told you not to).  Take Tylenol (acetaminophen) for fever or pain.&nbsp;If you have liver or kidney problems, ask your family doctor if it's okay to take Tylenol.   Adults can take either:   650 mg (two 325 mg pills) every 4 to 6 hours,&nbsp;or...  1,000 mg (two 500 mg pills) every 8 hours as needed.  Note:&nbsp;Don't take more than 3,000 mg in one day. Acetaminophen is found in many medicines (both prescribed and over-the-counter medicines). Read all labels to be sure you don't take too much.   For children, check the Tylenol bottle for the right dose. The dose is based on the child's age or weight.   If you have other health problems (like cancer, heart failure, an organ transplant or severe kidney disease):&nbsp;Call your specialty clinic if you don't feel better in the next 2 days.    Know when to call 911.&nbsp;Emergency warning signs include:   Trouble breathing or shortness of breath Pain or pressure in the chest that doesn't go away Feeling confused like you haven't felt before, or not being able to wake up Bluish-colored lips or face.  Where can I get more information?   [facility] -- About COVID-19:&nbsp;[email or web address]  CDC -- What to Do If You're Sick:&nbsp;www.cdc.gov/coronavirus/2019-ncov/about/steps-when-sick.html  CDC -- Ending Home Isolation:&nbsp;www.cdc.gov/coronavirus/2019-ncov/hcp/disposition-in-home-patients.html  CDC -- Caring for Someone:&nbsp;www.cdc.gov/coronavirus/2019-ncov/if-you-are-sick/care-for-someone.html  [facility] -- Interim Guidance for Hospital Discharge to Home:&nbsp;www.health.[facility].[place]/diseases/coronavirus/hcp/hospdischarge.pdf  [facility] clinical trials (COVID-19 research studies):&nbsp;clinicalaffairs.[facility]-clinical-trials  Below are the COVID-19 hotlines at the [facility] of [facility] ([facility]). Interpreters are available.   For health questions:  Call 594-100-1481 or 1-754.323.4790 (7 a.m. to 7 p.m.) For questions about schools and childcare: Call 696-268-3167 or 1-755.377.5487 (7 a.m. to 7 p.m.)           Diagnosis: Contact with and (suspected) exposure to other viral communicable diseases  Diagnosis ICD: Z20.828

## 2020-11-22 DIAGNOSIS — Z20.822 SUSPECTED COVID-19 VIRUS INFECTION: Primary | ICD-10-CM

## 2020-11-23 ENCOUNTER — TELEPHONE (OUTPATIENT)
Dept: PEDIATRICS | Facility: CLINIC | Age: 50
End: 2020-11-23

## 2020-11-23 DIAGNOSIS — Z20.822 SUSPECTED COVID-19 VIRUS INFECTION: ICD-10-CM

## 2020-11-23 DIAGNOSIS — Z79.4 TYPE 2 DIABETES MELLITUS WITH HYPERGLYCEMIA, WITH LONG-TERM CURRENT USE OF INSULIN (H): Primary | ICD-10-CM

## 2020-11-23 DIAGNOSIS — E11.65 TYPE 2 DIABETES MELLITUS WITH HYPERGLYCEMIA, WITH LONG-TERM CURRENT USE OF INSULIN (H): Primary | ICD-10-CM

## 2020-11-23 PROCEDURE — U0003 INFECTIOUS AGENT DETECTION BY NUCLEIC ACID (DNA OR RNA); SEVERE ACUTE RESPIRATORY SYNDROME CORONAVIRUS 2 (SARS-COV-2) (CORONAVIRUS DISEASE [COVID-19]), AMPLIFIED PROBE TECHNIQUE, MAKING USE OF HIGH THROUGHPUT TECHNOLOGIES AS DESCRIBED BY CMS-2020-01-R: HCPCS | Performed by: NURSE PRACTITIONER

## 2020-11-24 LAB
SARS-COV-2 RNA SPEC QL NAA+PROBE: NOT DETECTED
SPECIMEN SOURCE: NORMAL

## 2020-11-24 NOTE — TELEPHONE ENCOUNTER
PA Initiation    Medication: BYDUREON  Insurance Company: Other (see comments)Comment:  [address]/[employer]  Pharmacy Filling the Rx: [id number] - [place], [place] - [address] AT [facility]  Filling Pharmacy Phone: [phone]  Filling Pharmacy Fax: [phone]  Start Date: 11/24/2020

## 2020-11-30 NOTE — TELEPHONE ENCOUNTER
PRIOR AUTHORIZATION DENIED    Medication: BYDUREON    Denial Date: 11/29/2020    Denial Rational: Patient has to first try/fail 1 preferred agent: Ozempic, Trulicity, or Victoza          Appeal Information: If provider would like to appeal this decision we will need a detailed letter of medical necessity to start the process. Then re-route this request back to the PA pool.

## 2020-12-01 RX ORDER — LIRAGLUTIDE 6 MG/ML
1.2 INJECTION SUBCUTANEOUS DAILY
Qty: 18 ML | Refills: 3 | Status: SHIPPED | OUTPATIENT
Start: 2020-12-01 | End: 2022-01-03

## 2020-12-01 NOTE — TELEPHONE ENCOUNTER
Placed call to patient. No answer. Left VM stating I would send a MC message with details.    MC message sent advising PA was denied and alternative was sent to pharmacy. Requested call back with any questions or concerns.    Aileen FABIAN RN

## 2020-12-01 NOTE — TELEPHONE ENCOUNTER
Please call patient.      Bydureon is not covered.    We have to change to daily Victoza.  [facility]    Make sure he's okay with this.    Sandoval Wilkinson MD  Internal Medicine and Pediatrics

## 2021-01-10 ENCOUNTER — HEALTH MAINTENANCE LETTER (OUTPATIENT)
Age: 51
End: 2021-01-10

## 2021-03-13 ENCOUNTER — HEALTH MAINTENANCE LETTER (OUTPATIENT)
Age: 51
End: 2021-03-13

## 2021-03-23 ENCOUNTER — IMMUNIZATION (OUTPATIENT)
Dept: NURSING | Facility: CLINIC | Age: 51
End: 2021-03-23
Payer: OTHER GOVERNMENT

## 2021-03-23 PROCEDURE — 91300 PR COVID VAC PFIZER DIL RECON 30 MCG/0.3 ML IM: CPT

## 2021-03-23 PROCEDURE — 0001A PR COVID VAC PFIZER DIL RECON 30 MCG/0.3 ML IM: CPT

## 2021-04-13 ENCOUNTER — IMMUNIZATION (OUTPATIENT)
Dept: NURSING | Facility: CLINIC | Age: 51
End: 2021-04-13
Attending: INTERNAL MEDICINE
Payer: OTHER GOVERNMENT

## 2021-04-13 PROCEDURE — 91300 PR COVID VAC PFIZER DIL RECON 30 MCG/0.3 ML IM: CPT

## 2021-04-13 PROCEDURE — 0002A PR COVID VAC PFIZER DIL RECON 30 MCG/0.3 ML IM: CPT

## 2021-10-23 ENCOUNTER — HEALTH MAINTENANCE LETTER (OUTPATIENT)
Age: 51
End: 2021-10-23

## 2021-11-19 ENCOUNTER — PATIENT OUTREACH (OUTPATIENT)
Dept: PEDIATRICS | Facility: CLINIC | Age: 51
End: 2021-11-19

## 2021-11-19 DIAGNOSIS — I10 ESSENTIAL HYPERTENSION: Primary | ICD-10-CM

## 2021-11-30 ENCOUNTER — APPOINTMENT (OUTPATIENT)
Dept: URGENT CARE | Facility: CLINIC | Age: 51
End: 2021-11-30

## 2021-12-01 ENCOUNTER — IMMUNIZATION (OUTPATIENT)
Dept: NURSING | Facility: CLINIC | Age: 51
End: 2021-12-01
Payer: OTHER GOVERNMENT

## 2021-12-01 PROCEDURE — 91300 PR COVID VAC PFIZER DIL RECON 30 MCG/0.3 ML IM: CPT

## 2021-12-01 PROCEDURE — 0004A PR COVID VAC PFIZER DIL RECON 30 MCG/0.3 ML IM: CPT

## 2021-12-01 PROCEDURE — 90471 IMMUNIZATION ADMIN: CPT

## 2021-12-01 PROCEDURE — 90686 IIV4 VACC NO PRSV 0.5 ML IM: CPT

## 2021-12-20 ENCOUNTER — MYC MEDICAL ADVICE (OUTPATIENT)
Dept: PEDIATRICS | Facility: CLINIC | Age: 51
End: 2021-12-20

## 2021-12-20 DIAGNOSIS — E11.65 TYPE 2 DIABETES MELLITUS WITH HYPERGLYCEMIA, WITHOUT LONG-TERM CURRENT USE OF INSULIN (H): Primary | ICD-10-CM

## 2022-01-03 DIAGNOSIS — Z79.4 TYPE 2 DIABETES MELLITUS WITH HYPERGLYCEMIA, WITH LONG-TERM CURRENT USE OF INSULIN (H): ICD-10-CM

## 2022-01-03 DIAGNOSIS — N52.9 ERECTILE DYSFUNCTION, UNSPECIFIED ERECTILE DYSFUNCTION TYPE: ICD-10-CM

## 2022-01-03 DIAGNOSIS — I10 HYPERTENSION, UNSPECIFIED TYPE: ICD-10-CM

## 2022-01-03 DIAGNOSIS — E78.5 HYPERLIPIDEMIA LDL GOAL <100: ICD-10-CM

## 2022-01-03 DIAGNOSIS — E11.65 TYPE 2 DIABETES MELLITUS WITH HYPERGLYCEMIA, WITH LONG-TERM CURRENT USE OF INSULIN (H): ICD-10-CM

## 2022-01-03 RX ORDER — SILDENAFIL CITRATE 20 MG/1
40 TABLET ORAL DAILY PRN
Qty: 30 TABLET | Refills: 5 | Status: SHIPPED | OUTPATIENT
Start: 2022-01-03 | End: 2022-05-02

## 2022-01-03 RX ORDER — INSULIN LISPRO 100 [IU]/ML
25 INJECTION, SOLUTION INTRAVENOUS; SUBCUTANEOUS
Qty: 12 ML | Refills: 5 | Status: SHIPPED | OUTPATIENT
Start: 2022-01-03 | End: 2022-01-04

## 2022-01-03 RX ORDER — SIMVASTATIN 20 MG
20 TABLET ORAL DAILY
Qty: 90 TABLET | Refills: 3 | Status: SHIPPED | OUTPATIENT
Start: 2022-01-03 | End: 2022-03-30

## 2022-01-03 RX ORDER — INSULIN GLARGINE 100 [IU]/ML
70 INJECTION, SOLUTION SUBCUTANEOUS AT BEDTIME
Qty: 15 ML | Refills: 5 | Status: SHIPPED | OUTPATIENT
Start: 2022-01-03 | End: 2022-02-08

## 2022-01-03 RX ORDER — LIRAGLUTIDE 6 MG/ML
1.2 INJECTION SUBCUTANEOUS DAILY
Qty: 18 ML | Refills: 3 | Status: SHIPPED | OUTPATIENT
Start: 2022-01-03 | End: 2022-02-08

## 2022-01-03 RX ORDER — LISINOPRIL 10 MG/1
10 TABLET ORAL DAILY
Qty: 90 TABLET | Refills: 3 | Status: SHIPPED | OUTPATIENT
Start: 2022-01-03 | End: 2022-03-30

## 2022-01-03 RX ORDER — METFORMIN HCL 500 MG
1000 TABLET, EXTENDED RELEASE 24 HR ORAL 2 TIMES DAILY WITH MEALS
Qty: 360 TABLET | Refills: 3 | Status: SHIPPED | OUTPATIENT
Start: 2022-01-03 | End: 2022-03-30

## 2022-01-03 NOTE — TELEPHONE ENCOUNTER
Kirstie,    I've reordered all your meds.    You should set up a nurse visit for a Hepatitis A vaccine.    Sandoval Wilkinson MD  Internal Medicine and Pediatrics

## 2022-01-04 ENCOUNTER — TELEPHONE (OUTPATIENT)
Dept: PEDIATRICS | Facility: CLINIC | Age: 52
End: 2022-01-04
Payer: COMMERCIAL

## 2022-01-04 DIAGNOSIS — Z79.4 TYPE 2 DIABETES MELLITUS WITH HYPERGLYCEMIA, WITH LONG-TERM CURRENT USE OF INSULIN (H): Primary | ICD-10-CM

## 2022-01-04 DIAGNOSIS — E11.65 TYPE 2 DIABETES MELLITUS WITH HYPERGLYCEMIA, WITH LONG-TERM CURRENT USE OF INSULIN (H): Primary | ICD-10-CM

## 2022-01-04 NOTE — TELEPHONE ENCOUNTER
[facility] pharmacy is requesting an alternative from Humalog 100 unit/ml to Novolog 100 unit/ml. Please send a new prescription if you approve of the change. Thank you. Milagro Colon CMA on 1/4/2022 at 3:05 PM

## 2022-01-04 NOTE — TELEPHONE ENCOUNTER
N/O appt made for same day at 230p- Pt will see if possible to do vaccine right after labs.    Amanda MILTON  [facility]   [facility]

## 2022-01-11 ENCOUNTER — TELEPHONE (OUTPATIENT)
Dept: PEDIATRICS | Facility: CLINIC | Age: 52
End: 2022-01-11
Payer: COMMERCIAL

## 2022-01-11 NOTE — TELEPHONE ENCOUNTER
Patient was called and asked if he wanted to meet with the diabetic educator. He said he did not want to do that at this time and knows what to do to control his diabetes.

## 2022-01-12 ENCOUNTER — LAB (OUTPATIENT)
Dept: LAB | Facility: CLINIC | Age: 52
End: 2022-01-12
Payer: COMMERCIAL

## 2022-01-12 ENCOUNTER — ALLIED HEALTH/NURSE VISIT (OUTPATIENT)
Dept: PEDIATRICS | Facility: CLINIC | Age: 52
End: 2022-01-12
Payer: COMMERCIAL

## 2022-01-12 ENCOUNTER — MYC MEDICAL ADVICE (OUTPATIENT)
Dept: PEDIATRICS | Facility: CLINIC | Age: 52
End: 2022-01-12

## 2022-01-12 DIAGNOSIS — Z23 NEED FOR HEPATITIS A IMMUNIZATION: Primary | ICD-10-CM

## 2022-01-12 DIAGNOSIS — E11.65 TYPE 2 DIABETES MELLITUS WITH HYPERGLYCEMIA, WITHOUT LONG-TERM CURRENT USE OF INSULIN (H): ICD-10-CM

## 2022-01-12 LAB — HBA1C MFR BLD: 10.2 % (ref 0–5.6)

## 2022-01-12 PROCEDURE — 80053 COMPREHEN METABOLIC PANEL: CPT

## 2022-01-12 PROCEDURE — 83036 HEMOGLOBIN GLYCOSYLATED A1C: CPT

## 2022-01-12 PROCEDURE — 90632 HEPA VACCINE ADULT IM: CPT

## 2022-01-12 PROCEDURE — 99207 PR NO CHARGE NURSE ONLY: CPT

## 2022-01-12 PROCEDURE — 80061 LIPID PANEL: CPT

## 2022-01-12 PROCEDURE — 82043 UR ALBUMIN QUANTITATIVE: CPT

## 2022-01-12 PROCEDURE — 36415 COLL VENOUS BLD VENIPUNCTURE: CPT

## 2022-01-12 PROCEDURE — 90471 IMMUNIZATION ADMIN: CPT

## 2022-01-12 PROCEDURE — 84443 ASSAY THYROID STIM HORMONE: CPT

## 2022-01-13 LAB
ALBUMIN SERPL-MCNC: 4 G/DL (ref 3.4–5)
ALP SERPL-CCNC: 63 U/L (ref 40–150)
ALT SERPL W P-5'-P-CCNC: 40 U/L (ref 0–70)
ANION GAP SERPL CALCULATED.3IONS-SCNC: 3 MMOL/L (ref 3–14)
AST SERPL W P-5'-P-CCNC: 24 U/L (ref 0–45)
BILIRUB SERPL-MCNC: 1 MG/DL (ref 0.2–1.3)
BUN SERPL-MCNC: 16 MG/DL (ref 7–30)
CALCIUM SERPL-MCNC: 9.5 MG/DL (ref 8.5–10.1)
CHLORIDE BLD-SCNC: 101 MMOL/L (ref 94–109)
CHOLEST SERPL-MCNC: 163 MG/DL
CO2 SERPL-SCNC: 30 MMOL/L (ref 20–32)
CREAT SERPL-MCNC: 0.78 MG/DL (ref 0.66–1.25)
CREAT UR-MCNC: 112 MG/DL
FASTING STATUS PATIENT QL REPORTED: YES
GFR SERPL CREATININE-BSD FRML MDRD: >90 ML/MIN/1.73M2
GLUCOSE BLD-MCNC: 217 MG/DL (ref 70–99)
HDLC SERPL-MCNC: 35 MG/DL
LDLC SERPL CALC-MCNC: 48 MG/DL
MICROALBUMIN UR-MCNC: 8 MG/L
MICROALBUMIN/CREAT UR: 7.14 MG/G CR (ref 0–17)
NONHDLC SERPL-MCNC: 128 MG/DL
POTASSIUM BLD-SCNC: 5 MMOL/L (ref 3.4–5.3)
PROT SERPL-MCNC: 8 G/DL (ref 6.8–8.8)
SODIUM SERPL-SCNC: 134 MMOL/L (ref 133–144)
TRIGL SERPL-MCNC: 399 MG/DL
TSH SERPL DL<=0.005 MIU/L-ACNC: 2.11 MU/L (ref 0.4–4)

## 2022-02-08 ENCOUNTER — OFFICE VISIT (OUTPATIENT)
Dept: PEDIATRICS | Facility: CLINIC | Age: 52
End: 2022-02-08
Payer: COMMERCIAL

## 2022-02-08 VITALS
WEIGHT: 277 LBS | DIASTOLIC BLOOD PRESSURE: 50 MMHG | SYSTOLIC BLOOD PRESSURE: 128 MMHG | TEMPERATURE: 97.4 F | OXYGEN SATURATION: 98 % | HEART RATE: 94 BPM | BODY MASS INDEX: 39.75 KG/M2

## 2022-02-08 DIAGNOSIS — Z79.4 TYPE 2 DIABETES MELLITUS WITH HYPERGLYCEMIA, WITH LONG-TERM CURRENT USE OF INSULIN (H): ICD-10-CM

## 2022-02-08 DIAGNOSIS — S06.5XAA SDH (SUBDURAL HEMATOMA) (H): ICD-10-CM

## 2022-02-08 DIAGNOSIS — E11.65 TYPE 2 DIABETES MELLITUS WITH HYPERGLYCEMIA, WITH LONG-TERM CURRENT USE OF INSULIN (H): ICD-10-CM

## 2022-02-08 DIAGNOSIS — E66.01 MORBID OBESITY (H): ICD-10-CM

## 2022-02-08 PROCEDURE — 99214 OFFICE O/P EST MOD 30 MIN: CPT | Performed by: INTERNAL MEDICINE

## 2022-02-08 RX ORDER — INSULIN GLARGINE 100 [IU]/ML
40 INJECTION, SOLUTION SUBCUTANEOUS 2 TIMES DAILY
Qty: 90 ML | Refills: 1 | Status: SHIPPED | OUTPATIENT
Start: 2022-02-08 | End: 2022-05-02

## 2022-02-08 NOTE — PATIENT INSTRUCTIONS
"Let's see if there is another \"GLP-1 Agonist\" that is covered on your insurance:  Either Trulicity, Ozempic, or bydureon, or byetta.  We'll start with trying Ozempic, but ask pharmacy if others might be cheaper.    Let's increase your basaglar to 40 twice daily for 1 week.  If your AM sugars remain above 150, then increase to 45 twice daily for a week.  Then, to 50 twice daily thereafter.     Let's refer you to the [facility] pharmacist who might help us with adjusting your diabetes mellitus meds and possibly discussing a continuous glucose monitor.     Sandoval Wilkinson MD  Internal Medicine and Pediatrics       "

## 2022-02-08 NOTE — PROGRESS NOTES
Assessment & Plan     Type 2 diabetes mellitus with hyperglycemia, with long-term current use of insulin (H)  Seen today for review of medications. Reports high morning blood sugars. Basaglar was increased to 80 Units total (40 Units BID) with a plan to incrementally increase to 50 Units BID for blood sugars > 150 in the next three weeks. Patient requesting Dexcom and with high dose Basaglar referral to [facility] was placed for further support in diabetic cares. Discussed healed wound on foot and provided recommendations for good wound care. Patient not on Aspirin due to Hx of subdural hemorrhage. Refill of medications and supplies sent to pharmacy  - semaglutide (OZEMPIC) 2 MG/1.5ML SOPN pen  Dispense: 1.5 mL; Refill: 2  - insulin glargine (BASAGLAR KWIKPEN) 100 UNIT/ML pen  Dispense: 90 mL; Refill: 1  - ASPIRIN NOT PRESCRIBED (INTENTIONAL)  - insulin pen needle (32G X 6 MM) 32G X 6 MM miscellaneous  Dispense: 180 each; Refill: 3      SDH:  Resolved.    Obesity: Discussed weight loss.          MEDICATIONS:        - Resume Insulin Novolog 2-3 x daily with meals at 25 Units       - Increase Insulin Basaglar to 40 Units BID, with blood sugars over 150 increase to 45 Units BID, then 50 Units BID to obtain sugars around goal range of 100.       - Continue other medications without change       - Prescription sent for Ozempic as Victoza previously not covered, patient to contact insurance regarding options on formulary for GLP-1 agonists that may be covered.   FUTURE APPOINTMENTS:       - Follow-up visit in 3 months, visit with [facility] pharmacist (referral placed)  SELF MONITORING:       - Please check blood glucose readings at least twice daily       - Please continue monitoring for any wounds on the feet  Work on weight loss  Regular exercise    Return in about 3 months (around 5/8/2022).    Loretta Cook, MS3  [facility] Medical Student    DOYLE ROWLEY KRYSTINA R am acting as scribe for Dr. Sandoval CASTANEDA  "Jaja    Physician Attestation   I, Sandoval Wilkinson, was present with the medical/KEENAN student who participated in the service and in the documentation of the note.  I have verified the history and personally performed the physical exam and medical decision making.  I agree with the assessment and plan of care as documented in the note.      Items personally reviewed: vitals and imaging and agree with the interpretation documented in the note.    Sandoval Wilkinson MD    Mireya NGUYEN is a 51 year old who presents for the following health issues    History of Present Illness       Diabetes:   He presents for follow up of diabetes.  He is checking home blood glucose two times daily. He checks blood glucose before meals and at bedtime.  Blood glucose is sometimes over 200 and never under 70. When his blood glucose is low, the patient is asymptomatic for confusion, blurred vision, lethargy and reports not feeling dizzy, shaky, or weak.  He is concerned about blood sugar frequently over 200.  He is having numbness in feet, burning in feet and excessive thirst. The patient has not had a diabetic eye exam in the last 12 months.         He eats 0-1 servings of fruits and vegetables daily.He consumes 1 sweetened beverage(s) daily.He exercises with enough effort to increase his heart rate 10 to 19 minutes per day.  He exercises with enough effort to increase his heart rate 3 or less days per week. He is missing 2 dose(s) of medications per week.  He is not taking prescribed medications regularly due to remembering to take.     Patient is here today to \"get back on track\" with medications. He had a change in career a few years ago and he had a change in health insurance coverage at that time. He restarted  Metformin, Insulin Basaglar 70 Units at bedtime, Insulin Novolog 20 Units twice a day to align with his meals. Has some GI concerns with Metformin including irregular bowel movements and occasional diarrhea though symptoms are " tolerable. 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 Mealtimes often consist with foods out of convenience. He regularly eats two larger meals a day due to his work hours. Checks blood sugars twice on average. He states the numbers are over 200 and sometimes range up to 300. His usual AM number is around 200.    He did some research on Dexcom and is hoping to get a CGM to motivate him to check his numbers, reminder to take his medications and make better decisions for diet. He said checking his numbers at work is difficult as he can't carry the devices with him during the day.     He is back on all medications except Victoza as it cost him $375.00 out of pocket. He has not yet asked for alternatives from his insurance company and is willing to do this.     He also shared one morning he woke up and had a bleeding dark purple wound on his toe. He reports having little feeling in his feet. He said the wound has now healed. Has excessive thirst and increased frequency of urination. No signs of hypoglycemia and numbers are never near low range that patient has noticed at home.          Review of Systems   Constitutional, HEENT, cardiovascular, pulmonary, GI, , musculoskeletal, neuro, skin, endocrine and psych systems are negative, except as otherwise noted.      Objective    /50 (BP Location: Right arm, Patient Position: Sitting, Cuff Size: Adult Large)   Pulse 94   Temp 97.4  F (36.3  C) (Tympanic)   Wt 125.6 kg (277 lb)   SpO2 98%   BMI 39.75 kg/m    Body mass index is 39.75 kg/m .  Physical Exam   GENERAL: healthy, alert and no distress  RESP: lungs clear to auscultation - no rales, rhonchi or wheezes  CV: regular rate and rhythm, normal S1 S2, no S3 or S4, no murmur, click or rub, no peripheral edema and peripheral pulses strong  MS: no gross musculoskeletal defects noted, no edema  SKIN: no suspicious lesions or rashes  NEURO: Normal strength  and tone, mentation intact and speech normal  PSYCH: mentation appears normal, affect normal/bright    Diabetic foot exam: DP reduced bilaterally, PT reduced bilaterally, no monofilament sensation in tarsals, reduced sensation from monofilament in distal metatarsals, dry cracking heels, onychomycosis and dystrophic nails.      Lab on 01/12/2022   Component Date Value Ref Range Status     Hemoglobin A1C 01/12/2022 10.2* 0.0 - 5.6 % Final    Normal <5.7%   Prediabetes 5.7-6.4%    Diabetes 6.5% or higher     Note: Adopted from ADA consensus guidelines.     Sodium 01/12/2022 134  133 - 144 mmol/L Final     Potassium 01/12/2022 5.0  3.4 - 5.3 mmol/L Final     Chloride 01/12/2022 101  94 - 109 mmol/L Final     Carbon Dioxide (CO2) 01/12/2022 30  20 - 32 mmol/L Final     Anion Gap 01/12/2022 3  3 - 14 mmol/L Final     Urea Nitrogen 01/12/2022 16  7 - 30 mg/dL Final     Creatinine 01/12/2022 0.78  0.66 - 1.25 mg/dL Final     Calcium 01/12/2022 9.5  8.5 - 10.1 mg/dL Final     Glucose 01/12/2022 217* 70 - 99 mg/dL Final     Alkaline Phosphatase 01/12/2022 63  40 - 150 U/L Final     AST 01/12/2022 24  0 - 45 U/L Final     ALT 01/12/2022 40  0 - 70 U/L Final     Protein Total 01/12/2022 8.0  6.8 - 8.8 g/dL Final     Albumin 01/12/2022 4.0  3.4 - 5.0 g/dL Final     Bilirubin Total 01/12/2022 1.0  0.2 - 1.3 mg/dL Final     GFR Estimate 01/12/2022 >90  >60 mL/min/1.73m2 Final    Effective December 21, 2021 eGFRcr in adults is calculated using the 2021 CKD-EPI creatinine equation which includes age and gender (Alexi et al., NEJM, DOI: 10.1056/LLILdu5505365)     Cholesterol 01/12/2022 163  <200 mg/dL Final     Triglycerides 01/12/2022 399* <150 mg/dL Final     Direct Measure HDL 01/12/2022 35* >=40 mg/dL Final     LDL Cholesterol Calculated 01/12/2022 48  <=100 mg/dL Final     Non HDL Cholesterol 01/12/2022 128  <130 mg/dL Final     Patient Fasting > 8hrs? 01/12/2022 Yes   Final     TSH 01/12/2022 2.11  0.40 - 4.00 mU/L Final      Creatinine Urine mg/dL 01/12/2022 112  mg/dL Final     Albumin Urine mg/L 01/12/2022 8  mg/L Final     Albumin Urine mg/g Cr 01/12/2022 7.14  0.00 - 17.00 mg/g Cr Final

## 2022-02-12 ENCOUNTER — HEALTH MAINTENANCE LETTER (OUTPATIENT)
Age: 52
End: 2022-02-12

## 2022-04-11 DIAGNOSIS — Z79.4 TYPE 2 DIABETES MELLITUS WITHOUT COMPLICATION, WITH LONG-TERM CURRENT USE OF INSULIN (H): ICD-10-CM

## 2022-04-11 DIAGNOSIS — E11.9 TYPE 2 DIABETES MELLITUS WITHOUT COMPLICATION, WITH LONG-TERM CURRENT USE OF INSULIN (H): ICD-10-CM

## 2022-04-11 DIAGNOSIS — E78.5 HYPERLIPIDEMIA LDL GOAL <100: ICD-10-CM

## 2022-04-11 DIAGNOSIS — I10 HYPERTENSION, UNSPECIFIED TYPE: ICD-10-CM

## 2022-04-11 DIAGNOSIS — E11.65 TYPE 2 DIABETES MELLITUS WITH HYPERGLYCEMIA, WITH LONG-TERM CURRENT USE OF INSULIN (H): ICD-10-CM

## 2022-04-11 DIAGNOSIS — Z79.4 TYPE 2 DIABETES MELLITUS WITH HYPERGLYCEMIA, WITH LONG-TERM CURRENT USE OF INSULIN (H): ICD-10-CM

## 2022-04-11 RX ORDER — LISINOPRIL 10 MG/1
10 TABLET ORAL DAILY
Qty: 90 TABLET | Refills: 3 | Status: SHIPPED | OUTPATIENT
Start: 2022-04-11 | End: 2022-05-02 | Stop reason: ALTCHOICE

## 2022-04-11 RX ORDER — METFORMIN HCL 500 MG
TABLET, EXTENDED RELEASE 24 HR ORAL
Qty: 360 TABLET | Refills: 3 | Status: SHIPPED | OUTPATIENT
Start: 2022-04-11 | End: 2023-01-24

## 2022-04-11 RX ORDER — SIMVASTATIN 20 MG
20 TABLET ORAL DAILY
Qty: 90 TABLET | Refills: 3 | Status: SHIPPED | OUTPATIENT
Start: 2022-04-11 | End: 2023-01-24

## 2022-04-11 NOTE — TELEPHONE ENCOUNTER
Called and confirmed, patient is requesting rx sent to alternative pharmacy. RN resending original scripts sent 3/30/22. Prescription approved per [facility] Refill Protocol.  Evin BREEN RN

## 2022-05-02 ENCOUNTER — OFFICE VISIT (OUTPATIENT)
Dept: PEDIATRICS | Facility: CLINIC | Age: 52
End: 2022-05-02
Payer: COMMERCIAL

## 2022-05-02 ENCOUNTER — OFFICE VISIT (OUTPATIENT)
Dept: PHARMACY | Facility: CLINIC | Age: 52
End: 2022-05-02
Payer: COMMERCIAL

## 2022-05-02 ENCOUNTER — TELEPHONE (OUTPATIENT)
Dept: PEDIATRICS | Facility: CLINIC | Age: 52
End: 2022-05-02

## 2022-05-02 VITALS
SYSTOLIC BLOOD PRESSURE: 134 MMHG | HEIGHT: 70 IN | HEART RATE: 99 BPM | TEMPERATURE: 98.4 F | DIASTOLIC BLOOD PRESSURE: 85 MMHG | BODY MASS INDEX: 40.52 KG/M2 | WEIGHT: 283 LBS | OXYGEN SATURATION: 96 % | RESPIRATION RATE: 20 BRPM

## 2022-05-02 DIAGNOSIS — F43.23 ADJUSTMENT DISORDER WITH MIXED ANXIETY AND DEPRESSED MOOD: ICD-10-CM

## 2022-05-02 DIAGNOSIS — Z12.11 SCREEN FOR COLON CANCER: ICD-10-CM

## 2022-05-02 DIAGNOSIS — F32.A DEPRESSION, UNSPECIFIED DEPRESSION TYPE: ICD-10-CM

## 2022-05-02 DIAGNOSIS — E11.65 TYPE 2 DIABETES MELLITUS WITH HYPERGLYCEMIA, WITH LONG-TERM CURRENT USE OF INSULIN (H): Primary | ICD-10-CM

## 2022-05-02 DIAGNOSIS — E78.5 HYPERLIPIDEMIA LDL GOAL <100: ICD-10-CM

## 2022-05-02 DIAGNOSIS — Z79.4 TYPE 2 DIABETES MELLITUS WITH HYPERGLYCEMIA, WITH LONG-TERM CURRENT USE OF INSULIN (H): Primary | ICD-10-CM

## 2022-05-02 DIAGNOSIS — N52.9 ERECTILE DYSFUNCTION, UNSPECIFIED ERECTILE DYSFUNCTION TYPE: ICD-10-CM

## 2022-05-02 DIAGNOSIS — F41.9 ANXIETY: ICD-10-CM

## 2022-05-02 DIAGNOSIS — R53.83 OTHER FATIGUE: ICD-10-CM

## 2022-05-02 DIAGNOSIS — R05.9 COUGH: ICD-10-CM

## 2022-05-02 DIAGNOSIS — Z11.59 NEED FOR HEPATITIS C SCREENING TEST: ICD-10-CM

## 2022-05-02 DIAGNOSIS — G47.00 INSOMNIA, UNSPECIFIED TYPE: ICD-10-CM

## 2022-05-02 DIAGNOSIS — I10 HYPERTENSION, UNSPECIFIED TYPE: ICD-10-CM

## 2022-05-02 DIAGNOSIS — Z71.85 VACCINE COUNSELING: ICD-10-CM

## 2022-05-02 LAB
BASOPHILS # BLD AUTO: 0 10E3/UL (ref 0–0.2)
BASOPHILS NFR BLD AUTO: 0 %
EOSINOPHIL # BLD AUTO: 0.1 10E3/UL (ref 0–0.7)
EOSINOPHIL NFR BLD AUTO: 2 %
ERYTHROCYTE [DISTWIDTH] IN BLOOD BY AUTOMATED COUNT: 13.7 % (ref 10–15)
HBA1C MFR BLD: 8.9 % (ref 0–5.6)
HCT VFR BLD AUTO: 50.2 % (ref 40–53)
HGB BLD-MCNC: 16.9 G/DL (ref 13.3–17.7)
LYMPHOCYTES # BLD AUTO: 2.1 10E3/UL (ref 0.8–5.3)
LYMPHOCYTES NFR BLD AUTO: 27 %
MCH RBC QN AUTO: 29 PG (ref 26.5–33)
MCHC RBC AUTO-ENTMCNC: 33.7 G/DL (ref 31.5–36.5)
MCV RBC AUTO: 86 FL (ref 78–100)
MONOCYTES # BLD AUTO: 0.7 10E3/UL (ref 0–1.3)
MONOCYTES NFR BLD AUTO: 9 %
NEUTROPHILS # BLD AUTO: 4.8 10E3/UL (ref 1.6–8.3)
NEUTROPHILS NFR BLD AUTO: 61 %
PLATELET # BLD AUTO: 259 10E3/UL (ref 150–450)
RBC # BLD AUTO: 5.83 10E6/UL (ref 4.4–5.9)
WBC # BLD AUTO: 7.9 10E3/UL (ref 4–11)

## 2022-05-02 PROCEDURE — 99605 MTMS BY PHARM NP 15 MIN: CPT | Performed by: PHARMACIST

## 2022-05-02 PROCEDURE — 91305 COVID-19,PF,PFIZER (12+ YRS): CPT | Performed by: INTERNAL MEDICINE

## 2022-05-02 PROCEDURE — 85025 COMPLETE CBC W/AUTO DIFF WBC: CPT | Performed by: INTERNAL MEDICINE

## 2022-05-02 PROCEDURE — 99214 OFFICE O/P EST MOD 30 MIN: CPT | Mod: 25 | Performed by: INTERNAL MEDICINE

## 2022-05-02 PROCEDURE — 86803 HEPATITIS C AB TEST: CPT | Performed by: INTERNAL MEDICINE

## 2022-05-02 PROCEDURE — 83036 HEMOGLOBIN GLYCOSYLATED A1C: CPT | Performed by: INTERNAL MEDICINE

## 2022-05-02 PROCEDURE — 36415 COLL VENOUS BLD VENIPUNCTURE: CPT | Performed by: INTERNAL MEDICINE

## 2022-05-02 PROCEDURE — 99607 MTMS BY PHARM ADDL 15 MIN: CPT | Performed by: PHARMACIST

## 2022-05-02 PROCEDURE — 0054A COVID-19,PF,PFIZER (12+ YRS): CPT | Performed by: INTERNAL MEDICINE

## 2022-05-02 RX ORDER — PROCHLORPERAZINE 25 MG/1
1 SUPPOSITORY RECTAL ONCE
Qty: 1 EACH | Refills: 0 | Status: SHIPPED | OUTPATIENT
Start: 2022-05-02 | End: 2022-05-02

## 2022-05-02 RX ORDER — PROCHLORPERAZINE 25 MG/1
1 SUPPOSITORY RECTAL
Qty: 1 EACH | Refills: 3 | Status: SHIPPED | OUTPATIENT
Start: 2022-05-02 | End: 2023-09-08

## 2022-05-02 RX ORDER — PROCHLORPERAZINE 25 MG/1
1 SUPPOSITORY RECTAL
Qty: 3 EACH | Refills: 11 | Status: SHIPPED | OUTPATIENT
Start: 2022-05-02 | End: 2023-10-23

## 2022-05-02 RX ORDER — SILDENAFIL CITRATE 20 MG/1
40 TABLET ORAL DAILY PRN
Qty: 30 TABLET | Refills: 11 | Status: SHIPPED | OUTPATIENT
Start: 2022-05-02 | End: 2023-01-24

## 2022-05-02 RX ORDER — INSULIN GLARGINE 100 [IU]/ML
50 INJECTION, SOLUTION SUBCUTANEOUS 2 TIMES DAILY
Qty: 90 ML | Refills: 1 | Status: SHIPPED | OUTPATIENT
Start: 2022-05-02 | End: 2023-01-24

## 2022-05-02 RX ORDER — LOSARTAN POTASSIUM 50 MG/1
50 TABLET ORAL DAILY
Qty: 90 TABLET | Refills: 1 | Status: SHIPPED | OUTPATIENT
Start: 2022-05-02 | End: 2022-08-19

## 2022-05-02 ASSESSMENT — ANXIETY QUESTIONNAIRES
2. NOT BEING ABLE TO STOP OR CONTROL WORRYING: MORE THAN HALF THE DAYS
IF YOU CHECKED OFF ANY PROBLEMS ON THIS QUESTIONNAIRE, HOW DIFFICULT HAVE THESE PROBLEMS MADE IT FOR YOU TO DO YOUR WORK, TAKE CARE OF THINGS AT HOME, OR GET ALONG WITH OTHER PEOPLE: SOMEWHAT DIFFICULT
7. FEELING AFRAID AS IF SOMETHING AWFUL MIGHT HAPPEN: NOT AT ALL
GAD7 TOTAL SCORE: 9
1. FEELING NERVOUS, ANXIOUS, OR ON EDGE: MORE THAN HALF THE DAYS
5. BEING SO RESTLESS THAT IT IS HARD TO SIT STILL: SEVERAL DAYS
3. WORRYING TOO MUCH ABOUT DIFFERENT THINGS: MORE THAN HALF THE DAYS
6. BECOMING EASILY ANNOYED OR IRRITABLE: MORE THAN HALF THE DAYS

## 2022-05-02 ASSESSMENT — PATIENT HEALTH QUESTIONNAIRE - PHQ9
SUM OF ALL RESPONSES TO PHQ QUESTIONS 1-9: 11
5. POOR APPETITE OR OVEREATING: NOT AT ALL

## 2022-05-02 ASSESSMENT — PAIN SCALES - GENERAL: PAINLEVEL: NO PAIN (0)

## 2022-05-02 NOTE — PROGRESS NOTES
Medication Therapy Management (MTM) Encounter    ASSESSMENT:                            Medication Adherence/Access: No issues identified    Type 2 Diabetes: A1c not at goal of < 7%. Fasting blood glucose not at goal of  mg/dL.  Patient would benefit from taking metformin with food to avoid nausea/diarrhea and would benefit from taking insulin aspart within 15-30 min of first meal to avoid blood sugar spikes.   As patient is still on low dose of Ozempic, increasing dose to 0.5 mg weekly is recommended to provide additional A1c & blood glucose lowering. In addition, patient would benefit from a continuous glucose monitor to better assess blood glucose spikes & overall control.  Depending on CGM data, future options include increasing Ozempic dose to 1 mg weekly or adding an additional agent such as glipizide.    Hypertension/Persistant cough:  Due to chronic cough, patient may benefit from switching from lisinopril to an ARB such as losartan. ARB will provide similar blood pressure lowering and kidney protection with a low incidence of cough. Blood pressure not at goal of < 130/80 mm Hg but is < 140/90 therefore recommend losartan dose of 50 mg.     Hyperlipidemia: Stable. Patient is on moderate intensity statin per JOSE/AHA guidelines. However given patient has had diabetes for over 10 years this would be an enhanced risk factor and could consider further optimization/titration of dose to lower future risk.     Low mood/History of anxiety/Insomnia-TIANNA: PHQ-9 indicates moderate depression severity > 5. Patient would benefit from restarting counseling for his depressive and anxiety symptoms. Could consider addition of a medication (SSRI/SNRI) to augment counseling, especially if patient cannot get in to see a therapist for a few months. Would prefer agents that do not have weight gain potential.     Erectile Dysfunction: Patient in need of refill of sildenafil. Per provider, appropriate for patient to take 4  "tablets if needed for erectile dysfunction if 2 tablets is not effective.      Vaccines: Patient is due for his 2nd COVID booster and Hep B vaccine.     PLAN:                            Type 2 Diabetes:   1. Increase Ozempic dose to 0.5 mg weekly.  We will consider increasing the dose further (1 mg) in the future.   2.  & start using the Dexcom G6 continuous glucose monitor to monitor blood sugars  - Use the Dexcom li on your phone as the \"\"  - Change out the sensor every 10 days  - Change out the transmitter every 3 months   3. Try to take Novolog within 15-30 minutes of your meal to avoid high blood sugar spikes.   4. Try to take metformin with food to help decrease stomach upset and diarrhea.     Hypertension/cough:  1. Change lisinopril to Losartan 50 mg daily  2. Recheck a BMP and blood pressure in 1 month    Mental Health:   1. Mental health referral for counselor/therapist    Erectile Dysfunction:  1. [facility]     Follow-up: 3 months with labs again to follow-up on change to losartan.     Future considerations:   - Due for COVID-19 2nd booster and Hep B  - Due for diabetes eye exam    SUBJECTIVE/OBJECTIVE:                          Jairo Escamilla is a 52 year old male coming in for an initial visit. Today's visit is a co-visit with Dr. Wilkinson.     Reason for visit: Patient has the following concerns: 1) coughing intermittently, 2) A1c control, has dental procedure scheduled for 5/19 but not sure if able to get done with a1c elevated, 3) wondering about labs - fasting today.    Allergies/ADRs: Reviewed in chart  Past Medical History: Reviewed in chart  Tobacco: He reports that he has never smoked. He has never used smokeless tobacco.  Alcohol: reviewed by rooming staff today    Medication Adherence/Access: No issues reported today, but he did have a lapse in insurance in the past and he tells me how that was challenging.     Type 2 Diabetes: " " Currently taking Ozempic 0.25 mg weekly for 10 weeks so far, metformin XR [id number] twice daily, Basaglar 50 units twice daily (increased at office visit on 2/8/22), and Novolog 25 units twice daily (~30-45 minutes before he eats in the AM & and right when he gets home before dinner).   Patient is experiencing the following side effects: He has intermittent diarrhea and nausea. He notice an increase in nausea with first few doses of Ozempic but the past few weeks has no issues.     Blood sugar monitoring: Usually just in the morning fasting. He admits doesn't check as much as he should be and it's difficult to fit into his schedule. Ranges (patient reported): Always in the 200's. He notes that a CGM would help him assess his sugars better.   He has a lot of dental work that needs to be done and notes that he was told his A1c needs to be 8% or less for dental work to occur.   Eye exam: due  Foot exam: up to date  Diet: Eats twice a day (works 12 - 8 or 9pm), first meal 12-3pm and dinner 9-10pmm. Goes to bed at 1am and wakes up at 9-10 am. He reports lunch is not large. However, admits when he gets home has a larger dinner meal. He reports meals are out of convenience and whatever is easiest for him. He often picks up something for dinner on his way home, not always fast food. Examples such as pre-made meals from the grocery store. Snacks: not always, but sometimes will have some and that snacks varies. He doesn't count carbs. He reports \"tries to be aware\" of carb intake but he likes pasta (his favorite food).   He has worked with many nutritionists in the past and feels he knows what he needs to do but just able to make all of the dietary changes. He even notes working with therapist in the past that helped.     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   Aspirin: Not taking due to Hx of subdural hemorrhage  Statin: Yes: simvastatin   ACEi/ARB: Yes: lisinopril. " "  Urine Albumin:   Lab Results   Component Value Date    UMALCR 7.14 01/12/2022      Lab Results   Component Value Date    A1C 8.9 05/02/2022    A1C 10.2 01/12/2022    A1C 7.6 08/17/2020    A1C 8.2 05/18/2020    A1C 8.2 04/22/2019    A1C 8.1 01/11/2019    A1C 10.1 12/24/2018     Estimated body mass index is 40.61 kg/m  as calculated from the following:    Height as of an earlier encounter on 5/2/22: 5' 10\" (1.778 m).    Weight as of an earlier encounter on 5/2/22: 283 lb (128.4 kg).     TSH   Date Value Ref Range Status   01/12/2022 2.11 0.40 - 4.00 mU/L Final   05/18/2020 1.25 0.40 - 4.00 mU/L Final     Hypertension/Persistant cough: Current medications include lisinopril 10 mg daily - at bedtime.  Patient does not self-monitor blood pressure.  Patient reports no current medication side effects. However from further discussion he does have an ongoing cough that is intermittent for a 3 years now, sometimes it's mild & sometimes it's bad enough that he feels like he cannot swallow. He was wondering if he needs his throat look at due to his friend recently dx with throat cancer.    BP Readings from Last 3 Encounters:   05/02/22 134/85   02/08/22 128/50   08/11/20 120/78       Hyperlipidemia: Current therapy includes simvastatin 20 mg at bedtime.  Patient reports no significant myalgias or other side effects.  The 10-year ASCVD risk score (Flushing RENATO Jr., et al., 2013) is: 9%    Values used to calculate the score:      Age: 52 years      Sex: Male      Is Non- : No      Diabetic: Yes      Tobacco smoker: No      Systolic Blood Pressure: 134 mmHg      Is BP treated: Yes      HDL Cholesterol: 35 mg/dL      Total Cholesterol: 163 mg/dL  Recent Labs   Lab Test 01/12/22  1122 05/18/20  1159 03/06/17  0912 10/20/15  1303 05/30/14  1031   CHOL 163 114   < > 135 122   HDL 35* 34*   < > 39* 31*   LDL 48 52   < > 54 53   TRIG 399* 141   < > 212* 192*   CHOLHDLRATIO  --   --   --  3.5 4.0    < > = values in " "this interval not displayed.     Low mood/History of anxiety/Insomnia-TIANNA: Patient it taking no current medications. He reports working with a counselor briefly before his insurance lapsed then never restarted. He did find counseling helpful, he likes being able to have someone to talk to about his health & general concerns. H reports having low self worth. When he's not at work he doesn't want to do anything, feels like he can't get out of bed has he is very tired but also no motivation.  He also feels lack of motivation to change his diet when he knows there are things he needs to do. He reports concerns of memory issues and poor sleep. He does have a CPAP but not using, no tolerable.    PHQ 8/10/2020 5/2/2022   PHQ-9 Total Score 11 11   Q9: Thoughts of better off dead/self-harm past 2 weeks Not at all Several days     SAGE-7 SCORE 5/2/2022   Total Score 9       ED: Patient is not currently taking any medications. Has been prescribed sildenafil in the past but not filled in a long time. He does want a refill of this and did not report any side effects with therapy. Taking 40 mg was not effective, feels he needs a higher dose.     Vaccines:  Most Recent Immunizations   Administered Date(s) Administered     COVID-19,PF,Pfizer (12+ Yrs) 12/01/2021     HepA-Adult 01/12/2022     Influenza Vaccine IM > 6 months Valent IIV4 (Alfuria,Fluzone) 12/01/2021     Pneumococcal 23 valent 10/20/2015     TDAP Vaccine (Adacel) 05/03/2013       Today's Vitals:   BP Readings from Last 1 Encounters:   05/02/22 134/85     Pulse Readings from Last 1 Encounters:   05/02/22 99     Wt Readings from Last 1 Encounters:   05/02/22 283 lb (128.4 kg)     Ht Readings from Last 1 Encounters:   05/02/22 5' 10\" (1.778 m)     Estimated body mass index is 40.61 kg/m  as calculated from the following:    Height as of an earlier encounter on 5/2/22: 5' 10\" (1.778 m).    Weight as of an earlier encounter on 5/2/22: 283 lb (128.4 kg).    Temp Readings from " Last 1 Encounters:   05/02/22 98.4  F (36.9  C) (Tympanic)     ----------------    I spent 60 minutes with this patient today. All changes were made via verbal approval with Sandoval Wilkinson MD. A copy of the visit note was provided to the patient's provider(s).    The patient was given a summary of these recommendations. See Provider note/AVS from today.     Audrey Downey, PharmD4 Student    Marta Cardoza, PharmD  Medication Therapy Management Pharmacist       Medication Therapy Recommendations  Hypertension    Current Medication: lisinopril (ZESTRIL) 10 MG tablet (Discontinued)   Rationale: Undesirable effect - Adverse medication event - Safety   Recommendation: Change Medication - LOSARTAN POTASSIUM PO   Status: Accepted per Provider         Type 2 diabetes mellitus with hyperglycemia (H)    Current Medication: Continuous Blood Gluc  (DEXCOM G6 ) LUCAS   Rationale: Medication requires monitoring - Needs additional monitoring   Recommendation: Continue to Monitor - Dexcom G6 Sensor Misc   Status: Accepted per Provider          Current Medication: insulin aspart (NOVOLOG PEN) 100 UNIT/ML pen   Rationale: Incorrect administration - Adverse medication event - Safety   Recommendation: Provide Education   Status: Patient Agreed - Adherence/Education   Note: Take within 15-30 minutes of first meal          Current Medication: metFORMIN (GLUCOPHAGE-XR) 500 MG 24 hr tablet   Rationale: Undesirable effect - Adverse medication event - Safety   Recommendation: Provide Education   Status: Patient Agreed - Adherence/Education   Note: Take metformin with food          Current Medication: semaglutide (OZEMPIC) 2 MG/1.5ML SOPN pen   Rationale: Dose too low - Dosage too low - Effectiveness   Recommendation: Increase Dose - Ozempic (0.25 or 0.5 MG/DOSE) 2 MG/1.5ML Sopn   Status: Accepted per CPA   Note: increase dose to 0.5 mg weekly

## 2022-05-02 NOTE — PATIENT INSTRUCTIONS
"Recommendations from today's Care Team visit:                                                      Thank you for participating in our care team visit today!    1. Change lisinopril to Losartan 50 mg daily, we will want to recheck labs and your blood pressure again.   - if unimproved, then will consider referral to ENT specialist    2. Ozempic: increase to 0.5 mg weekly. Future will consider 1 mg but will need a new order when ready to change.     3. Mental health referral today -->   Call for counseling:  Our counselor at [facility] is Pedrito Lui, and the main number for [facility] is 1 .  If he is not available, you can set up an appointment with another [facility] counselor that is.   You can also choose to followup with [facility] at [phone]     the book \"Full Catastrophe Living\", by Jacky Lawson, and begin the course described therein.     4. Rx for Dexcom G6 will check coverage.   - also download an li called Clarity which will be helpful when working with Marta in the future.   - once you have this on board we will work on starting an adjustment Novolog.     5. Send Sildenafil order to pharmacy downstairs. If insurance not covered, ask about paying for it out of pocket with coupon card.     Next care team visit:     It was a pleasure seeing you today!  Please feel free to contact us with any questions or concerns you have.      My Care Team Members                                                      PCP:    Marta Cardoza Medication Therapy Management Pharmacist  Pager: [phone]    You may receive a survey about the services you received.  I would appreciate your feedback to us serve you better in the future. Please fill it out and return it when you can. Your comments will be anonymous.     "

## 2022-05-02 NOTE — TELEPHONE ENCOUNTER
Central Prior Authorization Team   Phone: [phone]      PRIOR AUTHORIZATION DENIED    Medication: sildenafil (REVATIO) 20 MG tablet - EPA Denied     Denial Date: 5/2/2022    Denial Rational:       Appeal Information:

## 2022-05-02 NOTE — PROGRESS NOTES
"  Assessment & Plan     Screen for colon cancer    - Adult Gastro Ref - Procedure Only; Future    Need for hepatitis C screening test    - Hepatitis C Screen Reflex to HCV RNA Quant and Genotype; Future  - Hepatitis C Screen Reflex to HCV RNA Quant and Genotype    Hyperlipidemia LDL goal <100      Erectile dysfunction, unspecified erectile dysfunction type    - sildenafil (REVATIO) 20 MG tablet; Take 2 tablets (40 mg) by mouth daily as needed (ED)    Type 2 diabetes mellitus with hyperglycemia, with long-term current use of insulin (H)  Increase ozempic and begin CGM.   - Hemoglobin A1c; Future  - Hemoglobin A1c    Cough  Trial off lisinopril; if persists, needs otolaryngologist evaluation.   - Otolaryngology Referral; Future    Other fatigue  Ongoing.  Likely due to stress, poor sleep, and depression symptoms.   - CBC with platelets and differential; Future  - CBC with platelets and differential    Depression, unspecified depression type   the book \"Full Catastrophe Living\", by Jacky Lawson, and begin the course described therein. Follow-up with Counseling.  Refuses medkeyana frank.   - Adult Mental Health  Referral; Future             BMI:   Estimated body mass index is 40.61 kg/m  as calculated from the following:    Height as of this encounter: 1.778 m (5' 10\").    Weight as of this encounter: 128.4 kg (283 lb).       See Patient Instructions    Return in about 3 months (around 8/2/2022).    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  He is checking home blood glucose a few times a month. He checks blood glucose before meals.  Blood glucose is sometimes over 200 and never under 70. When his blood glucose is low, the patient is asymptomatic for confusion, blurred vision, lethargy and reports not feeling dizzy, shaky, or " "weak.  He is concerned about blood sugar frequently over 200.  He is having numbness in feet. The patient has not had a diabetic eye exam in the last 12 months.         He eats 0-1 servings of fruits and vegetables daily.He consumes 1 sweetened beverage(s) daily.He exercises with enough effort to increase his heart rate 10 to 19 minutes per day.  He exercises with enough effort to increase his heart rate 3 or less days per week.   He is taking medications regularly.       Ozempic 0.25 mg.  For last 10 weeks.  Ready to incrase to 0.5.  Goal diabetes blood test (A1c) is 8 or less.    To begin CGM, Dexcom (versus Freestyle).    Eating 2 meals per day, convenience meals.      Now doing 50 twice daily glargine, and 25 novolog with meals twice daily>.    Chronic cough; changing to losartan.  Going on 3 years.  Friend with throat cancer.  Occasional swallowing issues.  No smoking or chewing.     Over last year or two has had some memory issues; Grandparents with some alzheimer's but not parents. Some anxiety and dpression symptoms; is \"more aware of his mortality.\"  Sleep is not great; gets 8-9 hours in bed, but wakes up every 2-3 hours--and is awake for about 15 in-half hour.  Cannot tolerate his continuous positive airway pressure.           Review of Systems   CONSTITUTIONAL: NEGATIVE for fever, chills, change in weight  INTEGUMENTARY/SKIN: NEGATIVE for worrisome rashes, moles or lesions  EYES: NEGATIVE for vision changes or irritation  ENT/MOUTH: NEGATIVE for ear, mouth and throat problems  RESP: NEGATIVE for significant cough or SOB  BREAST: NEGATIVE for masses, tenderness or discharge  CV: NEGATIVE for chest pain, palpitations or peripheral edema  GI: NEGATIVE for nausea, abdominal pain, heartburn, or change in bowel habits  : NEGATIVE for frequency, dysuria, or hematuria  MUSCULOSKELETAL: NEGATIVE for significant arthralgias or myalgia  NEURO: NEGATIVE for weakness, dizziness or paresthesias  ENDOCRINE: NEGATIVE " "for temperature intolerance, skin/hair changes  HEME: NEGATIVE for bleeding problems  PSYCHIATRIC: NEGATIVE for changes in mood or affect      Objective    /85   Pulse 99   Temp 98.4  F (36.9  C) (Tympanic)   Resp 20   Ht 1.778 m (5' 10\")   Wt 128.4 kg (283 lb)   SpO2 96%   BMI 40.61 kg/m    Body mass index is 40.61 kg/m .  Physical Exam   GENERAL: healthy, alert and no distress  EYES: Eyes grossly normal to inspection, PERRL and conjunctivae and sclerae normal  HENT: ear canals and TM's normal, nose and mouth without ulcers or lesions  NECK: no adenopathy, no asymmetry, masses, or scars and thyroid normal to palpation  RESP: lungs clear to auscultation - no rales, rhonchi or wheezes  CV: regular rate and rhythm, normal S1 S2, no S3 or S4, no murmur, click or rub, no peripheral edema and peripheral pulses strong  ABDOMEN: soft, nontender, no hepatosplenomegaly, no masses and bowel sounds normal  MS: no gross musculoskeletal defects noted, no edema  SKIN: no suspicious lesions or rashes  NEURO: Normal strength and tone, mentation intact and speech normal  PSYCH: mentation appears normal, affect normal/bright        \plain        "

## 2022-05-03 ENCOUNTER — TELEPHONE (OUTPATIENT)
Dept: PEDIATRICS | Facility: CLINIC | Age: 52
End: 2022-05-03
Payer: COMMERCIAL

## 2022-05-03 LAB — HCV AB SERPL QL IA: NONREACTIVE

## 2022-05-03 ASSESSMENT — ANXIETY QUESTIONNAIRES: GAD7 TOTAL SCORE: 9

## 2022-05-03 NOTE — TELEPHONE ENCOUNTER
I called pharmacy as we still have not received a PA yet. Obtained PA info over the phone given no fax received yesterday or today.      Dexcom G6 , Transmitter, and sensor needs prior authorization.  RXBIN: [id number]  PCN: [id number]  GRP#: [id number]  Plan ph#: [phone]      Routing to SIMON garcia for help.    Marta Cardoza, PharmD  Medication Therapy Management Pharmacist

## 2022-05-04 NOTE — TELEPHONE ENCOUNTER
Called pt LVM with direct number for call back. Told him of medication and pharmacy.     Tiarra Rocha, EMT at 8:35 AM on May 4, 2022  [facility] Health Guide  [phone]

## 2022-05-05 NOTE — TELEPHONE ENCOUNTER
Prior Authorization Approval    Authorization Effective Date:  04/05/2022  Authorization Expiration Date:  05/04/2025  Medication:   Approved Dose/Quantity: 1  Reference #:     Insurance Company: [employer] Phone [phone] Fax [phone]  Expected CoPay:       CoPay Card Available:      Foundation Assistance Needed:    Which Pharmacy is filling the prescription (Not needed for infusion/clinic administered): [id number] - [place], [place] - [address]  AT [address]  Pharmacy Notified:  Yes  Patient Notified:  Pharmacy will notify patient.      PA Initiation    Medication:   Insurance Company: [employer] Phone [phone] Fax [phone]  Pharmacy Filling the Rx: [facility] #[id number] - [place] - [address]  AT [address] [facility]  Filling Pharmacy Phone: [phone]  Filling Pharmacy Fax:    Start Date: 5/5/2022    Central Prior Authorization Team   Phone: [phone]

## 2022-05-10 NOTE — TELEPHONE ENCOUNTER
[employer] called stating that the PA has been approved. Luli Clemons RN on 5/10/2022 at 11:28 AM

## 2022-05-28 DIAGNOSIS — E11.65 TYPE 2 DIABETES MELLITUS WITH HYPERGLYCEMIA, WITH LONG-TERM CURRENT USE OF INSULIN (H): ICD-10-CM

## 2022-05-28 DIAGNOSIS — Z79.4 TYPE 2 DIABETES MELLITUS WITH HYPERGLYCEMIA, WITH LONG-TERM CURRENT USE OF INSULIN (H): ICD-10-CM

## 2022-05-31 ENCOUNTER — VIRTUAL VISIT (OUTPATIENT)
Dept: FAMILY MEDICINE | Facility: CLINIC | Age: 52
End: 2022-05-31
Payer: COMMERCIAL

## 2022-05-31 DIAGNOSIS — U07.1 INFECTION DUE TO 2019 NOVEL CORONAVIRUS: Primary | ICD-10-CM

## 2022-05-31 PROCEDURE — 99214 OFFICE O/P EST MOD 30 MIN: CPT | Mod: 95 | Performed by: FAMILY MEDICINE

## 2022-05-31 ASSESSMENT — ENCOUNTER SYMPTOMS
FATIGUE: 1
HEADACHES: 1
MYALGIAS: 1
COUGH: 1
SORE THROAT: 1
SHORTNESS OF BREATH: 0
FEVER: 1
GASTROINTESTINAL NEGATIVE: 1

## 2022-05-31 NOTE — PROGRESS NOTES
JAIRO is a 52 year old who is being evaluated via a billable video visit.      How would you like to obtain your AVS? MyChart  If the video visit is dropped, the invitation should be resent by: Text to cell phone: [phone]  Will anyone else be joining your video visit? No      Video Start Time: 11:09 AM    Assessment and Plan    (U07.1) Infection due to 2019 novel coronavirus  (primary encounter diagnosis)  Comment: advised to stay quaratined until 24h after feeling improvement.  Advised to hold simvastatin and avoid using sildneafil while using tx.  Plan: nirmatrelvir and ritonavir (PAXLOVID) therapy         pack              RTC in 3m for Dm    Jairo Mclaughlin MD      Subjective   JAIRO is a 52 year old who presents for the following health issues     HPI       COVID-19 Symptom Review  How many days ago did these symptoms start? 05/28/2022    Are any of the following symptoms significant for you?    New or worsening difficulty breathing? No    Worsening cough? Yes, I am coughing up mucus.    Fever or chills? Yes, the highest temperature was 99.7    Headache: YES    Sore throat: no    Chest pain: no    Diarrhea: YES    Body aches? YES    What treatments has patient tried? Acetaminophen   Does patient live in a nursing home, group home, or shelter? no  Does patient have a way to get food/medications during quarantined? Yes, I have a friend or family member who can help me.          Started feeling poorly three days ago.  Was exposed at work last week,  Postive test two days ago.  Cough, but no shortness of breath.    Review of Systems   Constitutional: Positive for fatigue and fever.   HENT: Positive for sore throat.    Respiratory: Positive for cough. Negative for shortness of breath.    Gastrointestinal: Negative.    Musculoskeletal: Positive for myalgias.   Neurological: Positive for headaches.            Objective           Vitals:  No vitals were obtained today due to virtual visit.    Physical Exam    GENERAL: Healthy, alert and no distress  EYES: Eyes grossly normal to inspection.  No discharge or erythema, or obvious scleral/conjunctival abnormalities.  RESP: No audible wheeze, cough, or visible cyanosis.  No visible retractions or increased work of breathing.    SKIN: Visible skin clear. No significant rash, abnormal pigmentation or lesions.  NEURO: Cranial nerves grossly intact.  Mentation and speech appropriate for age.  PSYCH: Mentation appears normal, affect normal/bright, judgement and insight intact, normal speech and appearance well-groomed.                Video-Visit Details    Type of service:  Video Visit    Video End Time:11:16 AM    Originating Location (pt. Location): Home    Distant Location (provider location):  [facility]Stay     Platform used for Video Visit: [employer]

## 2022-06-01 RX ORDER — SEMAGLUTIDE 1.34 MG/ML
INJECTION, SOLUTION SUBCUTANEOUS
Qty: 1.5 ML | Refills: 1 | Status: SHIPPED | OUTPATIENT
Start: 2022-06-01 | End: 2022-07-14

## 2022-06-28 ENCOUNTER — MYC MEDICAL ADVICE (OUTPATIENT)
Dept: PEDIATRICS | Facility: CLINIC | Age: 52
End: 2022-06-28

## 2022-06-29 ENCOUNTER — NURSE TRIAGE (OUTPATIENT)
Dept: PEDIATRICS | Facility: CLINIC | Age: 52
End: 2022-06-29

## 2022-06-29 NOTE — TELEPHONE ENCOUNTER
"S-(situation): Received call back from patient to discuss [employer] message received 6/29/22.     Patient mychart message: Dr Wilkinson, I think I might need to see someone.  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  Because of the issues I'm having and the severe pain from bone spurs on my mouth and cutting into bottom of my tongue, im dealing with severe depression and anxiety.  I'm in a very dark place right now.  Is there anything I can do?    B-(background): Patient had teeth extracted by dentist, was informed it is common to get bone spurs, has bone spur in far right back of mouth that protrudes and when patient is trying to speak or swallow, his tongue rubs across the area and cuts it. Very painful and difficult to talk. Patient has multiple bone spurs in mouth. 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     Patient did receive a call from his dentist this morning, asking if he can go to an appointment tomorrow, patient unable to make appointment tomorrow due to work. Patient feels that dentist is not supplying any care or concern regarding his loss of voice, per patient \"I said that I lost my voice and they basically just said 'well i've never heard of that happening, there wouldn't be any reason for that to happen'\".     A-(assessment): Patient having passive thoughts of suicide. No active plan, no active thoughts of self-harm. Patient states depression symptoms are worsened due to issue with loss of voice and difficulty speaking. Patient denies any other increase in stress/triggers. Patient states he has \"a lot of support at home, but it just doesn't " "help\". Patient feeling overwhelmed and does not know what to do about his situation.     R-(recommendations): Scheduled for Virtual visit to discuss mental health 7/1/22, patient declined visit today or tomorrow as he will be busy with work. Patient has crisis line for county to call if needed. Patient is involved with suicidal prevention ment's support group and states he has resources to contact if needed. RN advised nurse triage line is open 24/7 if needed. Patient thanked RN for their time and will follow up in virtual visit on Friday.       Evin BREEN RN    "

## 2022-06-29 NOTE — TELEPHONE ENCOUNTER
Attempted to reach patient, LVMTCB. See [employer] message, needs triage.     Evin BREEN RN

## 2022-07-01 ENCOUNTER — VIRTUAL VISIT (OUTPATIENT)
Dept: PEDIATRICS | Facility: CLINIC | Age: 52
End: 2022-07-01
Payer: COMMERCIAL

## 2022-07-01 DIAGNOSIS — R49.0 VOICE HOARSENESS: ICD-10-CM

## 2022-07-01 DIAGNOSIS — R47.9 DIFFICULTY WITH SPEECH: ICD-10-CM

## 2022-07-01 DIAGNOSIS — F41.9 ANXIETY: ICD-10-CM

## 2022-07-01 DIAGNOSIS — F33.0 MILD EPISODE OF RECURRENT MAJOR DEPRESSIVE DISORDER (H): Primary | ICD-10-CM

## 2022-07-01 DIAGNOSIS — Z97.2 WEARS DENTURES: ICD-10-CM

## 2022-07-01 PROCEDURE — 99214 OFFICE O/P EST MOD 30 MIN: CPT | Mod: 95 | Performed by: NURSE PRACTITIONER

## 2022-07-01 RX ORDER — ESCITALOPRAM OXALATE 5 MG/1
TABLET ORAL
Qty: 30 TABLET | Refills: 0 | Status: SHIPPED | OUTPATIENT
Start: 2022-07-01 | End: 2022-07-25

## 2022-07-01 RX ORDER — HYDROXYZINE HYDROCHLORIDE 10 MG/1
10 TABLET, FILM COATED ORAL DAILY PRN
Qty: 30 TABLET | Refills: 0 | Status: ON HOLD | OUTPATIENT
Start: 2022-07-01 | End: 2023-08-18

## 2022-07-01 ASSESSMENT — ANXIETY QUESTIONNAIRES
6. BECOMING EASILY ANNOYED OR IRRITABLE: NEARLY EVERY DAY
GAD7 TOTAL SCORE: 8
IF YOU CHECKED OFF ANY PROBLEMS ON THIS QUESTIONNAIRE, HOW DIFFICULT HAVE THESE PROBLEMS MADE IT FOR YOU TO DO YOUR WORK, TAKE CARE OF THINGS AT HOME, OR GET ALONG WITH OTHER PEOPLE: VERY DIFFICULT
5. BEING SO RESTLESS THAT IT IS HARD TO SIT STILL: NOT AT ALL
3. WORRYING TOO MUCH ABOUT DIFFERENT THINGS: SEVERAL DAYS
GAD7 TOTAL SCORE: 8
1. FEELING NERVOUS, ANXIOUS, OR ON EDGE: MORE THAN HALF THE DAYS
7. FEELING AFRAID AS IF SOMETHING AWFUL MIGHT HAPPEN: SEVERAL DAYS
2. NOT BEING ABLE TO STOP OR CONTROL WORRYING: SEVERAL DAYS

## 2022-07-01 ASSESSMENT — PATIENT HEALTH QUESTIONNAIRE - PHQ9
SUM OF ALL RESPONSES TO PHQ QUESTIONS 1-9: 12
5. POOR APPETITE OR OVEREATING: NOT AT ALL

## 2022-07-01 NOTE — PROGRESS NOTES
WENDY is a 52 year old who is being evaluated via a billable video visit.      How would you like to obtain your AVS? MyChart  If the video visit is dropped, the invitation should be resent by: Text to cell phone: [phone]  Will anyone else be joining your video visit? No      Assessment & Plan     Mild episode of recurrent major depressive disorder (H)  Stable.  Improved today after dental procedure.  Started on escitalopram 5mg today.  Started on hydroxyzine as needed  Follow-up in 4 weeks  Message sent to [facility] to follow-up in 2 weeks to check-in on patient  ER if needed  MH referral placed  - Adult Mental Health  Referral; Future  - escitalopram (LEXAPRO) 5 MG tablet; Take 5mg daily for 2 weeks then 10mg daily.  - hydrOXYzine (ATARAX) 10 MG tablet; Take 1 tablet (10 mg) by mouth daily as needed for anxiety    Anxiety  Stable.  - Adult Mental Health  Referral; Future  - escitalopram (LEXAPRO) 5 MG tablet; Take 5mg daily for 2 weeks then 10mg daily.  - hydrOXYzine (ATARAX) 10 MG tablet; Take 1 tablet (10 mg) by mouth daily as needed for anxiety    Voice hoarseness  Possibly due to COVID.  Referred for evaluation.  - Adult ENT  Referral; Future    Difficulty with speech  Referred to speech therapy for evaluation  - Speech Therapy Referral; Future    Wears dentures  - Speech Therapy Referral; Future         Depression Screening Follow Up    PHQ 7/1/2022   PHQ-9 Total Score 12   Q9: Thoughts of better off dead/self-harm past 2 weeks Several days     Last PHQ-9 7/1/2022   1.  Little interest or pleasure in doing things 3   2.  Feeling down, depressed, or hopeless 3   3.  Trouble falling or staying asleep, or sleeping too much 0   4.  Feeling tired or having little energy 3   5.  Poor appetite or overeating 0   6.  Feeling bad about yourself 1   7.  Trouble concentrating 1   8.  Moving slowly or restless 0   Q9: Thoughts of better off dead/self-harm past 2 weeks 1   PHQ-9 Total Score 12    Difficulty at work, home, or with people Very difficult         Follow Up  Follow Up Actions Taken:  Follow-up in 4 weeks or sooner if needed; PAL to follow-up in 2 weeks; referrals placed    Discussed the following ways the patient can remain in a safe environment:  be around others  See Patient Instructions    No follow-ups on file.    Apoorva Mar NP  [facility] [place] NGUY[place] is a 52 year old, presenting for the following health issues:    MH Follow Up and Hoarse    HPI     Loss of voice (primary concern)    Depression and Anxiety Follow-Up    Mostly due from prior teeth pain, recent dental procedure fixed.      How are you doing with your depression since your last visit? Improved     How are you doing with your anxiety since your last visit?  Improved     Are you having other symptoms that might be associated with depression or anxiety? Yes:  see PHQ 9 and SAGE 7    Have you had a significant life event? Health Concerns     Do you have any concerns with your use of alcohol or other drugs? No    Social History     Tobacco Use     Smoking status: Never Smoker     Smokeless tobacco: Never Used   Vaping Use     Vaping Use: Never used   Substance Use Topics     Alcohol use: Yes     Alcohol/week: 0.0 standard drinks     Comment: Very little and infrequently     Drug use: Never     PHQ 8/10/2020 5/2/2022 7/1/2022   PHQ-9 Total Score 11 11 12   Q9: Thoughts of better off dead/self-harm past 2 weeks Not at all Several days Several days     SAGE-7 SCORE 5/2/2022 7/1/2022   Total Score 9 8     Last PHQ-9 7/1/2022   1.  Little interest or pleasure in doing things 3   2.  Feeling down, depressed, or hopeless 3   3.  Trouble falling or staying asleep, or sleeping too much 0   4.  Feeling tired or having little energy 3   5.  Poor appetite or overeating 0   6.  Feeling bad about yourself 1   7.  Trouble concentrating 1   8.  Moving slowly or restless 0   Q9: Thoughts of better off dead/self-harm  "past 2 weeks 1   PHQ-9 Total Score 12   Difficulty at work, home, or with people Very difficult     SAGE-7  7/1/2022   1. Feeling nervous, anxious, or on edge 2   2. Not being able to stop or control worrying 1   3. Worrying too much about different things 1   4. Trouble relaxing 0   5. Being so restless that it is hard to sit still 0   6. Becoming easily annoyed or irritable 3   7. Feeling afraid, as if something awful might happen 1   SAGE-7 Total Score 8   If you checked any problems, how difficult have they made it for you to do your work, take care of things at home, or get along with other people? Very difficult       Suicide Assessment Five-step Evaluation and Treatment (SAFE-T)      Depression:  -History of depression.  Was stable until recently  -This episode started 2-3 weeks ago after his dental procedure (6 weeks ago).  Now wears dentures.  -Feels better today due to appointment with the dentist today and having a procedure to help with \"bone spurs\" that were cutting his tongue making it hard to talk  -Thoughts of passive SI due \"wanting to give up\".  States he has dealt with a lot of suicide in his life.  Involved in suicide support groups.  No active thoughts or plans.   -Very irritable  -Feels less drive for his work.  -Moderate anxiety.  No panic attacks    Speech difficulty:  -Patient just obtained dentures after his procedure 6 weeks ago  -Is having difficulty adjusting to these and talking.    Voice changes:  Hoarseness of voice.  Started 3 weeks ago.  Started 1.5 weeks after patient tested positive for COVID.  Has not improved or worsened.     Review of Systems   Constitutional, HEENT, cardiovascular, pulmonary, gi and gu systems are negative, except as otherwise noted.      Objective       Vitals:  No vitals were obtained today due to virtual visit.    Physical Exam   GENERAL: Healthy, alert and no distress  EYES: Eyes grossly normal to inspection.  No discharge or erythema, or obvious " scleral/conjunctival abnormalities.  RESP: No audible wheeze, cough, or visible cyanosis.  No visible retractions or increased work of breathing.    SKIN: Visible skin clear. No significant rash, abnormal pigmentation or lesions.  NEURO: Cranial nerves grossly intact.  Mentation and speech appropriate for age.  PSYCH: Mentation appears normal, affect normal/bright, judgement and insight intact, normal speech and appearance well-groomed.            Video-Visit Details    Video Start Time: 1:21p    Type of service:  Video Visit    Video End Time:1:41 PM    Originating Location (pt. Location): Home    Distant Location (provider location):  [facility] [place]     Platform used for Video Visit: [employer]    .  ..

## 2022-07-01 NOTE — COMMUNITY RESOURCES LIST (ENGLISH)
07/01/2022   [facility] - Outpatient Clinics  N/A  For questions about this resource list or additional care needs, please contact your primary care clinic or care manager.  Phone: [phone]   Email: N/A   Address: [address] [place] [address]   Hours: N/A        Hotlines and Helplines       Hotline - Crisis help  1  [facility] Crisis Response Unit - Suicide and Crisis Response Hotline Distance: 5.42 miles      COVID-19 Status: Phone/Virtual   [address] [place] [address]  Language: English  Hours: Mon - Sun Open 24 Hours   Phone: (895) 869-4005 Email: cru@[facility] Website: https://www.[facility]/English [employer]/HandlingEmergencies/Help     2  [facility] - Adult Protection - 24-Hour Crisis Response Distance: 5.44 miles      COVID-19 Status: Phone/Virtual   [address] [place] [address]  Language: English  Hours: Mon - Sun Open 24 Hours   Phone: (134) 808-5525 Email: social.services@[facility] Website: https://www.co"[employer]/English [employer]/AdultProtection/Pages/default.aspx          Mental Health       Individual counseling  3  [facility] Distance: 0.67 miles      COVID-19 Status: Regular Operations, COVID-19 Status: Phone/Virtual   [address] [place] [address]  Language: English  Hours: Mon - Thu 8:30 AM - 9:00 PM , Fri 8:30 AM - 5:00 PM , Sat 8:00 AM - 4:00 PM  Fees: Insurance, Self Pay, Sliding Fee   Phone: (875) 836-3279 Email: talon@[employer] Website: https://www.[employer]/locations/[place]     4  [facility] [place] Distance: 0.75 miles      COVID-19 Status: Phone/Virtual   [address] [place] [address]  Language: English, Israeli, Tanzanian  Hours: Mon - Thu 8:30 AM - 5:00 PM , Fri 8:30 AM - 4:00 PM  Fees: Insurance, Self Pay, Sliding Fee   Phone: (270) 794-6938 Website: https://www.[employer]/[place]     [facility]  crisis care  5  [facility] Distance: 0.67 miles      COVID-19 Status: Regular Operations, COVID-19 Status: Phone/Virtual   [address] [place] [address]  Language: English  Hours: Mon - Thu 8:30 AM - 9:00 PM , Fri 8:30 AM - 5:00 PM , Sat 8:00 AM - 4:00 PM  Fees: Insurance, Self Pay, Sliding Fee   Phone: (564) 445-9987 Email: talon@[employer] Website: https://www.[employer]/locations/[place]     6  [employer] [facility] - 24-Hour Mental Health Practitioner Distance: 4.23 miles      COVID-19 Status: Regular Operations, COVID-19 Status: Phone/Virtual   [address] [place] [address]  Language: English, Hmong  Hours: Mon - Fri 8:00 AM - 4:30 PM  Fees: Insurance, Self Pay   Phone: (936) 547-4785 Email: info@[employer] Website: http://www.[employer]/     Mental health support group  7  [facility] Distance: 1.45 miles      COVID-19 Status: Phone/Virtual   [address] [address] [place] [address]  Language: English  Hours: Mon - Fri 9:00 AM - 5:00 PM Appt. Only  Fees: Free   Phone: (710) 378-3185 Email: yaniraCellectargatito@[employer] Website: http://www.[employer].org/     8  [facility] (Main Office) Distance: 5.55 miles      COVID-19 Status: Phone/Virtual   [address] [place] [address]  Language: English  Hours: Mon - Fri 9:00 AM - 5:00 PM  Fees: Free   Phone: (108) 447-2653 Email: info@[employer] Website: http://[employer].org          Important Numbers & Websites       Emergency Services   911  City Services   311  Poison Control   (684) 598-5701  Suicide Prevention Lifeline   (459) 141-6623 (TALK)  Child Abuse Hotline   (838) 135-1592 (4-A-Child)  Sexual Assault Hotline   (472) 810-4793 (HOPE)  National Runaway Safeline   (295) 949-1969 (RUNAWAY)  All-Options Talkline   (673) 309-3813  Substance Abuse Referral   (614) 617-6449 (HELP)

## 2022-07-05 NOTE — TELEPHONE ENCOUNTER
FUTURE VISIT INFORMATION      FUTURE VISIT INFORMATION:    Date: 10/28/2022    Time: 9:30 AM    Location: [facility]VO[facility]  REFERRAL INFORMATION:    Referring provider: Apoorva Mar NP    Referring providers clinic: [facility] [place]    Reason for visit/diagnosis: Difficulty with speech, wears dentures    RECORDS REQUESTED FROM:       Clinic name Comments Records Status Imaging Status   [facility] ([facility]) 8/16/22 - ENT OV with Dr. Avery [place] 7/1/22 - [facility] OV with Apoorva Mar NP  5/2/22 - [facility] OV with Dr. Jaja Ambriz 5/31/22 - [facility] OV with Dr. Mclaughlin [facility]    [facility] - Imaging 12/25/18 - XR Chest Epic PACs

## 2022-07-13 DIAGNOSIS — Z79.4 TYPE 2 DIABETES MELLITUS WITH HYPERGLYCEMIA, WITH LONG-TERM CURRENT USE OF INSULIN (H): ICD-10-CM

## 2022-07-13 DIAGNOSIS — E11.65 TYPE 2 DIABETES MELLITUS WITH HYPERGLYCEMIA, WITH LONG-TERM CURRENT USE OF INSULIN (H): ICD-10-CM

## 2022-07-14 RX ORDER — SEMAGLUTIDE 1.34 MG/ML
INJECTION, SOLUTION SUBCUTANEOUS
Qty: 1.5 ML | Refills: 1 | Status: SHIPPED | OUTPATIENT
Start: 2022-07-14 | End: 2022-08-19

## 2022-07-18 ENCOUNTER — OFFICE VISIT (OUTPATIENT)
Dept: OPTOMETRY | Facility: CLINIC | Age: 52
End: 2022-07-18
Payer: COMMERCIAL

## 2022-07-18 DIAGNOSIS — H02.889 MEIBOMIAN GLAND DYSFUNCTION: ICD-10-CM

## 2022-07-18 DIAGNOSIS — H52.4 PRESBYOPIA: ICD-10-CM

## 2022-07-18 DIAGNOSIS — H40.003 GLAUCOMA SUSPECT, BILATERAL: ICD-10-CM

## 2022-07-18 DIAGNOSIS — E11.3299 NONPROLIFERATIVE DIABETIC RETINOPATHY ASSOCIATED WITH TYPE 2 DIABETES MELLITUS (H): Primary | ICD-10-CM

## 2022-07-18 PROCEDURE — 92004 COMPRE OPH EXAM NEW PT 1/>: CPT | Performed by: OPTOMETRIST

## 2022-07-18 PROCEDURE — 92015 DETERMINE REFRACTIVE STATE: CPT | Performed by: OPTOMETRIST

## 2022-07-18 ASSESSMENT — REFRACTION_MANIFEST
OS_ADD: +2.00
OD_ADD: +2.00
OD_CYLINDER: +0.50
OD_AXIS: 110
OS_SPHERE: -0.25
OS_AXIS: 067
OD_SPHERE: -1.25
OD_SPHERE: -0.50
OD_CYLINDER: +0.25
OS_CYLINDER: +0.50
OS_CYLINDER: SPHERE
OD_AXIS: 088
OS_SPHERE: -1.25
METHOD_AUTOREFRACTION: 1

## 2022-07-18 ASSESSMENT — VISUAL ACUITY
OD_SC: 20/20
OS_SC+: -1
METHOD: SNELLEN - LINEAR
OD_SC+: -2
OD_SC: 20/60
OS_SC: 20/20
OS_SC: 20/60

## 2022-07-18 ASSESSMENT — SLIT LAMP EXAM - LIDS
COMMENTS: MEIBOMIAN GLAND DYSFUNCTION, TELANGIECTASIA
COMMENTS: MEIBOMIAN GLAND DYSFUNCTION, TELANGIECTASIA

## 2022-07-18 ASSESSMENT — CUP TO DISC RATIO
OS_RATIO: 0.6
OD_RATIO: 0.6

## 2022-07-18 ASSESSMENT — TONOMETRY
OD_IOP_MMHG: 16
OS_IOP_MMHG: 16
IOP_METHOD: APPLANATION

## 2022-07-18 ASSESSMENT — CONF VISUAL FIELD
OS_NORMAL: 1
OD_NORMAL: 1
METHOD: COUNTING FINGERS

## 2022-07-18 ASSESSMENT — EXTERNAL EXAM - RIGHT EYE: OD_EXAM: NORMAL

## 2022-07-18 ASSESSMENT — EXTERNAL EXAM - LEFT EYE: OS_EXAM: NORMAL

## 2022-07-18 NOTE — LETTER
7/18/2022         RE: Jairo Escamilla  [address] [place] [address]        Dear Colleague,    Thank you for referring your patient, Jairo Escamilla, to the [facility] [place]. Please see a copy of my visit note below.    Chief Complaint   Patient presents with     Diabetic Eye Exam     Lab Results   Component Value Date    A1C 8.9 05/02/2022    A1C 10.2 01/12/2022    A1C 7.6 08/17/2020    A1C 8.2 05/18/2020    A1C 8.2 04/22/2019    A1C 8.1 01/11/2019    A1C 10.1 12/24/2018            Last Eye Exam: 2 years ago  Dilated Previously: Yes, side effects of dilation explained today    What are you currently using to see?  readers    Distance Vision Acuity: Satisfied with vision    Near Vision Acuity: Not satisfied     Eye Comfort: watery and itchy once in awhile  Do you use eye drops? : No      Julissa Cruz - Optometric Assistant      Medical, surgical and family histories reviewed and updated 7/18/2022.     Past records from [facility] show IOP borderline, CD 0.6  Normal today  No family history of glaucoma   OBJECTIVE: See Ophthalmology exam    ASSESSMENT:    ICD-10-CM    1. Nonproliferative diabetic retinopathy associated with type 2 diabetes mellitus (H)  E11.3299 EYE EXAM (SIMPLE-NONBILLABLE)   2. Presbyopia  H52.4 REFRACTION   3. Meibomian gland dysfunction  H02.889    4. Glaucoma suspect, bilateral  H40.003    isolated heme   Low risk (Past records from [facility] show IOP borderline, CD 0.6)  Normal today  PLAN:  Glucose control  No treatment okay to monitor yearly if controlled   Watch as glaucoma suspect for low tension  Jairo Escamilla aware  eye exam results will be sent to Sandoval Wilkinson OD           Again, thank you for allowing me to participate in the care of your patient.        Sincerely,        Devika Croft, OD

## 2022-07-18 NOTE — PROGRESS NOTES
Chief Complaint   Patient presents with     Diabetic Eye Exam     Lab Results   Component Value Date    A1C 8.9 05/02/2022    A1C 10.2 01/12/2022    A1C 7.6 08/17/2020    A1C 8.2 05/18/2020    A1C 8.2 04/22/2019    A1C 8.1 01/11/2019    A1C 10.1 12/24/2018            Last Eye Exam: 2 years ago  Dilated Previously: Yes, side effects of dilation explained today    What are you currently using to see?  readers    Distance Vision Acuity: Satisfied with vision    Near Vision Acuity: Not satisfied     Eye Comfort: watery and itchy once in awhile  Do you use eye drops? : No      Julissa Cruz - Optometric Assistant      Medical, surgical and family histories reviewed and updated 7/18/2022.     Past records from [facility] show IOP borderline, CD 0.6  Normal today  No family history of glaucoma   OBJECTIVE: See Ophthalmology exam    ASSESSMENT:    ICD-10-CM    1. Nonproliferative diabetic retinopathy associated with type 2 diabetes mellitus (H)  E11.3299 EYE EXAM (SIMPLE-NONBILLABLE)   2. Presbyopia  H52.4 REFRACTION   3. Meibomian gland dysfunction  H02.889    4. Glaucoma suspect, bilateral  H40.003    isolated heme   Low risk (Past records from [facility] show IOP borderline, CD 0.6)  Normal today  PLAN:  Glucose control  No treatment okay to monitor yearly if controlled   Watch as glaucoma suspect for low tension  Jairo LEONEL Andi aware  eye exam results will be sent to Sandoval Wilkinson OD

## 2022-08-14 ASSESSMENT — ANXIETY QUESTIONNAIRES
2. NOT BEING ABLE TO STOP OR CONTROL WORRYING: NOT AT ALL
8. IF YOU CHECKED OFF ANY PROBLEMS, HOW DIFFICULT HAVE THESE MADE IT FOR YOU TO DO YOUR WORK, TAKE CARE OF THINGS AT HOME, OR GET ALONG WITH OTHER PEOPLE?: NOT DIFFICULT AT ALL
GAD7 TOTAL SCORE: 0
3. WORRYING TOO MUCH ABOUT DIFFERENT THINGS: NOT AT ALL
GAD7 TOTAL SCORE: 0
IF YOU CHECKED OFF ANY PROBLEMS ON THIS QUESTIONNAIRE, HOW DIFFICULT HAVE THESE PROBLEMS MADE IT FOR YOU TO DO YOUR WORK, TAKE CARE OF THINGS AT HOME, OR GET ALONG WITH OTHER PEOPLE: NOT DIFFICULT AT ALL
1. FEELING NERVOUS, ANXIOUS, OR ON EDGE: NOT AT ALL
5. BEING SO RESTLESS THAT IT IS HARD TO SIT STILL: NOT AT ALL
6. BECOMING EASILY ANNOYED OR IRRITABLE: NOT AT ALL
4. TROUBLE RELAXING: NOT AT ALL
7. FEELING AFRAID AS IF SOMETHING AWFUL MIGHT HAPPEN: NOT AT ALL
7. FEELING AFRAID AS IF SOMETHING AWFUL MIGHT HAPPEN: NOT AT ALL

## 2022-08-19 ENCOUNTER — OFFICE VISIT (OUTPATIENT)
Dept: PEDIATRICS | Facility: CLINIC | Age: 52
End: 2022-08-19
Payer: COMMERCIAL

## 2022-08-19 VITALS
RESPIRATION RATE: 16 BRPM | OXYGEN SATURATION: 96 % | HEART RATE: 89 BPM | WEIGHT: 269.3 LBS | DIASTOLIC BLOOD PRESSURE: 74 MMHG | BODY MASS INDEX: 38.64 KG/M2 | TEMPERATURE: 97.5 F | SYSTOLIC BLOOD PRESSURE: 110 MMHG

## 2022-08-19 DIAGNOSIS — I10 HYPERTENSION, UNSPECIFIED TYPE: ICD-10-CM

## 2022-08-19 DIAGNOSIS — E11.65 TYPE 2 DIABETES MELLITUS WITH HYPERGLYCEMIA, WITH LONG-TERM CURRENT USE OF INSULIN (H): Primary | ICD-10-CM

## 2022-08-19 DIAGNOSIS — Z12.11 SCREEN FOR COLON CANCER: ICD-10-CM

## 2022-08-19 DIAGNOSIS — F41.9 ANXIETY: ICD-10-CM

## 2022-08-19 DIAGNOSIS — E66.01 MORBID OBESITY (H): ICD-10-CM

## 2022-08-19 DIAGNOSIS — F33.0 MILD EPISODE OF RECURRENT MAJOR DEPRESSIVE DISORDER (H): ICD-10-CM

## 2022-08-19 DIAGNOSIS — Z79.4 TYPE 2 DIABETES MELLITUS WITH HYPERGLYCEMIA, WITH LONG-TERM CURRENT USE OF INSULIN (H): Primary | ICD-10-CM

## 2022-08-19 DIAGNOSIS — S06.5XAA SDH (SUBDURAL HEMATOMA) (H): ICD-10-CM

## 2022-08-19 DIAGNOSIS — N52.9 ERECTILE DYSFUNCTION, UNSPECIFIED ERECTILE DYSFUNCTION TYPE: ICD-10-CM

## 2022-08-19 LAB — HBA1C MFR BLD: 6 % (ref 0–5.6)

## 2022-08-19 PROCEDURE — 99214 OFFICE O/P EST MOD 30 MIN: CPT | Mod: 25 | Performed by: INTERNAL MEDICINE

## 2022-08-19 PROCEDURE — 90472 IMMUNIZATION ADMIN EACH ADD: CPT | Performed by: INTERNAL MEDICINE

## 2022-08-19 PROCEDURE — 36415 COLL VENOUS BLD VENIPUNCTURE: CPT | Performed by: INTERNAL MEDICINE

## 2022-08-19 PROCEDURE — 90471 IMMUNIZATION ADMIN: CPT | Performed by: INTERNAL MEDICINE

## 2022-08-19 PROCEDURE — 83036 HEMOGLOBIN GLYCOSYLATED A1C: CPT | Performed by: INTERNAL MEDICINE

## 2022-08-19 PROCEDURE — 96127 BRIEF EMOTIONAL/BEHAV ASSMT: CPT | Mod: 59 | Performed by: INTERNAL MEDICINE

## 2022-08-19 PROCEDURE — 90750 HZV VACC RECOMBINANT IM: CPT | Performed by: INTERNAL MEDICINE

## 2022-08-19 PROCEDURE — 90677 PCV20 VACCINE IM: CPT | Performed by: INTERNAL MEDICINE

## 2022-08-19 RX ORDER — TADALAFIL 20 MG/1
20 TABLET ORAL DAILY PRN
Qty: 12 TABLET | Refills: 11 | Status: SHIPPED | OUTPATIENT
Start: 2022-08-19 | End: 2023-01-24

## 2022-08-19 RX ORDER — ESCITALOPRAM OXALATE 5 MG/1
5 TABLET ORAL DAILY
Qty: 90 TABLET | Refills: 1 | Status: ON HOLD | COMMUNITY
Start: 2022-08-19 | End: 2022-12-22

## 2022-08-19 RX ORDER — SEMAGLUTIDE 1.34 MG/ML
0.5 INJECTION, SOLUTION SUBCUTANEOUS
Qty: 1.5 ML | Refills: 11 | Status: SHIPPED | OUTPATIENT
Start: 2022-08-19 | End: 2023-01-03

## 2022-08-19 RX ORDER — LOSARTAN POTASSIUM 50 MG/1
50 TABLET ORAL DAILY
Qty: 90 TABLET | Refills: 3 | Status: SHIPPED | OUTPATIENT
Start: 2022-08-19 | End: 2023-08-01

## 2022-08-19 ASSESSMENT — PATIENT HEALTH QUESTIONNAIRE - PHQ9
SUM OF ALL RESPONSES TO PHQ QUESTIONS 1-9: 1
10. IF YOU CHECKED OFF ANY PROBLEMS, HOW DIFFICULT HAVE THESE PROBLEMS MADE IT FOR YOU TO DO YOUR WORK, TAKE CARE OF THINGS AT HOME, OR GET ALONG WITH OTHER PEOPLE: SOMEWHAT DIFFICULT
SUM OF ALL RESPONSES TO PHQ QUESTIONS 1-9: 1

## 2022-08-19 ASSESSMENT — ANXIETY QUESTIONNAIRES: GAD7 TOTAL SCORE: 0

## 2022-08-19 ASSESSMENT — PAIN SCALES - GENERAL: PAINLEVEL: NO PAIN (0)

## 2022-08-19 NOTE — PROGRESS NOTES
Assessment & Plan     Screen for colon cancer    - Colonscopy Screening  Referral; Future    Type 2 diabetes mellitus with hyperglycemia, with long-term current use of insulin (H)  Likely is improved based on CGM numbers.  Will check today.  If not at goal, has room to increase ozempic, and may need CGM details analyzed in more detail by MTM.  Appears to be at goal 86% of the time based on his li. obeisty complicates his treatment.   - semaglutide (OZEMPIC, 0.25 OR 0.5 MG/DOSE,) 2 MG/1.5ML SOPN pen; Inject 0.5 mg Subcutaneous every 7 days  - Hemoglobin A1c; Future  - Hemoglobin A1c    Morbid obesity (H)  Improved, but still severe.     SDH (subdural hematoma) (H)  No recurrence.  No aspirin.     Hypertension, unspecified type  At goal.  Off ACE inhibitor (cough)  - losartan (COZAAR) 50 MG tablet; Take 1 tablet (50 mg) by mouth daily    Erectile dysfunction, unspecified erectile dysfunction type  Change from silenafil to cialis.   - tadalafil (CIALIS) 20 MG tablet; Take 1 tablet (20 mg) by mouth daily as needed (ED)    Mild episode of recurrent major depressive disorder (H)  Much improved.    - escitalopram (LEXAPRO) 5 MG tablet; Take 1 tablet (5 mg) by mouth daily    Anxiety  Doing well on lexapro.   - escitalopram (LEXAPRO) 5 MG tablet; Take 1 tablet (5 mg) by mouth daily             See Patient Instructions    Return in about 6 months (around 2/19/2023) for diabetes followup, with me, in person.    Sandoval Wilkinson MD  [facility] [place] is a 52 year old, presenting for the following health issues:  Diabetes      History of Present Illness       Diabetes:   He presents for follow up of diabetes.  He is checking home blood glucose with a continuous glucose monitor.  He checks blood glucose before and after meals and at bedtime.  Blood glucose is sometimes over 200 and sometimes under 70. He is aware of hypoglycemia symptoms including lethargy. He is concerned about other.   He is having numbness in feet and burning in feet.         He eats 0-1 servings of fruits and vegetables daily.He consumes 1 sweetened beverage(s) daily.He exercises with enough effort to increase his heart rate 10 to 19 minutes per day.  He exercises with enough effort to increase his heart rate 3 or less days per week.   He is taking medications regularly.    Today's PHQ-9         PHQ-9 Total Score: 1    PHQ-9 Q9 Thoughts of better off dead/self-harm past 2 weeks :   Not at all    How difficult have these problems made it for you to do your work, take care of things at home, or get along with other people: Somewhat difficult  Today's SAGE-7 Score: 0       Diabetes Follow-up    How often are you checking your blood sugar? Continuous glucose monitor  What time of day are you checking your blood sugars (select all that apply)?  Not applicable  Have you had any blood sugars above 200?  Yes   Have you had any blood sugars below 70?  YES    What symptoms do you notice when your blood sugar is low?  Weak and Lethargy    What concerns do you have today about your diabetes? None     Do you have any of these symptoms? (Select all that apply)  Numbness in feet, Burning in feet and Blurry vision              Hyperlipidemia Follow-Up      Are you regularly taking any medication or supplement to lower your cholesterol? yes      Are you having muscle aches or other side effects that you think could be caused by your cholesterol lowering medication?  No    Hypertension Follow-up      Do you check your blood pressure regularly outside of the clinic? Yes     Are you following a low salt diet? No    Are your blood pressures ever more than 140 on the top number (systolic) OR more   than 90 on the bottom number (diastolic), for example 140/90? Yes     erectile dysfunction:  Not doing well even on 80 mg daily.    COugh has improved off lisinopril.    Blood sugars much better:  On 25 three times daily novolog, and basaglar 50 twice  daily.  Ozempic 0.5 mg weekly.  CGM:  Has been in range 86% of time.   His diabetes blood test (A1c) was 8.9 in May.  The CGM seems to be a great motivator for him.  Has been making healthy eating choices based on his sugars.      Weight is down 15 pounds from May.  He is very happy about this.      Mood and outlook is better.  Had a great visit with RABIA last month.  Has been on citalopram. This is new for him.  Not needing the hydroxyzine.  PHQ is great.  No side effects.     BP Readings from Last 2 Encounters:   08/19/22 110/74   05/02/22 134/85     Hemoglobin A1C (%)   Date Value   05/02/2022 8.9 (H)   01/12/2022 10.2 (H)   08/17/2020 7.6 (H)   05/18/2020 8.2 (H)     LDL Cholesterol Calculated (mg/dL)   Date Value   01/12/2022 48   05/18/2020 52   04/22/2019 57           Review of Systems   CONSTITUTIONAL: NEGATIVE for fever, chills, change in weight  INTEGUMENTARY/SKIN: NEGATIVE for worrisome rashes, moles or lesions  EYES: NEGATIVE for vision changes or irritation  ENT/MOUTH: NEGATIVE for ear, mouth and throat problems  RESP: NEGATIVE for significant cough or SOB  BREAST: NEGATIVE for masses, tenderness or discharge  CV: NEGATIVE for chest pain, palpitations or peripheral edema  GI: NEGATIVE for nausea, abdominal pain, heartburn, or change in bowel habits  : NEGATIVE for frequency, dysuria, or hematuria  MUSCULOSKELETAL: NEGATIVE for significant arthralgias or myalgia  NEURO: NEGATIVE for weakness, dizziness or paresthesias  ENDOCRINE: NEGATIVE for temperature intolerance, skin/hair changes  HEME: NEGATIVE for bleeding problems  PSYCHIATRIC: NEGATIVE for changes in mood or affect      Objective    /74 (BP Location: Right arm, Patient Position: Sitting, Cuff Size: Adult Regular)   Pulse 89   Temp 97.5  F (36.4  C) (Tympanic)   Resp 16   Wt 122.2 kg (269 lb 4.8 oz)   SpO2 96%   BMI 38.64 kg/m    Body mass index is 38.64 kg/m .  Physical Exam   GENERAL: healthy, alert and no distress  EYES: Eyes  grossly normal to inspection, PERRL and conjunctivae and sclerae normal  HENT: ear canals and TM's normal, nose and mouth without ulcers or lesions  NECK: no adenopathy, no asymmetry, masses, or scars and thyroid normal to palpation  RESP: lungs clear to auscultation - no rales, rhonchi or wheezes  CV: regular rate and rhythm, normal S1 S2, no S3 or S4, no murmur, click or rub, no peripheral edema and peripheral pulses strong  ABDOMEN: soft, nontender, no hepatosplenomegaly, no masses and bowel sounds normal  MS: no gross musculoskeletal defects noted, no edema  SKIN: no suspicious lesions or rashes  NEURO: Normal strength and tone, mentation intact and speech normal  PSYCH: mentation appears normal, affect normal/bright                    .  ..

## 2022-08-19 NOTE — PATIENT INSTRUCTIONS
Let's keep the ozempic at 0.5 for now.  We can incraese this if needed based on weight and your diabetes blood test (A1c).    Lab work today:  We can do labs in the exam room today, or you can get them done downstairs in the lab.        For now, keep your basaglar and novolog at same dose.    Great job with the weight loss!    May stop the sildenafil and begin trial of cialis.  No need to redose for 48-72 hours.      2 shots today.    They will call you for colonoscopy.

## 2022-10-09 ENCOUNTER — HEALTH MAINTENANCE LETTER (OUTPATIENT)
Age: 52
End: 2022-10-09

## 2022-10-28 ENCOUNTER — PRE VISIT (OUTPATIENT)
Dept: OTOLARYNGOLOGY | Facility: CLINIC | Age: 52
End: 2022-10-28

## 2022-11-02 ENCOUNTER — TELEPHONE (OUTPATIENT)
Dept: GASTROENTEROLOGY | Facility: CLINIC | Age: 52
End: 2022-11-02

## 2022-11-02 NOTE — TELEPHONE ENCOUNTER
Screening Questions  BLUE  KIND OF PREP RED  LOCATION [review exclusion criteria] GREEN  SEDATION TYPE        Y Are you active on mychart?       Sandoval Wilkinson  Ordering/Referring Provider?        [employer] What type of coverage do you have?      n Have you had a positive covid test in the last 90 days?     38.6 1. BMI  [BMI 40+ - review exclusion criteria]    Y  2. Are you able to give consent for your medical care? [IF NO,RN REVIEW]        N  3. Are you taking any prescription pain medications on a routine schedule?        3a. EXTENDED PREP What kind of prescription?     N 4. Do you have any chemical dependencies such as alcohol, street drugs, or methadone?    N 5. Do you have any history of post-traumatic stress syndrome, severe anxiety or history of psychosis?      **If yes 3- 5 , please schedule with MAC sedation.**          IF YES TO ANY 6 - 10 - HOSPITAL SETTING ONLY.     N 6.   Do you need assistance transferring?     N 7.   Have you had a heart or lung transplant?    N 8.   Are you currently on dialysis?   N 9.   Do you use daily home oxygen?   N 10. Do you take nitroglycerin?   10a. N If yes, how often?     11. [FEMALES]  N Are you currently pregnant?    11a. N If yes, how many weeks? [ Greater than 12 weeks, OR NEEDED]    N 12. Do you have Pulmonary Hypertension? *NEED PAC APPT AT [facility]*     N 13. [review exclusion criteria]  Do you have any implantable devices in your body (pacemaker, defib, LVAD)?    N 14. In the past 6 months, have you had any heart related issues including cardiomyopathy or heart attack?     14a. N If yes, did it require cardiac stenting if so when?     N 15. Have you had a stroke or Transient ischemic attack (TIA - aka  mini stroke ) within 6 months?      N 16. Do you have mod to severe Obstructive Sleep Apnea?  [Hospital only - Ok at [facility]]    N 17. Do you have SEVERE AND UNCONTROLLED asthma? *NEED PAC APPT AT [facility]*     N 18. Are you currently taking any blood thinners?     18a.  "If yes, inform patient to \"follow up w/ ordering provider for bridging instructions.\"    N 19. Do you take the medication Phentermine?    19a. If yes, \"Hold for 7 days before procedure.  Please consult your prescribing provider if you have questions about holding this medication.\"     N  20. Do you have chronic kidney disease?      Y TYPE 2  21. Do you have a diagnosis of diabetes?     N  22. On a regular basis do you go 3-5 days between bowel movements?      23. Preferred LOCAL Pharmacy for Pre Prescription    [ LIST ONLY ONE PHARMACY]        [id number] - [place], [place] - [address]  [address]          - CLOSING REMINDERS -    Informed patient they will need an adult    Cannot take any type of public or medical transportation alone    Conscious Sedation- Needs  for 6 hours after the procedure       MAC/General-Needs  for 24 hours after procedure    Pre-Procedure Covid test to be completed [[facility] PCR Testing Required]    Confirmed Nurse will call to complete assessment       - SCHEDULING DETAILS -     TONY  Surgeon    12/22/22  Date of Procedure  Lower Endoscopy [Colonoscopy]  Type of Procedure Scheduled   [facility]-If you answer yes to questions #8, #20, #21Which Colonoscopy Prep was Sent?     CS Sedation Type      PAC / Pre-op Required         Additional comments:            "

## 2022-11-25 DIAGNOSIS — R53.83 OTHER FATIGUE: Primary | ICD-10-CM

## 2022-11-27 ENCOUNTER — HOSPITAL ENCOUNTER (EMERGENCY)
Facility: CLINIC | Age: 52
Discharge: HOME | End: 2022-11-27
Attending: EMERGENCY MEDICINE | Admitting: EMERGENCY MEDICINE
Payer: COMMERCIAL

## 2022-11-27 ENCOUNTER — APPOINTMENT (OUTPATIENT)
Dept: GENERAL RADIOLOGY | Facility: CLINIC | Age: 52
End: 2022-11-27
Attending: EMERGENCY MEDICINE
Payer: COMMERCIAL

## 2022-11-27 VITALS
SYSTOLIC BLOOD PRESSURE: 142 MMHG | RESPIRATION RATE: 16 BRPM | OXYGEN SATURATION: 98 % | TEMPERATURE: 98.2 F | DIASTOLIC BLOOD PRESSURE: 80 MMHG | HEART RATE: 100 BPM

## 2022-11-27 DIAGNOSIS — S22.32XA CLOSED FRACTURE OF ONE RIB OF LEFT SIDE, INITIAL ENCOUNTER: ICD-10-CM

## 2022-11-27 PROCEDURE — 250N000013 HC RX MED GY IP 250 OP 250 PS 637: Performed by: EMERGENCY MEDICINE

## 2022-11-27 PROCEDURE — 99283 EMERGENCY DEPT VISIT LOW MDM: CPT | Mod: 25

## 2022-11-27 PROCEDURE — 71101 X-RAY EXAM UNILAT RIBS/CHEST: CPT | Mod: LT

## 2022-11-27 RX ORDER — LIDOCAINE 4 G/G
1 PATCH TOPICAL ONCE
Status: DISCONTINUED | OUTPATIENT
Start: 2022-11-27 | End: 2022-11-27 | Stop reason: HOSPADM

## 2022-11-27 RX ORDER — IBUPROFEN 600 MG/1
600 TABLET, FILM COATED ORAL ONCE
Status: COMPLETED | OUTPATIENT
Start: 2022-11-27 | End: 2022-11-27

## 2022-11-27 RX ORDER — HYDROCODONE BITARTRATE AND ACETAMINOPHEN 5; 325 MG/1; MG/1
1 TABLET ORAL EVERY 6 HOURS PRN
Qty: 6 TABLET | Refills: 0 | Status: ON HOLD | OUTPATIENT
Start: 2022-11-27 | End: 2022-12-22

## 2022-11-27 RX ORDER — ACETAMINOPHEN 500 MG
1000 TABLET ORAL ONCE
Status: COMPLETED | OUTPATIENT
Start: 2022-11-27 | End: 2022-11-27

## 2022-11-27 RX ADMIN — LIDOCAINE 1 PATCH: 246 PATCH TOPICAL at 21:37

## 2022-11-27 RX ADMIN — ACETAMINOPHEN 1000 MG: 500 TABLET ORAL at 21:37

## 2022-11-27 RX ADMIN — IBUPROFEN 600 MG: 600 TABLET, FILM COATED ORAL at 21:37

## 2022-11-27 ASSESSMENT — ENCOUNTER SYMPTOMS
SHORTNESS OF BREATH: 0
VOMITING: 0
FEVER: 0

## 2022-11-27 ASSESSMENT — ACTIVITIES OF DAILY LIVING (ADL): ADLS_ACUITY_SCORE: 35

## 2022-11-27 NOTE — ED TRIAGE NOTES
Pt states he was wrestling with a friend a week ago ad left a pop in his left rib.  No complains of pain with mvmt and dep breaths.     Triage Assessment     Row Name 11/27/22 1901       Triage Assessment (Adult)    Airway WDL WDL       Respiratory WDL    Respiratory WDL WDL

## 2022-11-28 NOTE — ED PROVIDER NOTES
History   Chief Complaint:  Rib Pain     The history is provided by the patient.      Jairo Escamilla is a 52 year old male with history of hypertension and type II diabetes mellitus who presents with left rib pain. He was wrestling a friend a week ago and heard a pop in his left chest followed by mild pain. A couple days later he felt this popping again and yesterday a final time followed by an increase in pain to 7/10. His pain is worsened by movement and deep inspiration. He denies vomiting, fever, or shortness of breath. He traveled to [place] three weeks ago. He denies any recent surgeries. He took Tylenol and Advil this morning.     Review of Systems   Constitutional: Negative for fever.   Respiratory: Negative for shortness of breath.    Gastrointestinal: Negative for vomiting.   Musculoskeletal:        Rib pain (+)   All other systems reviewed and are negative.    Allergies:  Lisinopril    Medications:  Escitalopram   Hydroxyzine   Insulin  Losartan   Metformin   Semaglutide   Sildenafil   Simvastatin   Tadalafil     Past Medical History:     Hyperlipidemia   Hypertension   Morbid obesity   Subdural hematoma   Type II diabetes mellitus   Peripheral polyneuropathy   Gastroenteritis      Family History:    Mother: diabetes, hypertension, cerebrovascular disease, obesity   Father: CAD, cerebrovascular disease     Social History:  The patient presents to the ED by means of car alone.   Physical Exam     Patient Vitals for the past 24 hrs:   BP Temp Pulse Resp SpO2   11/27/22 1740 (!) 142/80 98.2  F (36.8  C) 100 16 98 %     Physical Exam  General: Well-developed and well-nourished. Well appearing middle aged  man. Cooperative.  Head:  Atraumatic.  Eyes:  Conjunctivae, lids, and sclerae are normal.  Neck:  Supple. Normal range of motion.  CV:  Regular rate and rhythm. Normal heart sounds with no murmurs, rubs, or gallops detected.  Resp:  No respiratory distress. Clear to auscultation bilaterally  "without decreased breath sounds, wheezing, rales, or rhonchi.  GI:  Soft.  Protuberant.   MS:  Normal ROM. No bilateral lower extremity edema or calf tenderness.  Tenderness to palpation over the left mid to inferior anterolateral ribs without crepitus or step-off.  Skin:  Warm. Non-diaphoretic. No pallor.  Neuro:  Awake. A&Ox3. Normal strength.  Psych: Normal mood and affect. Normal speech.  Vitals reviewed.    Emergency Department Course   Imaging:  Ribs XR, unilat 3 views + PA chest,  left   Final Result   IMPRESSION: The visualized heart and lungs are negative. There is a nondisplaced fracture of the left ninth rib which is in close proximity to the external marker.        Report per radiology    Emergency Department Course:  Reviewed:  I reviewed nursing notes, vitals, and past medical history.    Assessments:  2120 I obtained history and examined the patient as noted above.     Interventions:  2137 Lidocaine 4% patch 1 patch transdermal  2137 Tylenol 1,000 mg PO  2137 Ibuprofen 600 mg PO     Disposition:  The patient was discharged.     Impression & Plan   Medical Decision Making:  Jairo is a 52 year old man who initially felt a left-sided rib pain and \"popping\" a week ago and has had worsening of this pain particularly after another \"popping\" yesterday which prompted his visit.  He does not have shortness of breath or any other concerns.  He appears well on exam although he does have tenderness over the left anterolateral ribs without crepitus or step-offs.  He was sent for x-ray which confirms a nondisplaced fracture of the left ninth rib.  I believe this rib fracture explains his pain and is consistent with his history of a popping sensation while wrestling.  He did have recent travel which could raise suspicion for pulmonary embolism but that is felt to be unlikely given his focal tenderness and imaging findings.  At this point there is no indication for further emergent work-up.  We will treat his " fracture expectantly and he was given first dose of Tylenol, ibuprofen, and a Lidoderm patch in the emergency department and instructed to continue these.  He was also provided with an incentive spirometer and I recommended he use this frequently.  I provided him with Norco as needed for more severe pain and encouraged him to follow-up with his primary care provider to ensure he is improving as expected.  He does understand indications for return and all of his questions were answered.  He verbalized understanding and is amenable to discharge.    Diagnosis:    ICD-10-CM    1. Closed fracture of one rib of left side, initial encounter  S22.32XA           Discharge Medications:  Discharge Medication List as of 11/27/2022  9:37 PM      START taking these medications    Details   HYDROcodone-acetaminophen (NORCO) 5-325 MG tablet Take 1 tablet by mouth every 6 hours as needed for severe pain (7-10), Disp-6 tablet, R-0, Local Print             Scribe Disclosure:  I, Joshua Kjer, am serving as a scribe at 9:17 PM on 11/27/2022 to document services personally performed by Macrina Foster MD based on my observations and the provider's statements to me.            Macrina Foster MD  12/20/22 0127

## 2022-11-28 NOTE — DISCHARGE INSTRUCTIONS
600 mg of ibuprofen every 6 hours for pain.  You may want take this with food as it can upset your stomach.  Purchase over-the-counter patches containing lidocaine.  Norco as needed for severe pain.  Incentive spirometer frequently.  Follow-up with primary care to ensure you are improving as expected.  Return immediately if you have worsening pain, fever, shortness of breath, or any other new or concerning symptoms.

## 2022-11-30 ENCOUNTER — TELEPHONE (OUTPATIENT)
Dept: PEDIATRICS | Facility: CLINIC | Age: 52
End: 2022-11-30

## 2022-11-30 NOTE — TELEPHONE ENCOUNTER
Prior Authorization Retail Medication Request    Medication/Dose: Ozempic 0.25 or 0.5mg/dose 2ml/1.5ml sopn pen  ICD code (if different than what is on RX):  E11.65, Z79.4  Previously Tried and Failed: NA  Rationale:  NA    Insurance Name:  [facility]  Insurance ID:  [id number]       Pharmacy Information (if different than what is on RX)  Name:  [facility]  Phone:  [phone]

## 2022-12-01 NOTE — TELEPHONE ENCOUNTER
Central Prior Authorization Team   Phone: [phone]      PA Initiation    Medication: Ozempic  Insurance Company: [employer] - Phone [phone] Fax [phone]  Pharmacy Filling the Rx: [id number] - [place], [place] - [address]  AT [address]  Filling Pharmacy Phone: [phone]  Filling Pharmacy Fax:    Start Date: 12/1/2022    Started PA on CMM and a response of Drug is covered by current benefit plan. No further PA activity needed.  Called pharmacy, she shows that a different [facility] filled the medication.

## 2022-12-15 RX ORDER — BISACODYL 5 MG
TABLET, DELAYED RELEASE (ENTERIC COATED) ORAL
Qty: 4 TABLET | Refills: 0 | Status: SHIPPED | OUTPATIENT
Start: 2022-12-15 | End: 2023-01-24

## 2022-12-22 ENCOUNTER — HOSPITAL ENCOUNTER (OUTPATIENT)
Facility: CLINIC | Age: 52
Discharge: HOME | End: 2022-12-22
Attending: SPECIALIST | Admitting: SPECIALIST
Payer: COMMERCIAL

## 2022-12-22 VITALS
HEART RATE: 85 BPM | RESPIRATION RATE: 14 BRPM | OXYGEN SATURATION: 92 % | SYSTOLIC BLOOD PRESSURE: 118 MMHG | DIASTOLIC BLOOD PRESSURE: 80 MMHG

## 2022-12-22 DIAGNOSIS — Z12.11 SPECIAL SCREENING FOR MALIGNANT NEOPLASMS, COLON: Primary | ICD-10-CM

## 2022-12-22 LAB
COLONOSCOPY: NORMAL
GLUCOSE BLDC GLUCOMTR-MCNC: 143 MG/DL (ref 70–99)

## 2022-12-22 PROCEDURE — 88305 TISSUE EXAM BY PATHOLOGIST: CPT | Mod: TC | Performed by: SPECIALIST

## 2022-12-22 PROCEDURE — 88305 TISSUE EXAM BY PATHOLOGIST: CPT | Mod: 26 | Performed by: PATHOLOGY

## 2022-12-22 PROCEDURE — 250N000011 HC RX IP 250 OP 636: Performed by: SPECIALIST

## 2022-12-22 PROCEDURE — 82962 GLUCOSE BLOOD TEST: CPT

## 2022-12-22 PROCEDURE — G0500 MOD SEDAT ENDO SERVICE >5YRS: HCPCS | Performed by: SPECIALIST

## 2022-12-22 PROCEDURE — 99153 MOD SED SAME PHYS/QHP EA: CPT | Performed by: SPECIALIST

## 2022-12-22 PROCEDURE — 45385 COLONOSCOPY W/LESION REMOVAL: CPT | Mod: PT | Performed by: SPECIALIST

## 2022-12-22 RX ORDER — LIDOCAINE 40 MG/G
CREAM TOPICAL
Status: DISCONTINUED | OUTPATIENT
Start: 2022-12-22 | End: 2022-12-22 | Stop reason: HOSPADM

## 2022-12-22 RX ORDER — ONDANSETRON 2 MG/ML
4 INJECTION INTRAMUSCULAR; INTRAVENOUS
Status: DISCONTINUED | OUTPATIENT
Start: 2022-12-22 | End: 2022-12-22 | Stop reason: HOSPADM

## 2022-12-22 RX ORDER — FENTANYL CITRATE 50 UG/ML
INJECTION, SOLUTION INTRAMUSCULAR; INTRAVENOUS PRN
Status: DISCONTINUED | OUTPATIENT
Start: 2022-12-22 | End: 2022-12-22 | Stop reason: HOSPADM

## 2022-12-22 ASSESSMENT — ACTIVITIES OF DAILY LIVING (ADL): ADLS_ACUITY_SCORE: 37

## 2022-12-22 NOTE — H&P
Pre-Endoscopy History and Physical     Jairo Escamilla MRN# [id number]   YOB: 1970 Age: 52 year old     Date of Procedure: 12/22/2022  Primary care provider: Sandoval Wilkinson  Type of Endoscopy: colonoscopy  Reason for Procedure: screening  Type of Anesthesia Anticipated: Moderate sedation    HPI:    Jairo is a 52 year old male who will be undergoing the above procedure.      A history and physical has been performed. The patient's medications and allergies have been reviewed. The risks and benefits of the procedure and the sedation options and risks were discussed with the patient.  All questions were answered and informed consent was obtained.      He denies a personal or family history of anesthesia complications or bleeding disorders.     Allergies   Allergen Reactions     Lisinopril      Cough          Current Facility-Administered Medications   Medication     fentaNYL (PF) (SUBLIMAZE) injection     lidocaine (LMX4) cream     lidocaine 1 % 0.1-1 mL     midazolam (VERSED) injection     ondansetron (ZOFRAN) injection 4 mg     sodium chloride (PF) 0.9% PF flush 3 mL     sodium chloride (PF) 0.9% PF flush 3 mL       Patient Active Problem List   Diagnosis     Hyperlipidemia LDL goal <100     Hypertension  BP goal <140/90     Morbid obesity (H)     SDH (subdural hematoma)     Type 2 diabetes mellitus with hyperglycemia (H)     Peripheral polyneuropathy     Gastroenteritis     Diabetes mellitus, type 2 (H)        Past Medical History:   Diagnosis Date     Diabetes mellitus (adult onset)      Diabetes mellitus, type 2 (H) 05/14/2008     Fracture of rib 2022     High cholesterol      Hypertension      Peripheral neuropathy      Pneumonia      Sleep apnea      Subdural hematoma         Past Surgical History:   Procedure Laterality Date     NO HISTORY OF SURGERY         Social History     Tobacco Use     Smoking status: Never     Smokeless tobacco: Never   Substance Use Topics     Alcohol use: Yes      "Alcohol/week: 0.0 standard drinks     Comment: Very little and infrequently       Family History   Problem Relation Age of Onset     Diabetes Mother      Hypertension Mother      Cerebrovascular Disease Mother      Obesity Mother      C.A.D. Father         in mid 60's     Cerebrovascular Disease Father      Diabetes Maternal Aunt          of Diabetic complications     Glaucoma Other      Cancer - colorectal No family hx of      Prostate Cancer No family hx of      Macular Degeneration No family hx of        REVIEW OF SYSTEMS:   5 point ROS negative except as noted above in HPI, including Gen., Resp., CV, GI &  system review.    PHYSICAL EXAM:   BP (!) (P) 143/87   Pulse (P) 89   Resp (P) 16   Ht (P) 1.778 m (5' 10\")   Wt (P) 122.5 kg (270 lb)   SpO2 (P) 96%   BMI (P) 38.74 kg/m   Estimated body mass index is 38.74 kg/m  (pended) as calculated from the following:    Height as of this encounter: (P) 1.778 m (5' 10\").    Weight as of this encounter: (P) 122.5 kg (270 lb).   GENERAL APPEARANCE: healthy and over weight  MENTAL STATUS: alert  AIRWAY EXAM: Mallampatti Class II (visualization of the soft palate, fauces, and uvula)  RESP: lungs clear to auscultation - no rales, rhonchi or wheezes  CV: regular rates and rhythm, normal S1 S2, no S3 or S4 and no murmur, click or rub    DIAGNOSTICS:    Not indicated    IMPRESSION   ASA Class 2 - Mild systemic disease    PLAN:   Colonoscopy    The above has been forwarded to the consulting provider.      Signed Electronically by: Josh Reeves MD  2022        "

## 2022-12-23 LAB
PATH REPORT.COMMENTS IMP SPEC: NORMAL
PATH REPORT.COMMENTS IMP SPEC: NORMAL
PATH REPORT.FINAL DX SPEC: NORMAL
PATH REPORT.GROSS SPEC: NORMAL
PATH REPORT.MICROSCOPIC SPEC OTHER STN: NORMAL
PATH REPORT.RELEVANT HX SPEC: NORMAL
PHOTO IMAGE: NORMAL

## 2022-12-26 NOTE — RESULT ENCOUNTER NOTE
Sandoval-    Adenomatous polyps are benign, but have malignant potential. This increases the risk of colon cancer and impacts the frequency of colonoscopy. Based on the updated polypectomy surveillance recommendations, individuals with 1-2 adenomas <10 mm should undergo surveillance colonoscopy in 7-10 years.    Hyperplastic polyps are benign; when small and in the sigmoid colon or rectum, they are regarded as having no malignant potential.     Recommendation: Surveillance colonoscopy in 7 years (2029).

## 2023-01-03 ENCOUNTER — MYC MEDICAL ADVICE (OUTPATIENT)
Dept: PEDIATRICS | Facility: CLINIC | Age: 53
End: 2023-01-03

## 2023-01-03 DIAGNOSIS — E11.65 TYPE 2 DIABETES MELLITUS WITH HYPERGLYCEMIA, WITH LONG-TERM CURRENT USE OF INSULIN (H): ICD-10-CM

## 2023-01-03 DIAGNOSIS — Z79.4 TYPE 2 DIABETES MELLITUS WITH HYPERGLYCEMIA, WITH LONG-TERM CURRENT USE OF INSULIN (H): ICD-10-CM

## 2023-01-03 RX ORDER — SEMAGLUTIDE 1.34 MG/ML
0.5 INJECTION, SOLUTION SUBCUTANEOUS
Qty: 1.5 ML | Refills: 5 | Status: SHIPPED | OUTPATIENT
Start: 2023-01-03 | End: 2023-04-24

## 2023-01-03 NOTE — TELEPHONE ENCOUNTER
Pharmacy transfer. Please see [employer] message.     Called downstairs and they have it in stock, routing high priority so patient has first dibs.     Pending Prescriptions:                       Disp   Refills    semaglutide (OZEMPIC (0.25 OR 0.5 MG/DOSE*1.5 mL 11           Sig: Inject 0.5 mg Subcutaneous every 7 days    QAMAR Newberry  [phone]

## 2023-01-03 NOTE — TELEPHONE ENCOUNTER
New pharmacy, has refills  Prescription approved per [facility] Refill Protocol.  Mari Martinez RN, BSN  [facility]

## 2023-01-23 ASSESSMENT — ANXIETY QUESTIONNAIRES
4. TROUBLE RELAXING: SEVERAL DAYS
1. FEELING NERVOUS, ANXIOUS, OR ON EDGE: NOT AT ALL
7. FEELING AFRAID AS IF SOMETHING AWFUL MIGHT HAPPEN: NOT AT ALL
IF YOU CHECKED OFF ANY PROBLEMS ON THIS QUESTIONNAIRE, HOW DIFFICULT HAVE THESE PROBLEMS MADE IT FOR YOU TO DO YOUR WORK, TAKE CARE OF THINGS AT HOME, OR GET ALONG WITH OTHER PEOPLE: NOT DIFFICULT AT ALL
6. BECOMING EASILY ANNOYED OR IRRITABLE: SEVERAL DAYS
GAD7 TOTAL SCORE: 3
5. BEING SO RESTLESS THAT IT IS HARD TO SIT STILL: NOT AT ALL
2. NOT BEING ABLE TO STOP OR CONTROL WORRYING: NOT AT ALL
8. IF YOU CHECKED OFF ANY PROBLEMS, HOW DIFFICULT HAVE THESE MADE IT FOR YOU TO DO YOUR WORK, TAKE CARE OF THINGS AT HOME, OR GET ALONG WITH OTHER PEOPLE?: NOT DIFFICULT AT ALL
3. WORRYING TOO MUCH ABOUT DIFFERENT THINGS: SEVERAL DAYS
7. FEELING AFRAID AS IF SOMETHING AWFUL MIGHT HAPPEN: NOT AT ALL
GAD7 TOTAL SCORE: 3

## 2023-01-24 ENCOUNTER — OFFICE VISIT (OUTPATIENT)
Dept: PEDIATRICS | Facility: CLINIC | Age: 53
End: 2023-01-24
Payer: COMMERCIAL

## 2023-01-24 VITALS
DIASTOLIC BLOOD PRESSURE: 78 MMHG | RESPIRATION RATE: 16 BRPM | WEIGHT: 283.3 LBS | SYSTOLIC BLOOD PRESSURE: 118 MMHG | TEMPERATURE: 97.7 F | OXYGEN SATURATION: 97 % | BODY MASS INDEX: 40.56 KG/M2 | HEART RATE: 86 BPM | HEIGHT: 70 IN

## 2023-01-24 DIAGNOSIS — N52.9 ERECTILE DYSFUNCTION, UNSPECIFIED ERECTILE DYSFUNCTION TYPE: ICD-10-CM

## 2023-01-24 DIAGNOSIS — I10 HYPERTENSION, UNSPECIFIED TYPE: ICD-10-CM

## 2023-01-24 DIAGNOSIS — E11.65 TYPE 2 DIABETES MELLITUS WITH HYPERGLYCEMIA, WITH LONG-TERM CURRENT USE OF INSULIN (H): Primary | ICD-10-CM

## 2023-01-24 DIAGNOSIS — Z79.4 TYPE 2 DIABETES MELLITUS WITH HYPERGLYCEMIA, WITH LONG-TERM CURRENT USE OF INSULIN (H): Primary | ICD-10-CM

## 2023-01-24 DIAGNOSIS — R53.83 OTHER FATIGUE: ICD-10-CM

## 2023-01-24 DIAGNOSIS — Z13.220 SCREENING FOR HYPERLIPIDEMIA: ICD-10-CM

## 2023-01-24 DIAGNOSIS — E66.01 MORBID OBESITY (H): ICD-10-CM

## 2023-01-24 DIAGNOSIS — E78.5 HYPERLIPIDEMIA LDL GOAL <100: ICD-10-CM

## 2023-01-24 PROBLEM — E11.9 DIABETES MELLITUS, TYPE 2 (H): Status: RESOLVED | Noted: 2020-08-11 | Resolved: 2023-01-24

## 2023-01-24 LAB
ALBUMIN SERPL BCG-MCNC: 4.4 G/DL (ref 3.5–5.2)
ALP SERPL-CCNC: 53 U/L (ref 40–129)
ALT SERPL W P-5'-P-CCNC: 61 U/L (ref 10–50)
ANION GAP SERPL CALCULATED.3IONS-SCNC: 14 MMOL/L (ref 7–15)
AST SERPL W P-5'-P-CCNC: 46 U/L (ref 10–50)
BASOPHILS # BLD AUTO: 0 10E3/UL (ref 0–0.2)
BASOPHILS NFR BLD AUTO: 1 %
BILIRUB SERPL-MCNC: 0.7 MG/DL
BUN SERPL-MCNC: 14.9 MG/DL (ref 6–20)
CALCIUM SERPL-MCNC: 9.8 MG/DL (ref 8.6–10)
CHLORIDE SERPL-SCNC: 103 MMOL/L (ref 98–107)
CHOLEST SERPL-MCNC: 143 MG/DL
CREAT SERPL-MCNC: 0.67 MG/DL (ref 0.67–1.17)
CREAT UR-MCNC: 92.6 MG/DL
DEPRECATED HCO3 PLAS-SCNC: 20 MMOL/L (ref 22–29)
EOSINOPHIL # BLD AUTO: 0.2 10E3/UL (ref 0–0.7)
EOSINOPHIL NFR BLD AUTO: 3 %
ERYTHROCYTE [DISTWIDTH] IN BLOOD BY AUTOMATED COUNT: 13.2 % (ref 10–15)
GFR SERPL CREATININE-BSD FRML MDRD: >90 ML/MIN/1.73M2
GLUCOSE SERPL-MCNC: 162 MG/DL (ref 70–99)
HBA1C MFR BLD: 8 % (ref 0–5.6)
HCT VFR BLD AUTO: 48.5 % (ref 40–53)
HDLC SERPL-MCNC: 35 MG/DL
HGB BLD-MCNC: 16.6 G/DL (ref 13.3–17.7)
LDLC SERPL CALC-MCNC: 52 MG/DL
LYMPHOCYTES # BLD AUTO: 1.7 10E3/UL (ref 0.8–5.3)
LYMPHOCYTES NFR BLD AUTO: 33 %
MCH RBC QN AUTO: 29.2 PG (ref 26.5–33)
MCHC RBC AUTO-ENTMCNC: 34.2 G/DL (ref 31.5–36.5)
MCV RBC AUTO: 85 FL (ref 78–100)
MICROALBUMIN UR-MCNC: <12 MG/L
MICROALBUMIN/CREAT UR: NORMAL MG/G{CREAT}
MONOCYTES # BLD AUTO: 0.6 10E3/UL (ref 0–1.3)
MONOCYTES NFR BLD AUTO: 13 %
NEUTROPHILS # BLD AUTO: 2.6 10E3/UL (ref 1.6–8.3)
NEUTROPHILS NFR BLD AUTO: 51 %
NONHDLC SERPL-MCNC: 108 MG/DL
PLATELET # BLD AUTO: 215 10E3/UL (ref 150–450)
POTASSIUM SERPL-SCNC: 4.5 MMOL/L (ref 3.4–5.3)
PROT SERPL-MCNC: 7.7 G/DL (ref 6.4–8.3)
RBC # BLD AUTO: 5.69 10E6/UL (ref 4.4–5.9)
SODIUM SERPL-SCNC: 137 MMOL/L (ref 136–145)
TRIGL SERPL-MCNC: 279 MG/DL
TSH SERPL DL<=0.005 MIU/L-ACNC: 2.51 UIU/ML (ref 0.3–4.2)
WBC # BLD AUTO: 5.1 10E3/UL (ref 4–11)

## 2023-01-24 PROCEDURE — 0124A COVID-19 VACCINE BIVALENT BOOSTER 12+ (PFIZER): CPT | Performed by: INTERNAL MEDICINE

## 2023-01-24 PROCEDURE — 36415 COLL VENOUS BLD VENIPUNCTURE: CPT | Performed by: INTERNAL MEDICINE

## 2023-01-24 PROCEDURE — 90682 RIV4 VACC RECOMBINANT DNA IM: CPT | Performed by: INTERNAL MEDICINE

## 2023-01-24 PROCEDURE — 80061 LIPID PANEL: CPT | Performed by: INTERNAL MEDICINE

## 2023-01-24 PROCEDURE — 83036 HEMOGLOBIN GLYCOSYLATED A1C: CPT | Performed by: INTERNAL MEDICINE

## 2023-01-24 PROCEDURE — 80050 GENERAL HEALTH PANEL: CPT | Performed by: INTERNAL MEDICINE

## 2023-01-24 PROCEDURE — 84403 ASSAY OF TOTAL TESTOSTERONE: CPT | Performed by: INTERNAL MEDICINE

## 2023-01-24 PROCEDURE — 90750 HZV VACC RECOMBINANT IM: CPT | Performed by: INTERNAL MEDICINE

## 2023-01-24 PROCEDURE — 90632 HEPA VACCINE ADULT IM: CPT | Performed by: INTERNAL MEDICINE

## 2023-01-24 PROCEDURE — 90472 IMMUNIZATION ADMIN EACH ADD: CPT | Performed by: INTERNAL MEDICINE

## 2023-01-24 PROCEDURE — 82043 UR ALBUMIN QUANTITATIVE: CPT | Performed by: INTERNAL MEDICINE

## 2023-01-24 PROCEDURE — 91312 COVID-19 VACCINE BIVALENT BOOSTER 12+ (PFIZER): CPT | Performed by: INTERNAL MEDICINE

## 2023-01-24 PROCEDURE — 99207 PR FOOT EXAM NO CHARGE: CPT | Performed by: INTERNAL MEDICINE

## 2023-01-24 PROCEDURE — 99214 OFFICE O/P EST MOD 30 MIN: CPT | Mod: 25 | Performed by: INTERNAL MEDICINE

## 2023-01-24 PROCEDURE — 90471 IMMUNIZATION ADMIN: CPT | Performed by: INTERNAL MEDICINE

## 2023-01-24 PROCEDURE — 82570 ASSAY OF URINE CREATININE: CPT | Performed by: INTERNAL MEDICINE

## 2023-01-24 RX ORDER — INSULIN GLARGINE 100 [IU]/ML
60 INJECTION, SOLUTION SUBCUTANEOUS 2 TIMES DAILY
Qty: 36 ML | Refills: 11 | Status: SHIPPED | OUTPATIENT
Start: 2023-01-24 | End: 2023-04-24

## 2023-01-24 RX ORDER — SIMVASTATIN 20 MG
20 TABLET ORAL DAILY
Qty: 90 TABLET | Refills: 3 | Status: SHIPPED | OUTPATIENT
Start: 2023-01-24 | End: 2023-04-11

## 2023-01-24 RX ORDER — TADALAFIL 20 MG/1
20 TABLET ORAL DAILY PRN
Qty: 12 TABLET | Refills: 11 | Status: SHIPPED | OUTPATIENT
Start: 2023-01-24 | End: 2023-08-01

## 2023-01-24 RX ORDER — METFORMIN HCL 500 MG
TABLET, EXTENDED RELEASE 24 HR ORAL
Qty: 360 TABLET | Refills: 3 | Status: SHIPPED | OUTPATIENT
Start: 2023-01-24 | End: 2023-04-11

## 2023-01-24 ASSESSMENT — PAIN SCALES - GENERAL: PAINLEVEL: NO PAIN (0)

## 2023-01-24 ASSESSMENT — ANXIETY QUESTIONNAIRES: GAD7 TOTAL SCORE: 3

## 2023-01-24 ASSESSMENT — PATIENT HEALTH QUESTIONNAIRE - PHQ9: SUM OF ALL RESPONSES TO PHQ QUESTIONS 1-9: 3

## 2023-01-24 NOTE — PATIENT INSTRUCTIONS
"If your diabetes blood test (A1c) is high again today, we'll likely go up to 60 units basaglar twice daily, and 1 mg every 7 days of the ozempic.    No change in oral meds.    Try to limit complex carbs (rice, bread, pasta, potatoes) and simple carbs (pop, juice, alcohol.)  Eating more lean proteins (chicken, fish, eggs, beans, nuts) and unlimited vegetables and fruits can definitely help as well.      Cardio exercise is probably the most helpful thing for your heart and future health.  Try to get about 30 minutes of sustained cardio exercise (biking, running, swimming, fast walking) every other day or even every day.  Keeping your heart rate at a \"target\" of (220 - your age) x 0.80 will be your goal.  For example, if you're 60 years old, this would be (220 - 60) x 0.80 = 128 beats per minute for those 30 minutes of exercise.   "

## 2023-01-24 NOTE — PROGRESS NOTES
"  Assessment & Plan     Screening for hyperlipidemia    - Lipid panel reflex to direct LDL Non-fasting; Future  - Lipid panel reflex to direct LDL Non-fasting    Type 2 diabetes mellitus with hyperglycemia, with long-term current use of insulin (H)  Poor control.    Patient Instructions   If your diabetes blood test (A1c) is high again today, we'll likely go up to 60 units basaglar twice daily, and 1 mg every 7 days of the ozempic.    No change in oral meds.    Try to limit complex carbs (rice, bread, pasta, potatoes) and simple carbs (pop, juice, alcohol.)  Eating more lean proteins (chicken, fish, eggs, beans, nuts) and unlimited vegetables and fruits can definitely help as well.      Cardio exercise is probably the most helpful thing for your heart and future health.  Try to get about 30 minutes of sustained cardio exercise (biking, running, swimming, fast walking) every other day or even every day.  Keeping your heart rate at a \"target\" of (220 - your age) x 0.80 will be your goal.  For example, if you're 60 years old, this would be (220 - 60) x 0.80 = 128 beats per minute for those 30 minutes of exercise.        - Albumin Random Urine Quantitative with Creat Ratio; Future  - FOOT EXAM  - Hemoglobin A1c; Future  - TSH with free T4 reflex; Future  - insulin glargine (BASAGLAR KWIKPEN) 100 UNIT/ML pen; Inject 60 Units Subcutaneous 2 times daily  - insulin aspart (NOVOLOG PEN) 100 UNIT/ML pen; Inject 25 Units Subcutaneous 3 times daily (with meals) Along with adjustment scale of 1:50 as directed up to 75 units daily.  - metFORMIN (GLUCOPHAGE XR) 500 MG 24 hr tablet; TAKE 2 TABLETS (1,000 MG) BY MOUTH 2 TIMES DAILY (WITH MEALS) **OFFICE VISIT NEEDED  - Semaglutide, 1 MG/DOSE, 4 MG/3ML SOPN; Inject 1 mg Subcutaneous every 7 days  - Albumin Random Urine Quantitative with Creat Ratio  - Hemoglobin A1c  - TSH with free T4 reflex    Hypertension, unspecified type  At goal.   - Comprehensive metabolic panel (BMP + Alb, " "Alk Phos, ALT, AST, Total. Bili, TP); Future  - Comprehensive metabolic panel (BMP + Alb, Alk Phos, ALT, AST, Total. Bili, TP)    Hyperlipidemia LDL goal <100  Continue statin.   - Comprehensive metabolic panel (BMP + Alb, Alk Phos, ALT, AST, Total. Bili, TP); Future  - simvastatin (ZOCOR) 20 MG tablet; Take 1 tablet (20 mg) by mouth daily  - Comprehensive metabolic panel (BMP + Alb, Alk Phos, ALT, AST, Total. Bili, TP)    Morbid obesity (H)  Discussed 10 pound weight loss; carbs and cardio.     Other fatigue    - Testosterone, total; Future  - TSH with free T4 reflex; Future  - CBC with platelets and differential; Future  - Testosterone, total  - TSH with free T4 reflex  - CBC with platelets and differential    Erectile dysfunction, unspecified erectile dysfunction type    - tadalafil (CIALIS) 20 MG tablet; Take 1 tablet (20 mg) by mouth daily as needed (ED)             BMI:   Estimated body mass index is 40.2 kg/m  as calculated from the following:    Height as of this encounter: 1.788 m (5' 10.39\").    Weight as of this encounter: 128.5 kg (283 lb 4.8 oz).   Weight management plan: Patient was referred to their PCP to discuss a diet and exercise plan.    See Patient Instructions    Return in about 3 months (around 4/24/2023) for diabetes followup, with me, in person.    Sandoval Wilkinson MD  [facility] DANIELA NGUYEN is a 52 year old, presenting for the following health issues:  Diabetes      History of Present Illness       Diabetes:   He presents for follow up of diabetes.  He is checking home blood glucose with a continuous glucose monitor.  He checks blood glucose before meals, after meals, before and after meals and at bedtime.  Blood glucose is sometimes over 200 and never under 70. He is aware of hypoglycemia symptoms including lethargy. He is concerned about blood sugar frequently over 200.  He is having numbness in feet and burning in feet.         Hypertension: He presents for " follow up of hypertension.  He does not check blood pressure  regularly outside of the clinic. Outside blood pressures have been over 140/90. He does not follow a low salt diet.     Hypothyroidism:     Since last visit, patient describes the following symptoms::  Anxiety, Depression, Fatigue and Loose stools   Today's SAGE-7 Score: 3       Diabetes Follow-up      How often are you checking your blood sugar? Not at all    What concerns do you have today about your diabetes? None and Other: medication mot working as good     Do you have any of these symptoms? (Select all that apply)  Numbness in feet, Burning in feet and Weight gain      Sugars:  Had issues with getting ozempic.   Now back on it for last 3 weeks.  Last few weeks has improved.      AM sugars 150s, and after meals running above 200s.    Still on 50 twice daily basaglar, and 25 three times daily novolog with meals.    Eats only twice daily, work schedule makes him eat dinner at 10 PM.  Skips breakfast.  Eats small lunch at work. Minimal pop or juice.  Does diet sodas.                 Hyperlipidemia Follow-Up      Are you regularly taking any medication or supplement to lower your cholesterol?   Yes- Zocor    Are you having muscle aches or other side effects that you think could be caused by your cholesterol lowering medication?  No    Hypertension Follow-up      Do you check your blood pressure regularly outside of the clinic? No     Are you following a low salt diet? No    Are your blood pressures ever more than 140 on the top number (systolic) OR more   than 90 on the bottom number (diastolic), for example 140/90? No    BP Readings from Last 2 Encounters:   01/24/23 118/78   12/22/22 118/80     Hemoglobin A1C (%)   Date Value   08/19/2022 6.0 (H)   05/02/2022 8.9 (H)   08/17/2020 7.6 (H)   05/18/2020 8.2 (H)     LDL Cholesterol Calculated (mg/dL)   Date Value   01/12/2022 48   05/18/2020 52   04/22/2019 57           Review of Systems   CONSTITUTIONAL:  "NEGATIVE for fever, chills, change in weight  INTEGUMENTARY/SKIN: NEGATIVE for worrisome rashes, moles or lesions  EYES: NEGATIVE for vision changes or irritation  ENT/MOUTH: NEGATIVE for ear, mouth and throat problems  RESP: NEGATIVE for significant cough or SOB  BREAST: NEGATIVE for masses, tenderness or discharge  CV: NEGATIVE for chest pain, palpitations or peripheral edema  GI: NEGATIVE for nausea, abdominal pain, heartburn, or change in bowel habits  : NEGATIVE for frequency, dysuria, or hematuria  MUSCULOSKELETAL: NEGATIVE for significant arthralgias or myalgia  NEURO: NEGATIVE for weakness, dizziness or paresthesias  ENDOCRINE: NEGATIVE for temperature intolerance, skin/hair changes  HEME: NEGATIVE for bleeding problems  PSYCHIATRIC: NEGATIVE for changes in mood or affect      Objective    /78   Pulse 86   Temp 97.7  F (36.5  C) (Tympanic)   Resp 16   Ht 1.788 m (5' 10.39\")   Wt 128.5 kg (283 lb 4.8 oz)   SpO2 97%   BMI 40.20 kg/m    Body mass index is 40.2 kg/m .  Physical Exam   GENERAL: healthy, alert and no distress  EYES: Eyes grossly normal to inspection, PERRL and conjunctivae and sclerae normal  HENT: ear canals and TM's normal, nose and mouth without ulcers or lesions  NECK: no adenopathy, no asymmetry, masses, or scars and thyroid normal to palpation  RESP: lungs clear to auscultation - no rales, rhonchi or wheezes  CV: regular rate and rhythm, normal S1 S2, no S3 or S4, no murmur, click or rub, no peripheral edema and peripheral pulses strong  ABDOMEN: soft, nontender, no hepatosplenomegaly, no masses and bowel sounds normal  MS: no gross musculoskeletal defects noted, no edema  SKIN: no suspicious lesions or rashes  NEURO: Normal strength and tone, mentation intact and speech normal  PSYCH: mentation appears normal, affect normal/bright                    "

## 2023-01-24 NOTE — LETTER
January 26, 2023      JAIRO CASTANEDA Andi  [address] [place] [address]        Jairo,     Your testosterone was slightly low, but as we discussed, I'd rather we recheck this with a First AM blood draw to ensure that it is low, before committing you to testosterone shots or gels.     Let's have you set up a repeat lab draw, first AM(7 or 8 AM) to be sure.  You can schedule this by calling  or using your [employer] account, if you have it.     Resulted Orders   Lipid panel reflex to direct LDL Non-fasting   Result Value Ref Range    Cholesterol 143 <200 mg/dL    Triglycerides 279 (H) <150 mg/dL    Direct Measure HDL 35 (L) >=40 mg/dL    LDL Cholesterol Calculated 52 <=100 mg/dL    Non HDL Cholesterol 108 <130 mg/dL    Narrative    Cholesterol  Desirable:  <200 mg/dL    Triglycerides  Normal:  Less than 150 mg/dL  Borderline High:  150-199 mg/dL  High:  200-499 mg/dL  Very High:  Greater than or equal to 500 mg/dL    Direct Measure HDL  Female:  Greater than or equal to 50 mg/dL   Male:  Greater than or equal to 40 mg/dL    LDL Cholesterol  Desirable:  <100mg/dL  Above Desirable:  100-129 mg/dL   Borderline High:  130-159 mg/dL   High:  160-189 mg/dL   Very High:  >= 190 mg/dL    Non HDL Cholesterol  Desirable:  130 mg/dL  Above Desirable:  130-159 mg/dL  Borderline High:  160-189 mg/dL  High:  190-219 mg/dL  Very High:  Greater than or equal to 220 mg/dL   Albumin Random Urine Quantitative with Creat Ratio   Result Value Ref Range    Creatinine Urine mg/dL 92.6 mg/dL      Comment:      The reference ranges have not been established in urine creatinine. The results should be integrated into the clinical context for interpretation.    Albumin Urine mg/L <12.0 mg/L      Comment:      The reference ranges have not been established in urine albumin. The results should be integrated into the clinical context for interpretation.    Albumin Urine mg/g Cr        Comment:      Unable to calculate, urine albumin  and/or urine creatinine is outside detectable limits.  Microalbuminuria is defined as an albumin:creatinine ratio of 17 to 299 for males and 25 to 299 for females. A ratio of albumin:creatinine of 300 or higher is indicative of overt proteinuria.  Due to biologic variability, positive results should be confirmed by a second, first-morning random or 24-hour timed urine specimen. If there is discrepancy, a third specimen is recommended. When 2 out of 3 results are in the microalbuminuria range, this is evidence for incipient nephropathy and warrants increased efforts at glucose control, blood pressure control, and institution of therapy with an angiotensin-converting-enzyme (ACE) inhibitor (if the patient can tolerate it).     Hemoglobin A1c   Result Value Ref Range    Hemoglobin A1C 8.0 (H) 0.0 - 5.6 %      Comment:      Normal <5.7%   Prediabetes 5.7-6.4%    Diabetes 6.5% or higher     Note: Adopted from ADA consensus guidelines.   Testosterone, total   Result Value Ref Range    Testosterone Total 189 (L) 240 - 950 ng/dL   Comprehensive metabolic panel (BMP + Alb, Alk Phos, ALT, AST, Total. Bili, TP)   Result Value Ref Range    Sodium 137 136 - 145 mmol/L    Potassium 4.5 3.4 - 5.3 mmol/L    Chloride 103 98 - 107 mmol/L    Carbon Dioxide (CO2) 20 (L) 22 - 29 mmol/L    Anion Gap 14 7 - 15 mmol/L    Urea Nitrogen 14.9 6.0 - 20.0 mg/dL    Creatinine 0.67 0.67 - 1.17 mg/dL    Calcium 9.8 8.6 - 10.0 mg/dL    Glucose 162 (H) 70 - 99 mg/dL    Alkaline Phosphatase 53 40 - 129 U/L    AST 46 10 - 50 U/L    ALT 61 (H) 10 - 50 U/L    Protein Total 7.7 6.4 - 8.3 g/dL    Albumin 4.4 3.5 - 5.2 g/dL    Bilirubin Total 0.7 <=1.2 mg/dL    GFR Estimate >90 >60 mL/min/1.73m2      Comment:      eGFR calculated using 2021 CKD-EPI equation.   TSH with free T4 reflex   Result Value Ref Range    TSH 2.51 0.30 - 4.20 uIU/mL   CBC with platelets and differential   Result Value Ref Range    WBC Count 5.1 4.0 - 11.0 10e3/uL    RBC Count 5.69  4.40 - 5.90 10e6/uL    Hemoglobin 16.6 13.3 - 17.7 g/dL    Hematocrit 48.5 40.0 - 53.0 %    MCV 85 78 - 100 fL    MCH 29.2 26.5 - 33.0 pg    MCHC 34.2 31.5 - 36.5 g/dL    RDW 13.2 10.0 - 15.0 %    Platelet Count 215 150 - 450 10e3/uL    % Neutrophils 51 %    % Lymphocytes 33 %    % Monocytes 13 %    % Eosinophils 3 %    % Basophils 1 %    Absolute Neutrophils 2.6 1.6 - 8.3 10e3/uL    Absolute Lymphocytes 1.7 0.8 - 5.3 10e3/uL    Absolute Monocytes 0.6 0.0 - 1.3 10e3/uL    Absolute Eosinophils 0.2 0.0 - 0.7 10e3/uL    Absolute Basophils 0.0 0.0 - 0.2 10e3/uL       Hope you're well.     Sandoval Wilkinson MD   Internal Medicine and Pediatrics

## 2023-01-26 LAB — TESTOST SERPL-MCNC: 189 NG/DL (ref 240–950)

## 2023-02-18 ENCOUNTER — HEALTH MAINTENANCE LETTER (OUTPATIENT)
Age: 53
End: 2023-02-18

## 2023-04-05 DIAGNOSIS — Z79.4 TYPE 2 DIABETES MELLITUS WITH HYPERGLYCEMIA, WITH LONG-TERM CURRENT USE OF INSULIN (H): ICD-10-CM

## 2023-04-05 DIAGNOSIS — E11.65 TYPE 2 DIABETES MELLITUS WITH HYPERGLYCEMIA, WITH LONG-TERM CURRENT USE OF INSULIN (H): ICD-10-CM

## 2023-04-06 ENCOUNTER — E-VISIT (OUTPATIENT)
Dept: PEDIATRICS | Facility: CLINIC | Age: 53
End: 2023-04-06
Payer: COMMERCIAL

## 2023-04-06 DIAGNOSIS — T14.8XXA BLISTER: Primary | ICD-10-CM

## 2023-04-06 PROCEDURE — 99421 OL DIG E/M SVC 5-10 MIN: CPT | Performed by: PEDIATRICS

## 2023-04-06 RX ORDER — INSULIN GLARGINE 100 [IU]/ML
INJECTION, SOLUTION SUBCUTANEOUS
Qty: 90 ML | OUTPATIENT
Start: 2023-04-06

## 2023-04-06 NOTE — TELEPHONE ENCOUNTER
Already approved insulin glargine (BASAGLAR KWIKPEN) 100 UNIT/ML pen (36 mLs with 11 refills on 1/24/2023). Should already have refills on file.     Vaishnavi SCHULTE RN   [facility]

## 2023-04-06 NOTE — PATIENT INSTRUCTIONS
"  Dear Jairo Wilkinson is out of the office.    That blister looks painful!    I recommend:    1. Daily cleaning with warm soap/water, pat dry with clean cloth.  2. I recommend getting a \"hydrocolloid\" dressing to then cover the blister.  These are larger bandaids with a gel in it that promotes healing. See picture below (or something similar)  3. Given that you have diabetes, your healing may take longer than normal.  If there is any redness that starts spreading, please let us know right away.            Thanks for choosing us as your health care partner,    Mena Bertrand MD  "

## 2023-04-08 DIAGNOSIS — E78.5 HYPERLIPIDEMIA LDL GOAL <100: ICD-10-CM

## 2023-04-08 DIAGNOSIS — E11.65 TYPE 2 DIABETES MELLITUS WITH HYPERGLYCEMIA, WITH LONG-TERM CURRENT USE OF INSULIN (H): ICD-10-CM

## 2023-04-08 DIAGNOSIS — Z79.4 TYPE 2 DIABETES MELLITUS WITH HYPERGLYCEMIA, WITH LONG-TERM CURRENT USE OF INSULIN (H): ICD-10-CM

## 2023-04-11 ENCOUNTER — E-VISIT (OUTPATIENT)
Dept: PEDIATRICS | Facility: CLINIC | Age: 53
End: 2023-04-11

## 2023-04-11 DIAGNOSIS — M79.675 PAIN OF TOE OF LEFT FOOT: Primary | ICD-10-CM

## 2023-04-11 PROCEDURE — 99207 PR NON-BILLABLE SERV PER CHARTING: CPT | Performed by: INTERNAL MEDICINE

## 2023-04-11 RX ORDER — METFORMIN HCL 500 MG
TABLET, EXTENDED RELEASE 24 HR ORAL
Qty: 360 TABLET | Refills: 2 | Status: SHIPPED | OUTPATIENT
Start: 2023-04-11 | End: 2024-01-15

## 2023-04-11 RX ORDER — SIMVASTATIN 20 MG
TABLET ORAL
Qty: 90 TABLET | Refills: 2 | Status: SHIPPED | OUTPATIENT
Start: 2023-04-11 | End: 2023-12-26

## 2023-04-19 ENCOUNTER — LAB (OUTPATIENT)
Dept: LAB | Facility: CLINIC | Age: 53
End: 2023-04-19
Payer: COMMERCIAL

## 2023-04-19 DIAGNOSIS — R53.83 OTHER FATIGUE: ICD-10-CM

## 2023-04-19 LAB
BASOPHILS # BLD AUTO: 0 10E3/UL (ref 0–0.2)
BASOPHILS NFR BLD AUTO: 1 %
EOSINOPHIL # BLD AUTO: 0.1 10E3/UL (ref 0–0.7)
EOSINOPHIL NFR BLD AUTO: 2 %
ERYTHROCYTE [DISTWIDTH] IN BLOOD BY AUTOMATED COUNT: 13.5 % (ref 10–15)
HBA1C MFR BLD: 6.8 % (ref 0–5.6)
HCT VFR BLD AUTO: 43.7 % (ref 40–53)
HGB BLD-MCNC: 14.6 G/DL (ref 13.3–17.7)
LYMPHOCYTES # BLD AUTO: 1.8 10E3/UL (ref 0.8–5.3)
LYMPHOCYTES NFR BLD AUTO: 30 %
MCH RBC QN AUTO: 28.8 PG (ref 26.5–33)
MCHC RBC AUTO-ENTMCNC: 33.4 G/DL (ref 31.5–36.5)
MCV RBC AUTO: 86 FL (ref 78–100)
MONOCYTES # BLD AUTO: 0.6 10E3/UL (ref 0–1.3)
MONOCYTES NFR BLD AUTO: 9 %
NEUTROPHILS # BLD AUTO: 3.5 10E3/UL (ref 1.6–8.3)
NEUTROPHILS NFR BLD AUTO: 58 %
PLATELET # BLD AUTO: 220 10E3/UL (ref 150–450)
RBC # BLD AUTO: 5.07 10E6/UL (ref 4.4–5.9)
TSH SERPL DL<=0.005 MIU/L-ACNC: 2.3 UIU/ML (ref 0.3–4.2)
WBC # BLD AUTO: 6 10E3/UL (ref 4–11)

## 2023-04-19 PROCEDURE — 36415 COLL VENOUS BLD VENIPUNCTURE: CPT

## 2023-04-19 PROCEDURE — 84403 ASSAY OF TOTAL TESTOSTERONE: CPT

## 2023-04-19 PROCEDURE — 84443 ASSAY THYROID STIM HORMONE: CPT

## 2023-04-19 PROCEDURE — 85025 COMPLETE CBC W/AUTO DIFF WBC: CPT

## 2023-04-19 PROCEDURE — 83036 HEMOGLOBIN GLYCOSYLATED A1C: CPT

## 2023-04-19 NOTE — LETTER
April 24, 2023      WENDY Escamilla  [address] [place] [address]        Dear ,    We are writing to inform you of your test results.      Your testosterone levels came back low again.  Based on this, we should consider starting you on some testosterone replacement.  Please set up a time to discuss this, either in person or virtually.     Hope you're well.     Sandoval Wilkinson MD   Internal Medicine and Pediatrics     Resulted Orders   Testosterone, total   Result Value Ref Range    Testosterone Total 177 (L) 240 - 950 ng/dL   TSH with free T4 reflex   Result Value Ref Range    TSH 2.30 0.30 - 4.20 uIU/mL   Hemoglobin A1c   Result Value Ref Range    Hemoglobin A1C 6.8 (H) 0.0 - 5.6 %      Comment:      Normal <5.7%   Prediabetes 5.7-6.4%    Diabetes 6.5% or higher     Note: Adopted from ADA consensus guidelines.   CBC with platelets and differential   Result Value Ref Range    WBC Count 6.0 4.0 - 11.0 10e3/uL    RBC Count 5.07 4.40 - 5.90 10e6/uL    Hemoglobin 14.6 13.3 - 17.7 g/dL    Hematocrit 43.7 40.0 - 53.0 %    MCV 86 78 - 100 fL    MCH 28.8 26.5 - 33.0 pg    MCHC 33.4 31.5 - 36.5 g/dL    RDW 13.5 10.0 - 15.0 %    Platelet Count 220 150 - 450 10e3/uL    % Neutrophils 58 %    % Lymphocytes 30 %    % Monocytes 9 %    % Eosinophils 2 %    % Basophils 1 %    Absolute Neutrophils 3.5 1.6 - 8.3 10e3/uL    Absolute Lymphocytes 1.8 0.8 - 5.3 10e3/uL    Absolute Monocytes 0.6 0.0 - 1.3 10e3/uL    Absolute Eosinophils 0.1 0.0 - 0.7 10e3/uL    Absolute Basophils 0.0 0.0 - 0.2 10e3/uL

## 2023-04-21 PROBLEM — E29.1 HYPOGONADISM MALE: Status: ACTIVE | Noted: 2023-04-21

## 2023-04-21 LAB — TESTOST SERPL-MCNC: 177 NG/DL (ref 240–950)

## 2023-04-21 NOTE — RESULT ENCOUNTER NOTE
Carito NGUYEN ,    The results from your recent lab work are within normal limits.    -All of your labs are normal.      Thank you for choosing [facility] for your health care needs,      Audrey Hurt PA-C

## 2023-04-24 ENCOUNTER — OFFICE VISIT (OUTPATIENT)
Dept: PEDIATRICS | Facility: CLINIC | Age: 53
End: 2023-04-24
Payer: COMMERCIAL

## 2023-04-24 VITALS
SYSTOLIC BLOOD PRESSURE: 112 MMHG | BODY MASS INDEX: 39.94 KG/M2 | HEART RATE: 92 BPM | OXYGEN SATURATION: 97 % | WEIGHT: 279 LBS | HEIGHT: 70 IN | RESPIRATION RATE: 16 BRPM | DIASTOLIC BLOOD PRESSURE: 78 MMHG | TEMPERATURE: 98.8 F

## 2023-04-24 DIAGNOSIS — E29.1 HYPOGONADISM MALE: ICD-10-CM

## 2023-04-24 DIAGNOSIS — E11.65 TYPE 2 DIABETES MELLITUS WITH HYPERGLYCEMIA, WITH LONG-TERM CURRENT USE OF INSULIN (H): Primary | ICD-10-CM

## 2023-04-24 DIAGNOSIS — E11.65 TYPE 2 DIABETES MELLITUS WITH HYPERGLYCEMIA, WITH LONG-TERM CURRENT USE OF INSULIN (H): ICD-10-CM

## 2023-04-24 DIAGNOSIS — E11.621 DIABETIC ULCER OF TOE OF LEFT FOOT ASSOCIATED WITH TYPE 2 DIABETES MELLITUS, UNSPECIFIED ULCER STAGE (H): ICD-10-CM

## 2023-04-24 DIAGNOSIS — Z79.4 TYPE 2 DIABETES MELLITUS WITH HYPERGLYCEMIA, WITH LONG-TERM CURRENT USE OF INSULIN (H): Primary | ICD-10-CM

## 2023-04-24 DIAGNOSIS — E29.1 HYPOGONADISM MALE: Primary | ICD-10-CM

## 2023-04-24 DIAGNOSIS — Z79.4 TYPE 2 DIABETES MELLITUS WITH HYPERGLYCEMIA, WITH LONG-TERM CURRENT USE OF INSULIN (H): ICD-10-CM

## 2023-04-24 DIAGNOSIS — S06.5XAA SDH (SUBDURAL HEMATOMA) (H): ICD-10-CM

## 2023-04-24 DIAGNOSIS — L97.529 DIABETIC ULCER OF TOE OF LEFT FOOT ASSOCIATED WITH TYPE 2 DIABETES MELLITUS, UNSPECIFIED ULCER STAGE (H): ICD-10-CM

## 2023-04-24 DIAGNOSIS — F33.0 MILD EPISODE OF RECURRENT MAJOR DEPRESSIVE DISORDER (H): ICD-10-CM

## 2023-04-24 PROCEDURE — 90715 TDAP VACCINE 7 YRS/> IM: CPT | Performed by: INTERNAL MEDICINE

## 2023-04-24 PROCEDURE — 99214 OFFICE O/P EST MOD 30 MIN: CPT | Mod: 25 | Performed by: INTERNAL MEDICINE

## 2023-04-24 PROCEDURE — 90471 IMMUNIZATION ADMIN: CPT | Performed by: INTERNAL MEDICINE

## 2023-04-24 PROCEDURE — 96127 BRIEF EMOTIONAL/BEHAV ASSMT: CPT | Performed by: INTERNAL MEDICINE

## 2023-04-24 PROCEDURE — 99207 PR FOOT EXAM NO CHARGE: CPT | Performed by: INTERNAL MEDICINE

## 2023-04-24 RX ORDER — TESTOSTERONE CYPIONATE 1000 MG/10ML
100 INJECTION, SOLUTION INTRAMUSCULAR
Qty: 2 ML | Refills: 2 | Status: SHIPPED | OUTPATIENT
Start: 2023-04-24 | End: 2023-08-01

## 2023-04-24 RX ORDER — INSULIN GLARGINE 100 [IU]/ML
60 INJECTION, SOLUTION SUBCUTANEOUS 2 TIMES DAILY
Qty: 36 ML | Refills: 11 | Status: SHIPPED | OUTPATIENT
Start: 2023-04-24 | End: 2024-02-01

## 2023-04-24 RX ORDER — SEMAGLUTIDE 1.34 MG/ML
0.5 INJECTION, SOLUTION SUBCUTANEOUS
Qty: 4.5 ML | Refills: 3 | Status: ON HOLD | OUTPATIENT
Start: 2023-04-24 | End: 2023-10-01

## 2023-04-24 ASSESSMENT — ANXIETY QUESTIONNAIRES
7. FEELING AFRAID AS IF SOMETHING AWFUL MIGHT HAPPEN: NOT AT ALL
IF YOU CHECKED OFF ANY PROBLEMS ON THIS QUESTIONNAIRE, HOW DIFFICULT HAVE THESE PROBLEMS MADE IT FOR YOU TO DO YOUR WORK, TAKE CARE OF THINGS AT HOME, OR GET ALONG WITH OTHER PEOPLE: NOT DIFFICULT AT ALL
2. NOT BEING ABLE TO STOP OR CONTROL WORRYING: SEVERAL DAYS
8. IF YOU CHECKED OFF ANY PROBLEMS, HOW DIFFICULT HAVE THESE MADE IT FOR YOU TO DO YOUR WORK, TAKE CARE OF THINGS AT HOME, OR GET ALONG WITH OTHER PEOPLE?: NOT DIFFICULT AT ALL
GAD7 TOTAL SCORE: 4
7. FEELING AFRAID AS IF SOMETHING AWFUL MIGHT HAPPEN: NOT AT ALL
5. BEING SO RESTLESS THAT IT IS HARD TO SIT STILL: NOT AT ALL
GAD7 TOTAL SCORE: 4
1. FEELING NERVOUS, ANXIOUS, OR ON EDGE: SEVERAL DAYS
4. TROUBLE RELAXING: NOT AT ALL
GAD7 TOTAL SCORE: 4
6. BECOMING EASILY ANNOYED OR IRRITABLE: SEVERAL DAYS
3. WORRYING TOO MUCH ABOUT DIFFERENT THINGS: SEVERAL DAYS

## 2023-04-24 ASSESSMENT — PAIN SCALES - GENERAL: PAINLEVEL: NO PAIN (0)

## 2023-04-24 ASSESSMENT — PATIENT HEALTH QUESTIONNAIRE - PHQ9
10. IF YOU CHECKED OFF ANY PROBLEMS, HOW DIFFICULT HAVE THESE PROBLEMS MADE IT FOR YOU TO DO YOUR WORK, TAKE CARE OF THINGS AT HOME, OR GET ALONG WITH OTHER PEOPLE: NOT DIFFICULT AT ALL
SUM OF ALL RESPONSES TO PHQ QUESTIONS 1-9: 6
SUM OF ALL RESPONSES TO PHQ QUESTIONS 1-9: 6

## 2023-04-24 NOTE — PROGRESS NOTES
Assessment & Plan     Type 2 diabetes mellitus with hyperglycemia, with long-term current use of insulin (H)  Adequate control, but side effects on 1 mg ozempic; decrease back to 0.5 and follow up in 3 months.  Other Parameters well controlled.  Continue secondary risk factor modification for BP, cholesterol, anticoagulation, and smoking cessation.   - FOOT EXAM  - insulin glargine (BASAGLAR KWIKPEN) 100 UNIT/ML pen; Inject 60 Units Subcutaneous 2 times daily  - semaglutide (OZEMPIC, 0.25 OR 0.5 MG/DOSE,) 2 MG/1.5ML SOPN pen; Inject 0.5 mg Subcutaneous every 7 days    Mild episode of recurrent major depressive disorder (H)  Stable; doing much better with respect to his past symptoms.  On no meds.     SDH (subdural hematoma) (H)  Asymptomatic and no recurrence.     Hypogonadism male  Begin testosterone every 14 days.   - testosterone cypionate (DEPOTESTOSTERONE) 100 MG/ML injection; Inject 1 mL (100 mg) into the muscle every 14 days  - Testosterone, total; Future    Diabetic ulcer of toe of left foot associated with type 2 diabetes mellitus, unspecified ulcer stage (H)  Referred to orthopedics for drainage.   - Orthopedic  Referral; Future             Patient Instructions   Eye doctor in June.    Follow-up with podiatry ASAP.    Let's decrease your ozempic back to 0.5 weekly due to side effects.    No changes in your insulin for now.    With respect to testosterone, begin 100 mg once every 14 days.    Set up a follow up testosterone level 1 week after your second shot.          Sandoval Wilkinson MD  [facility] DANIELA NGUYEN is a 53 year old, presenting for the following health issues:  Diabetes         View : No data to display.              History of Present Illness       Diabetes:   He presents for follow up of diabetes.  He is checking home blood glucose a few times a month. He checks blood glucose before meals and at bedtime.  Blood glucose is sometimes over 200 and never under  70. He is aware of hypoglycemia symptoms including weakness. He is concerned about blood sugar frequently over 200.  He is having numbness in feet, burning in feet, redness, sores, or blisters on feet and blurry vision.         He eats 0-1 servings of fruits and vegetables daily.He consumes 1 sweetened beverage(s) daily.He exercises with enough effort to increase his heart rate 10 to 19 minutes per day.  He exercises with enough effort to increase his heart rate 4 days per week. He is missing 1 dose(s) of medications per week.  He is not taking prescribed medications regularly due to side effects and remembering to take.    Today's PHQ-9         PHQ-9 Total Score: 6    PHQ-9 Q9 Thoughts of better off dead/self-harm past 2 weeks :   Not at all    How difficult have these problems made it for you to do your work, take care of things at home, or get along with other people: Not difficult at all  Today's SAGE-7 Score: 4       Diabetes Follow-up    How often are you checking your blood sugar? Two times daily  Blood sugar testing frequency justification:  NA  What time of day are you checking your blood sugars (select all that apply)?  Not applicable  Have you had any blood sugars above 200?  Yes   Have you had any blood sugars below 70?  No    What symptoms do you notice when your blood sugar is low?  Weak and Lethargy    What concerns do you have today about your diabetes? None     Do you have any of these symptoms? (Select all that apply)  Numbness in feet, Redness, sores, or blisters on feet and Blurry vision    Taking ozempic 1 mg weekly.  Has been having some diarrhea and gas.  Still going on.  diabetes blood test (A1c) has improved from 8.0 to 6.8.  Usually the 3 days after his dose is the worst.    Taking novolog 25 with each meal.    Basaglar 60 units twice daily.     testosterone was low on labs.  Interested in going on testosterone.             Hyperlipidemia Follow-Up      Are you regularly taking any  "medication or supplement to lower your cholesterol?   Yes- Zocor    Are you having muscle aches or other side effects that you think could be caused by your cholesterol lowering medication?  No    Hypertension Follow-up      Do you check your blood pressure regularly outside of the clinic? No     Are you following a low salt diet? No    Are your blood pressures ever more than 140 on the top number (systolic) OR more   than 90 on the bottom number (diastolic), for example 140/90? No    BP Readings from Last 2 Encounters:   04/24/23 112/78   01/24/23 118/78     Hemoglobin A1C (%)   Date Value   04/19/2023 6.8 (H)   01/24/2023 8.0 (H)   08/17/2020 7.6 (H)   05/18/2020 8.2 (H)     LDL Cholesterol Calculated (mg/dL)   Date Value   01/24/2023 52   01/12/2022 48   05/18/2020 52   04/22/2019 57             Review of Systems   CONSTITUTIONAL: NEGATIVE for fever, chills, change in weight  INTEGUMENTARY/SKIN: NEGATIVE for worrisome rashes, moles or lesions  EYES: NEGATIVE for vision changes or irritation  ENT/MOUTH: NEGATIVE for ear, mouth and throat problems  RESP: NEGATIVE for significant cough or SOB  BREAST: NEGATIVE for masses, tenderness or discharge  CV: NEGATIVE for chest pain, palpitations or peripheral edema  GI: NEGATIVE for nausea, abdominal pain, heartburn, or change in bowel habits  : NEGATIVE for frequency, dysuria, or hematuria  MUSCULOSKELETAL: NEGATIVE for significant arthralgias or myalgia  NEURO: NEGATIVE for weakness, dizziness or paresthesias  ENDOCRINE: NEGATIVE for temperature intolerance, skin/hair changes  HEME: NEGATIVE for bleeding problems  PSYCHIATRIC: NEGATIVE for changes in mood or affect      Objective    /78   Pulse 92   Temp 98.8  F (37.1  C) (Tympanic)   Resp 16   Ht 1.788 m (5' 10.39\")   Wt 126.6 kg (279 lb)   SpO2 97%   BMI 39.59 kg/m    Body mass index is 39.59 kg/m .  Physical Exam   GENERAL: healthy, alert and no distress  EYES: Eyes grossly normal to inspection, PERRL " and conjunctivae and sclerae normal  HENT: ear canals and TM's normal, nose and mouth without ulcers or lesions  NECK: no adenopathy, no asymmetry, masses, or scars and thyroid normal to palpation  RESP: lungs clear to auscultation - no rales, rhonchi or wheezes  CV: regular rate and rhythm, normal S1 S2, no S3 or S4, no murmur, click or rub, no peripheral edema and peripheral pulses strong  ABDOMEN: soft, nontender, no hepatosplenomegaly, no masses and bowel sounds normal  MS: toe lesion; as below.  Soft pulses.  Reflexes diminished   SKIN: no suspicious lesions or rashes  NEURO: Normal strength and tone, mentation intact and speech normal  PSYCH: mentation appears normal, affect normal/bright  Diabetic Foot Exam:  As above.

## 2023-04-24 NOTE — TELEPHONE ENCOUNTER
Called the [facility] at [phone] and spoke with eJnnifer Pharmacist   - Jennifer Pharmacist states that the patient needs an order for the testosterone syringe and needles   - Collins Rodriguez states that the patient will need two separate needles ordered as one needle is used to draw up the medication and one needle is used to inject the testosterone     Called the [facility] at [phone] and spoke with Collins De Los Santos   - Collins De Los Santos states that the order should include:     18g x 1 inch needle to withdraw the testosterone medication     1 mL Leur Lock syringe     25g x 1 inch needle for injection of the testosterone medication     - Pended the testosterone supplies     Dr. Wilkinson, please review and order as appropriate.     Vaishnavi SCHULTE RN   [facility]

## 2023-04-24 NOTE — PATIENT INSTRUCTIONS
Eye doctor in June.    Follow-up with podiatry ASAP.    Let's decrease your ozempic back to 0.5 weekly due to side effects.    No changes in your insulin for now.    With respect to testosterone, begin 100 mg once every 14 days.    Set up a follow up testosterone level 1 week after your second shot.

## 2023-04-24 NOTE — TELEPHONE ENCOUNTER
Pharmacy called in to get an order of supplies for Jairo's testosterone medication. Please review and refill as soon as possible as patient is still waiting in pharmacy.     Antonio Real MA

## 2023-04-28 ENCOUNTER — OFFICE VISIT (OUTPATIENT)
Dept: PODIATRY | Facility: CLINIC | Age: 53
End: 2023-04-28
Attending: INTERNAL MEDICINE
Payer: COMMERCIAL

## 2023-04-28 VITALS
DIASTOLIC BLOOD PRESSURE: 72 MMHG | WEIGHT: 279 LBS | SYSTOLIC BLOOD PRESSURE: 120 MMHG | BODY MASS INDEX: 39.94 KG/M2 | HEIGHT: 70 IN

## 2023-04-28 DIAGNOSIS — E11.621 DIABETIC ULCER OF TOE OF LEFT FOOT ASSOCIATED WITH TYPE 2 DIABETES MELLITUS, UNSPECIFIED ULCER STAGE (H): ICD-10-CM

## 2023-04-28 DIAGNOSIS — L97.529 DIABETIC ULCER OF TOE OF LEFT FOOT ASSOCIATED WITH TYPE 2 DIABETES MELLITUS, UNSPECIFIED ULCER STAGE (H): ICD-10-CM

## 2023-04-28 PROCEDURE — 11042 DBRDMT SUBQ TIS 1ST 20SQCM/<: CPT | Performed by: PODIATRIST

## 2023-04-28 PROCEDURE — 99244 OFF/OP CNSLTJ NEW/EST MOD 40: CPT | Mod: 25 | Performed by: PODIATRIST

## 2023-04-28 NOTE — PATIENT INSTRUCTIONS
"Thank you for choosing [facility] Podiatry / Foot & Ankle Surgery!    DR. ARAGON'S CLINIC LOCATIONS:     [facility] (Friday) TRIAGE LINE: [phone] [address]  APPOINTMENTS: [phone]   [place] [address] RADIOLOGY: [phone]    PHYSICAL THERAPY: [phone]    SET UP SURGERY: [phone]   [place] (Mon-Tues AM-Thurs) BILLING QUESTIONS: [phone] [address] FAX: [phone]   [place] [address]           DIABETES AND YOUR FEET  Diabetes can result in several problems in the feet including ulcers (open sores) and amputations. Two of the most important reasons why people develop foot problems when they have diabetes is : 1. Neuropathy (loss of feeling)  2. Vascular disease (loss or decrease of blood flow).    Neuropathy is a term used to describe a loss of nerve function.  Patients with diabetes are at risk of developing neuropathy if their sugars continue to run high and are above the normal value. One theory for neuropathy is that the \"extra\" sugar in the body enters the nerves and is broken down. These by-products build up in the nerve causing it to swell and impairing nerve function. Often times, this can be prevented by controlling your sugars, dieting and exercise.    When a person develops neuropathy, they usually begin to feel numbness or tingling in their feet and sometime in their legs.  Other symptoms may include painful burning or hot feet, tingling or feeling like insects or ants are crawling on your feet or legs.  If the diabetes is sever and the sugars run high for long periods of time, neuropathy can also occur in the hands.    Vascular disease  is a term used to describe a loss or decrease in circulation (blood flow). There is a problem in getting blood and oxygen to areas that need it. Similar to neuropathy, sugars can build up in the walls of the arteries (blood vessels) and cause them to become swollen, thickened and hardened. This " decreases the amount of blood that can go to an area that needs it. Though this is common in the legs of diabetic patients, it can also affect other arteries (blood vessels) in the body such as in the heart and eyes.    In the legs, vascular disease usually results in cramping. Patients who develop leg cramps after walking the same distance every time (i.e. One block, half a mile, ect.) need to let their doctors know so that their circulation may be checked. Cramps causing severe pain in the feet and/or legs while sleeping and the cramps go away when you stand or hang your legs off the side of the bed, may also be a sign of poor blood circulation.  Occasional cramping in cold weather or on rare occasions with activity may not be due to poor circulation, but you should inform your doctor.    PREVENTION OF THESE DISEASES  The key to prevention is good blood sugar control. Poor blood sugar control is a big reason many of these problems start. Physical activity (exercise) is a very good way to help decrease your blood sugars. Exercise can lower your blood sugar, blood pressure, and cholesterol. It also reduces your risk for heart disease and stroke, relieves stress, and strengthens your heart, muscles and bones.  In addition, regular activity helps insulin work better, improves your blood circulation, and keeps your joints flexible. If you're trying to lose weight, a combination of exercise and wise food choices can help you reach your target weight and maintain it.      PAIN MANAGEMENT (**Please speak with your primary doctor about any medications**)  1.Blood Sugar Control - Most important  2. Medications such as:  Amytriptylline, duloxetine, gabapentin, lyrica, tramadol (talk with your primary care doctor about this).     NUTRITION:  Nutrition is also important to help with healing. If your body does not have what it needs, it can't heal.   Increasing your protein intake is important.  With wounds you need 60-90gm of  "protein a day to help with healing. Over the counter protein shakes such as Michael, Glucerna, Ensure, ect... can help to supplement your daily protein intake.   It is also important to take Vitamins to help with healing.  Vitamins such as B12, B6 and Vitamin D3 are important for healing. These can be gotten over the counter at pharmacies or at stores like [employer] or the [employer].    I can also prescribe a dietary supplement called \"Rheumate\" that has a lot of essential vitamins in one capsule.  This may not be covered by insurance though.     FOOT CARE RECOMMENDATIONS   1. Wash your feet with lukewarm water and a mild soap and then dry them thoroughly, especially between the toes.     2. Examine your feet daily looking for cuts, corns, blisters, cracks, ect, especially after wearing new shoes. Make sure to look between your toes. If you cannot see the bottom of your feet, set a mirror on the floor and hold your foot over it, or ask a spouse, friend or family member to examine your feet for you. Contact your doctor immediately if new problems are noted or if sores are not healing.     3. Immediately apply moisturizer to the tops and bottoms of your feet, avoiding areas between the toes. Hand lotion (Intesive Care, Nadine, Eucerin, Neutrogena, Curel, ect) is sufficient unless your doctor prescribes a medicated lotion. Apply sunscreen to your feet when going swimming outside.     4. Use clean comfortable shoes, wear white socks (if you have any bleeding or drainage, you will see it on white socks). Socks should not have thick seams or cut off the circulation around the leg. Break in new shoes slowly and rotate with older shoes until broken in. Check the inside of your shoes with your hand to look for areas of irritation or objects that may have fallen into your shoes.       5. Keep slippers by the side of your bed for use during the night.     6.  Shoes should be fitted by a professional and should not cause areas of " irritation.  Check your feet regularly when wearing a new pair of shoes and replace them as needed.     7.  Talk to your doctor about proper exercise. Exercise and stretching stimulate blood flow to your feet and maintain proper glucose levels.     8.  Monitor your blood glucose level as instructed by your doctor. Notify your doctor immediately if your blood sugar is abnormally high or low.    9. Cut your nails straight across, but then gently round any sharp edges with a cardboard nail file. If you have neuropathy, peripheral vascular disease or cannot see that well to trim your own toenails contact [facility] ([phone]) or [facility] ([phone]).      THINGS TO AVOID DOING   1.  Do not soak your feet if you have an open sore. Use only lukewarm water and always check the temperature with your hand as hot water can easily burn your feet.       2.  Never use a hot water bottle or heating pad on your feet. Also do not apply cold compresses to your feet. With decreased sensation, you could burn or freeze your feet.       3.  Do not apply any of these to your feet:    -  Over the counter medicine for corns or warts    -  Harsh chemicals like boric acid    -  Do not self-treat corns, cuts, blisters or infections. Always consult your doctor.       4.  Do not wear sandals, slippers or walk barefoot, especially on hot sand or concrete or other harsh surfaces.     5.  If you smoke, stop!!!    Here is a list of routine foot care resources, which includes toenail trimming and callus/corn management.     This is not a referral. It is your responsibility to contact the organization and your insurance to confirm cost and coverage.      ROUTINE FOOT CARE (NAIL TRIMMING / CALLUSES)      [facility]  [phone]   [facility]  [phone]  Multiple locations   [facility]  [phone] Dr. Saleem and Dr. Lemons  [address]  [place] [address]  [phone]         PRICE Therapy    Many aches and  "pains throughout the foot and ankle can be helped with many simple treatments.  This is usually described as PRICE Therapy.      P - Protection - often times, inflammation/pain in the lower extremity is not able to improve simply because the areas involved are never allowed to rest.  Every step we take can bother the problematic area.  Protecting those areas is an important step in the healing process.  This may involve a walking cast boot, a special insert/orthotic device, an ankle brace, or simply avoiding barefoot walking.    R - Rest - in addition to protecting the foot/ankle, resting is an important, but often times difficult, treatment option.  Getting off your feet when they bother you, and specifically avoiding activities that cause pain/discomfort, are very beneficial to prevent, and treat, foot/ankle pain.  \"If there's something that makes it hurt(eg activities, shoe gear), and you keep doing the thing that makes it hurt, it's just going to keep hurting\".      I - Ice - icing regularly can help to decrease inflammation and swelling in the foot, thus decreasing pain.  Using an ice pack or a bag of frozen peas works very well.  Ice for 20 minutes multiple times per day as needed.  Do not place the ice directly on the skin as this can cause tissue damage.    C - Compression - using a compression wrap or an ACE wrap can help to decrease swelling, which can help to decrease pain.  Wearing the wraps is generally not needed at night, but they should be worn on a regular basis when you are going to be on your feet for prolonged periods as gravity tends to pull fluids down to your feet/ankles.    E - Elevation - elevating your lower extremities multiple times daily for 15-20 minutes can help to decrease swelling, which works well in decreasing pain levels.      NSAID/Tylenol - An anti-inflammatory, like Aleve or ibuprofen, and/or a pain medication, such as Tylenol, can help to improve pain levels and get the issue " resolved sooner rather than later.  Also, topical anti-inflammatory medications like Voltaren gel can be used for local treatment, with the benefit of avoiding system issues with oral medications.  Anyone with liver issues should be careful with Tylenol, and anyone with high blood pressure or heart, stomach or kidney issues should be careful with anti-inflammatories.  Please ask if you have questions about these medications, including dosage.     OVER THE COUNTER MEDICATIONS  Insurance usually does not cover these. Please check your local drug store and/or [employer]. Sometimes home medical equiptment stores carry the wound ointments as well.  Iodosorb topical wound care gel    Topical lidocaine gel     Woun'Dres topical wound care gel   Voltaren Gel       Over the Counter Inserts      [employer] are the most common and easiest to find.  Locations include any [employer] Store, [employer] in [place] on [address]  and in [place] on [address], [employer] in [facility] on [address] Running Room in [facility] on [address], [address] [facility] [address] Running Room in [place] on [address], [employer] in [place] on [address] and [employer] in [facility] on [address] and in [place] on [address].    Spenco can be found online and at [employer] in [facility] on [address], [employer] in [facility] on [address], Gear [employer] Store in [place] on [address], [employer] in [place] on [address] and [employer] in [place] on [address].    Power Step can be a little harder to find.  Locations include [employer] on [address] in [place], [employer] in [place] on [address], [place] in [facility], [employer] Store in [place] on [address] and online    **  A good high quality over the counter insert can cost around $30-$40.

## 2023-04-28 NOTE — PROGRESS NOTES
"Foot & Ankle Surgery  April 28, 2023    CC: \"Open wound on left big toe\"    I was asked to see Jairo Escamilla regarding the chief complaint by: Dr. Sandoval Wilkinson    HPI:  Pt is a 53 year old male who presents with above complaint.  Patient has had longstanding history of a callus on the left great toe.  On April 1, he states this developed into a wound without injury or inciting event.  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  \"It is definitely better than it was\".  He has been washing this with soapy water and has been using a hydrocolloid Band-Aid he also mentions that he had \"developed incredible pain in my right toes\".  He took Tylenol\" after 24 hours, it went away\".  Last night, he noticed some tenderness along the right arch.  No injury or inciting events.    ROS:   Pos for CC.  The patient denies current nausea, vomiting, chills, fevers, belly pain, calf pain, chest pain or SOB.  Complete remainder of ROS is otherwise neg.    VITALS:  There were no vitals filed for this visit.    PMH:    Past Medical History:   Diagnosis Date     Diabetes mellitus (adult onset)      Diabetes mellitus, type 2 (H) 05/14/2008     Fracture of rib 2022     High cholesterol      Hypertension      Peripheral neuropathy      Pneumonia      Sleep apnea      Subdural hematoma (H)        SXHX:    Past Surgical History:   Procedure Laterality Date     COLONOSCOPY N/A 12/22/2022    Procedure: COLONOSCOPY, FLEXIBLE, WITH LESION REMOVAL USING SNARE;  Surgeon: Josh Reeves MD;  Location:  GI     NO HISTORY OF SURGERY          MEDS:    Current Outpatient Medications   Medication     blood glucose (ONETOUCH ULTRA) test strip     Continuous Blood Gluc Sensor (DEXCOM G6 SENSOR) MISC     Continuous Blood Gluc Transmit (DEXCOM G6 TRANSMITTER) MISC     hydrOXYzine (ATARAX) 10 MG tablet     insulin aspart (NOVOLOG PEN) 100 UNIT/ML pen     insulin glargine (BASAGLAR KWIKPEN) 100 UNIT/ML pen     insulin pen needle (32G X 6 " "MM) 32G X 6 MM miscellaneous     losartan (COZAAR) 50 MG tablet     metFORMIN (GLUCOPHAGE XR) 500 MG 24 hr tablet     needle, disp, 18G X 1\" MISC     Needle, Disp, 25G X 1\" MISC     semaglutide (OZEMPIC, 0.25 OR 0.5 MG/DOSE,) 2 MG/1.5ML SOPN pen     Semaglutide, 1 MG/DOSE, 4 MG/3ML SOPN     simvastatin (ZOCOR) 20 MG tablet     syringe, disposable, (bluebottlebiz SYRINGE LUER LOCK) 1 ML MISC     tadalafil (CIALIS) 20 MG tablet     testosterone cypionate (DEPOTESTOSTERONE) 100 MG/ML injection     No current facility-administered medications for this visit.       ALL:     Allergies   Allergen Reactions     Lisinopril      Cough         FMH:    Family History   Problem Relation Age of Onset     Diabetes Mother      Hypertension Mother      Cerebrovascular Disease Mother      Obesity Mother      C.A.D. Father         in mid 60's     Cerebrovascular Disease Father      Diabetes Maternal Aunt          of Diabetic complications     Glaucoma Other      Cancer - colorectal No family hx of      Prostate Cancer No family hx of      Macular Degeneration No family hx of        SocHx:    Social History     Socioeconomic History     Marital status: Single     Spouse name: Not on file     Number of children: Not on file     Years of education: Not on file     Highest education level: Not on file   Occupational History     Not on file   Tobacco Use     Smoking status: Never     Smokeless tobacco: Never   Vaping Use     Vaping status: Never Used   Substance and Sexual Activity     Alcohol use: Yes     Alcohol/week: 0.0 standard drinks of alcohol     Comment: Very little and infrequently     Drug use: Never     Sexual activity: Yes     Partners: Female     Birth control/protection: Female Surgical, None   Other Topics Concern      Service No     Blood Transfusions No     Caffeine Concern No     Occupational Exposure No     Hobby Hazards No     Sleep Concern No     Stress Concern Yes     Comment: work related     Weight Concern " Yes     Special Diet Yes     Comment: medium carb (40-75g per meal)     Back Care No     Exercise Yes     Bike Helmet No     Seat Belt Yes     Self-Exams No     Parent/sibling w/ CABG, MI or angioplasty before 65F 55M? No   Social History Narrative    3/28/2013: Single, .  Lives with his 20 year old son.  Also has an 18 year old son and 22 year old daughter.  Works as a  for [employer].  Active in sports (playing, officiating, watching): softball, football, hockey.     Social Determinants of Health     Financial Resource Strain: Not on file   Food Insecurity: Not on file   Transportation Needs: Not on file   Physical Activity: Not on file   Stress: Not on file   Social Connections: Not on file   Intimate Partner Violence: Not on file   Housing Stability: Not on file           EXAMINATION:  Gen:   No apparent distress  Neuro:   A&Ox3, no deficits  Psych:    Answering questions appropriately for age and situation with normal affect  Head:    NCAT  Eye:    Visual scanning without deficit  Ear:    Response to auditory stimuli wnl  Lung:    Non-labored breathing on RA noted  Abd:    NTND per patient report  Lymph:    Neg for pitting/non-pitting edema BLE  Vasc:    Pulses weakly palpable, CFT minimally delayed  Neuro:    Light touch sensation diminished distally  Derm:    Left great toe ulceration measures 14 x 11 mm with exposed fat.  There is mild serous drainage but no probing to bone.  There is mild surrounding erythema.  There is dorsal left hallux erythema that appears to be more inflammatory or dermatological than cellulitis  MSK:    Right lower extremity -no acute musculoskeletal pain.  Mild hallux limitus with prominent dorsal medial eminence and evidence of midfoot arthritis including dorsal spurring  Calf:    Neg for redness, swelling or tenderness    Assessment:  53 year old male with left great toe ulceration with exposed fat and low-grade surrounding erythema without  exposed bone in setting of diabetes mellitus with peripheral neuropathy; intermittent right foot pain in setting of diabetes mellitus with peripheral neuropathy      Medical Decision Making/Plan:  Discussed etiologies, anatomy and options  1.  Left great toe ulceration with exposed fat and low-grade surrounding infection without exposed bone in setting of diabetes mellitus with peripheral neuropathy  -I personally reviewed and interpreted the patient's lower extremity history pertinent to today's visit, including imaging/labs, in preparation for initiating a treatment program.  -Regarding the patient's diabetes, we dispensed and discussed proper diabetic foot care.  This includes closely monitoring their blood sugars, closely monitoring their blood pressures, and evaluating their feet at least once per day.  We also discussed potential problems associated with barefoot/sock ambulation and soaking their feet.    Excisional debridement was performed, full-thickness, sharply debriding the wound down to and including exposed sub-cutaneous tissue/fat, excising nonviable tissue to the above dimensions with a 15 blade  -Wound care: Wash thoroughly, dry thoroughly, Iodosorb and bandage daily/q. OD.  The patient was given a prescription for Iodosorb as well as our OTC med handout  -Offloading: Minimize shoeless walking and minimize unnecessary/extracurricular time on feet.  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  He will follow-up in 2 weeks or sooner with acute issues    2.  Intermittent right foot pain in setting of diabetes mellitus with peripheral neuropathy  -No acute findings on exam today.  Hallux limitus is present and he does have evidence of midfoot arthritis.  -Comfortable shoes and minimize shoeless walking  -OTC  insert for arch support and stress relief.  We will plan to likely proceed with diabetic shoes and orthotics in the future  -RICE/NSAID versus Tylenol as needed based on pain.  He states that Tylenol helps with his pain in the past.  -I see no evidence of gout on exam today    Follow up: 2 weeks or sooner with acute issues      Patient's medical history was reviewed today      Filiberto Mercedes DPM [facility] FACFAOM  Podiatric Foot & Ankle Surgeon  [facility]  [phone]    Disclaimer: This note consists of symbols derived from keyboarding, dictation and/or voice recognition software. As a result, there may be errors in the script that have gone undetected. Please consider this when interpreting information found in this chart.

## 2023-05-15 ENCOUNTER — LAB (OUTPATIENT)
Dept: LAB | Facility: CLINIC | Age: 53
End: 2023-05-15
Payer: COMMERCIAL

## 2023-05-15 DIAGNOSIS — E29.1 HYPOGONADISM MALE: ICD-10-CM

## 2023-05-15 PROCEDURE — 84403 ASSAY OF TOTAL TESTOSTERONE: CPT

## 2023-05-15 PROCEDURE — 36415 COLL VENOUS BLD VENIPUNCTURE: CPT

## 2023-05-17 LAB — TESTOST SERPL-MCNC: 210 NG/DL (ref 240–950)

## 2023-05-17 RX ORDER — TESTOSTERONE CYPIONATE 200 MG/ML
50 INJECTION, SOLUTION INTRAMUSCULAR
Qty: 2 ML | Refills: 3 | Status: SHIPPED | OUTPATIENT
Start: 2023-05-17 | End: 2023-05-19

## 2023-05-19 ENCOUNTER — OFFICE VISIT (OUTPATIENT)
Dept: PODIATRY | Facility: CLINIC | Age: 53
End: 2023-05-19
Payer: COMMERCIAL

## 2023-05-19 VITALS — BODY MASS INDEX: 39.58 KG/M2 | SYSTOLIC BLOOD PRESSURE: 122 MMHG | DIASTOLIC BLOOD PRESSURE: 78 MMHG | WEIGHT: 279 LBS

## 2023-05-19 DIAGNOSIS — L97.529 DIABETIC ULCER OF TOE OF LEFT FOOT ASSOCIATED WITH TYPE 2 DIABETES MELLITUS, UNSPECIFIED ULCER STAGE (H): Primary | ICD-10-CM

## 2023-05-19 DIAGNOSIS — E11.621 DIABETIC ULCER OF TOE OF LEFT FOOT ASSOCIATED WITH TYPE 2 DIABETES MELLITUS, UNSPECIFIED ULCER STAGE (H): Primary | ICD-10-CM

## 2023-05-19 DIAGNOSIS — E29.1 HYPOGONADISM MALE: ICD-10-CM

## 2023-05-19 DIAGNOSIS — L85.9 HYPERKERATOSIS: ICD-10-CM

## 2023-05-19 PROCEDURE — 11042 DBRDMT SUBQ TIS 1ST 20SQCM/<: CPT | Performed by: PODIATRIST

## 2023-05-19 PROCEDURE — 99213 OFFICE O/P EST LOW 20 MIN: CPT | Mod: 25 | Performed by: PODIATRIST

## 2023-05-19 PROCEDURE — 11055 PARING/CUTG B9 HYPRKER LES 1: CPT | Mod: 51 | Performed by: PODIATRIST

## 2023-05-19 RX ORDER — TESTOSTERONE CYPIONATE 200 MG/ML
200 INJECTION, SOLUTION INTRAMUSCULAR
Qty: 2 ML | Refills: 3 | Status: SHIPPED | OUTPATIENT
Start: 2023-05-19 | End: 2023-11-01

## 2023-05-19 NOTE — PROGRESS NOTES
Foot & Ankle Surgery   May 19, 2023    S:  Pt is seen today for evaluation of diabetic left great toe ulceration.  He has been using the Iodosorb and has taken the Augmentin.  He has a new problem on the right foot that he would like assessed.    Vitals:    05/19/23 0851   BP: 122/78   Weight: 126.6 kg (279 lb)   '      ROS - Pos for CC.  Patient denies current nausea, vomiting, chills, fevers, belly pain, calf pain, chest pain or SOB.  Complete remainder of ROS it otherwise neg.      PE:  Gen:   No apparent distress  Eye:    Visual scanning without deficit  Ear:    Response to auditory stimuli wnl  Lung:    Non-labored breathing on RA noted  Abd:    NTND per patient report  Lymph:    Neg for pitting/non-pitting edema BLE  Vasc:    Pulses palpable, CFT minimally delayed  Neuro:    Light touch sensation diminished distally  Derm:    The left great toe ulceration presents with a 15 x 9 mm full-thickness ulcer with exposed fat.  He again has baseline pink discoloration to the toe but the cellulitis appears resolved.  There is no drainage noted today.  The right great toe presents with hyperkeratotic tissue and intralesional hemorrhaging, but no ulceration was noted underneath  MSK:    Limited dorsiflexion of the left first MPJ.  The right great toe presents with a very prominent plantar medial eminence  Calf:    Neg for redness, swelling or tenderness    Assessment:  53 year old male with left great toe ulceration with exposed fat in setting of diabetes mellitus with peripheral neuropathy; preulcerative lesion right hallux      Medical Decision Making/Plan:  Discussed etiologies, anatomy and options  1.  Left great toe ulceration with exposed fat in setting of diabetes mellitus with peripheral neuropathy and hallux limitus  -Excisional debridement was performed, full-thickness, sharply debriding the wound down to and including exposed sub-cutaneous tissue/fat, excising nonviable tissue to the above dimensions with a  tissue nipper  -The patient indicates that he never walks around barefoot, he usually has at least socks on in the house.  I advised minimizing shoeless walking, meaning that he should always have something on his foot, at minimum a slipper, to avoid excessive pressure on the wound  -Continue wound care: Wash thoroughly, dry thoroughly, Iodosorb and a bandage  -No indication for further antibiotics at this point  -Orthotic lab referral once healed    2.  Preulcerative lesion right great toe  -We discussed that many hyper-keratotic lesions are caused by pressure or shearing forces on the skin, and these can often be treated sufficiently with shoes, inserts and local tissue debridement.  Some lesions, however, can have a deep core, and while home treatments are helpful, occasional clinical debridement may be necessary.  In certain cases, surgical excision may be required if no other treatments have proven sufficient.  -Our home instruction handout was dispensed, detailing home therapies to minimize regrowth of the hyperkeratotic tissue.    -The lesion was debrided with a 15 blade x1 without incident.  No underlying ulcerations were noted  -Orthotic lab referral once the left great toe ulceration is healed to minimize recurrence  -Consider surgical intervention for the toe deformity/prominence should this become a chronic issue       Follow up: 3 weeks or sooner with acute issues           Filiberto Mercedes DPM [facility] FACFAOM  Podiatric Foot & Ankle Surgeon  [facility]  [phone]    Disclaimer: This note consists of symbols derived from keyboarding, dictation and/or voice recognition software. As a result, there may be errors in the script that have gone undetected. Please consider this when interpreting information found in this chart.

## 2023-06-07 ENCOUNTER — OFFICE VISIT (OUTPATIENT)
Dept: OPTOMETRY | Facility: CLINIC | Age: 53
End: 2023-06-07
Payer: COMMERCIAL

## 2023-06-07 DIAGNOSIS — E11.3299 NONPROLIFERATIVE DIABETIC RETINOPATHY ASSOCIATED WITH TYPE 2 DIABETES MELLITUS (H): Primary | ICD-10-CM

## 2023-06-07 DIAGNOSIS — H52.4 PRESBYOPIA: ICD-10-CM

## 2023-06-07 PROCEDURE — 92014 COMPRE OPH EXAM EST PT 1/>: CPT | Performed by: OPTOMETRIST

## 2023-06-07 PROCEDURE — 92015 DETERMINE REFRACTIVE STATE: CPT | Performed by: OPTOMETRIST

## 2023-06-07 ASSESSMENT — VISUAL ACUITY
OD_SC: 20/20
OD_SC: 20/80
METHOD: SNELLEN - LINEAR
OS_SC: 20/60
OS_SC: 20/20

## 2023-06-07 ASSESSMENT — REFRACTION_MANIFEST
OD_ADD: +2.25
OS_AXIS: 038
OD_SPHERE: PLANO
OS_SPHERE: PLANO
METHOD_AUTOREFRACTION: 1
OS_SPHERE: -0.75
OD_SPHERE: -0.50
OD_CYLINDER: SPHERE
OS_CYLINDER: +0.50
OS_ADD: +2.25

## 2023-06-07 ASSESSMENT — CONF VISUAL FIELD
OD_SUPERIOR_TEMPORAL_RESTRICTION: 0
OS_INFERIOR_TEMPORAL_RESTRICTION: 0
OD_NORMAL: 1
OD_INFERIOR_NASAL_RESTRICTION: 0
OS_SUPERIOR_NASAL_RESTRICTION: 0
OS_INFERIOR_NASAL_RESTRICTION: 0
OD_SUPERIOR_NASAL_RESTRICTION: 0
OS_NORMAL: 1
METHOD: COUNTING FINGERS
OS_SUPERIOR_TEMPORAL_RESTRICTION: 0
OD_INFERIOR_TEMPORAL_RESTRICTION: 0

## 2023-06-07 ASSESSMENT — TONOMETRY
OS_IOP_MMHG: 19
IOP_METHOD: APPLANATION
OD_IOP_MMHG: 17

## 2023-06-07 ASSESSMENT — CUP TO DISC RATIO
OS_RATIO: 0.6
OD_RATIO: 0.6

## 2023-06-07 ASSESSMENT — EXTERNAL EXAM - RIGHT EYE: OD_EXAM: NORMAL

## 2023-06-07 ASSESSMENT — EXTERNAL EXAM - LEFT EYE: OS_EXAM: NORMAL

## 2023-06-07 NOTE — PATIENT INSTRUCTIONS
Patient Education   Diabetes weakens the blood vessels all over the body, including the eyes. Damage to the blood vessels in the eyes can cause swelling or bleeding into part of the eye (called the retina). This is called diabetic retinopathy (JEFFERSON-tin--pu-thee). If not treated, this disease can cause vision loss or blindness.   Symptoms may include blurred or distorted vision, but many people have no symptoms. It's important to see your eye doctor regularly to check for problems.   Early treatment and good control can help protect your vision. Here are the things you can do to help prevent vision loss:      1. Keep your blood sugar levels under tight control.      2. Bring high blood pressure under control.      3. No smoking.      4. Have yearly dilated eye exams.

## 2023-06-07 NOTE — LETTER
6/7/2023         RE: Jairo Grahamil  [address] [place] [address]        Dear Colleague,    Thank you for referring your patient, Jairo Escamilla, to the [facility]. Please see a copy of my visit note below.    Chief Complaint   Patient presents with     Diabetic Eye Exam       Lab Results   Component Value Date    A1C 6.8 04/19/2023    A1C 8.0 01/24/2023    A1C 6.0 08/19/2022    A1C 8.9 05/02/2022    A1C 10.2 01/12/2022    A1C 7.6 08/17/2020    A1C 8.2 05/18/2020    A1C 8.2 04/22/2019    A1C 8.1 01/11/2019    A1C 10.1 12/24/2018            Last Eye Exam: 07/2022  Dilated Previously: Yes, side effects of dilation explained today    What are you currently using to see?  readers    Distance Vision Acuity: Satisfied with vision    Near Vision Acuity: Satisfied with vision while reading and using computer with readers    Eye Comfort: good  Do you use eye drops? : No      Julissa Cruz - Optometric Assistant      Medical, surgical and family histories reviewed and updated 6/7/2023.     No fohx glaucoma   0.6 for several years without IOP increase    OBJECTIVE: See Ophthalmology exam    ASSESSMENT:    ICD-10-CM    1. Nonproliferative diabetic retinopathy associated with type 2 diabetes mellitus (H)  E11.3299       2. Presbyopia  H52.4         History of glaucoma suspect , but stable cupping and no IOP increase, no fohx    PLAN:  Glucose control to halt retinopathy   Readers as needed  Monitor yearly unless vision change or glucose uncontrolled  Jairo Escamilla aware  eye exam results will be sent to Sandoval Wilkinson OD           Again, thank you for allowing me to participate in the care of your patient.        Sincerely,        Devika Croft, OD

## 2023-06-07 NOTE — PROGRESS NOTES
Chief Complaint   Patient presents with     Diabetic Eye Exam       Lab Results   Component Value Date    A1C 6.8 04/19/2023    A1C 8.0 01/24/2023    A1C 6.0 08/19/2022    A1C 8.9 05/02/2022    A1C 10.2 01/12/2022    A1C 7.6 08/17/2020    A1C 8.2 05/18/2020    A1C 8.2 04/22/2019    A1C 8.1 01/11/2019    A1C 10.1 12/24/2018            Last Eye Exam: 07/2022  Dilated Previously: Yes, side effects of dilation explained today    What are you currently using to see?  readers    Distance Vision Acuity: Satisfied with vision    Near Vision Acuity: Satisfied with vision while reading and using computer with readers    Eye Comfort: good  Do you use eye drops? : No      Julissa Cruz - Optometric Assistant      Medical, surgical and family histories reviewed and updated 6/7/2023.     No fohx glaucoma   0.6 for several years without IOP increase    OBJECTIVE: See Ophthalmology exam    ASSESSMENT:    ICD-10-CM    1. Nonproliferative diabetic retinopathy associated with type 2 diabetes mellitus (H)  E11.3299       2. Presbyopia  H52.4         History of glaucoma suspect , but stable cupping and no IOP increase, no fohx    PLAN:  Glucose control to halt retinopathy   Readers as needed  Monitor yearly unless vision change or glucose uncontrolled  Jairo melendez  eye exam results will be sent to Sandoval Wilkinson OD

## 2023-06-09 ENCOUNTER — OFFICE VISIT (OUTPATIENT)
Dept: PODIATRY | Facility: CLINIC | Age: 53
End: 2023-06-09
Payer: COMMERCIAL

## 2023-06-09 VITALS — WEIGHT: 279 LBS | SYSTOLIC BLOOD PRESSURE: 122 MMHG | BODY MASS INDEX: 39.58 KG/M2 | DIASTOLIC BLOOD PRESSURE: 78 MMHG

## 2023-06-09 DIAGNOSIS — L97.529 DIABETIC ULCER OF TOE OF LEFT FOOT ASSOCIATED WITH TYPE 2 DIABETES MELLITUS, UNSPECIFIED ULCER STAGE (H): Primary | ICD-10-CM

## 2023-06-09 DIAGNOSIS — E11.621 DIABETIC ULCER OF TOE OF LEFT FOOT ASSOCIATED WITH TYPE 2 DIABETES MELLITUS, UNSPECIFIED ULCER STAGE (H): Primary | ICD-10-CM

## 2023-06-09 PROCEDURE — 11042 DBRDMT SUBQ TIS 1ST 20SQCM/<: CPT | Performed by: PODIATRIST

## 2023-06-09 NOTE — PROGRESS NOTES
Foot & Ankle Surgery   June 9, 2023    S:  Pt is seen today for evaluation of diabetic left great toe ulceration.  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      Vitals:    06/09/23 0852   BP: 122/78   Weight: 126.6 kg (279 lb)   '      ROS - Pos for CC.  Patient denies current nausea, vomiting, chills, fevers, belly pain, calf pain, chest pain or SOB.  Complete remainder of ROS it otherwise neg.      PE:  Gen:   No apparent distress  Eye:    Visual scanning without deficit  Ear:    Response to auditory stimuli wnl  Lung:    Non-labored breathing on RA noted  Abd:    NTND per patient report  Lymph:    There is mild redness in the left great toe that does improve with elevation of the foot  Vasc:    Pulses palpable, CFT minimally delayed  Neuro:    Light touch sensation diminished distally  Derm:     The left great toe ulceration presents with mild surrounding redness that does improve with elevation.  The wound measures 13 x 8 mm with exposed fat but no probing to bone or joint.  Mild serous drainage present.  MSK:    Limited dorsiflexion of the left first MPJ.  The right great toe presents with a very prominent plantar medial eminence  Calf:    Neg for redness, swelling or tenderness    Assessment:  53 year old male with diabetic left great toe ulceration with exposed fat without local signs of infection      Medical Decision Making/Plan:  Discussed etiologies, anatomy and options  1.  Diabetic left great toe ulceration with exposed fat without signs infection  -Excisional debridement was performed, full-thickness, sharply debriding the wound down to and including exposed sub-cutaneous tissue/fat, excising nonviable tissue to the above dimensions with a 15 blade  -Continue Iodosorb dressing changes  -Continue offloading and activity limitation/modifications  -Consider changing topical and changing weightbearing recommendations should the wounds stagnate    Follow up: 3 weeks or sooner with acute  issues           Filiberto Mercedes DPM FACFAS FACFAOM  Podiatric Foot & Ankle Surgeon  [facility]  [phone]    Disclaimer: This note consists of symbols derived from keyboarding, dictation and/or voice recognition software. As a result, there may be errors in the script that have gone undetected. Please consider this when interpreting information found in this chart.

## 2023-06-30 ENCOUNTER — TELEPHONE (OUTPATIENT)
Dept: PEDIATRICS | Facility: CLINIC | Age: 53
End: 2023-06-30

## 2023-06-30 ENCOUNTER — OFFICE VISIT (OUTPATIENT)
Dept: PODIATRY | Facility: CLINIC | Age: 53
End: 2023-06-30
Payer: COMMERCIAL

## 2023-06-30 VITALS
BODY MASS INDEX: 39.06 KG/M2 | HEIGHT: 71 IN | SYSTOLIC BLOOD PRESSURE: 116 MMHG | DIASTOLIC BLOOD PRESSURE: 72 MMHG | WEIGHT: 279 LBS

## 2023-06-30 DIAGNOSIS — L97.529 DIABETIC ULCER OF TOE OF LEFT FOOT ASSOCIATED WITH TYPE 2 DIABETES MELLITUS, UNSPECIFIED ULCER STAGE (H): Primary | ICD-10-CM

## 2023-06-30 DIAGNOSIS — E11.621 DIABETIC ULCER OF TOE OF LEFT FOOT ASSOCIATED WITH TYPE 2 DIABETES MELLITUS, UNSPECIFIED ULCER STAGE (H): Primary | ICD-10-CM

## 2023-06-30 PROCEDURE — 99213 OFFICE O/P EST LOW 20 MIN: CPT | Mod: 25 | Performed by: PODIATRIST

## 2023-06-30 PROCEDURE — 11042 DBRDMT SUBQ TIS 1ST 20SQCM/<: CPT | Performed by: PODIATRIST

## 2023-06-30 NOTE — PROGRESS NOTES
"Foot & Ankle Surgery   June 30, 2023    S:  Pt is seen today for evaluation of diabetic left great toe ulceration.  He thought it was doing better but now is not quite so sure.    Vitals:    06/30/23 0916   BP: 116/72   Weight: 126.6 kg (279 lb)   Height: 1.791 m (5' 10.5\")   '      ROS - Pos for CC.  Patient denies current nausea, vomiting, chills, fevers, belly pain, calf pain, chest pain or SOB.  Complete remainder of ROS it otherwise neg.      PE:  Gen:   No apparent distress  Eye:    Visual scanning without deficit  Ear:    Response to auditory stimuli wnl  Lung:    Non-labored breathing on RA noted  Abd:    NTND per patient report  Lymph:    Neg for pitting/non-pitting edema BLE  Vasc:    Pulses palpable, CFT minimally delayed  Neuro:    Light touch sensation diminished distally  Derm:    The left great toe ulceration now is 13 x 7 mm but is rather deep.  No exposed bone yet but this is increasingly getting deeper.  No local signs of infection to warrant antibiotics  MSK:    ROM, strength wnl without limitation, no pain on palpation noted.  Calf:    Neg for redness, swelling or tenderness      Assessment:  53 year old male with worsening diabetic left great toe ulceration with exposed fat without signs of infection      Medical Decision Making/Plan:  Discussed etiologies, anatomy and options  1.  Worsening diabetic left great toe ulceration with exposed fat without signs of infection  -Excisional debridement was performed, full-thickness, sharply debriding the wound down to and including exposed sub-cutaneous tissue/fat, excising nonviable tissue to the above dimensions with a tissue nipper  -A walking cast boot was dispensed to alleviate pressure on the spot.  I advised he minimize activities when able we discussed using crutches or a walker to limit pressure on the foot  -Continue daily/q. OD Iodosorb dressing changes  -No indication today for oral antibiotics  -Consider x-rays/MRI at next visit if this is " failing to show progression    Follow up: 2 weeks or sooner with acute issues           Filiberto Mercedes DPM [facility]  Podiatric Foot & Ankle Surgeon  [facility]  [phone]    Disclaimer: This note consists of symbols derived from keyboarding, dictation and/or voice recognition software. As a result, there may be errors in the script that have gone undetected. Please consider this when interpreting information found in this chart.

## 2023-07-14 ENCOUNTER — OFFICE VISIT (OUTPATIENT)
Dept: PODIATRY | Facility: CLINIC | Age: 53
End: 2023-07-14
Payer: COMMERCIAL

## 2023-07-14 VITALS — DIASTOLIC BLOOD PRESSURE: 72 MMHG | SYSTOLIC BLOOD PRESSURE: 118 MMHG | BODY MASS INDEX: 39.47 KG/M2 | WEIGHT: 279 LBS

## 2023-07-14 DIAGNOSIS — E11.621 DIABETIC ULCER OF TOE OF LEFT FOOT ASSOCIATED WITH TYPE 2 DIABETES MELLITUS, UNSPECIFIED ULCER STAGE (H): Primary | ICD-10-CM

## 2023-07-14 DIAGNOSIS — L97.529 DIABETIC ULCER OF TOE OF LEFT FOOT ASSOCIATED WITH TYPE 2 DIABETES MELLITUS, UNSPECIFIED ULCER STAGE (H): Primary | ICD-10-CM

## 2023-07-14 PROCEDURE — 11042 DBRDMT SUBQ TIS 1ST 20SQCM/<: CPT | Performed by: PODIATRIST

## 2023-07-14 NOTE — PROGRESS NOTES
Foot & Ankle Surgery   July 14, 2023    S:  Pt is seen today for evaluation of diabetic left great toe ulceration.  He has been using a walking cast boot and using Iodosorb for dressing changes.  He states it looks better than it has in a while.  He has a trip coming up in about 2 weeks that will last approx 2 weeks..    Vitals:    07/14/23 1119   BP: 118/72   Weight: 126.6 kg (279 lb)   '      ROS - Pos for CC.  Patient denies current nausea, vomiting, chills, fevers, belly pain, calf pain, chest pain or SOB.  Complete remainder of ROS it otherwise neg.      PE:  Gen:   No apparent distress  Eye:    Visual scanning without deficit  Ear:    Response to auditory stimuli wnl  Lung:    Non-labored breathing on RA noted  Abd:    NTND per patient report  Lymph:    Neg for pitting/non-pitting edema BLE  Vasc:    Pulses palpable, CFT minimally delayed  Neuro:    Light touch sensation diminished distally  Derm:    The left great toe ulceration now is 11 x 7 mm, no worsening in depth.  Mostly granulation tissue present after debridement.  No exposed bone.  There is no surrounding erythema and there is no drainage noted today.   MSK:    ROM, strength wnl without limitation, no pain on palpation noted.  Calf:    Neg for redness, swelling or tenderness      Assessment:  53 year old male with improving diabetic left great toe ulceration with exposed fat without signs of infection      Medical Decision Making/Plan:  Discussed etiologies, anatomy and options  1.  Improving diabetic left great toe ulceration with exposed fat without signs of infection  -Excisional debridement was performed, full-thickness, sharply debriding the wound down to and including exposed sub-cutaneous tissue/fat, excising nonviable tissue to the above dimensions with a 15 blade  -Continue wound care: Wash thoroughly, dry thoroughly, Iodosorb and a bandage daily/q. right eye  -Continue offloading: Weightbearing as tolerated in walking cast boot, minimize  time on feet when able    Follow up: 4 weeks or sooner with acute issues           Filiberto Mercedes DPM [facility] FACFA  Podiatric Foot & Ankle Surgeon  [facility]  [phone]    Disclaimer: This note consists of symbols derived from keyboarding, dictation and/or voice recognition software. As a result, there may be errors in the script that have gone undetected. Please consider this when interpreting information found in this chart.

## 2023-07-24 ENCOUNTER — LAB (OUTPATIENT)
Dept: LAB | Facility: CLINIC | Age: 53
End: 2023-07-24
Payer: COMMERCIAL

## 2023-07-24 DIAGNOSIS — E29.1 HYPOGONADISM MALE: ICD-10-CM

## 2023-07-24 PROCEDURE — 84403 ASSAY OF TOTAL TESTOSTERONE: CPT

## 2023-07-24 PROCEDURE — 36416 COLLJ CAPILLARY BLOOD SPEC: CPT

## 2023-07-27 LAB — TESTOST SERPL-MCNC: 724 NG/DL (ref 240–950)

## 2023-07-31 ASSESSMENT — PATIENT HEALTH QUESTIONNAIRE - PHQ9
10. IF YOU CHECKED OFF ANY PROBLEMS, HOW DIFFICULT HAVE THESE PROBLEMS MADE IT FOR YOU TO DO YOUR WORK, TAKE CARE OF THINGS AT HOME, OR GET ALONG WITH OTHER PEOPLE: SOMEWHAT DIFFICULT
SUM OF ALL RESPONSES TO PHQ QUESTIONS 1-9: 5
SUM OF ALL RESPONSES TO PHQ QUESTIONS 1-9: 5

## 2023-08-01 ENCOUNTER — OFFICE VISIT (OUTPATIENT)
Dept: PEDIATRICS | Facility: CLINIC | Age: 53
End: 2023-08-01
Payer: COMMERCIAL

## 2023-08-01 VITALS
SYSTOLIC BLOOD PRESSURE: 132 MMHG | BODY MASS INDEX: 40.21 KG/M2 | HEART RATE: 84 BPM | WEIGHT: 280.9 LBS | TEMPERATURE: 98.4 F | RESPIRATION RATE: 16 BRPM | DIASTOLIC BLOOD PRESSURE: 87 MMHG | OXYGEN SATURATION: 96 % | HEIGHT: 70 IN

## 2023-08-01 DIAGNOSIS — E29.1 HYPOGONADISM MALE: ICD-10-CM

## 2023-08-01 DIAGNOSIS — E78.5 HYPERLIPIDEMIA LDL GOAL <100: ICD-10-CM

## 2023-08-01 DIAGNOSIS — N52.9 ERECTILE DYSFUNCTION, UNSPECIFIED ERECTILE DYSFUNCTION TYPE: ICD-10-CM

## 2023-08-01 DIAGNOSIS — I10 HYPERTENSION, UNSPECIFIED TYPE: ICD-10-CM

## 2023-08-01 DIAGNOSIS — E11.65 TYPE 2 DIABETES MELLITUS WITH HYPERGLYCEMIA, WITH LONG-TERM CURRENT USE OF INSULIN (H): Primary | ICD-10-CM

## 2023-08-01 DIAGNOSIS — Z79.4 TYPE 2 DIABETES MELLITUS WITH HYPERGLYCEMIA, WITH LONG-TERM CURRENT USE OF INSULIN (H): Primary | ICD-10-CM

## 2023-08-01 LAB
ALBUMIN SERPL BCG-MCNC: 4.4 G/DL (ref 3.5–5.2)
ALP SERPL-CCNC: 50 U/L (ref 40–129)
ALT SERPL W P-5'-P-CCNC: 39 U/L (ref 0–70)
ANION GAP SERPL CALCULATED.3IONS-SCNC: 13 MMOL/L (ref 7–15)
AST SERPL W P-5'-P-CCNC: 34 U/L (ref 0–45)
BASOPHILS # BLD AUTO: 0 10E3/UL (ref 0–0.2)
BASOPHILS NFR BLD AUTO: 1 %
BILIRUB SERPL-MCNC: 0.8 MG/DL
BUN SERPL-MCNC: 11.3 MG/DL (ref 6–20)
CALCIUM SERPL-MCNC: 9.4 MG/DL (ref 8.6–10)
CHLORIDE SERPL-SCNC: 101 MMOL/L (ref 98–107)
CREAT SERPL-MCNC: 0.71 MG/DL (ref 0.67–1.17)
DEPRECATED HCO3 PLAS-SCNC: 22 MMOL/L (ref 22–29)
EOSINOPHIL # BLD AUTO: 0.2 10E3/UL (ref 0–0.7)
EOSINOPHIL NFR BLD AUTO: 4 %
ERYTHROCYTE [DISTWIDTH] IN BLOOD BY AUTOMATED COUNT: 13.1 % (ref 10–15)
GFR SERPL CREATININE-BSD FRML MDRD: >90 ML/MIN/1.73M2
GLUCOSE SERPL-MCNC: 239 MG/DL (ref 70–99)
HBA1C MFR BLD: 8.4 % (ref 0–5.6)
HCT VFR BLD AUTO: 49 % (ref 40–53)
HGB BLD-MCNC: 15.9 G/DL (ref 13.3–17.7)
IMM GRANULOCYTES # BLD: 0 10E3/UL
IMM GRANULOCYTES NFR BLD: 0 %
LYMPHOCYTES # BLD AUTO: 1.4 10E3/UL (ref 0.8–5.3)
LYMPHOCYTES NFR BLD AUTO: 34 %
MCH RBC QN AUTO: 27.9 PG (ref 26.5–33)
MCHC RBC AUTO-ENTMCNC: 32.4 G/DL (ref 31.5–36.5)
MCV RBC AUTO: 86 FL (ref 78–100)
MONOCYTES # BLD AUTO: 0.4 10E3/UL (ref 0–1.3)
MONOCYTES NFR BLD AUTO: 9 %
NEUTROPHILS # BLD AUTO: 2.2 10E3/UL (ref 1.6–8.3)
NEUTROPHILS NFR BLD AUTO: 52 %
PLATELET # BLD AUTO: 216 10E3/UL (ref 150–450)
POTASSIUM SERPL-SCNC: 4.6 MMOL/L (ref 3.4–5.3)
PROT SERPL-MCNC: 7.6 G/DL (ref 6.4–8.3)
PSA SERPL DL<=0.01 NG/ML-MCNC: 0.46 NG/ML (ref 0–3.5)
RBC # BLD AUTO: 5.7 10E6/UL (ref 4.4–5.9)
SODIUM SERPL-SCNC: 136 MMOL/L (ref 136–145)
WBC # BLD AUTO: 4.2 10E3/UL (ref 4–11)

## 2023-08-01 PROCEDURE — 99214 OFFICE O/P EST MOD 30 MIN: CPT | Performed by: INTERNAL MEDICINE

## 2023-08-01 PROCEDURE — 36415 COLL VENOUS BLD VENIPUNCTURE: CPT | Performed by: INTERNAL MEDICINE

## 2023-08-01 PROCEDURE — 85025 COMPLETE CBC W/AUTO DIFF WBC: CPT | Performed by: INTERNAL MEDICINE

## 2023-08-01 PROCEDURE — G0103 PSA SCREENING: HCPCS | Performed by: INTERNAL MEDICINE

## 2023-08-01 PROCEDURE — 80053 COMPREHEN METABOLIC PANEL: CPT | Performed by: INTERNAL MEDICINE

## 2023-08-01 PROCEDURE — 83036 HEMOGLOBIN GLYCOSYLATED A1C: CPT | Performed by: INTERNAL MEDICINE

## 2023-08-01 RX ORDER — LOSARTAN POTASSIUM 50 MG/1
50 TABLET ORAL DAILY
Qty: 90 TABLET | Refills: 3 | Status: ON HOLD | OUTPATIENT
Start: 2023-08-01 | End: 2023-08-22

## 2023-08-01 RX ORDER — TADALAFIL 20 MG/1
20 TABLET ORAL DAILY PRN
Qty: 12 TABLET | Refills: 11 | Status: SHIPPED | OUTPATIENT
Start: 2023-08-01 | End: 2024-02-01

## 2023-08-01 ASSESSMENT — PAIN SCALES - GENERAL: PAINLEVEL: NO PAIN (0)

## 2023-08-01 NOTE — PATIENT INSTRUCTIONS
Consider a new bivalent COVID booster this fall.    Flu shot this fall.    Lab work today:  We can do labs in the exam room today, or you can get them done downstairs in the lab.      Try to limit complex carbs (rice, bread, pasta, potatoes) and simple carbs (pop, juice, alcohol.)  Eating more lean proteins (chicken, fish, eggs, beans, nuts) and unlimited vegetables and fruits can definitely help as well.     In general, try to spread meals out during the day, eating smaller breakfasts, lunches and dinners--and having healthy snacks in between.  It's a good idea to limit your carbs at mealtimes to 60-75 grams of carbs, and to limit your carbs at snacktimes to 15-30 grams of carbs.  (Check the food labels to add up these carbs.)

## 2023-08-01 NOTE — PROGRESS NOTES
"  Assessment & Plan     Hypogonadism male  Labs now within normal limits, and no symptoms of depression .  More energy as well.    - CBC with platelets and differential; Future  - Comprehensive metabolic panel (BMP + Alb, Alk Phos, ALT, AST, Total. Bili, TP); Future  - PSA, screen; Future  - CBC with platelets and differential  - Comprehensive metabolic panel (BMP + Alb, Alk Phos, ALT, AST, Total. Bili, TP)  - PSA, screen    Hypertension, unspecified type  At blood pressure goal.   - losartan (COZAAR) 50 MG tablet; Take 1 tablet (50 mg) by mouth daily    Hyperlipidemia LDL goal <100  On statin.     Type 2 diabetes mellitus with hyperglycemia, with long-term current use of insulin (H)  Parameters well controlled.  Continue secondary risk factor modification for BP, cholesterol, anticoagulation, and smoking cessation.   - Hemoglobin A1c; Future  - Hemoglobin A1c    Erectile dysfunction, unspecified erectile dysfunction type  Ongoing cialis working.   - tadalafil (CIALIS) 20 MG tablet; Take 1 tablet (20 mg) by mouth daily as needed (ED)             BMI:   Estimated body mass index is 39.77 kg/m  as calculated from the following:    Height as of this encounter: 1.79 m (5' 10.47\").    Weight as of this encounter: 127.4 kg (280 lb 14.4 oz).   Weight management plan: Patient was referred to their PCP to discuss a diet and exercise plan.        Sandoval Wilkinson MD  [facility] DANIELA NGUYEN is a 53 year old, presenting for the following health issues:  RECHECK (Diabetes follow up)      8/1/2023     9:41 AM   Additional Questions   Roomed by genna mario   Accompanied by n/a         8/1/2023     9:41 AM   Patient Reported Additional Medications   Patient reports taking the following new medications n/a       History of Present Illness       Diabetes:   He presents for follow up of diabetes.    He is not checking blood glucose.        He is concerned about blood sugar frequently over 200.   He is having " "numbness in feet and redness, sores, or blisters on feet.            He eats 0-1 servings of fruits and vegetables daily.He consumes 1 sweetened beverage(s) daily.He exercises with enough effort to increase his heart rate 9 or less minutes per day.  He exercises with enough effort to increase his heart rate 3 or less days per week. He is missing 7 dose(s) of medications per week.  He is not taking prescribed medications regularly due to remembering to take.     Has toe ulcer; doing walking boot and recurrent debridement, dressing and packing.      Ozempic is now at 0.5 mg weekly.  Side effects have subsided.     Supposed to be doing basaglar 60 twice daily, does this 75% of the time, and novoog 25 three times daily with maies; but only does it 50% of the time.     Now also on testosterone, 100 mg every 14 days.  Feels like it is helping his ambition and energy.  Last does was 2 weeks ago.  Feels like depressive symptoms are better.    Doing more activity lately; weight stable.       Diet these days: still skips and then does overeat a lot.               Review of Systems         Objective    /87 (BP Location: Right arm, Patient Position: Sitting, Cuff Size: Adult Large)   Pulse 84   Temp 98.4  F (36.9  C) (Oral)   Resp 16   Ht 1.79 m (5' 10.47\")   Wt 127.4 kg (280 lb 14.4 oz)   SpO2 96%   BMI 39.77 kg/m    Body mass index is 39.77 kg/m .  Physical Exam   GENERAL: healthy, alert and no distress  EYES: Eyes grossly normal to inspection, PERRL and conjunctivae and sclerae normal  HENT: ear canals and TM's normal, nose and mouth without ulcers or lesions  NECK: no adenopathy, no asymmetry, masses, or scars and thyroid normal to palpation  RESP: lungs clear to auscultation - no rales, rhonchi or wheezes  CV: regular rate and rhythm, normal S1 S2, no S3 or S4, no murmur, click or rub, no peripheral edema and peripheral pulses strong  ABDOMEN: soft, nontender, no hepatosplenomegaly, no masses and bowel sounds " normal  MS: no gross musculoskeletal defects noted, no edema  SKIN: no suspicious lesions or rashes  NEURO: Normal strength and tone, mentation intact and speech normal  PSYCH: mentation appears normal, affect normal/bright    Lab on 07/24/2023   Component Date Value Ref Range Status    Testosterone Total 07/24/2023 724  240 - 950 ng/dL Final

## 2023-08-02 ENCOUNTER — TELEPHONE (OUTPATIENT)
Dept: PEDIATRICS | Facility: CLINIC | Age: 53
End: 2023-08-02
Payer: COMMERCIAL

## 2023-08-02 NOTE — CONFIDENTIAL NOTE
Called pt left vm and sending results by [employer]    Your diabetes test actually looks worse.  Your A1c was 8.4.  I think we need to focus much more on getting you on your doses of insulin more regularly, and on keeping your diet more consistent throughout the day.  I do not think adding another medicine will be very helpful, but taking your insulin with more regularity, and decreasing your large carbohydrate intake will.     Let's see you back in 3 months instead of 6; You can schedule this by calling  or using your [employer] account, if you have it.      Sandoval Wilkinson MD   Internal Medicine and Pediatrics       Alem Hernandez MA

## 2023-08-17 ENCOUNTER — MYC MEDICAL ADVICE (OUTPATIENT)
Dept: PODIATRY | Facility: CLINIC | Age: 53
End: 2023-08-17
Payer: COMMERCIAL

## 2023-08-18 ENCOUNTER — ANCILLARY PROCEDURE (OUTPATIENT)
Dept: GENERAL RADIOLOGY | Facility: CLINIC | Age: 53
End: 2023-08-18
Attending: PODIATRIST
Payer: COMMERCIAL

## 2023-08-18 ENCOUNTER — HOSPITAL ENCOUNTER (INPATIENT)
Facility: CLINIC | Age: 53
Setting detail: SURGERY ADMIT
End: 2023-08-18
Attending: PODIATRIST | Admitting: PODIATRIST
Payer: COMMERCIAL

## 2023-08-18 ENCOUNTER — OFFICE VISIT (OUTPATIENT)
Dept: PODIATRY | Facility: CLINIC | Age: 53
End: 2023-08-18
Payer: COMMERCIAL

## 2023-08-18 ENCOUNTER — HOSPITAL ENCOUNTER (INPATIENT)
Facility: CLINIC | Age: 53
LOS: 4 days | Discharge: HOME | DRG: 617 | End: 2023-08-22
Attending: EMERGENCY MEDICINE | Admitting: INTERNAL MEDICINE
Payer: COMMERCIAL

## 2023-08-18 ENCOUNTER — APPOINTMENT (OUTPATIENT)
Dept: MRI IMAGING | Facility: CLINIC | Age: 53
DRG: 617 | End: 2023-08-18
Attending: PODIATRIST
Payer: COMMERCIAL

## 2023-08-18 VITALS
WEIGHT: 280 LBS | DIASTOLIC BLOOD PRESSURE: 82 MMHG | HEIGHT: 73 IN | BODY MASS INDEX: 37.11 KG/M2 | SYSTOLIC BLOOD PRESSURE: 126 MMHG

## 2023-08-18 DIAGNOSIS — E11.621 DIABETIC ULCER OF TOE OF LEFT FOOT ASSOCIATED WITH TYPE 2 DIABETES MELLITUS, UNSPECIFIED ULCER STAGE (H): Primary | ICD-10-CM

## 2023-08-18 DIAGNOSIS — Z79.4 TYPE 2 DIABETES MELLITUS WITH HYPERGLYCEMIA, WITH LONG-TERM CURRENT USE OF INSULIN (H): ICD-10-CM

## 2023-08-18 DIAGNOSIS — L97.529 DIABETIC ULCER OF TOE OF LEFT FOOT ASSOCIATED WITH TYPE 2 DIABETES MELLITUS, UNSPECIFIED ULCER STAGE (H): Primary | ICD-10-CM

## 2023-08-18 DIAGNOSIS — E11.621 DIABETIC ULCER OF TOE OF LEFT FOOT ASSOCIATED WITH TYPE 2 DIABETES MELLITUS, UNSPECIFIED ULCER STAGE (H): ICD-10-CM

## 2023-08-18 DIAGNOSIS — K21.9 GASTROESOPHAGEAL REFLUX DISEASE WITHOUT ESOPHAGITIS: Primary | ICD-10-CM

## 2023-08-18 DIAGNOSIS — M86.172 ACUTE OSTEOMYELITIS OF TOE, LEFT (H): ICD-10-CM

## 2023-08-18 DIAGNOSIS — L97.529 DIABETIC ULCER OF TOE OF LEFT FOOT ASSOCIATED WITH TYPE 2 DIABETES MELLITUS, UNSPECIFIED ULCER STAGE (H): ICD-10-CM

## 2023-08-18 DIAGNOSIS — M86.9 OSTEOMYELITIS OF LEFT FOOT, UNSPECIFIED TYPE (H): ICD-10-CM

## 2023-08-18 DIAGNOSIS — E11.628 DIABETIC FOOT INFECTION (H): ICD-10-CM

## 2023-08-18 DIAGNOSIS — L08.9 DIABETIC FOOT INFECTION (H): ICD-10-CM

## 2023-08-18 DIAGNOSIS — E11.65 TYPE 2 DIABETES MELLITUS WITH HYPERGLYCEMIA, WITH LONG-TERM CURRENT USE OF INSULIN (H): ICD-10-CM

## 2023-08-18 LAB
ALBUMIN SERPL BCG-MCNC: 4.2 G/DL (ref 3.5–5.2)
ALP SERPL-CCNC: 51 U/L (ref 40–129)
ALT SERPL W P-5'-P-CCNC: 24 U/L (ref 0–70)
ANION GAP SERPL CALCULATED.3IONS-SCNC: 14 MMOL/L (ref 7–15)
AST SERPL W P-5'-P-CCNC: 18 U/L (ref 0–45)
BASOPHILS # BLD AUTO: 0.1 10E3/UL (ref 0–0.2)
BASOPHILS NFR BLD AUTO: 0 %
BILIRUB SERPL-MCNC: 1.1 MG/DL
BUN SERPL-MCNC: 10.9 MG/DL (ref 6–20)
CALCIUM SERPL-MCNC: 9.2 MG/DL (ref 8.6–10)
CHLORIDE SERPL-SCNC: 98 MMOL/L (ref 98–107)
CREAT SERPL-MCNC: 0.81 MG/DL (ref 0.67–1.17)
CRP SERPL-MCNC: 124.92 MG/L
DEPRECATED HCO3 PLAS-SCNC: 24 MMOL/L (ref 22–29)
EOSINOPHIL # BLD AUTO: 0 10E3/UL (ref 0–0.7)
EOSINOPHIL NFR BLD AUTO: 0 %
ERYTHROCYTE [DISTWIDTH] IN BLOOD BY AUTOMATED COUNT: 13.8 % (ref 10–15)
GFR SERPL CREATININE-BSD FRML MDRD: >90 ML/MIN/1.73M2
GLUCOSE BLDC GLUCOMTR-MCNC: 140 MG/DL (ref 70–99)
GLUCOSE BLDC GLUCOMTR-MCNC: 219 MG/DL (ref 70–99)
GLUCOSE SERPL-MCNC: 162 MG/DL (ref 70–99)
HCT VFR BLD AUTO: 46 % (ref 40–53)
HGB BLD-MCNC: 14.8 G/DL (ref 13.3–17.7)
IMM GRANULOCYTES # BLD: 0.1 10E3/UL
IMM GRANULOCYTES NFR BLD: 1 %
LACTATE SERPL-SCNC: 1.2 MMOL/L (ref 0.7–2)
LYMPHOCYTES # BLD AUTO: 1.9 10E3/UL (ref 0.8–5.3)
LYMPHOCYTES NFR BLD AUTO: 15 %
MCH RBC QN AUTO: 27.4 PG (ref 26.5–33)
MCHC RBC AUTO-ENTMCNC: 32.2 G/DL (ref 31.5–36.5)
MCV RBC AUTO: 85 FL (ref 78–100)
MONOCYTES # BLD AUTO: 1.3 10E3/UL (ref 0–1.3)
MONOCYTES NFR BLD AUTO: 11 %
NEUTROPHILS # BLD AUTO: 8.9 10E3/UL (ref 1.6–8.3)
NEUTROPHILS NFR BLD AUTO: 73 %
NRBC # BLD AUTO: 0 10E3/UL
NRBC BLD AUTO-RTO: 0 /100
PLATELET # BLD AUTO: 245 10E3/UL (ref 150–450)
POTASSIUM SERPL-SCNC: 4.2 MMOL/L (ref 3.4–5.3)
PROT SERPL-MCNC: 8.1 G/DL (ref 6.4–8.3)
RBC # BLD AUTO: 5.4 10E6/UL (ref 4.4–5.9)
SODIUM SERPL-SCNC: 136 MMOL/L (ref 136–145)
WBC # BLD AUTO: 12.2 10E3/UL (ref 4–11)

## 2023-08-18 PROCEDURE — 99223 1ST HOSP IP/OBS HIGH 75: CPT | Performed by: INTERNAL MEDICINE

## 2023-08-18 PROCEDURE — 86140 C-REACTIVE PROTEIN: CPT | Performed by: EMERGENCY MEDICINE

## 2023-08-18 PROCEDURE — 36415 COLL VENOUS BLD VENIPUNCTURE: CPT | Performed by: EMERGENCY MEDICINE

## 2023-08-18 PROCEDURE — 87040 BLOOD CULTURE FOR BACTERIA: CPT | Performed by: EMERGENCY MEDICINE

## 2023-08-18 PROCEDURE — 120N000001 HC R&B MED SURG/OB

## 2023-08-18 PROCEDURE — 73630 X-RAY EXAM OF FOOT: CPT | Mod: TC | Performed by: RADIOLOGY

## 2023-08-18 PROCEDURE — 83605 ASSAY OF LACTIC ACID: CPT | Performed by: EMERGENCY MEDICINE

## 2023-08-18 PROCEDURE — 73718 MRI LOWER EXTREMITY W/O DYE: CPT | Mod: LT

## 2023-08-18 PROCEDURE — 250N000011 HC RX IP 250 OP 636: Mod: JZ | Performed by: INTERNAL MEDICINE

## 2023-08-18 PROCEDURE — 85004 AUTOMATED DIFF WBC COUNT: CPT | Performed by: EMERGENCY MEDICINE

## 2023-08-18 PROCEDURE — 99214 OFFICE O/P EST MOD 30 MIN: CPT | Performed by: PODIATRIST

## 2023-08-18 PROCEDURE — 80053 COMPREHEN METABOLIC PANEL: CPT | Performed by: EMERGENCY MEDICINE

## 2023-08-18 PROCEDURE — 99285 EMERGENCY DEPT VISIT HI MDM: CPT

## 2023-08-18 PROCEDURE — 250N000012 HC RX MED GY IP 250 OP 636 PS 637: Performed by: INTERNAL MEDICINE

## 2023-08-18 RX ORDER — LOSARTAN POTASSIUM 25 MG/1
25 TABLET ORAL DAILY
Status: DISCONTINUED | OUTPATIENT
Start: 2023-08-19 | End: 2023-08-22 | Stop reason: HOSPADM

## 2023-08-18 RX ORDER — NALOXONE HYDROCHLORIDE 0.4 MG/ML
0.4 INJECTION, SOLUTION INTRAMUSCULAR; INTRAVENOUS; SUBCUTANEOUS
Status: DISCONTINUED | OUTPATIENT
Start: 2023-08-18 | End: 2023-08-22 | Stop reason: HOSPADM

## 2023-08-18 RX ORDER — ACETAMINOPHEN 650 MG/1
650 SUPPOSITORY RECTAL EVERY 6 HOURS PRN
Status: DISCONTINUED | OUTPATIENT
Start: 2023-08-18 | End: 2023-08-22 | Stop reason: HOSPADM

## 2023-08-18 RX ORDER — NALOXONE HYDROCHLORIDE 0.4 MG/ML
0.2 INJECTION, SOLUTION INTRAMUSCULAR; INTRAVENOUS; SUBCUTANEOUS
Status: DISCONTINUED | OUTPATIENT
Start: 2023-08-18 | End: 2023-08-22 | Stop reason: HOSPADM

## 2023-08-18 RX ORDER — AMOXICILLIN 250 MG
1 CAPSULE ORAL 2 TIMES DAILY PRN
Status: DISCONTINUED | OUTPATIENT
Start: 2023-08-18 | End: 2023-08-22 | Stop reason: HOSPADM

## 2023-08-18 RX ORDER — DEXTROSE MONOHYDRATE 25 G/50ML
25-50 INJECTION, SOLUTION INTRAVENOUS
Status: DISCONTINUED | OUTPATIENT
Start: 2023-08-18 | End: 2023-08-21

## 2023-08-18 RX ORDER — SIMVASTATIN 20 MG
20 TABLET ORAL AT BEDTIME
Status: DISCONTINUED | OUTPATIENT
Start: 2023-08-19 | End: 2023-08-22 | Stop reason: HOSPADM

## 2023-08-18 RX ORDER — LIDOCAINE 40 MG/G
CREAM TOPICAL
Status: DISCONTINUED | OUTPATIENT
Start: 2023-08-18 | End: 2023-08-22

## 2023-08-18 RX ORDER — OXYCODONE HYDROCHLORIDE 5 MG/1
5 TABLET ORAL EVERY 4 HOURS PRN
Status: DISCONTINUED | OUTPATIENT
Start: 2023-08-18 | End: 2023-08-19

## 2023-08-18 RX ORDER — AMOXICILLIN 250 MG
2 CAPSULE ORAL 2 TIMES DAILY PRN
Status: DISCONTINUED | OUTPATIENT
Start: 2023-08-18 | End: 2023-08-22 | Stop reason: HOSPADM

## 2023-08-18 RX ORDER — PIPERACILLIN SODIUM, TAZOBACTAM SODIUM 4; .5 G/20ML; G/20ML
4.5 INJECTION, POWDER, LYOPHILIZED, FOR SOLUTION INTRAVENOUS ONCE
Status: DISCONTINUED | OUTPATIENT
Start: 2023-08-18 | End: 2023-08-18

## 2023-08-18 RX ORDER — PIPERACILLIN SODIUM, TAZOBACTAM SODIUM 4; .5 G/20ML; G/20ML
4.5 INJECTION, POWDER, LYOPHILIZED, FOR SOLUTION INTRAVENOUS EVERY 6 HOURS
Status: DISCONTINUED | OUTPATIENT
Start: 2023-08-18 | End: 2023-08-22

## 2023-08-18 RX ORDER — NICOTINE POLACRILEX 4 MG
15-30 LOZENGE BUCCAL
Status: DISCONTINUED | OUTPATIENT
Start: 2023-08-18 | End: 2023-08-21

## 2023-08-18 RX ORDER — ACETAMINOPHEN 325 MG/1
650 TABLET ORAL EVERY 6 HOURS PRN
Status: DISCONTINUED | OUTPATIENT
Start: 2023-08-18 | End: 2023-08-22 | Stop reason: HOSPADM

## 2023-08-18 RX ORDER — ONDANSETRON 4 MG/1
4 TABLET, ORALLY DISINTEGRATING ORAL EVERY 6 HOURS PRN
Status: DISCONTINUED | OUTPATIENT
Start: 2023-08-18 | End: 2023-08-21

## 2023-08-18 RX ORDER — PROCHLORPERAZINE MALEATE 10 MG
10 TABLET ORAL EVERY 6 HOURS PRN
Status: DISCONTINUED | OUTPATIENT
Start: 2023-08-18 | End: 2023-08-22 | Stop reason: HOSPADM

## 2023-08-18 RX ORDER — ONDANSETRON 2 MG/ML
4 INJECTION INTRAMUSCULAR; INTRAVENOUS EVERY 6 HOURS PRN
Status: DISCONTINUED | OUTPATIENT
Start: 2023-08-18 | End: 2023-08-21

## 2023-08-18 RX ORDER — POLYETHYLENE GLYCOL 3350 17 G/17G
17 POWDER, FOR SOLUTION ORAL DAILY PRN
Status: DISCONTINUED | OUTPATIENT
Start: 2023-08-18 | End: 2023-08-22 | Stop reason: HOSPADM

## 2023-08-18 RX ORDER — PROCHLORPERAZINE 25 MG
25 SUPPOSITORY, RECTAL RECTAL EVERY 12 HOURS PRN
Status: DISCONTINUED | OUTPATIENT
Start: 2023-08-18 | End: 2023-08-22 | Stop reason: HOSPADM

## 2023-08-18 RX ADMIN — PIPERACILLIN AND TAZOBACTAM 4.5 G: 4; .5 INJECTION, POWDER, FOR SOLUTION INTRAVENOUS at 19:58

## 2023-08-18 RX ADMIN — INSULIN GLARGINE 30 UNITS: 100 INJECTION, SOLUTION SUBCUTANEOUS at 22:53

## 2023-08-18 ASSESSMENT — PAIN SCALES - GENERAL: PAINLEVEL: NO PAIN (0)

## 2023-08-18 ASSESSMENT — ACTIVITIES OF DAILY LIVING (ADL)
ADLS_ACUITY_SCORE: 37

## 2023-08-18 NOTE — H&P
[facility]    History and Physical - Hospitalist Service       Date of Admission:  8/18/2023    Assessment & Plan    Jairo Escamilla is a 53 year old male with hypertension, dyslipidemia, obesity, insulin-dependent type 2 diabetes with peripheral neuropathy, hypogonadism who presented after podiatry evaluation for fever, left great toe ulcer and evidence for osteomyelitis on imaging.  Routed to the emergency department for admission and surgical debridement within the next 24 hours.      Left great toe osteomyelitis  Peripheral neuropathy secondary to diabetes  Marked elevation in CRP (124) with normal lactate.  WBC 12.2 K.  -Admit to inpatient medical surgical unit  -Zosyn IV every 6 hours  -Podiatry consultation  -CBC in a.m.  -Follow-up blood cultures  -As needed pain medication available: As needed acetaminophen, oxycodone  -Acetaminophen also as needed for fever     Insulin-dependent type 2 diabetes  Prior to admission regimen includes Basaglar 60 units twice daily and NovoLog 25 units 3 times daily.  Reports taking the Basaglar about 75% of the days and NovoLog about 50% of the days.  Also takes Ozempic.  -Consistent carbohydrate diet  -Half dose Basaglar tonight  -Glucose checks  -Corrective dose aspart  -We will resume Premeal NovoLog and Basaglar postoperatively    Chronic, stable conditions:  Hypertension  -Resume prior to admission losartan once dose verified.  Obesity, BMI 39  Dyslipidemia  -Resume statin once dose confirmed  Hypogonadism       Diet:  Consistent carbohydrate diet.  N.p.o. after midnight with anticipated surgery Saturday.  DVT Prophylaxis: Pneumatic Compression Devices and Ambulate every shift  Gill Catheter: Not present  Lines: None     Cardiac Monitoring: None  Code Status:  Full code    Clinically Significant Risk Factors Present on Admission                    # Hypertension: Noted on problem list     # DMII: A1C = 8.4 % (Ref range: 0.0 - 5.6 %) within past 6  "months      # Obesity: Estimated body mass index is 36.94 kg/m  as calculated from the following:    Height as of an earlier encounter on 8/18/23: 1.854 m (6' 1\").    Weight as of an earlier encounter on 8/18/23: 127 kg (280 lb).              Disposition Plan      Expected Discharge Date: 08/20/2023                  Maria De Jesus Ludwig MD  Hospitalist Service  [facility]  Securely message with [employer] (more info)  Text page via [employer] Paging/Directory     ______________________________________________________________________    Chief Complaint   Left great toe ulcer, fever    History is obtained from the patient, electronic health record, and emergency department physician    History of Present Illness   Jairo Escamilla is a 53 year old male with longstanding insulin-dependent type 2 diabetes, obesity, hypertension, dyslipidemia who was seen by Dr. Shepard from podiatry clinic.  He had had a fever of 102 Fahrenheit over the past 24 hours.  Seen in clinic for the left great toe nonhealing ulcer.  Suspicious for a deeper infection and imaging was ordered.  Left foot x-rays, 3 views showed lucency of soft tissues of the medial aspect of the left great toe with associated cortical irregularity at the base of the distal phalanx, worrisome for surface ulceration and developing osteomyelitis.  Was prescribed oral Augmentin but unclear if this was started.  He had been on a course of antibiotics 2 to 3 months ago to try to quinones off any deeper infection.  He has pretty severe peripheral neuropathy and so has trouble determining how serious this has become.  He is trying to watch it pretty closely and does have regular follow-up in clinic.  Looks like his diabetes has not been under great control.  In clinic recently his A1c was 8.4%, suggesting suboptimal diabetes management.  Does have Basaglar and NovoLog prescribed but only takes these about 50 to 75% of the time.  He has also been taking " Ozempic.    Case discussed with the emergency department physician, Dr. Beltran.  Labs and available recent imaging reviewed.    Past Medical History    Past Medical History:   Diagnosis Date    Diabetes mellitus (adult onset)     Diabetes mellitus, type 2 (H) 05/14/2008    Fracture of rib 2022    High cholesterol     Hypertension     Peripheral neuropathy     Pneumonia     Sleep apnea     Subdural hematoma (H)        Past Surgical History   Past Surgical History:   Procedure Laterality Date    COLONOSCOPY N/A 12/22/2022    Procedure: COLONOSCOPY, FLEXIBLE, WITH LESION REMOVAL USING SNARE;  Surgeon: Josh Reeves MD;  Location:  GI    NO HISTORY OF SURGERY         Prior to Admission Medications   Cannot display prior to admission medications because the patient has not been admitted in this contact.        Review of Systems    The 10 point Review of Systems is negative other than noted in the HPI or here.      Physical Exam   Vital Signs: Temp: 98.2  F (36.8  C) Temp src: Temporal BP: (!) 140/90 Pulse: 106   Resp: 16 SpO2: 95 % O2 Device: None (Room air)    Weight: 0 lbs 0 oz    General Appearance: Alert, calm, no acute distress  Eyes: Sclera anicteric  HEENT: Mucous membranes moist, neck supple with no lymphadenopathy  Respiratory: clear to auscultation bilaterally with good inspiratory effort, no wheezes or crackles  Cardiovascular: Regular rate and rhythm no murmur  GI: Obese, soft, nontender  Skin: No jaundice  Erythema of left foot extending from the toe onto the plantar surface  Musculoskeletal: Left great toe with ulcer on the plantar aspect   Neurologic: Alert and oriented x3, face symmetric, no focal deficits, speech normal, no tremors  Psychiatric: Calm, cooperative, pleasant, no overt thought disorder    Medical Decision Making       MANAGEMENT DISCUSSED with the following over the past 24 hours: Patient, emergency department physician   NOTE(S)/MEDICAL RECORDS REVIEWED over the past 24 hours: Clinic  notes, medication records, recent lab trends  Tests personally interpreted in the past 24 hours:  - Left foot x-rays showing lucency of left great toe       Data     I have personally reviewed the following data over the past 24 hrs:    12.2 (H)  \   14.8   / 245     136 98 10.9 /  162 (H)   4.2 24 0.81 \     ALT: 24 AST: 18 AP: 51 TBILI: 1.1   ALB: 4.2 TOT PROTEIN: 8.1 LIPASE: N/A     Procal: N/A CRP: 124.92 (H) Lactic Acid: 1.2         Imaging results reviewed over the past 24 hrs:   Recent Results (from the past 24 hour(s))   XR Foot Left G/E 3 Views    Narrative    XR FOOT LEFT G/E 3 VIEWS 8/18/2023 8:51 AM     HISTORY: worsening left great toe ulcer; Diabetic ulcer of toe of left  foot associated with type 2 diabetes mellitus, unspecified ulcer stage  (H); Diabetic ulcer of toe of left foot associated with type 2  diabetes mellitus, unspecified ulcer stage (H)    COMPARISON: None.         Impression    IMPRESSION: Lucency over the soft tissues of the medial aspect of the  great toe with associated cortical irregularity at the base of the  distal phalanx. Findings are worrisome for surface ulceration and  developing osteomyelitis. Old healed fracture of the fifth metatarsal  base. No evidence for acute fracture.    ELIAN ARREAGA MD         SYSTEM ID:  [id number]

## 2023-08-18 NOTE — ED NOTES
[facility]  ED Nurse Handoff Report    ED Chief complaint: Wound Check      ED Diagnosis:   Final diagnoses:   Osteomyelitis of left foot, unspecified type (H) - Left great toe   Diabetic foot infection (H)   Type 2 diabetes mellitus with hyperglycemia, with long-term current use of insulin (H)       Code Status:  Admitting MD to assess.      Allergies:   Allergies   Allergen Reactions    Lisinopril      Cough         Patient Story:   Left great toe osteomyelitis; sent for treatment         Focused Assessment:  Patient is alert and oriented x 4, calm and cooperative. VS are stable, skin is warm and dry, respirations are even and non-labored on room air. Patient denied chest pain, shortness of breath, dizziness, and nausea.       Treatments and/or interventions provided:   Medications   piperacillin-tazobactam (ZOSYN) 4.5 g vial to attach to  mL bag (has no administration in time range)       Patient's response to treatments and/or interventions: Stable    To be done/followed up on inpatient unit:  Monitor    Does this patient have any cognitive concerns?:  None    Activity level - Baseline/Home:  Independent  Activity Level - Current:   Independent    Patient's Preferred language: English   Needed?: No    Isolation: None  Infection: Not Applicable  Patient tested for COVID 19 prior to admission: YES  Bariatric?: No    Vital Signs:   Vitals:    08/18/23 1349   BP: (!) 140/90   BP Location: Right arm   Pulse: 106   Resp: 16   Temp: 98.2  F (36.8  C)   TempSrc: Temporal   SpO2: 95%       Cardiac Rhythm:     Was the PSS-3 completed:   Yes  What interventions are required if any?               Family Comments: No family present at this time.    OBS brochure/video discussed/provided to patient/family: No              Name of person given brochure if not patient: NA              Relationship to patient: NA    For the majority of the shift this patient's behavior was Green.   Behavioral  interventions performed were  information and reassurance provided.  .    ED NURSE PHONE NUMBER: [phone]

## 2023-08-18 NOTE — PROGRESS NOTES
RECEIVING UNIT ED HANDOFF REVIEW    ED Nurse Handoff Report was reviewed by: Thai Scanlon RN on August 18, 2023 at 6:05 PM

## 2023-08-18 NOTE — PROGRESS NOTES
"Foot & Ankle Surgery   2023    S:  Pt is seen today for evaluation of worsening diabetic left great toe wound and infection.  He has been feeling under the weather lately including fevers.  I received a message yesterday and advised he follow-up in clinic for recheck and for x-rays, and I also sent a prescription for Augmentin 875 twice daily to his pharmacy yesterday.  He is planning on picking up today after the appointment..    Vitals:    23 0907   BP: 126/82   Weight: 127 kg (280 lb)   Height: 1.854 m (6' 1\")   '      ROS - Pos for CC.  Patient denies current nausea, vomiting, chills, fevers, belly pain, calf pain, chest pain or SOB.  Complete remainder of ROS it otherwise neg.      PE:  Gen:   No apparent distress  Eye:    Visual scanning without deficit  Ear:    Response to auditory stimuli wnl  Lung:    Non-labored breathing on RA noted  Abd:    NTND per patient report  Lymph:    Localized redness and swelling left great toe ascending into the foot  Vasc:    Pulses palpable, CFT minimally delayed  Neuro:    Light touch sensation diminished distally  Derm:   Full-thickness wound plantar medial left great toe with serous drainage.  The wound readily probes to bone  MSK:    No specific pain noted with manipulation of the toe IPJ or MPJ  Calf:    Neg for redness, swelling or tenderness      Imagin views weightbearing left foot -wound is seen at the medial aspect of the left great toe.  There is an erosive/lytic lesion at the medial base of the distal phalanx suspicious for osteomyelitis.  No soft tissue emphysema is seen      Assessment:  53 year old male with diabetic left great toe ulceration with cellulitis and osteomyelitis      Medical Decision Making/Plan:  Discussed etiologies, anatomy and options  1.  Diabetic left great toe ulceration with cellulitis and osteomyelitis  -I personally reviewed and interpreted the x-rays.  Destructive changes are highly suggestive of osteomyelitis and " the wound clinically probes to bone  -We have attempted to facilitate admission to [facility] or [facility] but we have not heard back from the admission line.  The patient was discharged home.  We will reach out to him with bed information if possible.  If no beds are available, I will advise admission via the ER  -IV antibiotics and surgical intervention are appropriate.  We discussed the plan would be for partial left great toe amputation, excising the distal phalanx and the distal aspect of the proximal phalanx.  I would take a biopsy of the proximal phalanx to ensure complete removal of bone infection.    -recommend MRI at admission for further assessment of infection.    Follow up: Admission           Filiberto Mercedes DPM [facility]AC[facility]  Podiatric Foot & Ankle Surgeon  [facility]  [phone]    Disclaimer: This note consists of symbols derived from keyboarding, dictation and/or voice recognition software. As a result, there may be errors in the script that have gone undetected. Please consider this when interpreting information found in this chart.

## 2023-08-18 NOTE — PHARMACY-ADMISSION MEDICATION HISTORY
Pharmacist Admission Medication History    Admission medication history is complete. The information provided in this note is only as accurate as the sources available at the time of the update.    Medication reconciliation/reorder completed by provider prior to medication history? No    Information Source(s): Patient and CareEverywhere/SureScripts via phone    Pertinent Information: Patient confirmed his Novolog and Basaglar doses and frequency. He has not taken Novolog today yet because he has not eaten today.   Recent antimicrobials:  On 8/17, patient was prescribed Augmentin for SSTI with instructions of BID x 10 days. Patient started the course of antibiotics this morning and has gotten 1 dose so far. Course is set to be completed 8/27.     Changes made to PTA medication list:  Added: None  Deleted: None  Changed: None    Medication Affordability:  Not including over the counter (OTC) medications, was there a time in the past 3 months when you did not take your medications as prescribed because of cost?: No    Allergies reviewed with patient and updates made in EHR: yes    Medication History Completed By: Felicia Traore RPH 8/18/2023 6:45 PM    Prior to Admission medications    Medication Sig Last Dose Taking? Auth Provider Long Term End Date   amoxicillin-clavulanate (AUGMENTIN) 875-125 MG tablet Take 1 tablet by mouth 2 times daily for 10 days 8/18/2023 at AM Yes Filiberto Mercedes DPM  8/27/23   Cadexomer Iodine, topical, 0.9% (IODOSORB) 0.9 % GEL gel Apply to left great toe wound every other day with bandage Past Week Yes Filiberto Mercedes DPM Yes    insulin aspart (NOVOLOG PEN) 100 UNIT/ML pen Inject 25 Units Subcutaneous 3 times daily (with meals) Along with adjustment scale of 1:50 as directed up to 75 units daily. 8/17/2023 Yes Sandoval Wilkinson MD Yes    insulin glargine (BASAGLAR KWIKPEN) 100 UNIT/ML pen Inject 60 Units Subcutaneous 2 times daily 8/18/2023 at AM Yes Sandoval Wilkinson MD Yes    losartan  "(COZAAR) 50 MG tablet Take 1 tablet (50 mg) by mouth daily 8/17/2023 at HS Yes Sandoval Wilkinson MD Yes    metFORMIN (GLUCOPHAGE XR) 500 MG 24 hr tablet TAKE 2 TABLETS BY MOUTH TWICE DAILY 8/18/2023 at AM Yes Sandoval Wilkinson MD Yes    semaglutide (OZEMPIC, 0.25 OR 0.5 MG/DOSE,) 2 MG/1.5ML SOPN pen Inject 0.5 mg Subcutaneous every 7 days 8/14/2023 Yes Sandoval Wilkinson MD     simvastatin (ZOCOR) 20 MG tablet TAKE 1 TABLET(20 MG) BY MOUTH IN THE MORNING 8/17/2023 at HS Yes Sandoval Wilkinson MD Yes    tadalafil (CIALIS) 20 MG tablet Take 1 tablet (20 mg) by mouth daily as needed (ED) Unknown at PRN Yes Sandoval Wilkinson MD Yes    testosterone cypionate (DEPOTESTOSTERONE) 200 MG/ML injection Inject 1 mL (200 mg) into the muscle every 14 days 8/8/2023 Yes Sandoval Wilkinson MD Yes    blood glucose (ONETOUCH ULTRA) test strip Use to test blood sugars tid or as directed.   Sandoval Wilkinson MD     Continuous Blood Gluc Sensor (DEXCOM G6 SENSOR) MISC 1 each every 10 days Change every 10 days.   Sandoval Wilkinson MD     Continuous Blood Gluc Transmit (DEXCOM G6 TRANSMITTER) MISC 1 each every 3 months Change every 3 months.   Sandoval Wilkinson MD     insulin pen needle (32G X 6 MM) 32G X 6 MM miscellaneous Use 6 pen needles daily or as directed.   Sandoval Wilkinson MD     needle, disp, 18G X 1\" MISC 1 Needle every 14 days Used to withdraw the testosterone medication from the vial.   Sandoval Wilkinson MD     Needle, Disp, 25G X 1\" MISC 1 Needle every 14 days Used to inject the testosterone medication.   Sandoval Wilkinson MD         "

## 2023-08-18 NOTE — ED PROVIDER NOTES
History     Chief Complaint:  Wound Check    HPI   Jairo Escamilla is a 53 year old male presenting with left great toe and foot pain. Patient states there is a known ulcer on the bone of the foot. He has had this problem for 3 months and was originally on oral antibiotics 2 weeks ago. He had an appointment with his doctor today who expressed concern due to worsening redness, pain, and foot numbness; he was prescribed antibiotics again for which he has taken his first dose today. Endorses increased redness, swelling, and numbness to the foot. States he had a fever of 102.7 F Tmax yesterday that has since come down. States he has not had toe amputations in the past. He is unsure if he has had MRSA.    XR Foot Left - 8/18/23  IMPRESSION: Lucency over the soft tissues of the medial aspect of the  great toe with associated cortical irregularity at the base of the  distal phalanx. Findings are worrisome for surface ulceration and  developing osteomyelitis. Old healed fracture of the fifth metatarsal  base. No evidence for acute fracture.    Independent Historian:   None - Patient Only    Review of External Notes:   Reviewed X-ray completed today; see HPI.  Also reviewed the office visit note from today with Dr. Chavez    Medications:    Augmentin  Hydroxyzine  Insulin aspart  Insulin glargine  Losartan  Metformin  Semaglutide  Simvastatin  Tadalafil  Testosterone cypionate     Past Medical History:    Type 2 diabetes mellitus  Fracture of rib  High cholesterol  Hypertension  Peripheral neuropathy  Sleep apnea  Subdural hematoma   Hyperlipidemia     Past Surgical History:    Colonoscopy     Physical Exam   Patient Vitals for the past 24 hrs:   BP Temp Temp src Pulse Resp SpO2   08/18/23 1349 (!) 140/90 98.2  F (36.8  C) Temporal 106 16 95 %        Physical Exam  Nursing note and vitals reviewed.    Constitutional:  Appears comfortable.    HENT:                Nose normal.  No discharge.      Oral mucosa is  moist.  Eyes:    Conjunctivae are normal without injection.  Pupils are equal.  Cardiovascular:  Normal rate, regular rhythm     Dorsalis pedis pulses intact, capillary refill is normal,  Pulmonary:  Effort normal and breath sounds clear to auscultation bilaterally.    No respiratory distress.     Musculoskeletal:  Normal range of motion. No extremity deformity.     Wound has dressing on the left great toe, but erythema extends onto the foot about 2 inches.  The whole toe was red.  I did not undress the wound.  Neurological:   Alert and oriented. No focal weakness.  but sensation has decreased to touch.  Skin:    Skin is warm and dry. No rash noted.   Psychiatric:   Behavior is normal. Appropriate mood and affect.     Judgment and thought content normal.       Emergency Department Course     Imaging:  MR Foot Left w/o Contrast    (Results Pending)      Report per radiology    Laboratory:  Labs Ordered and Resulted from Time of ED Arrival to Time of ED Departure   COMPREHENSIVE METABOLIC PANEL - Abnormal       Result Value    Sodium 136      Potassium 4.2      Chloride 98      Carbon Dioxide (CO2) 24      Anion Gap 14      Urea Nitrogen 10.9      Creatinine 0.81      Calcium 9.2      Glucose 162 (*)     Alkaline Phosphatase 51      AST 18      ALT 24      Protein Total 8.1      Albumin 4.2      Bilirubin Total 1.1      GFR Estimate >90     CRP INFLAMMATION - Abnormal    CRP Inflammation 124.92 (*)    CBC WITH PLATELETS AND DIFFERENTIAL - Abnormal    WBC Count 12.2 (*)     RBC Count 5.40      Hemoglobin 14.8      Hematocrit 46.0      MCV 85      MCH 27.4      MCHC 32.2      RDW 13.8      Platelet Count 245      % Neutrophils 73      % Lymphocytes 15      % Monocytes 11      % Eosinophils 0      % Basophils 0      % Immature Granulocytes 1      NRBCs per 100 WBC 0      Absolute Neutrophils 8.9 (*)     Absolute Lymphocytes 1.9      Absolute Monocytes 1.3      Absolute Eosinophils 0.0      Absolute Basophils 0.1       Absolute Immature Granulocytes 0.1      Absolute NRBCs 0.0     LACTIC ACID WHOLE BLOOD - Normal    Lactic Acid 1.2     BLOOD CULTURE   BLOOD CULTURE     Emergency Department Course & Assessments:       Interventions:  Medications   piperacillin-tazobactam (ZOSYN) 4.5 g vial to attach to  mL bag (has no administration in time range)        Assessments:  1353 I entered the patient's room and obtained history.    Independent Interpretation (X-rays, CTs, rhythm strip):  None    Consultations/Discussion of Management or Tests:  1447    I consulted with Dr. Marc, pediatry, regarding the patient's history and presentation here in the emergency department.   1515 I consulted with Dr. Ludwig, hospitalist, regarding the patient's history and presentation here in the emergency department who accepted the patient for admission.       Social Determinants of Health affecting care:   None    Disposition:  The patient was admitted to the hospital under the care of Dr. Ludwig.     Impression & Plan    CMS Diagnoses: None    Medical Decision Making:  Patient comes in at the direction of his podiatrist for admission although there are no beds available so he comes to the ER.  Fever last night, osteomyelitis of the left great toe seen on x-ray at the clinic, I ordered blood tests including the comprehensive panel which came back normal, CRP is up at 124.92, white count is elevated 12.2.  Lactic acid is normal at 1.2.  Blood cultures were obtained.  Because he has systemic symptoms with fever elevated white count and he is diabetic, I am using Zosyn at 4.5 g to cover Pseudomonas and polymicrobial infections.  I talked with the podiatrist on-call who agreed with this plan and will see the patient and plan for surgery at some point over the weekend.  I discussed the case with Dr. Ludwig the hospitalist will be admitting the patient and monitoring the blood sugars and managing those as well, the blood sugar here was  162.    Diagnosis:    ICD-10-CM    1. Osteomyelitis of left foot, unspecified type (H)  M86.9 Case Request: Left hallux amputation     Case Request: Left hallux amputation    Left great toe      2. Diabetic foot infection (H)  E11.628     L08.9       3. Type 2 diabetes mellitus with hyperglycemia, with long-term current use of insulin (H)  E11.65     Z79.4          Scribe Disclosure:  Gris ROWLEY Hired, am serving as a scribe at 1:56 PM on 8/18/2023 to document services personally performed by Carmen Beltran MD based on my observations and the provider's statements to me.   8/18/2023   Carmen Beltran MD Powell, Tracy Alan, MD  08/18/23 1533       Carmen Beltran MD  08/18/23 1536

## 2023-08-18 NOTE — LETTER
"    2023         RE: Jairo Escamilla  [address] [place] [address]        Dear Colleague,    Thank you for referring your patient, Jairo Escamilla, to the [facility]. Please see a copy of my visit note below.    Foot & Ankle Surgery   2023    S:  Pt is seen today for evaluation of worsening diabetic left great toe wound and infection.  He has been feeling under the weather lately including fevers.  I received a message yesterday and advised he follow-up in clinic for recheck and for x-rays, and I also sent a prescription for Augmentin 875 twice daily to his pharmacy yesterday.  He is planning on picking up today after the appointment..    Vitals:    23 0907   BP: 126/82   Weight: 127 kg (280 lb)   Height: 1.854 m (6' 1\")   '      ROS - Pos for CC.  Patient denies current nausea, vomiting, chills, fevers, belly pain, calf pain, chest pain or SOB.  Complete remainder of ROS it otherwise neg.      PE:  Gen:   No apparent distress  Eye:    Visual scanning without deficit  Ear:    Response to auditory stimuli wnl  Lung:    Non-labored breathing on RA noted  Abd:    NTND per patient report  Lymph:    Localized redness and swelling left great toe ascending into the foot  Vasc:    Pulses palpable, CFT minimally delayed  Neuro:    Light touch sensation diminished distally  Derm:   Full-thickness wound plantar medial left great toe with serous drainage.  The wound readily probes to bone  MSK:    No specific pain noted with manipulation of the toe IPJ or MPJ  Calf:    Neg for redness, swelling or tenderness      Imagin views weightbearing left foot -wound is seen at the medial aspect of the left great toe.  There is an erosive/lytic lesion at the medial base of the distal phalanx suspicious for osteomyelitis.  No soft tissue emphysema is seen      Assessment:  53 year old male with diabetic left great toe ulceration with cellulitis and osteomyelitis      Medical Decision " Making/Plan:  Discussed etiologies, anatomy and options  1.  Diabetic left great toe ulceration with cellulitis and osteomyelitis  -I personally reviewed and interpreted the x-rays.  Destructive changes are highly suggestive of osteomyelitis and the wound clinically probes to bone  -We have attempted to facilitate [facility] to [facility] or [facility] but we have not heard back from the admission line.  The patient was discharged home.  We will reach out to him with bed information if possible.  If no beds are available, I will advise admission via the ER  -IV antibiotics and surgical intervention are appropriate.  We discussed the plan would be for partial left great toe amputation, excising the distal phalanx and the distal aspect of the proximal phalanx.  I would take a biopsy of the proximal phalanx to ensure complete removal of bone infection.  I anticipate 3 to 5 days of hospitalization will be necessary    Follow up: Admission           Filiberto Mercedes DPM [facility] FACFAOM  Podiatric Foot & Ankle Surgeon  [facility]  [phone]    Disclaimer: This note consists of symbols derived from keyboarding, dictation and/or voice recognition software. As a result, there may be errors in the script that have gone undetected. Please consider this when interpreting information found in this chart.        Again, thank you for allowing me to participate in the care of your patient.        Sincerely,        Filiberto Mercedes DPM, DOE

## 2023-08-19 ENCOUNTER — APPOINTMENT (OUTPATIENT)
Dept: GENERAL RADIOLOGY | Facility: CLINIC | Age: 53
DRG: 617 | End: 2023-08-19
Attending: PODIATRIST
Payer: COMMERCIAL

## 2023-08-19 ENCOUNTER — ANESTHESIA EVENT (OUTPATIENT)
Dept: SURGERY | Facility: CLINIC | Age: 53
DRG: 617 | End: 2023-08-19
Payer: COMMERCIAL

## 2023-08-19 ENCOUNTER — ANESTHESIA (OUTPATIENT)
Dept: SURGERY | Facility: CLINIC | Age: 53
DRG: 617 | End: 2023-08-19
Payer: COMMERCIAL

## 2023-08-19 LAB
ERYTHROCYTE [DISTWIDTH] IN BLOOD BY AUTOMATED COUNT: 13.7 % (ref 10–15)
GLUCOSE BLDC GLUCOMTR-MCNC: 126 MG/DL (ref 70–99)
GLUCOSE BLDC GLUCOMTR-MCNC: 130 MG/DL (ref 70–99)
GLUCOSE BLDC GLUCOMTR-MCNC: 141 MG/DL (ref 70–99)
GLUCOSE BLDC GLUCOMTR-MCNC: 142 MG/DL (ref 70–99)
GLUCOSE BLDC GLUCOMTR-MCNC: 144 MG/DL (ref 70–99)
GLUCOSE BLDC GLUCOMTR-MCNC: 162 MG/DL (ref 70–99)
GLUCOSE BLDC GLUCOMTR-MCNC: 168 MG/DL (ref 70–99)
GLUCOSE BLDC GLUCOMTR-MCNC: 180 MG/DL (ref 70–99)
GRAM STAIN RESULT: ABNORMAL
HCT VFR BLD AUTO: 42 % (ref 40–53)
HGB BLD-MCNC: 13.8 G/DL (ref 13.3–17.7)
MCH RBC QN AUTO: 28 PG (ref 26.5–33)
MCHC RBC AUTO-ENTMCNC: 32.9 G/DL (ref 31.5–36.5)
MCV RBC AUTO: 85 FL (ref 78–100)
PLATELET # BLD AUTO: 215 10E3/UL (ref 150–450)
RBC # BLD AUTO: 4.92 10E6/UL (ref 4.4–5.9)
WBC # BLD AUTO: 10 10E3/UL (ref 4–11)

## 2023-08-19 PROCEDURE — 250N000013 HC RX MED GY IP 250 OP 250 PS 637: Performed by: INTERNAL MEDICINE

## 2023-08-19 PROCEDURE — 0Y6Q0Z1 DETACHMENT AT LEFT 1ST TOE, HIGH, OPEN APPROACH: ICD-10-PCS | Performed by: PODIATRIST

## 2023-08-19 PROCEDURE — 258N000003 HC RX IP 258 OP 636: Performed by: ANESTHESIOLOGY

## 2023-08-19 PROCEDURE — 271N000001 HC OR GENERAL SUPPLY NON-STERILE: Performed by: PODIATRIST

## 2023-08-19 PROCEDURE — 87075 CULTR BACTERIA EXCEPT BLOOD: CPT | Performed by: PODIATRIST

## 2023-08-19 PROCEDURE — 36415 COLL VENOUS BLD VENIPUNCTURE: CPT | Performed by: INTERNAL MEDICINE

## 2023-08-19 PROCEDURE — 85027 COMPLETE CBC AUTOMATED: CPT | Performed by: INTERNAL MEDICINE

## 2023-08-19 PROCEDURE — 250N000011 HC RX IP 250 OP 636: Performed by: PODIATRIST

## 2023-08-19 PROCEDURE — 258N000003 HC RX IP 258 OP 636: Performed by: NURSE ANESTHETIST, CERTIFIED REGISTERED

## 2023-08-19 PROCEDURE — 250N000011 HC RX IP 250 OP 636: Mod: JZ | Performed by: PODIATRIST

## 2023-08-19 PROCEDURE — 250N000012 HC RX MED GY IP 250 OP 636 PS 637: Performed by: PODIATRIST

## 2023-08-19 PROCEDURE — 370N000017 HC ANESTHESIA TECHNICAL FEE, PER MIN: Performed by: PODIATRIST

## 2023-08-19 PROCEDURE — 250N000013 HC RX MED GY IP 250 OP 250 PS 637: Performed by: PODIATRIST

## 2023-08-19 PROCEDURE — 28825 PARTIAL AMPUTATION OF TOE: CPT | Mod: TA | Performed by: PODIATRIST

## 2023-08-19 PROCEDURE — 360N000075 HC SURGERY LEVEL 2, PER MIN: Performed by: PODIATRIST

## 2023-08-19 PROCEDURE — 999N000141 HC STATISTIC PRE-PROCEDURE NURSING ASSESSMENT: Performed by: PODIATRIST

## 2023-08-19 PROCEDURE — 250N000011 HC RX IP 250 OP 636: Mod: JZ | Performed by: INTERNAL MEDICINE

## 2023-08-19 PROCEDURE — 999N000063 XR FOOT PORT LEFT 2 VIEWS: Mod: LT

## 2023-08-19 PROCEDURE — 99254 IP/OBS CNSLTJ NEW/EST MOD 60: CPT | Mod: 57 | Performed by: PODIATRIST

## 2023-08-19 PROCEDURE — 99232 SBSQ HOSP IP/OBS MODERATE 35: CPT | Performed by: INTERNAL MEDICINE

## 2023-08-19 PROCEDURE — 120N000001 HC R&B MED SURG/OB

## 2023-08-19 PROCEDURE — 87205 SMEAR GRAM STAIN: CPT | Performed by: PODIATRIST

## 2023-08-19 PROCEDURE — 250N000011 HC RX IP 250 OP 636: Mod: JZ | Performed by: NURSE ANESTHETIST, CERTIFIED REGISTERED

## 2023-08-19 PROCEDURE — 250N000011 HC RX IP 250 OP 636: Performed by: NURSE ANESTHETIST, CERTIFIED REGISTERED

## 2023-08-19 PROCEDURE — 88305 TISSUE EXAM BY PATHOLOGIST: CPT | Mod: 26 | Performed by: PATHOLOGY

## 2023-08-19 PROCEDURE — 710N000009 HC RECOVERY PHASE 1, LEVEL 1, PER MIN: Performed by: PODIATRIST

## 2023-08-19 PROCEDURE — 272N000001 HC OR GENERAL SUPPLY STERILE: Performed by: PODIATRIST

## 2023-08-19 PROCEDURE — 88311 DECALCIFY TISSUE: CPT | Mod: 26 | Performed by: PATHOLOGY

## 2023-08-19 PROCEDURE — 250N000009 HC RX 250: Performed by: PODIATRIST

## 2023-08-19 PROCEDURE — 88305 TISSUE EXAM BY PATHOLOGIST: CPT | Mod: TC | Performed by: PODIATRIST

## 2023-08-19 PROCEDURE — 87070 CULTURE OTHR SPECIMN AEROBIC: CPT | Performed by: PODIATRIST

## 2023-08-19 PROCEDURE — 250N000009 HC RX 250: Performed by: NURSE ANESTHETIST, CERTIFIED REGISTERED

## 2023-08-19 RX ORDER — FENTANYL CITRATE 50 UG/ML
25 INJECTION, SOLUTION INTRAMUSCULAR; INTRAVENOUS
Status: DISCONTINUED | OUTPATIENT
Start: 2023-08-19 | End: 2023-08-19 | Stop reason: HOSPADM

## 2023-08-19 RX ORDER — ONDANSETRON 2 MG/ML
4 INJECTION INTRAMUSCULAR; INTRAVENOUS EVERY 30 MIN PRN
Status: DISCONTINUED | OUTPATIENT
Start: 2023-08-19 | End: 2023-08-19 | Stop reason: HOSPADM

## 2023-08-19 RX ORDER — ONDANSETRON 2 MG/ML
INJECTION INTRAMUSCULAR; INTRAVENOUS PRN
Status: DISCONTINUED | OUTPATIENT
Start: 2023-08-19 | End: 2023-08-19

## 2023-08-19 RX ORDER — OXYCODONE HYDROCHLORIDE 5 MG/1
5 TABLET ORAL EVERY 4 HOURS PRN
Status: DISCONTINUED | OUTPATIENT
Start: 2023-08-19 | End: 2023-08-22 | Stop reason: HOSPADM

## 2023-08-19 RX ORDER — ACETAMINOPHEN 325 MG/1
650 TABLET ORAL EVERY 4 HOURS PRN
Status: DISCONTINUED | OUTPATIENT
Start: 2023-08-22 | End: 2023-08-19

## 2023-08-19 RX ORDER — HYDROMORPHONE HCL IN WATER/PF 6 MG/30 ML
0.2 PATIENT CONTROLLED ANALGESIA SYRINGE INTRAVENOUS EVERY 5 MIN PRN
Status: DISCONTINUED | OUTPATIENT
Start: 2023-08-19 | End: 2023-08-19 | Stop reason: HOSPADM

## 2023-08-19 RX ORDER — ALBUTEROL SULFATE 0.83 MG/ML
2.5 SOLUTION RESPIRATORY (INHALATION) EVERY 4 HOURS PRN
Status: DISCONTINUED | OUTPATIENT
Start: 2023-08-19 | End: 2023-08-19 | Stop reason: HOSPADM

## 2023-08-19 RX ORDER — BUPIVACAINE HYDROCHLORIDE 5 MG/ML
INJECTION, SOLUTION EPIDURAL; INTRACAUDAL PRN
Status: DISCONTINUED | OUTPATIENT
Start: 2023-08-19 | End: 2023-08-19 | Stop reason: HOSPADM

## 2023-08-19 RX ORDER — FENTANYL CITRATE 50 UG/ML
50 INJECTION, SOLUTION INTRAMUSCULAR; INTRAVENOUS EVERY 5 MIN PRN
Status: DISCONTINUED | OUTPATIENT
Start: 2023-08-19 | End: 2023-08-19 | Stop reason: HOSPADM

## 2023-08-19 RX ORDER — SODIUM CHLORIDE, SODIUM LACTATE, POTASSIUM CHLORIDE, CALCIUM CHLORIDE 600; 310; 30; 20 MG/100ML; MG/100ML; MG/100ML; MG/100ML
INJECTION, SOLUTION INTRAVENOUS CONTINUOUS
Status: DISCONTINUED | OUTPATIENT
Start: 2023-08-19 | End: 2023-08-19 | Stop reason: HOSPADM

## 2023-08-19 RX ORDER — ONDANSETRON 4 MG/1
4 TABLET, ORALLY DISINTEGRATING ORAL EVERY 6 HOURS PRN
Status: DISCONTINUED | OUTPATIENT
Start: 2023-08-19 | End: 2023-08-19

## 2023-08-19 RX ORDER — ONDANSETRON 4 MG/1
4 TABLET, ORALLY DISINTEGRATING ORAL EVERY 30 MIN PRN
Status: DISCONTINUED | OUTPATIENT
Start: 2023-08-19 | End: 2023-08-19 | Stop reason: HOSPADM

## 2023-08-19 RX ORDER — AMOXICILLIN 250 MG
1 CAPSULE ORAL 2 TIMES DAILY
Status: DISCONTINUED | OUTPATIENT
Start: 2023-08-19 | End: 2023-08-22 | Stop reason: HOSPADM

## 2023-08-19 RX ORDER — DEXMEDETOMIDINE HYDROCHLORIDE 4 UG/ML
INJECTION, SOLUTION INTRAVENOUS PRN
Status: DISCONTINUED | OUTPATIENT
Start: 2023-08-19 | End: 2023-08-19

## 2023-08-19 RX ORDER — HYDRALAZINE HYDROCHLORIDE 20 MG/ML
2.5-5 INJECTION INTRAMUSCULAR; INTRAVENOUS EVERY 10 MIN PRN
Status: DISCONTINUED | OUTPATIENT
Start: 2023-08-19 | End: 2023-08-19 | Stop reason: HOSPADM

## 2023-08-19 RX ORDER — BISACODYL 10 MG
10 SUPPOSITORY, RECTAL RECTAL DAILY PRN
Status: DISCONTINUED | OUTPATIENT
Start: 2023-08-19 | End: 2023-08-22 | Stop reason: HOSPADM

## 2023-08-19 RX ORDER — FENTANYL CITRATE 50 UG/ML
25 INJECTION, SOLUTION INTRAMUSCULAR; INTRAVENOUS EVERY 5 MIN PRN
Status: DISCONTINUED | OUTPATIENT
Start: 2023-08-19 | End: 2023-08-19 | Stop reason: HOSPADM

## 2023-08-19 RX ORDER — OXYCODONE HYDROCHLORIDE 5 MG/1
10 TABLET ORAL EVERY 4 HOURS PRN
Status: DISCONTINUED | OUTPATIENT
Start: 2023-08-19 | End: 2023-08-22 | Stop reason: HOSPADM

## 2023-08-19 RX ORDER — HYDROMORPHONE HCL IN WATER/PF 6 MG/30 ML
0.4 PATIENT CONTROLLED ANALGESIA SYRINGE INTRAVENOUS EVERY 5 MIN PRN
Status: DISCONTINUED | OUTPATIENT
Start: 2023-08-19 | End: 2023-08-19 | Stop reason: HOSPADM

## 2023-08-19 RX ORDER — OXYCODONE HYDROCHLORIDE 5 MG/1
5 TABLET ORAL
Status: DISCONTINUED | OUTPATIENT
Start: 2023-08-19 | End: 2023-08-19 | Stop reason: HOSPADM

## 2023-08-19 RX ORDER — ACETAMINOPHEN 325 MG/1
975 TABLET ORAL EVERY 8 HOURS
Status: DISCONTINUED | OUTPATIENT
Start: 2023-08-19 | End: 2023-08-21

## 2023-08-19 RX ORDER — PROCHLORPERAZINE MALEATE 10 MG
10 TABLET ORAL EVERY 6 HOURS PRN
Status: DISCONTINUED | OUTPATIENT
Start: 2023-08-19 | End: 2023-08-19

## 2023-08-19 RX ORDER — LIDOCAINE 40 MG/G
CREAM TOPICAL
Status: DISCONTINUED | OUTPATIENT
Start: 2023-08-19 | End: 2023-08-19

## 2023-08-19 RX ORDER — ONDANSETRON 2 MG/ML
4 INJECTION INTRAMUSCULAR; INTRAVENOUS EVERY 6 HOURS PRN
Status: DISCONTINUED | OUTPATIENT
Start: 2023-08-19 | End: 2023-08-19

## 2023-08-19 RX ORDER — MAGNESIUM HYDROXIDE 1200 MG/15ML
LIQUID ORAL PRN
Status: DISCONTINUED | OUTPATIENT
Start: 2023-08-19 | End: 2023-08-19 | Stop reason: HOSPADM

## 2023-08-19 RX ORDER — POLYETHYLENE GLYCOL 3350 17 G/17G
17 POWDER, FOR SOLUTION ORAL DAILY
Status: DISCONTINUED | OUTPATIENT
Start: 2023-08-20 | End: 2023-08-22 | Stop reason: HOSPADM

## 2023-08-19 RX ORDER — PROPOFOL 10 MG/ML
INJECTION, EMULSION INTRAVENOUS CONTINUOUS PRN
Status: DISCONTINUED | OUTPATIENT
Start: 2023-08-19 | End: 2023-08-19

## 2023-08-19 RX ORDER — LABETALOL HYDROCHLORIDE 5 MG/ML
10 INJECTION, SOLUTION INTRAVENOUS
Status: DISCONTINUED | OUTPATIENT
Start: 2023-08-19 | End: 2023-08-19 | Stop reason: HOSPADM

## 2023-08-19 RX ORDER — MEPERIDINE HYDROCHLORIDE 25 MG/ML
12.5 INJECTION INTRAMUSCULAR; INTRAVENOUS; SUBCUTANEOUS EVERY 5 MIN PRN
Status: DISCONTINUED | OUTPATIENT
Start: 2023-08-19 | End: 2023-08-19 | Stop reason: HOSPADM

## 2023-08-19 RX ORDER — OXYCODONE HYDROCHLORIDE 5 MG/1
10 TABLET ORAL
Status: DISCONTINUED | OUTPATIENT
Start: 2023-08-19 | End: 2023-08-19 | Stop reason: HOSPADM

## 2023-08-19 RX ORDER — LIDOCAINE 40 MG/G
CREAM TOPICAL
Status: DISCONTINUED | OUTPATIENT
Start: 2023-08-19 | End: 2023-08-19 | Stop reason: HOSPADM

## 2023-08-19 RX ADMIN — SIMVASTATIN 20 MG: 20 TABLET, FILM COATED ORAL at 21:41

## 2023-08-19 RX ADMIN — SIMVASTATIN 20 MG: 20 TABLET, FILM COATED ORAL at 00:20

## 2023-08-19 RX ADMIN — MIDAZOLAM 2 MG: 1 INJECTION INTRAMUSCULAR; INTRAVENOUS at 07:27

## 2023-08-19 RX ADMIN — PIPERACILLIN AND TAZOBACTAM 4.5 G: 4; .5 INJECTION, POWDER, FOR SOLUTION INTRAVENOUS at 13:53

## 2023-08-19 RX ADMIN — ACETAMINOPHEN 975 MG: 325 TABLET, FILM COATED ORAL at 17:51

## 2023-08-19 RX ADMIN — PIPERACILLIN AND TAZOBACTAM 4.5 G: 4; .5 INJECTION, POWDER, FOR SOLUTION INTRAVENOUS at 21:40

## 2023-08-19 RX ADMIN — INSULIN ASPART 1 UNITS: 100 INJECTION, SOLUTION INTRAVENOUS; SUBCUTANEOUS at 17:52

## 2023-08-19 RX ADMIN — PROPOFOL 200 MCG/KG/MIN: 10 INJECTION, EMULSION INTRAVENOUS at 07:29

## 2023-08-19 RX ADMIN — PHENYLEPHRINE HYDROCHLORIDE 100 MCG: 10 INJECTION INTRAVENOUS at 07:47

## 2023-08-19 RX ADMIN — PIPERACILLIN AND TAZOBACTAM 4.5 G: 4; .5 INJECTION, POWDER, FOR SOLUTION INTRAVENOUS at 01:38

## 2023-08-19 RX ADMIN — ACETAMINOPHEN 650 MG: 325 TABLET, FILM COATED ORAL at 02:19

## 2023-08-19 RX ADMIN — INSULIN ASPART 1 UNITS: 100 INJECTION, SOLUTION INTRAVENOUS; SUBCUTANEOUS at 12:00

## 2023-08-19 RX ADMIN — ACETAMINOPHEN 975 MG: 325 TABLET, FILM COATED ORAL at 10:32

## 2023-08-19 RX ADMIN — DEXMEDETOMIDINE HYDROCHLORIDE 20 MCG: 200 INJECTION INTRAVENOUS at 07:28

## 2023-08-19 RX ADMIN — PIPERACILLIN AND TAZOBACTAM 4.5 G: 4; .5 INJECTION, POWDER, FOR SOLUTION INTRAVENOUS at 07:10

## 2023-08-19 RX ADMIN — LOSARTAN POTASSIUM 25 MG: 25 TABLET, FILM COATED ORAL at 21:41

## 2023-08-19 RX ADMIN — LOSARTAN POTASSIUM 25 MG: 25 TABLET, FILM COATED ORAL at 00:20

## 2023-08-19 RX ADMIN — SODIUM CHLORIDE, POTASSIUM CHLORIDE, SODIUM LACTATE AND CALCIUM CHLORIDE: 600; 310; 30; 20 INJECTION, SOLUTION INTRAVENOUS at 07:09

## 2023-08-19 RX ADMIN — ONDANSETRON 4 MG: 2 INJECTION INTRAMUSCULAR; INTRAVENOUS at 07:58

## 2023-08-19 ASSESSMENT — ACTIVITIES OF DAILY LIVING (ADL)
DRESSING/BATHING_DIFFICULTY: NO
FALL_HISTORY_WITHIN_LAST_SIX_MONTHS: NO
WALKING_OR_CLIMBING_STAIRS_DIFFICULTY: NO
ADLS_ACUITY_SCORE: 37
DIFFICULTY_EATING/SWALLOWING: NO
ADLS_ACUITY_SCORE: 37
ADLS_ACUITY_SCORE: 20
ADLS_ACUITY_SCORE: 37
TOILETING_ISSUES: NO
WEAR_GLASSES_OR_BLIND: NO
ADLS_ACUITY_SCORE: 37
CONCENTRATING,_REMEMBERING_OR_MAKING_DECISIONS_DIFFICULTY: NO
ADLS_ACUITY_SCORE: 37
DOING_ERRANDS_INDEPENDENTLY_DIFFICULTY: NO
ADLS_ACUITY_SCORE: 37

## 2023-08-19 NOTE — PLAN OF CARE
Date/Time: 08/19:     Trauma/Ortho/Medical (Choose one): Ortho    Diagnosis:(L) great toe amp  POD#:0  Mental Status:Oriented x 4  Activity/dangle: Yes  Diet: Mod CHO  Pain: Denies just cramp on (L) calf. Wedge off for few mins to relieve cramp  Gill/Voiding:Bathroom  Tele/Restraints/Iso: None  02/LDA:RA. JONATHON SL  D/C Date:Unknown  Other Info:Is on sliding scale coverage

## 2023-08-19 NOTE — PROGRESS NOTES
Date/Time:1900-0730    Trauma/Ortho/Medical (Choose one):Ortho    Diagnosis:Diabetic left great toe wound  POD#:Pending surgery  Mental Status:A&O x 4  Activity/dangle:Indep in room  Diet:NPO @ midnight  Pain:Denies  Gill/Voiding:BR  Tele/Restraints/Iso:N/A  02/LDA:NICHOLE JOYNER  D/C Date:TBD  Other Info:   Pt sent to OR at 0635.Zosyn 4.5 g sent with.

## 2023-08-19 NOTE — PROGRESS NOTES
MD notification:Pt requesting to have his PTA meds :Metformin ,Simvastatin and Losartan. Awaiting feedback.

## 2023-08-19 NOTE — CONSULTS
Foot & Ankle Surgery  August 19, 2023    CC: diabetic left great toe ulcer with cellulitis and osteomyelitis    I was asked to see Jairo Escamilla regarding the chief complaint by:  Dr. JAMSHID Ludwig    HPI:  Pt is a 53 year old male who presents with above complaint.  Consultation for left great toe wound.  I have seen the patient extensively outpatient in clinic for a diabetic left great toe ulceration.  The patient presented to clinic yesterday with pronounced redness and swelling of the toe.  He had developed constitutional symptoms of infection including fevers.  X-rays in clinic showed a lytic destructive lesion at the medial base of the left great distal phalanx and the bone was readily palpable on examination.  I recommended admission for IV antibiotics, further infection work-up and likely surgical intervention.  In admission MRI demonstrated osteomyelitis of the distal phalanx without proximal phalanx involvement.  The patient was started on IV Zosyn    ROS:   Pos for CC.  The patient denies current nausea, vomiting, chills, fevers, belly pain, calf pain, chest pain or SOB.  Complete remainder of ROS is otherwise neg.    VITALS:    Vitals:    08/18/23 1349 08/18/23 1845 08/19/23 0149 08/19/23 0701   BP: (!) 140/90 119/69 96/54 120/77   BP Location: Right arm Right arm Left arm    Patient Position:   Semi-Higgins's    Cuff Size:    Adult Large   Pulse: 106 104 101 93   Resp: 16 16 16 18   Temp: 98.2  F (36.8  C) 99.2  F (37.3  C) 99.6  F (37.6  C) 98.2  F (36.8  C)   TempSrc: Temporal Oral Oral Temporal   SpO2: 95% 96% 93% 94%       PMH:    Past Medical History:   Diagnosis Date    Diabetes mellitus (adult onset)     Diabetes mellitus, type 2 (H) 05/14/2008    Fracture of rib 2022    High cholesterol     Hypertension     Obese     Peripheral neuropathy     Pneumonia     Sleep apnea     Subdural hematoma (H)        SXHX:    Past Surgical History:   Procedure Laterality Date    COLONOSCOPY N/A 12/22/2022     Procedure: COLONOSCOPY, FLEXIBLE, WITH LESION REMOVAL USING SNARE;  Surgeon: Josh Reeves MD;  Location: [facility]    NO HISTORY OF SURGERY          MEDS:    Current Facility-Administered Medications   Medication    acetaminophen (TYLENOL) tablet 650 mg    Or    acetaminophen (TYLENOL) Suppository 650 mg    glucose gel 15-30 g    Or    dextrose 50 % injection 25-50 mL    Or    glucagon injection 1 mg    insulin aspart (NovoLOG) injection (RAPID ACTING)    insulin aspart (NovoLOG) injection (RAPID ACTING)    lactated ringers infusion    lactated ringers infusion    lidocaine (LMX4) cream    lidocaine (LMX4) cream    lidocaine 1 % 0.1-1 mL    lidocaine 1 % 0.1-1 mL    lidocaine 1 % 0.1-1 mL    losartan (COZAAR) tablet 25 mg    melatonin tablet 1 mg    naloxone (NARCAN) injection 0.2 mg    Or    naloxone (NARCAN) injection 0.4 mg    Or    naloxone (NARCAN) injection 0.2 mg    Or    naloxone (NARCAN) injection 0.4 mg    ondansetron (ZOFRAN ODT) ODT tab 4 mg    Or    ondansetron (ZOFRAN) injection 4 mg    oxyCODONE (ROXICODONE) tablet 5 mg    oxyCODONE IR (ROXICODONE) half-tab 2.5 mg    Patient RECEIVING antibiotic to treat a different condition and it provides ADEQUATE COVERAGE for this surgical procedure.    piperacillin-tazobactam (ZOSYN) 4.5 g vial to attach to  mL bag    polyethylene glycol (MIRALAX) Packet 17 g    prochlorperazine (COMPAZINE) injection 10 mg    Or    prochlorperazine (COMPAZINE) tablet 10 mg    Or    prochlorperazine (COMPAZINE) suppository 25 mg    senna-docusate (SENOKOT-S/PERICOLACE) 8.6-50 MG per tablet 1 tablet    Or    senna-docusate (SENOKOT-S/PERICOLACE) 8.6-50 MG per tablet 2 tablet    simvastatin (ZOCOR) tablet 20 mg    sodium chloride (PF) 0.9% PF flush 3 mL    sodium chloride (PF) 0.9% PF flush 3 mL    sodium chloride (PF) 0.9% PF flush 3 mL    sodium chloride (PF) 0.9% PF flush 3 mL    sodium chloride (PF) 0.9% PF flush 3 mL       ALL:     Allergies   Allergen Reactions     Lisinopril      Cough         FMH:    Family History   Problem Relation Age of Onset    Diabetes Mother     Hypertension Mother     Cerebrovascular Disease Mother     Obesity Mother     C.A.D. Father         in mid 60's    Cerebrovascular Disease Father     Diabetes Maternal Aunt          of Diabetic complications    Glaucoma Other     Cancer - colorectal No family hx of     Prostate Cancer No family hx of     Macular Degeneration No family hx of        SocHx:    Social History     Socioeconomic History    Marital status: Single     Spouse name: Not on file    Number of children: Not on file    Years of education: Not on file    Highest education level: Not on file   Occupational History    Not on file   Tobacco Use    Smoking status: Never    Smokeless tobacco: Never   Vaping Use    Vaping Use: Never used   Substance and Sexual Activity    Alcohol use: Yes     Alcohol/week: 0.0 standard drinks of alcohol     Comment: Very little and infrequently    Drug use: Never    Sexual activity: Yes     Partners: Female     Birth control/protection: Female Surgical, None   Other Topics Concern     Service No    Blood Transfusions No    Caffeine Concern No    Occupational Exposure No    Hobby Hazards No    Sleep Concern No    Stress Concern Yes     Comment: work related    Weight Concern Yes    Special Diet Yes     Comment: medium carb (40-75g per meal)    Back Care No    Exercise Yes    Bike Helmet No    Seat Belt Yes    Self-Exams No    Parent/sibling w/ CABG, MI or angioplasty before 65F 55M? No   Social History Narrative    3/28/2013: Single, .  Lives with his 20 year old son.  Also has an 18 year old son and 22 year old daughter.  Works as a  for [employer].  Active in sports (playing, officiating, watching): softball, football, hockey.     Social Determinants of Health     Financial Resource Strain: Not on file   Food Insecurity: Not on file   Transportation Needs: Not  on file   Physical Activity: Not on file   Stress: Not on file   Social Connections: Not on file   Intimate Partner Violence: Not on file   Housing Stability: Not on file           EXAMINATION:  Gen:   No apparent distress  Neuro:   A&Ox3, no deficits  Psych:    Answering questions appropriately for age and situation with normal affect  Head:    NCAT  Eye:    Visual scanning without deficit  Ear:    Response to auditory stimuli wnl  Lung:    Non-labored breathing on RA noted  Abd:    NTND per patient report  Lymph:    Localized redness and swelling left great toe ascending into the foot  Vasc:    Pulses palpable, CFT minimally delayed  Neuro:    Light touch sensation diminished distally  Derm:   Full-thickness wound plantar medial left great toe with serous drainage.  The wound readily probes to bone  MSK:    No specific pain noted with manipulation of the toe IPJ or MPJ  Calf:    Neg for redness, swelling or tenderness    Imaging: X-ray left foot 8/18/2023 - IMPRESSION: Lucency over the soft tissues of the medial aspect of the  great toe with associated cortical irregularity at the base of the  distal phalanx. Findings are worrisome for surface ulceration and  developing osteomyelitis. Old healed fracture of the fifth metatarsal  base. No evidence for acute fracture.    MRI left foot 8/18/2023 - IMPRESSION:  1.  Osteomyelitis distal phalanx of the great toe, adjacent to an area of soft tissue ulceration.  2.  Soft tissue infection great toe without discrete abscess.    Labs:    Component      Latest Ref Rng 8/1/2023  10:28 AM 8/18/2023  2:08 PM   Sodium      136 - 145 mmol/L  136    Potassium      3.4 - 5.3 mmol/L  4.2    Chloride      98 - 107 mmol/L  98    Carbon Dioxide (CO2)      22 - 29 mmol/L  24    Anion Gap      7 - 15 mmol/L  14    Urea Nitrogen      6.0 - 20.0 mg/dL  10.9    Creatinine      0.67 - 1.17 mg/dL  0.81    Calcium      8.6 - 10.0 mg/dL  9.2    Glucose      70 - 99 mg/dL  162 (H)    Alkaline  Phosphatase      40 - 129 U/L  51    AST      0 - 45 U/L  18    ALT      0 - 70 U/L  24    Protein Total      6.4 - 8.3 g/dL  8.1    Albumin      3.5 - 5.2 g/dL  4.2    Bilirubin Total      <=1.2 mg/dL  1.1    GFR Estimate      >60 mL/min/1.73m2  >90    Hemoglobin A1C      0.0 - 5.6 % 8.4 (H)     CRP Inflammation      <5.00 mg/L  124.92 (H)    Lactic Acid      0.7 - 2.0 mmol/L  1.2       Legend:  (H) High      Assessment:  53 year old male with diabetic left great toe ulceration with cellulitis and osteomyelitis      Medical Decision Making/Plan:  Discussed etiologies, anatomy and options  1.  Diabetic left great toe ulceration with cellulitis and osteomyelitis  -I personally reviewed and interpreted the patient's lower extremity history pertinent to today's visit, including imaging/labs, in preparation for initiating a treatment program.  -We had previously discussed surgical options if the infection were to reach the deficit currently is.  The wound readily probes to bone and imaging is highly suggestive of infection.  As such, the patient will be brought to the operating room today for partial left great toe amputation.  If there is no signs of necrosis or ascending infection at the amputation site, we will consider primary closure.  -Continue Zosyn; will tailor antibiotics to culture results      Patient's medical history was reviewed today      Filiberto Mercedes DPM FACFAS FACFAOM  Podiatric Foot & Ankle Surgeon  [facility]  [phone]    Disclaimer: This note consists of symbols derived from keyboarding, dictation and/or voice recognition software. As a result, there may be errors in the script that have gone undetected. Please consider this when interpreting information found in this chart.

## 2023-08-19 NOTE — OP NOTE
Foot & Ankle Surgery  August 19, 2023    Surgeon:   Filiberto Mercedes DPM FACFAS FACFAOM    Assistant: none    Pre-op diagnosis:   Poorly-controlled DMII with neuropathy  Diabetic left great toe ulceration  Cellulitis and osteomyelitis left great toe    Post-op diagnosis:  Same    Procedure:  Partial left great toe amputation with osteotomy through proximal phalanx    Pathology:  Tissue left great toe was sent off for aerobic and anaerobic cultures  The toe was sent for gross evaluation only    Anesthesia: MAC    Hemostasis: None    EBL: Less than 5 cc    Materials:    None    Injectables: 15 cc of quarter percent bupivacaine plain preoperatively    Complications: None apparent    Intra-operative findings: No signs of ascending infection at amputation site    Indications for procedure: Chronic diabetic left great toe ulceration with new onset cellulitis and osteomyelitis    After obtaining verbal consent, the patient was transferred to the operating room and placed in the supine position on the operating table.  IV sedation was initiated.  The foot was anesthetized with the preoperative local.  Was then prepped and draped in the normal aseptic fashion, no tourniquet was utilized.  Patient, procedure, site were correct identified by OR staff.    Procedure 1: Left great toe amputation-attention was directed to the left great toe where an incision was drawn out to excise the ulceration, remove the distal phalanx and to gain access to the underlying proximal phalanx.  The incision was carried down to bone and full-thickness.  The soft tissue was reflected off of the proximal phalanx and full-thickness and a through and through osteotomy was performed.  After bleeding vessels were electrocauterized, the wound was flushed with copious amounts normal saline.  As there was very slight unhealthy tissue at the base the wound and there was some pronounced erythema at the distal aspect of the toe, full closure was not  performed but multiple retention sutures were placed.    A dry sterile dressing was applied to patient's left foot.  He appeared to tolerate the procedure and anesthesia well was transferred to the PACU vital signs stable and vascular status intact to remaining portions of the foot.  The patient will be readmitted to the floor for IV antibiotics and monitoring      Filiberto Mercedes DPM [facility] FACFA  Podiatric Foot & Ankle Surgeon  [facility]  [phone]

## 2023-08-19 NOTE — ANESTHESIA CARE TRANSFER NOTE
Patient: Jairo Escamilla    Procedure: Procedure(s):  Left hallux amputation       Diagnosis: Osteomyelitis of left foot, unspecified type (H) [M86.9]  Diagnosis Additional Information: No value filed.    Anesthesia Type:   MAC     Note:    Oropharynx: oropharynx clear of all foreign objects and spontaneously breathing  Level of Consciousness: awake  Oxygen Supplementation: face mask  Level of Supplemental Oxygen (L/min / FiO2): 6  Independent Airway: airway patency satisfactory and stable  Dentition: dentition unchanged  Vital Signs Stable: post-procedure vital signs reviewed and stable  Report to RN Given: handoff report given  Patient transferred to: PACU    Handoff Report: Identifed the Patient, Identified the Reponsible Provider, Reviewed the pertinent medical history, Discussed the surgical course, Reviewed Intra-OP anesthesia mangement and issues during anesthesia, Set expectations for post-procedure period and Allowed opportunity for questions and acknowledgement of understanding      Vitals:  Vitals Value Taken Time   BP 86/54 08/19/23 0804   Temp     Pulse 85 08/19/23 0808   Resp 25 08/19/23 0808   SpO2 93 % 08/19/23 0808   Vitals shown include unvalidated device data.    Electronically Signed By: NILTON Beach CRNA  August 19, 2023  8:08 AM

## 2023-08-19 NOTE — PROGRESS NOTES
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  Routed to the emergency department for admission, IV antibiotics and surgical debridement.      Left great toe osteomyelitis  Peripheral neuropathy secondary to diabetes  Presented with marked elevation in CRP (124) with normal lactate.  WBC 12.2 K.  -8/19 S/p partial left great toe amputation with osteotomy through proximal phalanx  -Continue Zosyn IV every 6 hours  -Podiatry consultation recommendations appreciated  -Follow-up blood cultures no growth to date  -Operative tissue Gram stain: 4+ gram-positive cocci, 3+ gram-positive bacilli resembling diphtheroids and 4+ white blood cells predominantly PMNs.  -As needed pain medication available: As needed acetaminophen, oxycodone  -Acetaminophen also as needed for fever     Insulin-dependent type 2 diabetes  Prior to admission regimen includes Basaglar 60 units twice daily and NovoLog 25 units 3 times daily.  Reports taking the Basaglar about 75% of the days and NovoLog about 50% of the days.  Also takes Ozempic.  -Consistent carbohydrate diet  -Glucose checks  -Corrective dose aspart  -Resume prior to admission regimen of Premeal NovoLog and Basaglar postoperatively    Chronic, stable conditions:  Hypertension  -Resume prior to admission losartan once dose verified.  Obesity, BMI 39  Dyslipidemia  -Resume statin once dose confirmed  Hypogonadism       Diet: NPO per Anesthesia Guidelines for Procedure/Surgery Except for: Meds    DVT Prophylaxis: Pneumatic Compression Devices and Ambulate every shift  Gill Catheter: Not present  Lines: None     Cardiac Monitoring:  "None  Code Status: Full Code      Clinically Significant Risk Factors Present on Admission                  # Hypertension: Noted on problem list     # DMII: A1C = 8.4 % (Ref range: 0.0 - 5.6 %) within past 6 months    # Obesity: Estimated body mass index is 36.94 kg/m  as calculated from the following:    Height as of an earlier encounter on 8/18/23: 1.854 m (6' 1\").    Weight as of this encounter: 127 kg (280 lb).              Disposition Plan      Expected Discharge Date: 08/20/2023                  Maria De Jesus Ludwig MD  Hospitalist Service  [facility]  Securely message with [employer] (more info)  Text page via [employer] Paging/Directory   ______________________________________________________________________    Interval History   Doing well postoperatively.  Eating and drinking okay.  Pain controlled.  We discussed antibiotic plan and that he had taken 1 tablet out of the 10-day course of Augmentin prescribed from clinic.  Was having some eg cramping using the elevated foam contraption so is now resting his midfoot on the base of the bed.    Afebrile, feels less feverish.  Overall just feeling better.  Physical Exam   Vital Signs: Temp: 98.2  F (36.8  C) Temp src: Temporal BP: 120/77 Pulse: 93   Resp: 18 SpO2: 94 % O2 Device: None (Room air)    Weight: 280 lbs 0 oz    General Appearance: Obese, no acute distress, pleasant and cooperative  Respiratory: clear, nonlabored  Cardiovascular: Trace lower extremity edema, regular rate and rhythm  GI: Protuberant, soft, nontender  Skin: No jaundice, no acute rashes  Left foot: Bandaged    Medical Decision Making       MANAGEMENT DISCUSSED with the following over the past 24 hours: Patient, bedside nurse   NOTE(S)/MEDICAL RECORDS REVIEWED over the past 24 hours: Operative notes, nursing notes, pain flowsheet, medication administration record  Tests ORDERED & REVIEWED in the past 24 hours:  - See lab/imaging results included in the data section of the " note      Data     I have personally reviewed the following data over the past 24 hrs:    10.0  \   13.8   / 215     N/A N/A N/A /  141 (H)   N/A N/A N/A \

## 2023-08-19 NOTE — ANESTHESIA PREPROCEDURE EVALUATION
Anesthesia Pre-Procedure Evaluation    Patient: Jairo Escamilla   MRN: [id number] : 1970        Procedure : Procedure(s):  Left hallux amputation          Past Medical History:   Diagnosis Date    Diabetes mellitus (adult onset)     Diabetes mellitus, type 2 (H) 2008    Fracture of rib     High cholesterol     Hypertension     Obese     Peripheral neuropathy     Pneumonia     Sleep apnea     Subdural hematoma (H)       Past Surgical History:   Procedure Laterality Date    COLONOSCOPY N/A 2022    Procedure: COLONOSCOPY, FLEXIBLE, WITH LESION REMOVAL USING SNARE;  Surgeon: Josh Reeves MD;  Location: [facility]    NO HISTORY OF SURGERY        Allergies   Allergen Reactions    Lisinopril      Cough        Social History     Tobacco Use    Smoking status: Never    Smokeless tobacco: Never   Substance Use Topics    Alcohol use: Yes     Alcohol/week: 0.0 standard drinks of alcohol     Comment: Very little and infrequently      Wt Readings from Last 1 Encounters:   23 127 kg (280 lb)        Anesthesia Evaluation   Pt has had prior anesthetic.     No history of anesthetic complications       ROS/MED HX  ENT/Pulmonary:     (+) sleep apnea,                            recent URI,          Neurologic:    (-) no CVA   Cardiovascular:     (+) Dyslipidemia hypertension- -   -  - -                                   (-) CAD   METS/Exercise Tolerance:     Hematologic:       Musculoskeletal:       GI/Hepatic:    (-) GERD   Renal/Genitourinary:       Endo:     (+)  type II DM,   Using insulin,          Obesity,       Psychiatric/Substance Use:       Infectious Disease: Comment: Osteomyelitis      Malignancy:       Other:            Physical Exam    Airway        Mallampati: II   TM distance: > 3 FB   Neck ROM: full   Mouth opening: > 3 cm    Respiratory Devices and Support         Dental  no notable dental history         Cardiovascular   cardiovascular exam normal          Pulmonary   pulmonary exam normal                 OUTSIDE LABS:  CBC:   Lab Results   Component Value Date    WBC 10.0 08/19/2023    WBC 12.2 (H) 08/18/2023    HGB 13.8 08/19/2023    HGB 14.8 08/18/2023    HCT 42.0 08/19/2023    HCT 46.0 08/18/2023     08/19/2023     08/18/2023     BMP:   Lab Results   Component Value Date     08/18/2023     08/01/2023    POTASSIUM 4.2 08/18/2023    POTASSIUM 4.6 08/01/2023    CHLORIDE 98 08/18/2023    CHLORIDE 101 08/01/2023    CO2 24 08/18/2023    CO2 22 08/01/2023    BUN 10.9 08/18/2023    BUN 11.3 08/01/2023    CR 0.81 08/18/2023    CR 0.71 08/01/2023     (H) 08/19/2023     (H) 08/19/2023     COAGS:   Lab Results   Component Value Date    PTT 29 02/27/2016    INR 1.05 02/27/2016     POC:   Lab Results   Component Value Date     (H) 12/27/2018     HEPATIC:   Lab Results   Component Value Date    ALBUMIN 4.2 08/18/2023    PROTTOTAL 8.1 08/18/2023    ALT 24 08/18/2023    AST 18 08/18/2023    ALKPHOS 51 08/18/2023    BILITOTAL 1.1 08/18/2023     OTHER:   Lab Results   Component Value Date    LACT 1.2 08/18/2023    A1C 8.4 (H) 08/01/2023    LUKASZ 9.2 08/18/2023    PHOS 3.3 12/24/2018    MAG 1.8 12/24/2018    LIPASE 132 12/23/2018    TSH 2.30 04/19/2023    SED 7 12/02/2015       Anesthesia Plan    ASA Status:  3    NPO Status:  NPO Appropriate    Anesthesia Type: MAC.     - Reason for MAC: straight local not clinically adequate              Consents    Anesthesia Plan(s) and associated risks, benefits, and realistic alternatives discussed. Questions answered and patient/representative(s) expressed understanding.     - Discussed:     - Discussed with:  Patient            Postoperative Care    Pain management: Multi-modal analgesia.   PONV prophylaxis: Ondansetron (or other 5HT-3), Background Propofol Infusion     Comments:                Elayne Marin MD, MD

## 2023-08-19 NOTE — PLAN OF CARE
[id number]: Oriented x 4. Denies pain. Up independent. IV SL on (R) AC. BG was 140. (L) great toe is reddened/hot to touch. Will be NPO after midnight.

## 2023-08-19 NOTE — ANESTHESIA POSTPROCEDURE EVALUATION
Patient: Jairo Escamilla    Procedure: Procedure(s):  Left hallux amputation       Anesthesia Type:  MAC    Note:     Postop Pain Control: Uneventful            Sign Out: Well controlled pain   PONV: No   Neuro/Psych: Uneventful            Sign Out: Acceptable/Baseline neuro status   Airway/Respiratory: Uneventful            Sign Out: Acceptable/Baseline resp. status   CV/Hemodynamics: Uneventful            Sign Out: Acceptable CV status; No obvious hypovolemia; No obvious fluid overload   Other NRE:    DID A NON-ROUTINE EVENT OCCUR? No           Last vitals:  Vitals Value Taken Time   BP 92/57 08/19/23 0845   Temp 36.5  C (97.7  F) 08/19/23 0845   Pulse 81 08/19/23 0859   Resp 14 08/19/23 0859   SpO2 95 % 08/19/23 0858   Vitals shown include unvalidated device data.    Electronically Signed By: Elayne Marin MD, MD  August 19, 2023  10:40 AM

## 2023-08-20 ENCOUNTER — APPOINTMENT (OUTPATIENT)
Dept: PHYSICAL THERAPY | Facility: CLINIC | Age: 53
DRG: 617 | End: 2023-08-20
Payer: COMMERCIAL

## 2023-08-20 ENCOUNTER — PREP FOR PROCEDURE (OUTPATIENT)
Dept: OTHER | Facility: CLINIC | Age: 53
End: 2023-08-20
Payer: COMMERCIAL

## 2023-08-20 DIAGNOSIS — M86.172 ACUTE OSTEOMYELITIS OF LEFT FOOT (H): Primary | ICD-10-CM

## 2023-08-20 LAB
GLUCOSE BLDC GLUCOMTR-MCNC: 131 MG/DL (ref 70–99)
GLUCOSE BLDC GLUCOMTR-MCNC: 139 MG/DL (ref 70–99)
GLUCOSE BLDC GLUCOMTR-MCNC: 148 MG/DL (ref 70–99)
GLUCOSE BLDC GLUCOMTR-MCNC: 184 MG/DL (ref 70–99)
GLUCOSE BLDC GLUCOMTR-MCNC: 188 MG/DL (ref 70–99)

## 2023-08-20 PROCEDURE — 97161 PT EVAL LOW COMPLEX 20 MIN: CPT | Mod: GP

## 2023-08-20 PROCEDURE — 97116 GAIT TRAINING THERAPY: CPT | Mod: GP

## 2023-08-20 PROCEDURE — 250N000011 HC RX IP 250 OP 636: Mod: JZ | Performed by: PODIATRIST

## 2023-08-20 PROCEDURE — 250N000013 HC RX MED GY IP 250 OP 250 PS 637: Performed by: PODIATRIST

## 2023-08-20 PROCEDURE — 99233 SBSQ HOSP IP/OBS HIGH 50: CPT | Performed by: PODIATRIST

## 2023-08-20 PROCEDURE — 120N000001 HC R&B MED SURG/OB

## 2023-08-20 PROCEDURE — 250N000013 HC RX MED GY IP 250 OP 250 PS 637: Performed by: INTERNAL MEDICINE

## 2023-08-20 PROCEDURE — 99232 SBSQ HOSP IP/OBS MODERATE 35: CPT | Performed by: INTERNAL MEDICINE

## 2023-08-20 RX ORDER — FAMOTIDINE 10 MG
10 TABLET ORAL 2 TIMES DAILY
Status: DISCONTINUED | OUTPATIENT
Start: 2023-08-20 | End: 2023-08-22 | Stop reason: HOSPADM

## 2023-08-20 RX ORDER — CALCIUM CARBONATE 500 MG/1
500-1000 TABLET, CHEWABLE ORAL 3 TIMES DAILY PRN
Status: DISCONTINUED | OUTPATIENT
Start: 2023-08-20 | End: 2023-08-22 | Stop reason: HOSPADM

## 2023-08-20 RX ADMIN — PIPERACILLIN AND TAZOBACTAM 4.5 G: 4; .5 INJECTION, POWDER, FOR SOLUTION INTRAVENOUS at 15:40

## 2023-08-20 RX ADMIN — ACETAMINOPHEN 975 MG: 325 TABLET, FILM COATED ORAL at 02:52

## 2023-08-20 RX ADMIN — ACETAMINOPHEN 975 MG: 325 TABLET, FILM COATED ORAL at 16:26

## 2023-08-20 RX ADMIN — PIPERACILLIN AND TAZOBACTAM 4.5 G: 4; .5 INJECTION, POWDER, FOR SOLUTION INTRAVENOUS at 08:50

## 2023-08-20 RX ADMIN — OXYCODONE HYDROCHLORIDE 5 MG: 5 TABLET ORAL at 16:26

## 2023-08-20 RX ADMIN — SIMVASTATIN 20 MG: 20 TABLET, FILM COATED ORAL at 21:38

## 2023-08-20 RX ADMIN — INSULIN ASPART 1 UNITS: 100 INJECTION, SOLUTION INTRAVENOUS; SUBCUTANEOUS at 15:40

## 2023-08-20 RX ADMIN — LOSARTAN POTASSIUM 25 MG: 25 TABLET, FILM COATED ORAL at 21:38

## 2023-08-20 RX ADMIN — PIPERACILLIN AND TAZOBACTAM 4.5 G: 4; .5 INJECTION, POWDER, FOR SOLUTION INTRAVENOUS at 02:52

## 2023-08-20 RX ADMIN — ACETAMINOPHEN 975 MG: 325 TABLET, FILM COATED ORAL at 10:22

## 2023-08-20 RX ADMIN — PIPERACILLIN AND TAZOBACTAM 4.5 G: 4; .5 INJECTION, POWDER, FOR SOLUTION INTRAVENOUS at 21:38

## 2023-08-20 RX ADMIN — FAMOTIDINE 10 MG: 10 TABLET ORAL at 21:59

## 2023-08-20 ASSESSMENT — ACTIVITIES OF DAILY LIVING (ADL)
ADLS_ACUITY_SCORE: 20

## 2023-08-20 NOTE — PROGRESS NOTES
08/20/23 0800   Appointment Info   Signing Clinician's Name / Credentials (PT) Silvia Lopez, PT, DPT   Living Environment   People in Home significant other;child(kyra), adult   Current Living Arrangements house   Home Accessibility stairs within home   Number of Stairs, Within Home, Primary ten  (5+5)   Stair Railings, Within Home, Primary railings on both sides of stairs   Transportation Anticipated car, drives self   Living Environment Comments main level living once upstairs   Self-Care   Usual Activity Tolerance excellent   Current Activity Tolerance good   Regular Exercise Yes   Activity/Exercise Type   (light ex, runs a softball/baseball training center)   Equipment Currently Used at Home none   Fall history within last six months no   Activity/Exercise/Self-Care Comment IND with all ADLs and MRADLs   General Information   Onset of Illness/Injury or Date of Surgery 08/18/23   Referring Physician Filiberto Mercedes, DOE   Patient/Family Therapy Goals Statement (PT) To get back to normal activity level   Pertinent History of Current Problem (include personal factors and/or comorbidities that impact the POC) Pt is a 53 YOM POD 1 Partial L hallux amputation and I&D secondary to great toe osteomyelitis.   Weight-Bearing Status - LLE other (see comments)  (heel WB in post op shoe with AD)   Cognition   Affect/Mental Status (Cognition) WFL   Orientation Status (Cognition) oriented x 4   Pain Assessment   Patient Currently in Pain Yes, see Vital Sign flowsheet   Integumentary/Edema   Integumentary/Edema Comments dressing CDI   Range of Motion (ROM)   ROM Comment WFL for mobility   Strength (Manual Muscle Testing)   Strength Comments WFL for mobility   Bed Mobility   Comment, (Bed Mobility) IND with rail, has rail at home   Transfers   Comment, (Transfers) Mod I sit to stand with B axillary crutches   Gait/Stairs (Locomotion)   [place] Level (Gait) supervision;verbal cues   Assistive Device (Gait)  DCFS present at this time. Child life care team remains presents with baby who continues to sleep after eating 5oz of formula. crutches, axillary   Distance in Feet 4   Pattern (Gait) step-through   Balance   Balance Comments Good static standing balance, maintains WB status   Clinical Impression   Criteria for Skilled Therapeutic Intervention Yes, treatment indicated   PT Diagnosis (PT) Impaired gait   Influenced by the following impairments pain, WB restriction   Functional limitations due to impairments Requires assist of 1 and device   Clinical Presentation (PT Evaluation Complexity) Stable/Uncomplicated   Clinical Presentation Rationale Pt presenting per pathway   Clinical Decision Making (Complexity) low complexity   Planned Therapy Interventions (PT) gait training;transfer training;stair training   Anticipated Equipment Needs at Discharge (PT) crutches, axillary;other (see comments)  (knee scooter)   Risk & Benefits of therapy have been explained evaluation/treatment results reviewed;care plan/treatment goals reviewed;risks/benefits reviewed;current/potential barriers reviewed;participants voiced agreement with care plan;participants included;patient   PT Total Evaluation Time   PT Eval, Low Complexity Minutes (03693) 10   Plan of Care Review   Plan of Care Reviewed With patient   Physical Therapy Goals   PT Frequency One time eval and treatment only   PT Goals Gait;Stairs   PT: Gait Modified independent;Assistive device;100 feet   PT: Stairs Modified independent;5 stairs;Rail on both sides

## 2023-08-20 NOTE — PLAN OF CARE
Date/Time: 08/20: 7a-7p    Trauma/Ortho/Medical (Choose one): Ortho    Diagnosis: (L) great toe amp  POD#:1  Mental Status:Oriented X 4  Activity/dangle: Up independent with surgical shoe/crutches  Diet:Mod CHO  Pain: Managed with oxycodone prn and scheduled tylenol  Gill/Voiding:Bathroom  Tele/Restraints/Iso: None  02/LDA: МАРИНА HOLT SL  D/C Date: May be on Tuesday??  Other Info: Will be NPO after midnight for 2nd I and D with wound closure. Is on sliding scale coverage

## 2023-08-20 NOTE — PROGRESS NOTES
Foot & Ankle Surgery Progress Note  August 20, 2023    S:  Jairo was seen at bedside today for continued monitoring of left foot POD#1 sp partial left great toe amputation for severe cellulitis and osteomyelitis.  Pain levels 2/10.    /73 (BP Location: Left arm)   Pulse 84   Temp 98.2  F (36.8  C) (Oral)   Resp 16   Wt 127 kg (280 lb)   SpO2 93%   BMI 36.94 kg/m      ROS - Pos for CC.  Denies nausea, vomiting, chills, fever, belly pain, calf pain, chest pain or shortness of breath. Complete remainder of ROS is otherwise neg.      PE - retention sutures intact, skin margins well coapted.  Erythema improved but present.  Skin margins viable.  Skin shows no trophic, color or temperature changes otherwise.  No calf redness, swelling or pain noted otherwise.      Imaging:  IMPRESSION: Postoperative changes of partial amputation of the great toe through the mid proximal phalanx. Expected soft tissue thickening and edema with scattered foci of soft tissue gas. Otherwise, negative for postoperative purposes.     Cultures:  pending;  gram stain -   4+ Gram positive cocci      3+ Gram positive bacilli resembling diphtheroids      4+ WBC seen   Predominantly PMNs     Pathology:  pending    Assessment:  53 year old male POD#1 sp partial left great toe amputation for pronounced cellulitis and underlying osteomyelitis    Plan:  dressing change at bedside  -reviewed procedure and intra-op findings with patient  -to OR tomorrow with Dr Marc for I&D and likely closure; NPO at midnight, he is the 1st add-on case but timing not specified     Activity -  heel WB; PT training   Pain Management -  oxycodone, tylenol    DVT Prophylaxis -  mechanical   Abx therapy -  Chanelsygabriella Mercedes DPM FACFAS FACFAOM  Podiatric Foot & Ankle Surgeon  [facility]  [phone]

## 2023-08-20 NOTE — PROGRESS NOTES
Date/Time:08/19 2300-0730    Trauma/Ortho/Medical (Choose one):Ortho    Diagnosis:Diabetic left great toe wound  POD#:1 , Left hallux amputation  Mental Status:A&O x 4  Activity/dangle:Indep in room, heel weight bearing  Diet:Mod carb  Pain:Denies  Gill/Voiding:BR  Tele/Restraints/Iso:N/A  02/LDA:RA,PIV SL  D/C Date:TBD  Extremity elevated on pillow per pt preference.

## 2023-08-20 NOTE — PLAN OF CARE
Date/Time 8/19 gabbie    Trauma/Ortho/Medical (Choose one) ortho    Diagnosis:I&D and left great toe amputation  POD#:DOS  Mental Status: A&O  Activity/dangle up indep to bathroom  Diet:mod carb  Pain:denies- some slight ache at 2200- ice applied  Gill/Voiding:voiding in bathroom  Tele/Restraints/Iso:no  02/LDA:saline lock right AC  D/C Date:Monday?  Other Info:preferred pillows over the wedge.

## 2023-08-20 NOTE — PROGRESS NOTES
[facility]  Medicine Progress Note - Hospitalist Service  Date of Admission:  8/18/2023    Assessment & Plan   Jairo Escamilla is a delightful 53 year old male with hypertension, dyslipidemia, obesity, insulin-dependent type 2 diabetes with peripheral neuropathy, hypogonadism who presented after podiatry evaluation for fever, left great toe ulcer and evidence for osteomyelitis on imaging.  Routed to the emergency department for admission, IV antibiotics and surgical debridement.      Left great toe osteomyelitis  Peripheral neuropathy secondary to diabetes  Presented with marked elevation in CRP (124) with normal lactate.  WBC 12.2 K.  -8/19 S/p partial left great toe amputation with osteotomy through proximal phalanx  -Continue Zosyn IV every 6 hours  -Podiatry consultation recommendations appreciated  -Follow-up blood cultures: NGTD  -Operative tissue Gram stain: 4+ gram-positive cocci, 3+ gram-positive bacilli resembling diphtheroids and 4+ white blood cells predominantly PMNs.  -Return to OR planned Monday 8/21 for I&D and likely closure.  -As needed pain medication available: As needed acetaminophen, oxycodone  -Acetaminophen also as needed for fever     Insulin-dependent type 2 diabetes  Prior to admission regimen includes Basaglar 60 units twice daily and NovoLog 25 units 3 times daily.  Reports taking the Basaglar about 75% of the days and NovoLog about 50% of the days.  Also takes Ozempic.  -Consistent carbohydrate diet  -Glucose checks  -Corrective dose aspart  -Half dose glargine tonight and in AM 8/21 given return to OR planned.  -Hold scheduled pre-meal novolog    Probable GERD  -start twice daily pepcid  -prn TUMS available    Chronic, stable conditions:  Hypertension  -Resume prior to admission losartan once dose verified.  Obesity, BMI 39  Dyslipidemia  -Resume statin once dose confirmed  Hypogonadism       Diet: Advance Diet as Tolerated: Fully Advanced to diet(s) per  "Provider order; Moderate Consistent Carb (60 g CHO per Meal) Diet    DVT Prophylaxis: Ambulate every shift  Gill Catheter: Not present  Lines: None     Cardiac Monitoring: None  Code Status: Full Code      Clinically Significant Risk Factors                  # Hypertension: Noted on problem list       # DMII: A1C = 8.4 % (Ref range: 0.0 - 5.6 %) within past 6 months, PRESENT ON ADMISSION  # Obesity: Estimated body mass index is 36.94 kg/m  as calculated from the following:    Height as of an earlier encounter on 8/18/23: 1.854 m (6' 1\").    Weight as of this encounter: 127 kg (280 lb)., PRESENT ON ADMISSION            Disposition Plan     Expected Discharge Date: 08/20/2023                Back to OR tomorrow 8/21 so likely discharge in 2-3 days.    Maria De Jesus Ludwig MD  Hospitalist Service  [facility]  Securely message with [email or web address] (more info)  Text page via [employer] Paging/Directory   ______________________________________________________________________    Interval History   Uneventful night. Eating and drinking ok. Pain controlled.     Describes some throat pain and cough that happens about 10-15 min after eating. No difference if solids or liquids. Thinks it might be reflux. Sister present in room and reports she had severe reflux and ended up getting a Nissen. Patient had colonoscopy ~6-8 months ago that was \"fine\" but hasn't had upper endoscopy.     Physical Exam   Vital Signs: Temp: 98.2  F (36.8  C) Temp src: Oral BP: 111/73 Pulse: 84   Resp: 16 SpO2: 93 % O2 Device: None (Room air)    Weight: 280 lbs 0 oz  General Appearance:  Obese, no acute distress, pleasant and cooperative  Respiratory: clear, nonlabored  Cardiovascular: Trace lower extremity edema, regular rate and rhythm  GI: Protuberant, soft, nontender  Skin: No jaundice, no acute rashes  Left foot: Bandaged    Medical Decision Making       MANAGEMENT DISCUSSED with the following over the past 24 hours: patient, bedside nurse "   NOTE(S)/MEDICAL RECORDS REVIEWED over the past 24 hours: podiatry note, nursing notes  Tests ORDERED & REVIEWED in the past 24 hours:  - See lab/imaging results included in the data section of the note      Data         Imaging results reviewed over the past 24 hrs:   No results found for this or any previous visit (from the past 24 hour(s)).

## 2023-08-21 ENCOUNTER — ANESTHESIA (OUTPATIENT)
Dept: SURGERY | Facility: CLINIC | Age: 53
DRG: 617 | End: 2023-08-21
Payer: COMMERCIAL

## 2023-08-21 ENCOUNTER — ANESTHESIA EVENT (OUTPATIENT)
Dept: SURGERY | Facility: CLINIC | Age: 53
DRG: 617 | End: 2023-08-21
Payer: COMMERCIAL

## 2023-08-21 ENCOUNTER — APPOINTMENT (OUTPATIENT)
Dept: ULTRASOUND IMAGING | Facility: CLINIC | Age: 53
DRG: 617 | End: 2023-08-21
Attending: PODIATRIST
Payer: COMMERCIAL

## 2023-08-21 LAB
GLUCOSE BLDC GLUCOMTR-MCNC: 120 MG/DL (ref 70–99)
GLUCOSE BLDC GLUCOMTR-MCNC: 132 MG/DL (ref 70–99)
GLUCOSE BLDC GLUCOMTR-MCNC: 151 MG/DL (ref 70–99)
GLUCOSE BLDC GLUCOMTR-MCNC: 165 MG/DL (ref 70–99)
GLUCOSE BLDC GLUCOMTR-MCNC: 182 MG/DL (ref 70–99)

## 2023-08-21 PROCEDURE — 250N000013 HC RX MED GY IP 250 OP 250 PS 637: Performed by: PODIATRIST

## 2023-08-21 PROCEDURE — 88305 TISSUE EXAM BY PATHOLOGIST: CPT | Mod: TC | Performed by: PODIATRIST

## 2023-08-21 PROCEDURE — 88311 DECALCIFY TISSUE: CPT | Mod: 26 | Performed by: PATHOLOGY

## 2023-08-21 PROCEDURE — 272N000001 HC OR GENERAL SUPPLY STERILE: Performed by: PODIATRIST

## 2023-08-21 PROCEDURE — 250N000009 HC RX 250: Performed by: NURSE ANESTHETIST, CERTIFIED REGISTERED

## 2023-08-21 PROCEDURE — 250N000011 HC RX IP 250 OP 636: Performed by: NURSE ANESTHETIST, CERTIFIED REGISTERED

## 2023-08-21 PROCEDURE — 710N000009 HC RECOVERY PHASE 1, LEVEL 1, PER MIN: Performed by: PODIATRIST

## 2023-08-21 PROCEDURE — 250N000011 HC RX IP 250 OP 636: Mod: JZ | Performed by: PODIATRIST

## 2023-08-21 PROCEDURE — 258N000003 HC RX IP 258 OP 636: Performed by: NURSE ANESTHETIST, CERTIFIED REGISTERED

## 2023-08-21 PROCEDURE — 11043 DBRDMT MUSC&/FSCA 1ST 20/<: CPT | Mod: 51 | Performed by: PODIATRIST

## 2023-08-21 PROCEDURE — 93922 UPR/L XTREMITY ART 2 LEVELS: CPT

## 2023-08-21 PROCEDURE — 250N000011 HC RX IP 250 OP 636: Performed by: PODIATRIST

## 2023-08-21 PROCEDURE — 271N000001 HC OR GENERAL SUPPLY NON-STERILE: Performed by: PODIATRIST

## 2023-08-21 PROCEDURE — 258N000003 HC RX IP 258 OP 636: Performed by: SURGERY

## 2023-08-21 PROCEDURE — 0Y6Q0Z0 DETACHMENT AT LEFT 1ST TOE, COMPLETE, OPEN APPROACH: ICD-10-PCS | Performed by: PODIATRIST

## 2023-08-21 PROCEDURE — 120N000001 HC R&B MED SURG/OB

## 2023-08-21 PROCEDURE — 370N000017 HC ANESTHESIA TECHNICAL FEE, PER MIN: Performed by: PODIATRIST

## 2023-08-21 PROCEDURE — 250N000009 HC RX 250: Performed by: PODIATRIST

## 2023-08-21 PROCEDURE — 250N000013 HC RX MED GY IP 250 OP 250 PS 637: Performed by: INTERNAL MEDICINE

## 2023-08-21 PROCEDURE — 0LBW0ZZ EXCISION OF LEFT FOOT TENDON, OPEN APPROACH: ICD-10-PCS | Performed by: PODIATRIST

## 2023-08-21 PROCEDURE — 999N000141 HC STATISTIC PRE-PROCEDURE NURSING ASSESSMENT: Performed by: PODIATRIST

## 2023-08-21 PROCEDURE — 28820 AMPUTATION OF TOE: CPT | Mod: TA | Performed by: PODIATRIST

## 2023-08-21 PROCEDURE — 99232 SBSQ HOSP IP/OBS MODERATE 35: CPT | Performed by: PHYSICIAN ASSISTANT

## 2023-08-21 PROCEDURE — 88305 TISSUE EXAM BY PATHOLOGIST: CPT | Mod: 26 | Performed by: PATHOLOGY

## 2023-08-21 PROCEDURE — 360N000078 HC SURGERY LEVEL 5, PER MIN: Performed by: PODIATRIST

## 2023-08-21 RX ORDER — ONDANSETRON 2 MG/ML
4 INJECTION INTRAMUSCULAR; INTRAVENOUS EVERY 6 HOURS PRN
Status: DISCONTINUED | OUTPATIENT
Start: 2023-08-21 | End: 2023-08-22 | Stop reason: HOSPADM

## 2023-08-21 RX ORDER — DEXTROSE MONOHYDRATE 25 G/50ML
25-50 INJECTION, SOLUTION INTRAVENOUS
Status: DISCONTINUED | OUTPATIENT
Start: 2023-08-21 | End: 2023-08-22 | Stop reason: HOSPADM

## 2023-08-21 RX ORDER — ACETAMINOPHEN 325 MG/1
650 TABLET ORAL EVERY 4 HOURS PRN
Status: DISCONTINUED | OUTPATIENT
Start: 2023-08-24 | End: 2023-08-21

## 2023-08-21 RX ORDER — AMOXICILLIN 250 MG
1 CAPSULE ORAL 2 TIMES DAILY
Status: DISCONTINUED | OUTPATIENT
Start: 2023-08-21 | End: 2023-08-21

## 2023-08-21 RX ORDER — FENTANYL CITRATE 0.05 MG/ML
50 INJECTION, SOLUTION INTRAMUSCULAR; INTRAVENOUS EVERY 5 MIN PRN
Status: DISCONTINUED | OUTPATIENT
Start: 2023-08-21 | End: 2023-08-21 | Stop reason: HOSPADM

## 2023-08-21 RX ORDER — ONDANSETRON 2 MG/ML
4 INJECTION INTRAMUSCULAR; INTRAVENOUS EVERY 30 MIN PRN
Status: DISCONTINUED | OUTPATIENT
Start: 2023-08-21 | End: 2023-08-21 | Stop reason: HOSPADM

## 2023-08-21 RX ORDER — DEXMEDETOMIDINE HYDROCHLORIDE 4 UG/ML
INJECTION, SOLUTION INTRAVENOUS PRN
Status: DISCONTINUED | OUTPATIENT
Start: 2023-08-21 | End: 2023-08-21

## 2023-08-21 RX ORDER — PROPOFOL 10 MG/ML
INJECTION, EMULSION INTRAVENOUS CONTINUOUS PRN
Status: DISCONTINUED | OUTPATIENT
Start: 2023-08-21 | End: 2023-08-21

## 2023-08-21 RX ORDER — NICOTINE POLACRILEX 4 MG
15-30 LOZENGE BUCCAL
Status: DISCONTINUED | OUTPATIENT
Start: 2023-08-21 | End: 2023-08-22 | Stop reason: HOSPADM

## 2023-08-21 RX ORDER — SODIUM CHLORIDE, SODIUM LACTATE, POTASSIUM CHLORIDE, CALCIUM CHLORIDE 600; 310; 30; 20 MG/100ML; MG/100ML; MG/100ML; MG/100ML
INJECTION, SOLUTION INTRAVENOUS CONTINUOUS
Status: DISCONTINUED | OUTPATIENT
Start: 2023-08-21 | End: 2023-08-21 | Stop reason: HOSPADM

## 2023-08-21 RX ORDER — BISACODYL 10 MG
10 SUPPOSITORY, RECTAL RECTAL DAILY PRN
Status: DISCONTINUED | OUTPATIENT
Start: 2023-08-21 | End: 2023-08-21

## 2023-08-21 RX ORDER — HYDROMORPHONE HCL IN WATER/PF 6 MG/30 ML
0.2 PATIENT CONTROLLED ANALGESIA SYRINGE INTRAVENOUS EVERY 5 MIN PRN
Status: DISCONTINUED | OUTPATIENT
Start: 2023-08-21 | End: 2023-08-21 | Stop reason: HOSPADM

## 2023-08-21 RX ORDER — MAGNESIUM HYDROXIDE 1200 MG/15ML
LIQUID ORAL PRN
Status: DISCONTINUED | OUTPATIENT
Start: 2023-08-21 | End: 2023-08-21 | Stop reason: HOSPADM

## 2023-08-21 RX ORDER — ONDANSETRON 2 MG/ML
INJECTION INTRAMUSCULAR; INTRAVENOUS PRN
Status: DISCONTINUED | OUTPATIENT
Start: 2023-08-21 | End: 2023-08-21

## 2023-08-21 RX ORDER — LIDOCAINE 40 MG/G
CREAM TOPICAL
Status: DISCONTINUED | OUTPATIENT
Start: 2023-08-21 | End: 2023-08-22 | Stop reason: HOSPADM

## 2023-08-21 RX ORDER — PROCHLORPERAZINE MALEATE 10 MG
10 TABLET ORAL EVERY 6 HOURS PRN
Status: DISCONTINUED | OUTPATIENT
Start: 2023-08-21 | End: 2023-08-21

## 2023-08-21 RX ORDER — PROPOFOL 10 MG/ML
INJECTION, EMULSION INTRAVENOUS PRN
Status: DISCONTINUED | OUTPATIENT
Start: 2023-08-21 | End: 2023-08-21

## 2023-08-21 RX ORDER — HYDROMORPHONE HCL IN WATER/PF 6 MG/30 ML
0.4 PATIENT CONTROLLED ANALGESIA SYRINGE INTRAVENOUS EVERY 5 MIN PRN
Status: DISCONTINUED | OUTPATIENT
Start: 2023-08-21 | End: 2023-08-21 | Stop reason: HOSPADM

## 2023-08-21 RX ORDER — FENTANYL CITRATE 50 UG/ML
INJECTION, SOLUTION INTRAMUSCULAR; INTRAVENOUS PRN
Status: DISCONTINUED | OUTPATIENT
Start: 2023-08-21 | End: 2023-08-21

## 2023-08-21 RX ORDER — ONDANSETRON 4 MG/1
4 TABLET, ORALLY DISINTEGRATING ORAL EVERY 30 MIN PRN
Status: DISCONTINUED | OUTPATIENT
Start: 2023-08-21 | End: 2023-08-21 | Stop reason: HOSPADM

## 2023-08-21 RX ORDER — FENTANYL CITRATE 0.05 MG/ML
25 INJECTION, SOLUTION INTRAMUSCULAR; INTRAVENOUS EVERY 5 MIN PRN
Status: DISCONTINUED | OUTPATIENT
Start: 2023-08-21 | End: 2023-08-21 | Stop reason: HOSPADM

## 2023-08-21 RX ORDER — ONDANSETRON 4 MG/1
4 TABLET, ORALLY DISINTEGRATING ORAL EVERY 6 HOURS PRN
Status: DISCONTINUED | OUTPATIENT
Start: 2023-08-21 | End: 2023-08-22 | Stop reason: HOSPADM

## 2023-08-21 RX ORDER — BUPIVACAINE HYDROCHLORIDE 5 MG/ML
INJECTION, SOLUTION EPIDURAL; INTRACAUDAL PRN
Status: DISCONTINUED | OUTPATIENT
Start: 2023-08-21 | End: 2023-08-21 | Stop reason: HOSPADM

## 2023-08-21 RX ORDER — ACETAMINOPHEN 325 MG/1
975 TABLET ORAL EVERY 8 HOURS
Status: DISCONTINUED | OUTPATIENT
Start: 2023-08-21 | End: 2023-08-22 | Stop reason: HOSPADM

## 2023-08-21 RX ORDER — POLYETHYLENE GLYCOL 3350 17 G/17G
17 POWDER, FOR SOLUTION ORAL DAILY
Status: DISCONTINUED | OUTPATIENT
Start: 2023-08-22 | End: 2023-08-21

## 2023-08-21 RX ADMIN — LOSARTAN POTASSIUM 25 MG: 25 TABLET, FILM COATED ORAL at 21:56

## 2023-08-21 RX ADMIN — DEXMEDETOMIDINE HYDROCHLORIDE 8 MCG: 200 INJECTION INTRAVENOUS at 10:10

## 2023-08-21 RX ADMIN — OXYCODONE HYDROCHLORIDE 5 MG: 5 TABLET ORAL at 15:19

## 2023-08-21 RX ADMIN — PHENYLEPHRINE HYDROCHLORIDE 100 MCG: 10 INJECTION INTRAVENOUS at 10:30

## 2023-08-21 RX ADMIN — DEXTROSE AND SODIUM CHLORIDE: 5; 450 INJECTION, SOLUTION INTRAVENOUS at 12:34

## 2023-08-21 RX ADMIN — FENTANYL CITRATE 50 MCG: 50 INJECTION, SOLUTION INTRAMUSCULAR; INTRAVENOUS at 10:16

## 2023-08-21 RX ADMIN — SODIUM CHLORIDE, POTASSIUM CHLORIDE, SODIUM LACTATE AND CALCIUM CHLORIDE: 600; 310; 30; 20 INJECTION, SOLUTION INTRAVENOUS at 09:40

## 2023-08-21 RX ADMIN — PIPERACILLIN AND TAZOBACTAM 4.5 G: 4; .5 INJECTION, POWDER, FOR SOLUTION INTRAVENOUS at 09:41

## 2023-08-21 RX ADMIN — PHENYLEPHRINE HYDROCHLORIDE 0.5 MCG/KG/MIN: 10 INJECTION INTRAVENOUS at 10:41

## 2023-08-21 RX ADMIN — PROPOFOL 150 MCG/KG/MIN: 10 INJECTION, EMULSION INTRAVENOUS at 10:05

## 2023-08-21 RX ADMIN — ACETAMINOPHEN 975 MG: 325 TABLET, FILM COATED ORAL at 21:55

## 2023-08-21 RX ADMIN — PHENYLEPHRINE HYDROCHLORIDE 100 MCG: 10 INJECTION INTRAVENOUS at 10:44

## 2023-08-21 RX ADMIN — PIPERACILLIN AND TAZOBACTAM 4.5 G: 4; .5 INJECTION, POWDER, FOR SOLUTION INTRAVENOUS at 15:19

## 2023-08-21 RX ADMIN — ACETAMINOPHEN 975 MG: 325 TABLET, FILM COATED ORAL at 03:26

## 2023-08-21 RX ADMIN — ACETAMINOPHEN 975 MG: 325 TABLET, FILM COATED ORAL at 14:04

## 2023-08-21 RX ADMIN — FENTANYL CITRATE 25 MCG: 50 INJECTION, SOLUTION INTRAMUSCULAR; INTRAVENOUS at 10:13

## 2023-08-21 RX ADMIN — PHENYLEPHRINE HYDROCHLORIDE 100 MCG: 10 INJECTION INTRAVENOUS at 10:16

## 2023-08-21 RX ADMIN — PIPERACILLIN AND TAZOBACTAM 4.5 G: 4; .5 INJECTION, POWDER, FOR SOLUTION INTRAVENOUS at 22:04

## 2023-08-21 RX ADMIN — MIDAZOLAM 2 MG: 1 INJECTION INTRAMUSCULAR; INTRAVENOUS at 10:05

## 2023-08-21 RX ADMIN — DEXMEDETOMIDINE HYDROCHLORIDE 12 MCG: 200 INJECTION INTRAVENOUS at 10:06

## 2023-08-21 RX ADMIN — FAMOTIDINE 10 MG: 10 TABLET ORAL at 21:55

## 2023-08-21 RX ADMIN — PHENYLEPHRINE HYDROCHLORIDE 100 MCG: 10 INJECTION INTRAVENOUS at 10:24

## 2023-08-21 RX ADMIN — PIPERACILLIN AND TAZOBACTAM 4.5 G: 4; .5 INJECTION, POWDER, FOR SOLUTION INTRAVENOUS at 03:26

## 2023-08-21 RX ADMIN — INSULIN ASPART 1 UNITS: 100 INJECTION, SOLUTION INTRAVENOUS; SUBCUTANEOUS at 19:07

## 2023-08-21 RX ADMIN — PHENYLEPHRINE HYDROCHLORIDE 150 MCG: 10 INJECTION INTRAVENOUS at 10:37

## 2023-08-21 RX ADMIN — SIMVASTATIN 20 MG: 20 TABLET, FILM COATED ORAL at 21:56

## 2023-08-21 RX ADMIN — OXYCODONE HYDROCHLORIDE 5 MG: 5 TABLET ORAL at 22:50

## 2023-08-21 RX ADMIN — FENTANYL CITRATE 25 MCG: 50 INJECTION, SOLUTION INTRAMUSCULAR; INTRAVENOUS at 10:11

## 2023-08-21 RX ADMIN — ONDANSETRON 4 MG: 2 INJECTION INTRAMUSCULAR; INTRAVENOUS at 10:11

## 2023-08-21 RX ADMIN — PROPOFOL 50 MG: 10 INJECTION, EMULSION INTRAVENOUS at 10:14

## 2023-08-21 RX ADMIN — FAMOTIDINE 10 MG: 10 TABLET ORAL at 09:01

## 2023-08-21 ASSESSMENT — ACTIVITIES OF DAILY LIVING (ADL)
ADLS_ACUITY_SCORE: 20

## 2023-08-21 NOTE — PLAN OF CARE
Date: 8/21/2023 0700-1930    Summary: POD0 I&D   Orientation: A&Ox4   Behavior Color: green   VS/O2: VSS on RA  Mobility: WBAT to L foot sx shoe, to elevate at rest   Pain: Pain managed with PRN oxy & scheduled tylenol   Diet: Tolerating regular   B/B: Cont B/B   ABNL LAB: , 132, 165  Drain/Device: R PIV infusing D51/2NS 125 ml/hr.   Tele: none  Skin: Dressing CDI, ace wraps intact. R knee scab   Test/Procedures: na  D/C Goal/Place: Pending improvement

## 2023-08-21 NOTE — PROGRESS NOTES
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  Routed to the emergency department for admission, IV antibiotics and surgical debridement.      Left great toe osteomyelitis  Peripheral neuropathy secondary to diabetes  Presented with marked elevation in CRP (124) with normal lactate.  WBC 12.2 K.  -8/19 S/p partial left great toe amputation with osteotomy through proximal phalanx  -Continue Zosyn IV every 6 hours  -Podiatry consultation recommendations appreciated  -Follow-up blood cultures: NGTD  -Operative tissue Gram stain: 4+ gram-positive cocci, 3+ gram-positive bacilli resembling diphtheroids and 4+ white blood cells predominantly PMNs.  -Return to OR planned today Monday 8/21 for I&D and likely closure.  -As needed pain medication available: As needed acetaminophen, oxycodone  -Acetaminophen also as needed for fever     Insulin-dependent type 2 diabetes  Prior to admission regimen includes Basaglar 60 units twice daily and NovoLog 25 units 3 times daily.  Reports taking the Basaglar about 75% of the days and NovoLog about 50% of the days.  Also takes Ozempic.  BG largely at goal <180.   -Consistent carbohydrate diet when diet advanced post op   -Glucose checks  -Corrective dose aspart  -Half dose glargine given evening 8/20 and this AM 8/21 given return to OR planned. Will increase dose pending po intake post op   -Hold scheduled pre-meal novolog, will resume post op when eating well. May bridge with carb correction pending po intake post op     Probable GERD  -start twice daily pepcid  -prn TUMS available    Chronic, stable  "conditions:  Hypertension  -Resume prior to admission losartan  Obesity, BMI 39  Dyslipidemia  -Resume statin once dose confirmed  Hypogonadism       Diet: NPO per Anesthesia Guidelines for Procedure/Surgery Except for: Meds    DVT Prophylaxis: Pneumatic Compression Devices  Gill Catheter: Not present  Lines: None     Cardiac Monitoring: None  Code Status: Full Code      Clinically Significant Risk Factors                  # Hypertension: Noted on problem list       # DMII: A1C = 8.4 % (Ref range: 0.0 - 5.6 %) within past 6 months, PRESENT ON ADMISSION  # Obesity: Estimated body mass index is 36.94 kg/m  as calculated from the following:    Height as of an earlier encounter on 8/18/23: 1.854 m (6' 1\").    Weight as of this encounter: 127 kg (280 lb)., PRESENT ON ADMISSION            Disposition Plan      Expected Discharge Date: 08/22/2023                  Discussed with Dr. Ludwig who agrees with the above plan    Romelia Wong PA-C  ______________________________________________________________________    Interval History   Seen in pre op. Patient feeling well today. No fevers, chills. Denies pain. BG well controlled. No  concerns at this time.     Physical Exam   Temp: 98.3  F (36.8  C) Temp src: Oral BP: 129/86 Pulse: 77   Resp: 18 SpO2: 96 % O2 Device: None (Room air)    Vitals:    08/19/23 0720   Weight: 127 kg (280 lb)     Vital Signs with Ranges  Temp:  [98  F (36.7  C)-98.4  F (36.9  C)] 98.3  F (36.8  C)  Pulse:  [77-88] 77  Resp:  [18] 18  BP: (108-129)/(61-86) 129/86  SpO2:  [94 %-96 %] 96 %  No intake/output data recorded.    Constitutional: Alert and oriented, sitting up in bed. Appears comfortable and is pleasantly conversant   ENT: moist mucous membranes  Respiratory: Lungs clear to auscultation bilaterally, no increased work of breathing  Cardiovascular: Regular rate and rhythm  GI: positive bowel sounds, abdomen soft, non-tender  Skin/Integumen: L foot dressed  MSK:  moves all four extremities, " no significant edema       Medical Decision Making       40 MINUTES SPENT BY ME on the date of service doing chart review, history, exam, documentation & further activities per the note.      Data         Imaging results reviewed over the past 24 hrs:   No results found for this or any previous visit (from the past 24 hour(s)).

## 2023-08-21 NOTE — ANESTHESIA POSTPROCEDURE EVALUATION
Patient: Jairo Escamilla    Procedure: Procedure(s):  IRRIGATION AND DEBRIDEMENT LEFT FOOT, REVISION OF AMPUTATION       Anesthesia Type:  MAC    Note:  Disposition: Inpatient   Postop Pain Control: Uneventful            Sign Out: Well controlled pain   PONV: No   Neuro/Psych: Uneventful            Sign Out: Acceptable/Baseline neuro status   Airway/Respiratory: Uneventful            Sign Out: Acceptable/Baseline resp. status   CV/Hemodynamics: Uneventful            Sign Out: Acceptable CV status   Other NRE: NONE   DID A NON-ROUTINE EVENT OCCUR?            Last vitals:  Vitals Value Taken Time   BP 99/62 08/21/23 1130   Temp 36.3  C (97.4  F) 08/21/23 1112   Pulse 77 08/21/23 1143   Resp 6 08/21/23 1143   SpO2 97 % 08/21/23 1143   Vitals shown include unvalidated device data.    Electronically Signed By: Shade Solis MD  August 21, 2023  11:44 AM

## 2023-08-21 NOTE — BRIEF OP NOTE
[facility]    Brief Operative Note    Pre-operative diagnosis: Acute osteomyelitis of left foot (H) [M86.172]  Post-operative diagnosis Same as pre-operative diagnosis    Procedure: Procedure(s):  IRRIGATION AND DEBRIDEMENT LEFT FOOT, REVISION OF AMPUTATION  Surgeon: Surgeon(s) and Role:     * Michelle Marc DPM - Primary  Anesthesia: MAC   Estimated Blood Loss: 10 mL from 8/21/2023 10:05 AM to 8/21/2023 11:08 AM      Drains: None  Specimens:   ID Type Source Tests Collected by Time Destination   1 : LEFT BIG TOE PROXIMAL PHALYNX Tissue Foot, Left SURGICAL PATHOLOGY EXAM Michelle Marc DPM 8/21/2023 10:30 AM      Findings:   Necrosis noted to dorsal tissue, extending to first interspace. Excised and removed remaining bone in order to remove all necrotic sot tissue. Site closed.   Complications: None.  Implants: * No implants in log *

## 2023-08-21 NOTE — ANESTHESIA PREPROCEDURE EVALUATION
Anesthesia Pre-Procedure Evaluation    Patient: Jairo Escamilla   MRN: [id number] : 1970        Procedure : Procedure(s):  Left hallux amputation          Past Medical History:   Diagnosis Date    Diabetes mellitus (adult onset)     Diabetes mellitus, type 2 (H) 2008    Fracture of rib     High cholesterol     Hypertension     Obese     Peripheral neuropathy     Pneumonia     Sleep apnea     Subdural hematoma (H)       Past Surgical History:   Procedure Laterality Date    AMPUTATE TOE(S) Left 2023    Procedure: Left hallux amputation;  Surgeon: Filiberto Mercedes DPM;  Location: [facility] OR    COLONOSCOPY N/A 2022    Procedure: COLONOSCOPY, FLEXIBLE, WITH LESION REMOVAL USING SNARE;  Surgeon: Josh Reeves MD;  Location:  GI    NO HISTORY OF SURGERY        Allergies   Allergen Reactions    Lisinopril      Cough        Social History     Tobacco Use    Smoking status: Never    Smokeless tobacco: Never   Substance Use Topics    Alcohol use: Yes     Alcohol/week: 0.0 standard drinks of alcohol     Comment: Very little and infrequently      Wt Readings from Last 1 Encounters:   23 127 kg (280 lb)        Anesthesia Evaluation   Pt has had prior anesthetic. Type: MAC.    No history of anesthetic complications       ROS/MED HX  ENT/Pulmonary:     (+) sleep apnea,                            recent URI,          Neurologic:    (-) no CVA   Cardiovascular:     (+) Dyslipidemia hypertension- -   -  - -                                   (-) CAD   METS/Exercise Tolerance:     Hematologic:       Musculoskeletal:       GI/Hepatic:    (-) GERD   Renal/Genitourinary:       Endo:     (+)  type II DM,   Using insulin,          Obesity,       Psychiatric/Substance Use:       Infectious Disease: Comment: Osteomyelitis      Malignancy:       Other:            Physical Exam    Airway        Mallampati: III   TM distance: > 3 FB   Neck ROM: full   Mouth opening: > 3 cm    Respiratory Devices and Support          Dental  no notable dental history     (+) Multiple crowns, permanant bridges      Cardiovascular   cardiovascular exam normal          Pulmonary   pulmonary exam normal                OUTSIDE LABS:  CBC:   Lab Results   Component Value Date    WBC 10.0 08/19/2023    WBC 12.2 (H) 08/18/2023    HGB 13.8 08/19/2023    HGB 14.8 08/18/2023    HCT 42.0 08/19/2023    HCT 46.0 08/18/2023     08/19/2023     08/18/2023     BMP:   Lab Results   Component Value Date     08/18/2023     08/01/2023    POTASSIUM 4.2 08/18/2023    POTASSIUM 4.6 08/01/2023    CHLORIDE 98 08/18/2023    CHLORIDE 101 08/01/2023    CO2 24 08/18/2023    CO2 22 08/01/2023    BUN 10.9 08/18/2023    BUN 11.3 08/01/2023    CR 0.81 08/18/2023    CR 0.71 08/01/2023     (H) 08/21/2023     (H) 08/21/2023     COAGS:   Lab Results   Component Value Date    PTT 29 02/27/2016    INR 1.05 02/27/2016     POC:   Lab Results   Component Value Date     (H) 12/27/2018     HEPATIC:   Lab Results   Component Value Date    ALBUMIN 4.2 08/18/2023    PROTTOTAL 8.1 08/18/2023    ALT 24 08/18/2023    AST 18 08/18/2023    ALKPHOS 51 08/18/2023    BILITOTAL 1.1 08/18/2023     OTHER:   Lab Results   Component Value Date    LACT 1.2 08/18/2023    A1C 8.4 (H) 08/01/2023    LUKASZ 9.2 08/18/2023    PHOS 3.3 12/24/2018    MAG 1.8 12/24/2018    LIPASE 132 12/23/2018    TSH 2.30 04/19/2023    SED 7 12/02/2015       Anesthesia Plan    ASA Status:  3    NPO Status:  NPO Appropriate    Anesthesia Type: MAC.     - Reason for MAC: straight local not clinically adequate   Induction: N/a.   Maintenance: N/A.        Consents    Anesthesia Plan(s) and associated risks, benefits, and realistic alternatives discussed. Questions answered and patient/representative(s) expressed understanding.     - Discussed: Risks, Benefits and Alternatives for BOTH SEDATION and the PROCEDURE were discussed     - Discussed with:  Patient            Postoperative  Care    Pain management: Multi-modal analgesia.   PONV prophylaxis: Ondansetron (or other 5HT-3), Background Propofol Infusion     Comments:                Shade Solis MD

## 2023-08-21 NOTE — OP NOTE
PREOPERATIVE DIAGNOSES:     1.  Left hallux ulcer s/p partial amputation on 8/19      POSTOPERATIVE DIAGNOSES:   1.   Left hallux ulcer s/p partial amputation on 8/19    PROCEDURE:     1.  Left hallux amputation at level of MPJ   2.  Left hallux excisional debridement down to and including extensor tendon, site measuring 4 cm x 1 cm x 1 cm         ANESTHESIA:  MAC with local.       HEMOSTASIS:  Electrocautery.       ESTIMATED BLOOD LOSS:  10 mL.       SPECIMENS:  Digit for pathology       MATERIALS:  None.     Indications: Jairo Escamilla  has an ulcer and active infection to the left foot. He was admitted for osteomyelitis and had a partial amputation by Dr. Piedra on 8/19. States feels well since then. There's been concern for ongoing redness to his foot. In preop, discussed primary plan is to debride all bad soft tissue, and remove bone as needed. He agrees to remove anything need to remove all infection. Xray and MRI confirm were reviewed. Procedure was discussed with patient at length, including all risks and benefits. Patient agrees to planned procedure.     Procedure: Under mild sedation pt was brought into the OR & placed on the operating table in a supine position. A total of 10 cc of .5% marcaine were injected into the first met in a solis block formation  The foot was scrubbed, prepped and draped in an aseptic manner. The previous incision site was opened and there was noted purulence to the distal aspect, primarily along the proximal phalanx. This was debride and removed. There was then noted necrosis to the dorsal and plantar flaps, worse to the dorsal flap, exending into the first interspace. Due to the extent of dorsal necrosis, this skin had to be resected and debrided. Because of the tissue loss, the remaining proximal phalanx was also disarticulated from the first metatarsal.  All bleeders were ligated and cauterized as necessary.      Following removal of the residual proximal phalanx, the soft  tissue was evaluated. Further necrosis was noted into the first interspace. This was opened and excised using a 15 blade. All necrosis was removed including extensor tendon. Site was debrided until tissue was felt to be healthy.     All resected bone was sent to pathology.  All nonviable soft tissue was resected  insuring no necrotic tissue proximal to the amputation remained. Next copious amounts of saline were used to flush the amputation site. Site was then closed with 3.0 prolene suture.  Upon completion of suturing, a non-adhesive sterile dressing was then placed over the surgical site followed by 4 x 4s, kerlix, & an ACE wrap.     The pt tolerated the procedure & anesthesia well.  He was transferred to the recovery room with vital signs stable and vascular status intact to the left foot.  Following a period of post-op monitoring the pt will return to his hospital bed with the following written and oral post-op instructions:    Keep dressing clean, dry and intact.  Do not remove dressing.  WBAT to left foot sx shoe  Elevate foot at rest.  Continue IV antibiotics  Contact Dr. Marc if any post-op questions or arrise.     Intraop findings: Noted necrosis to first interspace, excised. Overall less than expected bleeding for procedure.     Post op plan: Will monitor for continued improvement in cellulitis. Vascular studies ordered to evaluate arterial flow. All bone infection felt to be resected.

## 2023-08-21 NOTE — ANESTHESIA CARE TRANSFER NOTE
Patient: Jairo Escamilla    Procedure: Procedure(s):  IRRIGATION AND DEBRIDEMENT LEFT FOOT, REVISION OF AMPUTATION       Diagnosis: Acute osteomyelitis of left foot (H) [M86.172]  Diagnosis Additional Information: No value filed.    Anesthesia Type:   MAC     Note:    Oropharynx: oropharynx clear of all foreign objects and spontaneously breathing  Level of Consciousness: awake  Oxygen Supplementation: face mask  Level of Supplemental Oxygen (L/min / FiO2): 6  Independent Airway: airway patency satisfactory and stable  Dentition: dentition unchanged  Vital Signs Stable: post-procedure vital signs reviewed and stable  Report to RN Given: handoff report given  Patient transferred to: PACU    Handoff Report: Identifed the Patient, Identified the Reponsible Provider, Reviewed the pertinent medical history, Discussed the surgical course, Reviewed Intra-OP anesthesia mangement and issues during anesthesia, Set expectations for post-procedure period and Allowed opportunity for questions and acknowledgement of understanding      Vitals:  Vitals Value Taken Time   BP 98/61 08/21/23 1112   Temp     Pulse 79 08/21/23 1115   Resp 27 08/21/23 1115   SpO2 97 % 08/21/23 1115       Electronically Signed By: NILTON Marcos CRNA  August 21, 2023  11:17 AM

## 2023-08-22 VITALS
SYSTOLIC BLOOD PRESSURE: 124 MMHG | HEART RATE: 82 BPM | RESPIRATION RATE: 18 BRPM | WEIGHT: 280 LBS | BODY MASS INDEX: 36.94 KG/M2 | OXYGEN SATURATION: 91 % | DIASTOLIC BLOOD PRESSURE: 68 MMHG | TEMPERATURE: 98.4 F

## 2023-08-22 LAB
BACTERIA TISS BX CULT: ABNORMAL
GLUCOSE BLDC GLUCOMTR-MCNC: 138 MG/DL (ref 70–99)
GLUCOSE BLDC GLUCOMTR-MCNC: 140 MG/DL (ref 70–99)
GLUCOSE BLDC GLUCOMTR-MCNC: 168 MG/DL (ref 70–99)
GLUCOSE BLDC GLUCOMTR-MCNC: 192 MG/DL (ref 70–99)

## 2023-08-22 PROCEDURE — 250N000013 HC RX MED GY IP 250 OP 250 PS 637: Performed by: PODIATRIST

## 2023-08-22 PROCEDURE — 99239 HOSP IP/OBS DSCHRG MGMT >30: CPT | Performed by: INTERNAL MEDICINE

## 2023-08-22 PROCEDURE — 99233 SBSQ HOSP IP/OBS HIGH 50: CPT | Mod: GC | Performed by: PODIATRIST

## 2023-08-22 PROCEDURE — 250N000011 HC RX IP 250 OP 636: Mod: JZ | Performed by: PODIATRIST

## 2023-08-22 PROCEDURE — 250N000013 HC RX MED GY IP 250 OP 250 PS 637: Performed by: INTERNAL MEDICINE

## 2023-08-22 RX ORDER — LOSARTAN POTASSIUM 25 MG/1
25 TABLET ORAL DAILY
COMMUNITY
Start: 2023-08-22 | End: 2023-11-09

## 2023-08-22 RX ORDER — OXYCODONE HYDROCHLORIDE 5 MG/1
5 TABLET ORAL EVERY 4 HOURS PRN
Qty: 20 TABLET | Refills: 0 | Status: SHIPPED | OUTPATIENT
Start: 2023-08-22 | End: 2023-11-03

## 2023-08-22 RX ORDER — ACETAMINOPHEN 325 MG/1
650 TABLET ORAL EVERY 6 HOURS PRN
Status: ON HOLD | COMMUNITY
Start: 2023-08-22 | End: 2023-10-01

## 2023-08-22 RX ADMIN — SENNOSIDES AND DOCUSATE SODIUM 1 TABLET: 50; 8.6 TABLET ORAL at 09:48

## 2023-08-22 RX ADMIN — PIPERACILLIN AND TAZOBACTAM 4.5 G: 4; .5 INJECTION, POWDER, FOR SOLUTION INTRAVENOUS at 02:38

## 2023-08-22 RX ADMIN — PIPERACILLIN AND TAZOBACTAM 4.5 G: 4; .5 INJECTION, POWDER, FOR SOLUTION INTRAVENOUS at 09:05

## 2023-08-22 RX ADMIN — FAMOTIDINE 10 MG: 10 TABLET ORAL at 09:05

## 2023-08-22 RX ADMIN — OXYCODONE HYDROCHLORIDE 5 MG: 5 TABLET ORAL at 10:48

## 2023-08-22 RX ADMIN — INSULIN ASPART 1 UNITS: 100 INJECTION, SOLUTION INTRAVENOUS; SUBCUTANEOUS at 10:49

## 2023-08-22 ASSESSMENT — ACTIVITIES OF DAILY LIVING (ADL)
ADLS_ACUITY_SCORE: 20

## 2023-08-22 NOTE — PROGRESS NOTES
Diagnosis: I&D Left foot.  Mental Status: A&Ox4,  POD#1  Activity/dangle: Independent  Diet: Mod CHO  Gill/Voiding: Bathroom   Pain: Managed with Tylenol and Oxycodone.   Tele/Restraints/Iso: N/A  02/LDA: JONATHON JOYNER  D/C Date: TBD  Other Info: Ace wrap on, partial weight bearing on LLE.

## 2023-08-22 NOTE — PROGRESS NOTES
[facility]  PROGRESS NOTE    CHIEF COMPLAINT:      PATIENT HISTORY:  Jairo Escamilla is a 53 year old male seen post op for left foot. Had second procedure yesterday. States feels well. Denies significant pain. Denies N/F/V/C/D         Review of Systems:  A 10 point review of systems was performed and is positive for that noted above in the patient history.  All other areas are negative.     PAST MEDICAL HISTORY:   Past Medical History:   Diagnosis Date    Diabetes mellitus (adult onset)     Diabetes mellitus, type 2 (H) 05/14/2008    Fracture of rib 2022    High cholesterol     Hypertension     Obese     Peripheral neuropathy     Pneumonia     Sleep apnea     Subdural hematoma (H)         PAST SURGICAL HISTORY:   Past Surgical History:   Procedure Laterality Date    AMPUTATE TOE(S) Left 8/19/2023    Procedure: Left hallux amputation;  Surgeon: Filiberto Mercedes DPM;  Location: [facility]    COLONOSCOPY N/A 12/22/2022    Procedure: COLONOSCOPY, FLEXIBLE, WITH LESION REMOVAL USING SNARE;  Surgeon: Josh Reeves MD;  Location:  GI    NO HISTORY OF SURGERY          MEDICATIONS:  Reviewed in Epic. Current.     ALLERGIES:    Allergies   Allergen Reactions    Lisinopril      Cough          SOCIAL HISTORY:   Social History     Socioeconomic History    Marital status: Single     Spouse name: Not on file    Number of children: Not on file    Years of education: Not on file    Highest education level: Not on file   Occupational History    Not on file   Tobacco Use    Smoking status: Never    Smokeless tobacco: Never   Vaping Use    Vaping Use: Never used   Substance and Sexual Activity    Alcohol use: Yes     Alcohol/week: 0.0 standard drinks of alcohol     Comment: Very little and infrequently    Drug use: Never    Sexual activity: Yes     Partners: Female     Birth control/protection: Female Surgical, None   Other Topics Concern     Service No    Blood Transfusions No    Caffeine Concern  No    Occupational Exposure No    Hobby Hazards No    Sleep Concern No    Stress Concern Yes     Comment: work related    Weight Concern Yes    Special Diet Yes     Comment: medium carb (40-75g per meal)    Back Care No    Exercise Yes    Bike Helmet No    Seat Belt Yes    Self-Exams No    Parent/sibling w/ CABG, MI or angioplasty before 65F 55M? No   Social History Narrative    3/28/2013: Single, .  Lives with his 20 year old son.  Also has an 18 year old son and 22 year old daughter.  Works as a  for [employer].  Active in sports (playing, officiating, watching): softball, football, hockey.     Social Determinants of Health     Financial Resource Strain: Not on file   Food Insecurity: Not on file   Transportation Needs: Not on file   Physical Activity: Not on file   Stress: Not on file   Social Connections: Not on file   Intimate Partner Violence: Not on file   Housing Stability: Not on file        FAMILY HISTORY:   Family History   Problem Relation Age of Onset    Diabetes Mother     Hypertension Mother     Cerebrovascular Disease Mother     Obesity Mother     C.A.D. Father         in mid 60's    Cerebrovascular Disease Father     Diabetes Maternal Aunt          of Diabetic complications    Glaucoma Other     Cancer - colorectal No family hx of     Prostate Cancer No family hx of     Macular Degeneration No family hx of         EXAM:Vitals: /72 (BP Location: Right arm, Patient Position: Semi-Higgins's, Cuff Size: Adult Regular)   Pulse 82   Temp 98.4  F (36.9  C) (Oral)   Resp 18   Wt 127 kg (280 lb)   SpO2 97%   BMI 36.94 kg/m    BMI= Body mass index is 36.94 kg/m .    LABS:     Last Comprehensive Metabolic Panel:  Sodium   Date Value Ref Range Status   2023 136 136 - 145 mmol/L Final   2020 137 133 - 144 mmol/L Final     Potassium   Date Value Ref Range Status   2023 4.2 3.4 - 5.3 mmol/L Final   2022 5.0 3.4 - 5.3 mmol/L Final    05/18/2020 4.0 3.4 - 5.3 mmol/L Final     Chloride   Date Value Ref Range Status   08/18/2023 98 98 - 107 mmol/L Final   01/12/2022 101 94 - 109 mmol/L Final   05/18/2020 102 94 - 109 mmol/L Final     Carbon Dioxide   Date Value Ref Range Status   05/18/2020 25 20 - 32 mmol/L Final     Carbon Dioxide (CO2)   Date Value Ref Range Status   08/18/2023 24 22 - 29 mmol/L Final   01/12/2022 30 20 - 32 mmol/L Final     Anion Gap   Date Value Ref Range Status   08/18/2023 14 7 - 15 mmol/L Final   01/12/2022 3 3 - 14 mmol/L Final   05/18/2020 10 3 - 14 mmol/L Final     Glucose   Date Value Ref Range Status   01/12/2022 217 (H) 70 - 99 mg/dL Final   05/18/2020 160 (H) 70 - 99 mg/dL Final     GLUCOSE BY METER POCT   Date Value Ref Range Status   08/22/2023 140 (H) 70 - 99 mg/dL Final     Urea Nitrogen   Date Value Ref Range Status   08/18/2023 10.9 6.0 - 20.0 mg/dL Final   01/12/2022 16 7 - 30 mg/dL Final   05/18/2020 16 7 - 30 mg/dL Final     Creatinine   Date Value Ref Range Status   08/18/2023 0.81 0.67 - 1.17 mg/dL Final   05/18/2020 0.69 0.66 - 1.25 mg/dL Final     GFR Estimate   Date Value Ref Range Status   08/18/2023 >90 >60 mL/min/1.73m2 Final   05/18/2020 >90 >60 mL/min/[1.73_m2] Final     Comment:     Non  GFR Calc  Starting 12/18/2018, serum creatinine based estimated GFR (eGFR) will be   calculated using the Chronic Kidney Disease Epidemiology Collaboration   (CKD-EPI) equation.       Calcium   Date Value Ref Range Status   08/18/2023 9.2 8.6 - 10.0 mg/dL Final   05/18/2020 8.6 8.5 - 10.1 mg/dL Final     Lab Results   Component Value Date    WBC 10.0 08/19/2023    WBC 3.7 12/27/2018     Lab Results   Component Value Date    RBC 4.92 08/19/2023    RBC 4.84 12/27/2018     Lab Results   Component Value Date    HGB 13.8 08/19/2023    HGB 14.1 12/27/2018     Lab Results   Component Value Date    HCT 42.0 08/19/2023    HCT 40.4 12/27/2018     Lab Results   Component Value Date     08/19/2023      12/27/2018      Lab Results   Component Value Date    INR 1.05 02/27/2016        General appearance: Patient is alert and fully cooperative with history & exam.  No sign of distress is noted during the visit.      Respiratory: Breathing is regular & unlabored while sitting.      HEENT: Hearing is intact to spoken word.  Speech is clear.  No gross evidence of visual impairment that would impact ambulation.      Dermatologic: Left foot hallux amputation site: well approximated. Minimal drainage. Erythema improved. Nontender. No ischemia      Vascular: Dorsalis pedis and posterior tibial pulses are intact & regular bilaterally.  CFT and skin temperature is normal to both lower extremities.       Neurologic: Lower extremity sensation is diminished, bilateral foot, to light touch.  No evidence of neurological-based weakness or contracture in the lower extremities.       Musculoskeletal: Patient is ambulatory without an assistive device or brace.  S/p left hallux amputation    Psychiatric: Affect is pleasant & appropriate.      All cultures:  Recent Labs   Lab 08/19/23  0746 08/18/23  1900 08/18/23  1408   CULTURE Culture in progress  3+ Staphylococcus aureus*  2+ Streptococcus agalactiae (Group B Streptococcus)*  4+ Enterococcus faecalis*  4+ Normal asad  Culture in progress No growth after 3 days No growth after 3 days      Other; Tissue   0 Result Notes  Culture Culture in progress      3+ Staphylococcus aureus Abnormal       2+ Streptococcus agalactiae (Group B Streptococcus) Abnormal    This organism is susceptible to ampicillin, penicillin, vancomycin and the cephalosporins. If treatment is required and your patient is allergic to penicillin, contact the microbiology lab within 5 days to request susceptibility testing.   4+ Enterococcus faecalis Abnormal       4+ Normal asad           Resulting Agency: [facility]     Susceptibility     Staphylococcus aureus     MARTIN (Preliminary)     Clindamycin 0.25 ug/mL  Susceptible     Daptomycin 1 ug/mL Susceptible     Doxycycline <=0.5 ug/mL Susceptible     Erythromycin <=0.25 ug/mL Susceptible     Gentamicin <=0.5 ug/mL Susceptible     Tetracycline <=1 ug/mL Susceptible     Trimethoprim/Sulfamethoxazole <=0.5/9.5 u... Susceptible     Vancomycin 1 ug/mL Susceptible                 IMAGING:     IMPRESSION:  1.  RIGHT LOWER EXTREMITY: GER at rest is normal. Toe brachial index is normal.  2.  LEFT LOWER EXTREMITY: The left ankle-brachial index is falsely elevated (1.7), likely due to noncompliant, calcified vessels.    I personally reviewed the images and agree with the reports as stated.  -Appropriate waveforms     ASSESSMENT:  1. S/p left hallux amputation with debridement on 8/19 followed by on 8/21  2. Diabetes Mellitus --> hba1c: 8.4     MEDICAL DECISION MAKING:   -Discussed all findings with patient. Chart and imaging reviewed.   -Intra-op findings: noted purulence and necrosis, excised. Tissue felt to be viable at this time. Discussed with patient this and vascular studies which show reasonably appropriate blood flow. If outpatient has delayed healing, would plan vascular referral.   -Otherwise appropriate for discharge today, per podiatry's standpoint. Recommend continued oral abx per intra-op cultures.   -WB to heel of Lle in surgical shoe.   -No dressing changes needed until follow up. Will provide follow up info     Michelle Marc DPM   [facility]  [phone]

## 2023-08-22 NOTE — PLAN OF CARE
Date/Time: 08/22:     Trauma/Ortho/Medical (Choose one): Ortho    Diagnosis:(L) great toe amp (08/19) and I and D with revision on 08/21  POD#:3 and 1  Mental Status: Oriented X 4  Activity/dangle: Up Independent with crutches in surgical shoe  Diet: Moderate  Pain: Managed with prn oxycodone  Gill/Voiding: Bathroom  Tele/Restraints/Iso: None  02/LDA: RA/IV SL  D/C Date: 08/22 to home  Other Info: Pt doing well. Dressing on (L) foot changed by surgeon. Discharge instructions reviewed with pt and family and all questions answered. Discharging to home with family via pvt ride. Meds/knee scooter and belongings sent along.

## 2023-08-22 NOTE — DISCHARGE SUMMARY
"[facility]  Hospitalist Discharge Summary      Date of Admission:  8/18/2023  Date of Discharge:  8/22/2023  Discharging Provider: Maria De Jesus Ludwig MD  Discharge Service: Hospitalist Service    Discharge Diagnoses   Left great toe osteomyelitis  Peripheral neuropathy secondary to diabetes  Insulin-dependent type 2 diabetes  Probable GERD  Hypertension  Obesity  Dyslipidemia  Hypogonadism    Clinically Significant Risk Factors     # DMII: A1C = 8.4 % (Ref range: 0.0 - 5.6 %) within past 6 months  # Obesity: Estimated body mass index is 36.94 kg/m  as calculated from the following:    Height as of an earlier encounter on 8/18/23: 1.854 m (6' 1\").    Weight as of this encounter: 127 kg (280 lb).       Follow-ups Needed After Discharge   Follow-up Appointments     Follow-up and recommended labs and tests       Thank you for choosing [facility]!    Follow up with Dr Aragon at one of the clinic locations within 5-7   days of discharge.    DR. ARAGON'S CLINIC LOCATIONS:      [address]   [place], [place] [id number] [phone]  Clinic hours:  Monday, Tuesday morning, Thursday    [facility]  [address] [place] [address] [phone]  Clinic hours:  Friday        Follow-up and recommended labs and tests       Follow up with Michelle Marc DPM at the Orthopedic Clinic, located at   [address] [place] [address] [address], [place] [address]. Phone number is   [phone]. You can also follow up with Dr. Aragon in the   [place] or [place] office.            Unresulted Labs Ordered in the Past 30 Days of this Admission       Date and Time Order Name Status Description    8/21/2023 10:31 AM Surgical Pathology Exam In process     8/19/2023  7:48 AM Surgical Pathology Exam In process     8/19/2023  7:47 AM Tissue Aerobic Bacterial Culture Routine Preliminary     8/19/2023  7:47 AM Anaerobic Bacterial Culture Routine Preliminary     " 8/18/2023  1:54 PM Blood Culture Peripheral Blood Preliminary     8/18/2023  1:54 PM Blood Culture Arm, Left Preliminary         These results will be followed up by myself and podiatry.    Discharge Disposition   Discharged to home  Condition at discharge: Stable    Hospital Course   Jairo Escamilla is a delightful 53 year old male with hypertension, dyslipidemia, obesity, insulin-dependent type 2 diabetes with peripheral neuropathy, hypogonadism who presented after podiatry evaluation for fever, left great toe ulcer and evidence for osteomyelitis on imaging.  Routed to the emergency department for admission, IV antibiotics and surgical debridement.      Left great toe osteomyelitis  Peripheral neuropathy secondary to diabetes  Presented with marked elevation in CRP (124) with normal lactate.  WBC 12.2 K.  -8/19 S/p partial left great toe amputation with osteotomy through proximal phalanx  -During hospitalization was given Zosyn IV every 6 hours  -Operative tissue Gram stain: 4+ gram-positive cocci, 3+ gram-positive bacilli resembling diphtheroids and 4+ white blood cells predominantly 0PMNs.  Organisms identified as MSSA, Enterococcus faecalis, group B streptococcus.  -Follow-up blood cultures: NGTD (preliminary)  -Returned to OR Monday 8/21 for I&D and closure.  -As needed pain medication available: As needed acetaminophen, oxycodone  -Antibiotic at discharge: Resume the 10-day course of Augmentin twice daily he was prescribed in clinic the day he was admitted.  He should have 9.5 days worth left in his course.    Insulin-dependent type 2 diabetes  Prior to admission regimen includes Basaglar 60 units twice daily and NovoLog 25 units 3 times daily.  Reports taking the Basaglar about 75% of the days and NovoLog about 50% of the days.  Also takes Ozempic.  -resume home regimen at discharge    Probable GERD  -start twice daily pepcid  -prn TUMS available    Chronic, stable conditions:  Hypertension  -Resume prior to  admission losartan once dose verified.  Obesity, BMI 39  Dyslipidemia  -Resume statin once dose confirmed  Hypogonadism    Consultations This Hospital Stay   PODIATRY IP CONSULT  PHYSICAL THERAPY ADULT IP CONSULT    Code Status   Full Code    Time Spent on this Encounter   I, Maria De Jesus Ludwig MD, personally saw the patient today and spent greater than 30 minutes discharging this patient.       Maria De Jesus Ludwig MD  [facility]  [address] [place] [facility] [address]  Phone: [phone]  Fax: [phone]  ______________________________________________________________________    Physical Exam   Vital Signs: Temp: 98.4  F (36.9  C) Temp src: Oral BP: 124/68 Pulse: 82   Resp: 18 SpO2: 91 % O2 Device: None (Room air)    Weight: 280 lbs 0 oz         Primary Care Physician   Sandoval Wilkinson    Discharge Orders      Follow-up and recommended labs and tests     Thank you for choosing [facility]!    Follow up with Dr Aragon at one of the clinic locations within 5-7 days of discharge.    DR. [facility]AGON'S CLINIC LOCATIONS:      [address]   [place] [address] [phone]  Clinic hours:  Monday, Tuesday morning, Thursday    [facility]  [address] [place] [address] [address] [place] [address] [phone]  Clinic hours:  Friday     Activity    1.  Perform the following activities every 2 hours x 5 minutes:  -ankle ROM/calf massaging bilateral lower extremity.  If you are not comfortable moving the surgical ankle, you can wiggle the toes on that foot  -deep breathing/coughing exercises  -ambulation; keep in mind your weightbearing restrictions    2.  Elevate surgical limb at/above hip level 23/24 hours per day for aggressive swelling control, pain control, and to help facilitate incision healing.  This is to be done until sutures are removed.    3.  Apply ice pack to surgical site and behind left knee every 2 hours x 20 minutes.  Do not apply ice  pack directly to skin.      4.  Heel weight bearing in surgical shoe.    5.  You can take a shower or bath, but cover the bandage. Keep surgical dressing clean, dry and intact until your first post-operative appointment.     Follow-up and recommended labs and tests     Follow up with Michelle Marc DPM at the Orthopedic Clinic, located at [address], [place] [address]. Phone number is [phone]. You can also follow up with Dr. Mercedes in the [place] or [place] office.     Activity    WB to heel of LLE in surgical shoe. Elevate while at rest. Place ice behind knee for 15 minutes at a time.  Leave dressing to foot  at all times. Do not get wet in the shower.     No dressing changes needed until follow up. If dressing gets wet or has excessive drainage, please remove dressing. Cleanse site with saline. Pad dry and recover. Contact the office if you have any concerns.     Reason for your hospital stay    Osteomyelitis of left great toe     Full Code     Rolling Knee Walker/Scooter Order    DME Documentation:   Describe the reason for need to support medical necessity: Pt is heel weight bearing and very slow nohemi of gait with use of B axillary crutches. This is not practical for longer than household distances. Pt is very active and requires use of knee scooter to reduce effort of mobility in community and work environment.     I, the undersigned, certify that the above prescribed supplies are medically necessary for this patient and is both reasonable and necessary in reference to accepted standards of medical and necessary in reference to accepted standards of medical practice in the treatment of this patient's condition and is not prescribed as a convenience.     Diet    Follow this diet upon discharge:       Moderate Consistent Carb (60 g CHO per Meal) Diet       Significant Results and Procedures   See Epic    Discharge Medications   Current Discharge Medication List        START taking  these medications    Details   acetaminophen (TYLENOL) 325 MG tablet Take 2 tablets (650 mg) by mouth every 6 hours as needed for mild pain or other (and adjunct with moderate or severe pain or per patient request)    Associated Diagnoses: Osteomyelitis of left foot, unspecified type (H)      cimetidine (TAGAMET) 200 MG tablet Take 1 tablet (200 mg) by mouth 2 times daily  Qty: 60 tablet, Refills: 11    Associated Diagnoses: Gastroesophageal reflux disease without esophagitis      oxyCODONE (ROXICODONE) 5 MG tablet Take 1 tablet (5 mg) by mouth every 4 hours as needed for moderate pain  Qty: 20 tablet, Refills: 0    Associated Diagnoses: Osteomyelitis of left foot, unspecified type (H)           CONTINUE these medications which have CHANGED    Details   losartan (COZAAR) 25 MG tablet Take 1 tablet (25 mg) by mouth daily           CONTINUE these medications which have NOT CHANGED    Details   amoxicillin-clavulanate (AUGMENTIN) 875-125 MG tablet Take 1 tablet by mouth 2 times daily for 10 days  Qty: 20 tablet, Refills: 0    Associated Diagnoses: Diabetic ulcer of toe of left foot associated with type 2 diabetes mellitus, unspecified ulcer stage (H)      insulin aspart (NOVOLOG PEN) 100 UNIT/ML pen Inject 25 Units Subcutaneous 3 times daily (with meals) Along with adjustment scale of 1:50 as directed up to 75 units daily.  Qty: 75 mL, Refills: 11    Associated Diagnoses: Type 2 diabetes mellitus with hyperglycemia, with long-term current use of insulin (H)      insulin glargine (BASAGLAR KWIKPEN) 100 UNIT/ML pen Inject 60 Units Subcutaneous 2 times daily  Qty: 36 mL, Refills: 11    Comments: If Basaglar is not covered by insurance, may substitute Lantus or Semglee or other insulin glargine product per insurance preference at same dose and frequency.    Associated Diagnoses: Type 2 diabetes mellitus with hyperglycemia, with long-term current use of insulin (H)      metFORMIN (GLUCOPHAGE XR) 500 MG 24 hr tablet TAKE 2  TABLETS BY MOUTH TWICE DAILY  Qty: 360 tablet, Refills: 2    Associated Diagnoses: Type 2 diabetes mellitus with hyperglycemia, with long-term current use of insulin (H)      semaglutide (OZEMPIC, 0.25 OR 0.5 MG/DOSE,) 2 MG/1.5ML SOPN pen Inject 0.5 mg Subcutaneous every 7 days  Qty: 4.5 mL, Refills: 3    Associated Diagnoses: Type 2 diabetes mellitus with hyperglycemia, with long-term current use of insulin (H)      simvastatin (ZOCOR) 20 MG tablet TAKE 1 TABLET(20 MG) BY MOUTH IN THE MORNING  Qty: 90 tablet, Refills: 2    Associated Diagnoses: Hyperlipidemia LDL goal <100      tadalafil (CIALIS) 20 MG tablet Take 1 tablet (20 mg) by mouth daily as needed (ED)  Qty: 12 tablet, Refills: 11    Associated Diagnoses: Erectile dysfunction, unspecified erectile dysfunction type      testosterone cypionate (DEPOTESTOSTERONE) 200 MG/ML injection Inject 1 mL (200 mg) into the muscle every 14 days  Qty: 2 mL, Refills: 3    Associated Diagnoses: Hypogonadism male      blood glucose (ONETOUCH ULTRA) test strip Use to test blood sugars tid or as directed.  Qty: 100 each, Refills: prn    Associated Diagnoses: Type 2 diabetes mellitus without complication, with long-term current use of insulin (H)      Continuous Blood Gluc Sensor (DEXCOM G6 SENSOR) MISC 1 each every 10 days Change every 10 days.  Qty: 3 each, Refills: 11    Associated Diagnoses: Type 2 diabetes mellitus with hyperglycemia, with long-term current use of insulin (H)      Continuous Blood Gluc Transmit (DEXCOM G6 TRANSMITTER) MISC 1 each every 3 months Change every 3 months.  Qty: 1 each, Refills: 3    Associated Diagnoses: Type 2 diabetes mellitus with hyperglycemia, with long-term current use of insulin (H)      insulin pen needle (32G X 6 MM) 32G X 6 MM miscellaneous Use 6 pen needles daily or as directed.  Qty: 180 each, Refills: 3    Associated Diagnoses: Type 2 diabetes mellitus with hyperglycemia, with long-term current use of insulin (H)      needle, disp,  "18G X 1\" MISC 1 Needle every 14 days Used to withdraw the testosterone medication from the vial.  Qty: 50 each, Refills: 0    Associated Diagnoses: Hypogonadism male      Needle, Disp, 25G X 1\" MISC 1 Needle every 14 days Used to inject the testosterone medication.  Qty: 50 each, Refills: 0    Associated Diagnoses: Hypogonadism male           STOP taking these medications       Cadexomer Iodine, topical, 0.9% (IODOSORB) 0.9 % GEL gel Comments:   Reason for Stopping:             Allergies   Allergies   Allergen Reactions    Lisinopril      Cough       "

## 2023-08-23 LAB
BACTERIA BLD CULT: NO GROWTH
BACTERIA TISS BX CULT: NORMAL
PATH REPORT.COMMENTS IMP SPEC: NORMAL
PATH REPORT.COMMENTS IMP SPEC: NORMAL
PATH REPORT.FINAL DX SPEC: NORMAL
PATH REPORT.GROSS SPEC: NORMAL
PATH REPORT.MICROSCOPIC SPEC OTHER STN: NORMAL
PATH REPORT.RELEVANT HX SPEC: NORMAL
PHOTO IMAGE: NORMAL

## 2023-08-23 NOTE — TELEPHONE ENCOUNTER
Called and talked to patient to get them an earlier appointment on 8/28. Patient was appreciative of this and had no further questions or concerns. The appointment will be scheduled and the appointment for 8/29 will be canceled.     Maria De Jesus Villagomez, LAMBERTO, LAT

## 2023-08-24 LAB
BACTERIA BLD CULT: NO GROWTH
PATH REPORT.COMMENTS IMP SPEC: NORMAL
PATH REPORT.COMMENTS IMP SPEC: NORMAL
PATH REPORT.FINAL DX SPEC: NORMAL
PATH REPORT.GROSS SPEC: NORMAL
PATH REPORT.MICROSCOPIC SPEC OTHER STN: NORMAL
PATH REPORT.RELEVANT HX SPEC: NORMAL
PHOTO IMAGE: NORMAL

## 2023-08-28 NOTE — TELEPHONE ENCOUNTER
Called and spoke with patient he thought the appointment was tomorrow but that was the original appointment before we scheduled him for today 8/28. Went ahead and schedule the patient for tomorrow at 10 am.    Maria De Jesus Villagomez ATC, LAT

## 2023-08-29 ENCOUNTER — OFFICE VISIT (OUTPATIENT)
Dept: PODIATRY | Facility: CLINIC | Age: 53
End: 2023-08-29
Payer: COMMERCIAL

## 2023-08-29 VITALS
HEART RATE: 92 BPM | BODY MASS INDEX: 40.09 KG/M2 | HEIGHT: 70 IN | DIASTOLIC BLOOD PRESSURE: 84 MMHG | WEIGHT: 280 LBS | SYSTOLIC BLOOD PRESSURE: 133 MMHG

## 2023-08-29 DIAGNOSIS — Z89.422 S/P AMPUTATION OF LESSER TOE, LEFT (H): ICD-10-CM

## 2023-08-29 DIAGNOSIS — E11.621 DIABETIC ULCER OF TOE OF LEFT FOOT ASSOCIATED WITH TYPE 2 DIABETES MELLITUS, UNSPECIFIED ULCER STAGE (H): Primary | ICD-10-CM

## 2023-08-29 DIAGNOSIS — M86.172 ACUTE OSTEOMYELITIS OF TOE, LEFT (H): ICD-10-CM

## 2023-08-29 DIAGNOSIS — L97.529 DIABETIC ULCER OF TOE OF LEFT FOOT ASSOCIATED WITH TYPE 2 DIABETES MELLITUS, UNSPECIFIED ULCER STAGE (H): Primary | ICD-10-CM

## 2023-08-29 PROCEDURE — 99213 OFFICE O/P EST LOW 20 MIN: CPT | Performed by: PODIATRIST

## 2023-08-29 NOTE — LETTER
"    8/29/2023         RE: Jairo Escamilla  [address] [place] [address]        Dear Colleague,    Thank you for referring your patient, Jairo Escamilla, to the [facility] PODIATRY. Please see a copy of my visit note below.    Foot & Ankle Surgery  August 29, 2023    S:  Patient in today sp partial left great toe amputation by myself on 8/19/2023 with subsequent completion of the left great toe imitation by Dr. Marc On 8/21/23.  Pain levels minimal.  He has had a work a bit intermittently since discharge from the hospital but is otherwise trying to rest and keep the weight off the foot.  He is using a knee scooter now.  He has 3 days remaining of the Augmentin    /84   Pulse 92   Ht 1.778 m (5' 10\")   Wt 127 kg (280 lb)   BMI 40.18 kg/m        ROS - positive for CC.  Patient denies current nausea, vomiting, chills, fevers, belly pain, calf pain, chest pain or SOB.  Complete remainder of ROS is otherwise neg.    PE -sutures intact, skin margins well coapted at the left great toe amputation site to the flap is pink and warm.  There is dried blood along the margin but no clear necrosis or dehiscence.  There is some fresh blood laterally.  Skin shows no trophic, color or temperature changes otherwise.  No calf redness, swelling or pain noted otherwise.      Pathology -   Final Diagnosis   Left foot, great toe/proximal phalanx, amputation:  -Resected bony tissues with osteomyelitis present in the fragment without articular surface     Cultures -     3+ Staphylococcus aureus Abnormal       2+ Streptococcus agalactiae (Group B Streptococcus) Abnormal    This organism is susceptible to ampicillin, penicillin, vancomycin and the cephalosporins. If treatment is required and your patient is allergic to penicillin, contact the microbiology lab within 5 days to request susceptibility testing.   4+ Enterococcus faecalis Abnormal           A/P - 53 year old yo patient approx 1 week sp above " procedure  -Betadine was applied to the incision and a new bandage was applied.  Keep this clean dry and intact  -Finish oral antibiotic course, no indication for new or extended course  -Continue nonweightbearing with rollabout and minimizing weight on foot when able.  Continue elevation and resting as this helps to facilitate incision healing  -We discussed typical incision healing course for amputations.  We will leave the stitches intact up to 4 weeks and remove them when the incision allows.  If at 4 was, this has not healed fully, we will remove the stitches, debride the wound and started wound care protocol    Follow up  -1 week or sooner with acute issues    Plan for [id number] -when healed fully      Filiberto Mercedes DPM FACFAS FACFAOM  Podiatric Foot & Ankle Surgeon  [facility]  [phone]    Disclaimer: This note consists of symbols derived from keyboarding, dictation and/or voice recognition software. As a result, there may be errors in the script that have gone undetected. Please consider this when interpreting information found in this chart.      Again, thank you for allowing me to participate in the care of your patient.        Sincerely,        Filiberto Mercedes DPM, DOE

## 2023-08-29 NOTE — PATIENT INSTRUCTIONS
Thank you for choosing [facility] / Foot & Ankle Surgery!    DR. ARAGON'S CLINIC LOCATIONS:     [facility] (Friday) TRIAGE LINE: [phone]   [address] [facility]  APPOINTMENTS: [phone]   [place] [address] RADIOLOGY: [phone]    PHYSICAL THERAPY: [phone]    SET UP SURGERY: [phone]   [facility] (Mon-Tues AM-Thurs) BILLING QUESTIONS: [phone]   [address] FAX: [phone]   [place] [address] [facility]: [phone]     Follow up: 1 week

## 2023-08-29 NOTE — PROGRESS NOTES
"Foot & Ankle Surgery  August 29, 2023    S:  Patient in today sp partial left great toe amputation by myself on 8/19/2023 with subsequent completion of the left great toe imitation by Dr. Marc On 8/21/23.  Pain levels minimal.  He has had a work a bit intermittently since discharge from the hospital but is otherwise trying to rest and keep the weight off the foot.  He is using a knee scooter now.  He has 3 days remaining of the Augmentin    /84   Pulse 92   Ht 1.778 m (5' 10\")   Wt 127 kg (280 lb)   BMI 40.18 kg/m        ROS - positive for CC.  Patient denies current nausea, vomiting, chills, fevers, belly pain, calf pain, chest pain or SOB.  Complete remainder of ROS is otherwise neg.    PE -sutures intact, skin margins well coapted at the left great toe amputation site to the flap is pink and warm.  There is dried blood along the margin but no clear necrosis or dehiscence.  There is some fresh blood laterally.  Skin shows no trophic, color or temperature changes otherwise.  No calf redness, swelling or pain noted otherwise.      Pathology -   Final Diagnosis   Left foot, great toe/proximal phalanx, amputation:  -Resected bony tissues with osteomyelitis present in the fragment without articular surface     Cultures -     3+ Staphylococcus aureus Abnormal       2+ Streptococcus agalactiae (Group B Streptococcus) Abnormal    This organism is susceptible to ampicillin, penicillin, vancomycin and the cephalosporins. If treatment is required and your patient is allergic to penicillin, contact the microbiology lab within 5 days to request susceptibility testing.   4+ Enterococcus faecalis Abnormal           A/P - 53 year old yo patient approx 1 week sp above procedure  -Betadine was applied to the incision and a new bandage was applied.  Keep this clean dry and intact  -Finish oral antibiotic course, no indication for new or extended course  -Continue nonweightbearing with rollabout and minimizing weight on " foot when able.  Continue elevation and resting as this helps to facilitate incision healing  -We discussed typical incision healing course for amputations.  We will leave the stitches intact up to 4 weeks and remove them when the incision allows.  If at 4 was, this has not healed fully, we will remove the stitches, debride the wound and started wound care protocol    Follow up  -1 week or sooner with acute issues    Plan for [id number] -when healed fully      Filiberto Mercedes DPM [facility] FACFAOM  Podiatric Foot & Ankle Surgeon  [facility]  [phone]    Disclaimer: This note consists of symbols derived from keyboarding, dictation and/or voice recognition software. As a result, there may be errors in the script that have gone undetected. Please consider this when interpreting information found in this chart.

## 2023-09-06 DIAGNOSIS — E11.65 TYPE 2 DIABETES MELLITUS WITH HYPERGLYCEMIA, WITH LONG-TERM CURRENT USE OF INSULIN (H): ICD-10-CM

## 2023-09-06 DIAGNOSIS — Z79.4 TYPE 2 DIABETES MELLITUS WITH HYPERGLYCEMIA, WITH LONG-TERM CURRENT USE OF INSULIN (H): ICD-10-CM

## 2023-09-07 ENCOUNTER — OFFICE VISIT (OUTPATIENT)
Dept: PODIATRY | Facility: CLINIC | Age: 53
End: 2023-09-07
Payer: COMMERCIAL

## 2023-09-07 VITALS
SYSTOLIC BLOOD PRESSURE: 142 MMHG | DIASTOLIC BLOOD PRESSURE: 89 MMHG | BODY MASS INDEX: 40.09 KG/M2 | HEART RATE: 106 BPM | WEIGHT: 280 LBS | HEIGHT: 70 IN

## 2023-09-07 DIAGNOSIS — M86.172 ACUTE OSTEOMYELITIS OF TOE, LEFT (H): ICD-10-CM

## 2023-09-07 DIAGNOSIS — Z89.422 S/P AMPUTATION OF LESSER TOE, LEFT (H): ICD-10-CM

## 2023-09-07 DIAGNOSIS — L97.529 DIABETIC ULCER OF TOE OF LEFT FOOT ASSOCIATED WITH TYPE 2 DIABETES MELLITUS, UNSPECIFIED ULCER STAGE (H): Primary | ICD-10-CM

## 2023-09-07 DIAGNOSIS — E11.621 DIABETIC ULCER OF TOE OF LEFT FOOT ASSOCIATED WITH TYPE 2 DIABETES MELLITUS, UNSPECIFIED ULCER STAGE (H): Primary | ICD-10-CM

## 2023-09-07 PROCEDURE — 99213 OFFICE O/P EST LOW 20 MIN: CPT | Performed by: PODIATRIST

## 2023-09-07 NOTE — LETTER
"    9/7/2023         RE: Jairo Escamilla  [address] [place] [address]        Dear Colleague,    Thank you for referring your patient, Jairo Escamilla, to the [facility] PODIATRY. Please see a copy of my visit note below.    Foot & Ankle Surgery  September 7, 2023    S:  Patient in today sp sp partial left great toe amputation by myself on 8/19/2023 with subsequent completion of the left great toe imitation by Dr. Marc On 8/21/23.  Pain levels minimal.  Minimal.  He states he has been feeling better and he states the foot appears a more normal color.    BP (!) 142/89   Pulse 106   Ht 1.778 m (5' 10\")   Wt 127 kg (280 lb)   BMI 40.18 kg/m        ROS - positive for CC.  Patient denies current nausea, vomiting, chills, fevers, belly pain, calf pain, chest pain or SOB.  Complete remainder of ROS is otherwise neg.    PE -sutures intact but there is gapping at the apex of the flap at the dorsal lateral amputation site.  Low-grade redness but no cellulitis, purulence or necrosis seen..  Skin shows no trophic, color or temperature changes otherwise.  No calf redness, swelling or pain noted otherwise.    Pathology -   Final Diagnosis   Left foot, great toe/proximal phalanx, amputation:  -Resected bony tissues with osteomyelitis present in the fragment without articular surface      Cultures -      3+ Staphylococcus aureus Abnormal        2+ Streptococcus agalactiae (Group B Streptococcus) Abnormal    This organism is susceptible to ampicillin, penicillin, vancomycin and the cephalosporins. If treatment is required and your patient is allergic to penicillin, contact the microbiology lab within 5 days to request susceptibility testing.   4+ Enterococcus faecalis Abnormal           A/P - 53 year old yo patient approx 2-1/2 weeks sp above procedure  -Betadine was applied to the wound and a new dressing was applied  -We again discussed the importance of limiting time on feet and resting to help " facilitate healing.  -We reviewed previous cultures.  He was discharged on Augmentin.  He states the foot appears more normal in color today so we will hold off on further antibiotics.  -He will follow-up in 1 week and we will likely remove all sutures at that point.  Anticipate starting with wound care  -He was given a handicap parking pass    Follow up  -1 week or sooner with acute issues    Plan for [id number] -once healed sufficiently to resume job duties      Filiberto Mercedes DPM [facility] FACFAOM  Podiatric Foot & Ankle Surgeon  [facility]  [phone]    Disclaimer: This note consists of symbols derived from keyboarding, dictation and/or voice recognition software. As a result, there may be errors in the script that have gone undetected. Please consider this when interpreting information found in this chart.      Again, thank you for allowing me to participate in the care of your patient.        Sincerely,        Filiberto Mercedes DPM, DOE

## 2023-09-07 NOTE — PROGRESS NOTES
"Foot & Ankle Surgery  September 7, 2023    S:  Patient in today sp sp partial left great toe amputation by myself on 8/19/2023 with subsequent completion of the left great toe imitation by Dr. Marc On 8/21/23.  Pain levels minimal.  Minimal.  He states he has been feeling better and he states the foot appears a more normal color.    BP (!) 142/89   Pulse 106   Ht 1.778 m (5' 10\")   Wt 127 kg (280 lb)   BMI 40.18 kg/m        ROS - positive for CC.  Patient denies current nausea, vomiting, chills, fevers, belly pain, calf pain, chest pain or SOB.  Complete remainder of ROS is otherwise neg.    PE -sutures intact but there is gapping at the apex of the flap at the dorsal lateral amputation site.  Low-grade redness but no cellulitis, purulence or necrosis seen..  Skin shows no trophic, color or temperature changes otherwise.  No calf redness, swelling or pain noted otherwise.    Pathology -   Final Diagnosis   Left foot, great toe/proximal phalanx, amputation:  -Resected bony tissues with osteomyelitis present in the fragment without articular surface      Cultures -      3+ Staphylococcus aureus Abnormal        2+ Streptococcus agalactiae (Group B Streptococcus) Abnormal    This organism is susceptible to ampicillin, penicillin, vancomycin and the cephalosporins. If treatment is required and your patient is allergic to penicillin, contact the microbiology lab within 5 days to request susceptibility testing.   4+ Enterococcus faecalis Abnormal           A/P - 53 year old yo patient approx 2-1/2 weeks sp above procedure  -Betadine was applied to the wound and a new dressing was applied  -We again discussed the importance of limiting time on feet and resting to help facilitate healing.  -We reviewed previous cultures.  He was discharged on Augmentin.  He states the foot appears more normal in color today so we will hold off on further antibiotics.  -He will follow-up in 1 week and we will likely remove all sutures " at that point.  Anticipate starting with wound care  -He was given a handicap parking pass    Follow up  -1 week or sooner with acute issues    Plan for [id number] -once healed sufficiently to resume job duties      Filiberto Mercedes DPM [facility] FACFA[facility]  Podiatric Foot & Ankle Surgeon  [facility]  [phone]    Disclaimer: This note consists of symbols derived from keyboarding, dictation and/or voice recognition software. As a result, there may be errors in the script that have gone undetected. Please consider this when interpreting information found in this chart.

## 2023-09-08 RX ORDER — PROCHLORPERAZINE 25 MG/1
SUPPOSITORY RECTAL
Qty: 1 EACH | Refills: 0 | Status: SHIPPED | OUTPATIENT
Start: 2023-09-08 | End: 2024-03-01

## 2023-09-08 NOTE — TELEPHONE ENCOUNTER
Routing refill request to provider for review/approval because:  Patient needs to be seen because:  failing visit    Hoa Perdue RN

## 2023-09-14 ENCOUNTER — OFFICE VISIT (OUTPATIENT)
Dept: PODIATRY | Facility: CLINIC | Age: 53
End: 2023-09-14
Payer: COMMERCIAL

## 2023-09-14 VITALS
WEIGHT: 280 LBS | SYSTOLIC BLOOD PRESSURE: 156 MMHG | BODY MASS INDEX: 40.09 KG/M2 | HEIGHT: 70 IN | DIASTOLIC BLOOD PRESSURE: 86 MMHG

## 2023-09-14 DIAGNOSIS — E11.621 DIABETIC ULCER OF TOE OF LEFT FOOT ASSOCIATED WITH TYPE 2 DIABETES MELLITUS, UNSPECIFIED ULCER STAGE (H): Primary | ICD-10-CM

## 2023-09-14 DIAGNOSIS — T81.89XD NON-HEALING SURGICAL WOUND, SUBSEQUENT ENCOUNTER: ICD-10-CM

## 2023-09-14 DIAGNOSIS — Z89.422 S/P AMPUTATION OF LESSER TOE, LEFT (H): ICD-10-CM

## 2023-09-14 DIAGNOSIS — L97.529 DIABETIC ULCER OF TOE OF LEFT FOOT ASSOCIATED WITH TYPE 2 DIABETES MELLITUS, UNSPECIFIED ULCER STAGE (H): Primary | ICD-10-CM

## 2023-09-14 PROCEDURE — 11042 DBRDMT SUBQ TIS 1ST 20SQCM/<: CPT | Performed by: PODIATRIST

## 2023-09-14 PROCEDURE — 99213 OFFICE O/P EST LOW 20 MIN: CPT | Mod: 25 | Performed by: PODIATRIST

## 2023-09-14 NOTE — PROGRESS NOTES
"Foot & Ankle Surgery  September 14, 2023    S:  Patient in today sp partial left great toe amputation by myself on 8/19/2023 with completion of the toe amputation by Dr. Marc On 8/21/2023.  Pain levels minimal.  He was seen 1 week ago.  He states he has been on his feet more lately    BP (!) 156/86   Ht 1.778 m (5' 10\")   Wt 127 kg (280 lb)   BMI 40.18 kg/m        ROS - positive for CC.  Patient denies current nausea, vomiting, chills, fevers, belly pain, calf pain, chest pain or SOB.  Complete remainder of ROS is otherwise neg.    PE -all sutures were removed.  There is a residual open area measuring 2.0 x 1.2 cm.  Surrounding erythema although this appears more consistent with a dermatitis versus infection.  Is not hot to the touch.  The wound goes down to exposed fat but no deep probing is otherwise seen.  Skin shows no trophic, color or temperature changes otherwise.  No calf redness, swelling or pain noted otherwise.    A/P - 53 year old yo patient approx 3-1/2 weeks sp above procedure  -All sutures were removed  -Excisional debridement was performed, full-thickness, sharply debriding the wound down to and including exposed sub-cutaneous tissue/fat, excising nonviable tissue to the above dimensions with a tissue nipper  -No cellulitis, no indication for further antibiotics  -Daily/every other day dressing change: Wash thoroughly, dry thoroughly, Betadine to the open area with a bandage  -We again discussed the importance of limiting time on feet and minimizing activities  -Consider topical steroid if dermatitis persists    Follow up  -2 weeks or sooner with acute issues    Plan for [id number] -currently working      Filiberto Mercedes DPM FACFAS FACFAOM  Podiatric Foot & Ankle Surgeon  [facility]  [phone]    Disclaimer: This note consists of symbols derived from keyboarding, dictation and/or voice recognition software. As a result, there may be errors in the script that have gone " undetected. Please consider this when interpreting information found in this chart.

## 2023-09-14 NOTE — LETTER
"    9/14/2023         RE: Jairo Escamilla  [address] [place] [address]        Dear Colleague,    Thank you for referring your patient, Jairo Escamilla, to the [facility] PODIATRY. Please see a copy of my visit note below.    Foot & Ankle Surgery  September 14, 2023    S:  Patient in today sp partial left great toe amputation by myself on 8/19/2023 with completion of the toe amputation by Dr. Marc On 8/21/2023.  Pain levels minimal.  He was seen 1 week ago.  He states he has been on his feet more lately    BP (!) 156/86   Ht 1.778 m (5' 10\")   Wt 127 kg (280 lb)   BMI 40.18 kg/m        ROS - positive for CC.  Patient denies current nausea, vomiting, chills, fevers, belly pain, calf pain, chest pain or SOB.  Complete remainder of ROS is otherwise neg.    PE -all sutures were removed.  There is a residual open area measuring 2.0 x 1.2 cm.  Surrounding erythema although this appears more consistent with a dermatitis versus infection.  Is not hot to the touch.  The wound goes down to exposed fat but no deep probing is otherwise seen.  Skin shows no trophic, color or temperature changes otherwise.  No calf redness, swelling or pain noted otherwise.    A/P - 53 year old yo patient approx 3-1/2 weeks sp above procedure  -All sutures were removed  -Excisional debridement was performed, full-thickness, sharply debriding the wound down to and including exposed sub-cutaneous tissue/fat, excising nonviable tissue to the above dimensions with a tissue nipper  -No cellulitis, no indication for further antibiotics  -Daily/every other day dressing change: Wash thoroughly, dry thoroughly, Betadine to the open area with a bandage  -We again discussed the importance of limiting time on feet and minimizing activities  -Consider topical steroid if dermatitis persists    Follow up  -2 weeks or sooner with acute issues    Plan for [id number] -currently working      Filiberto Mercedes, DOE FACFAS " [facility]  Podiatric Foot & Ankle Surgeon  [facility]  [phone]    Disclaimer: This note consists of symbols derived from keyboarding, dictation and/or voice recognition software. As a result, there may be errors in the script that have gone undetected. Please consider this when interpreting information found in this chart.      Again, thank you for allowing me to participate in the care of your patient.        Sincerely,        Filiberto Mercedes DPM, DOE

## 2023-09-28 ENCOUNTER — HOSPITAL ENCOUNTER (INPATIENT)
Facility: CLINIC | Age: 53
LOS: 4 days | Discharge: HOME | DRG: 617 | End: 2023-10-02
Attending: HOSPITALIST | Admitting: INTERNAL MEDICINE
Payer: COMMERCIAL

## 2023-09-28 ENCOUNTER — APPOINTMENT (OUTPATIENT)
Dept: MRI IMAGING | Facility: CLINIC | Age: 53
DRG: 617 | End: 2023-09-28
Payer: COMMERCIAL

## 2023-09-28 ENCOUNTER — ANCILLARY PROCEDURE (OUTPATIENT)
Dept: GENERAL RADIOLOGY | Facility: CLINIC | Age: 53
End: 2023-09-28
Attending: PODIATRIST
Payer: COMMERCIAL

## 2023-09-28 ENCOUNTER — OFFICE VISIT (OUTPATIENT)
Dept: PODIATRY | Facility: CLINIC | Age: 53
End: 2023-09-28
Payer: COMMERCIAL

## 2023-09-28 VITALS — WEIGHT: 280 LBS | BODY MASS INDEX: 40.18 KG/M2 | SYSTOLIC BLOOD PRESSURE: 116 MMHG | DIASTOLIC BLOOD PRESSURE: 80 MMHG

## 2023-09-28 DIAGNOSIS — M86.9 OSTEOMYELITIS OF LEFT FOOT, UNSPECIFIED TYPE (H): ICD-10-CM

## 2023-09-28 DIAGNOSIS — M86.072 ACUTE HEMATOGENOUS OSTEOMYELITIS OF LEFT FOOT (H): ICD-10-CM

## 2023-09-28 DIAGNOSIS — Z79.4 TYPE 2 DIABETES MELLITUS WITH HYPERGLYCEMIA, WITH LONG-TERM CURRENT USE OF INSULIN (H): ICD-10-CM

## 2023-09-28 DIAGNOSIS — Z89.422 S/P AMPUTATION OF LESSER TOE, LEFT (H): ICD-10-CM

## 2023-09-28 DIAGNOSIS — E11.621 DIABETIC ULCER OF TOE OF LEFT FOOT ASSOCIATED WITH TYPE 2 DIABETES MELLITUS, UNSPECIFIED ULCER STAGE (H): Primary | ICD-10-CM

## 2023-09-28 DIAGNOSIS — L97.529 DIABETIC ULCER OF TOE OF LEFT FOOT ASSOCIATED WITH TYPE 2 DIABETES MELLITUS, UNSPECIFIED ULCER STAGE (H): Primary | ICD-10-CM

## 2023-09-28 DIAGNOSIS — L97.529 DIABETIC ULCER OF TOE OF LEFT FOOT ASSOCIATED WITH TYPE 2 DIABETES MELLITUS, UNSPECIFIED ULCER STAGE (H): ICD-10-CM

## 2023-09-28 DIAGNOSIS — E11.65 TYPE 2 DIABETES MELLITUS WITH HYPERGLYCEMIA, WITH LONG-TERM CURRENT USE OF INSULIN (H): ICD-10-CM

## 2023-09-28 DIAGNOSIS — M86.172 OTHER ACUTE OSTEOMYELITIS OF LEFT FOOT (H): Primary | ICD-10-CM

## 2023-09-28 DIAGNOSIS — E11.621 DIABETIC ULCER OF TOE OF LEFT FOOT ASSOCIATED WITH TYPE 2 DIABETES MELLITUS, UNSPECIFIED ULCER STAGE (H): ICD-10-CM

## 2023-09-28 LAB
ANION GAP SERPL CALCULATED.3IONS-SCNC: 16 MMOL/L (ref 7–15)
BASOPHILS # BLD AUTO: 0 10E3/UL (ref 0–0.2)
BASOPHILS NFR BLD AUTO: 0 %
BUN SERPL-MCNC: 15.7 MG/DL (ref 6–20)
CALCIUM SERPL-MCNC: 8.9 MG/DL (ref 8.6–10)
CHLORIDE SERPL-SCNC: 99 MMOL/L (ref 98–107)
CREAT SERPL-MCNC: 0.9 MG/DL (ref 0.67–1.17)
EGFRCR SERPLBLD CKD-EPI 2021: >90 ML/MIN/1.73M2
EOSINOPHIL # BLD AUTO: 0.1 10E3/UL (ref 0–0.7)
EOSINOPHIL NFR BLD AUTO: 1 %
ERYTHROCYTE [DISTWIDTH] IN BLOOD BY AUTOMATED COUNT: 13.9 % (ref 10–15)
GLUCOSE BLDC GLUCOMTR-MCNC: 195 MG/DL (ref 70–99)
GLUCOSE BLDC GLUCOMTR-MCNC: 269 MG/DL (ref 70–99)
GLUCOSE SERPL-MCNC: 337 MG/DL (ref 70–99)
HCO3 SERPL-SCNC: 21 MMOL/L (ref 22–29)
HCT VFR BLD AUTO: 43 % (ref 40–53)
HGB BLD-MCNC: 14.1 G/DL (ref 13.3–17.7)
IMM GRANULOCYTES # BLD: 0 10E3/UL
IMM GRANULOCYTES NFR BLD: 0 %
LYMPHOCYTES # BLD AUTO: 1.5 10E3/UL (ref 0.8–5.3)
LYMPHOCYTES NFR BLD AUTO: 21 %
MCH RBC QN AUTO: 27.4 PG (ref 26.5–33)
MCHC RBC AUTO-ENTMCNC: 32.8 G/DL (ref 31.5–36.5)
MCV RBC AUTO: 84 FL (ref 78–100)
MONOCYTES # BLD AUTO: 0.7 10E3/UL (ref 0–1.3)
MONOCYTES NFR BLD AUTO: 10 %
NEUTROPHILS # BLD AUTO: 4.7 10E3/UL (ref 1.6–8.3)
NEUTROPHILS NFR BLD AUTO: 68 %
NRBC # BLD AUTO: 0 10E3/UL
NRBC BLD AUTO-RTO: 0 /100
PLATELET # BLD AUTO: 216 10E3/UL (ref 150–450)
POTASSIUM SERPL-SCNC: 4.3 MMOL/L (ref 3.4–5.3)
RBC # BLD AUTO: 5.14 10E6/UL (ref 4.4–5.9)
SODIUM SERPL-SCNC: 136 MMOL/L (ref 135–145)
WBC # BLD AUTO: 6.9 10E3/UL (ref 4–11)

## 2023-09-28 PROCEDURE — 120N000001 HC R&B MED SURG/OB

## 2023-09-28 PROCEDURE — 250N000012 HC RX MED GY IP 250 OP 636 PS 637

## 2023-09-28 PROCEDURE — 82310 ASSAY OF CALCIUM: CPT

## 2023-09-28 PROCEDURE — 99222 1ST HOSP IP/OBS MODERATE 55: CPT

## 2023-09-28 PROCEDURE — 85025 COMPLETE CBC W/AUTO DIFF WBC: CPT

## 2023-09-28 PROCEDURE — 36415 COLL VENOUS BLD VENIPUNCTURE: CPT

## 2023-09-28 PROCEDURE — 73630 X-RAY EXAM OF FOOT: CPT | Mod: TC | Performed by: RADIOLOGY

## 2023-09-28 PROCEDURE — 87040 BLOOD CULTURE FOR BACTERIA: CPT

## 2023-09-28 PROCEDURE — A9585 GADOBUTROL INJECTION: HCPCS | Performed by: HOSPITALIST

## 2023-09-28 PROCEDURE — 250N000013 HC RX MED GY IP 250 OP 250 PS 637: Performed by: HOSPITALIST

## 2023-09-28 PROCEDURE — 250N000013 HC RX MED GY IP 250 OP 250 PS 637

## 2023-09-28 PROCEDURE — 73720 MRI LWR EXTREMITY W/O&W/DYE: CPT | Mod: LT

## 2023-09-28 PROCEDURE — 255N000002 HC RX 255 OP 636: Performed by: HOSPITALIST

## 2023-09-28 PROCEDURE — 99214 OFFICE O/P EST MOD 30 MIN: CPT | Performed by: PODIATRIST

## 2023-09-28 PROCEDURE — 250N000011 HC RX IP 250 OP 636

## 2023-09-28 PROCEDURE — 999N000128 HC STATISTIC PERIPHERAL IV START W/O US GUIDANCE

## 2023-09-28 RX ORDER — AMPICILLIN 2 G/1
2 INJECTION, POWDER, FOR SOLUTION INTRAVENOUS EVERY 4 HOURS
Status: DISCONTINUED | OUTPATIENT
Start: 2023-09-28 | End: 2023-10-02 | Stop reason: HOSPADM

## 2023-09-28 RX ORDER — DEXTROSE MONOHYDRATE 25 G/50ML
25-50 INJECTION, SOLUTION INTRAVENOUS
Status: DISCONTINUED | OUTPATIENT
Start: 2023-09-28 | End: 2023-09-28

## 2023-09-28 RX ORDER — AMOXICILLIN 250 MG
2 CAPSULE ORAL 2 TIMES DAILY PRN
Status: DISCONTINUED | OUTPATIENT
Start: 2023-09-28 | End: 2023-10-02 | Stop reason: HOSPADM

## 2023-09-28 RX ORDER — NALOXONE HYDROCHLORIDE 0.4 MG/ML
0.4 INJECTION, SOLUTION INTRAMUSCULAR; INTRAVENOUS; SUBCUTANEOUS
Status: DISCONTINUED | OUTPATIENT
Start: 2023-09-28 | End: 2023-10-02 | Stop reason: HOSPADM

## 2023-09-28 RX ORDER — HYDROMORPHONE HYDROCHLORIDE 2 MG/1
2 TABLET ORAL EVERY 4 HOURS PRN
Status: DISCONTINUED | OUTPATIENT
Start: 2023-09-28 | End: 2023-10-02 | Stop reason: HOSPADM

## 2023-09-28 RX ORDER — ONDANSETRON 4 MG/1
4 TABLET, ORALLY DISINTEGRATING ORAL EVERY 6 HOURS PRN
Status: DISCONTINUED | OUTPATIENT
Start: 2023-09-28 | End: 2023-09-30

## 2023-09-28 RX ORDER — NICOTINE POLACRILEX 4 MG
15-30 LOZENGE BUCCAL
Status: DISCONTINUED | OUTPATIENT
Start: 2023-09-28 | End: 2023-10-02 | Stop reason: HOSPADM

## 2023-09-28 RX ORDER — AMOXICILLIN 250 MG
1 CAPSULE ORAL 2 TIMES DAILY PRN
Status: DISCONTINUED | OUTPATIENT
Start: 2023-09-28 | End: 2023-10-02 | Stop reason: HOSPADM

## 2023-09-28 RX ORDER — DEXTROSE MONOHYDRATE 25 G/50ML
25-50 INJECTION, SOLUTION INTRAVENOUS
Status: DISCONTINUED | OUTPATIENT
Start: 2023-09-28 | End: 2023-10-02 | Stop reason: HOSPADM

## 2023-09-28 RX ORDER — NICOTINE POLACRILEX 4 MG
15-30 LOZENGE BUCCAL
Status: DISCONTINUED | OUTPATIENT
Start: 2023-09-28 | End: 2023-09-28

## 2023-09-28 RX ORDER — PROCHLORPERAZINE MALEATE 10 MG
10 TABLET ORAL EVERY 6 HOURS PRN
Status: DISCONTINUED | OUTPATIENT
Start: 2023-09-28 | End: 2023-09-30

## 2023-09-28 RX ORDER — ACETAMINOPHEN 325 MG/1
975 TABLET ORAL EVERY 8 HOURS
Status: DISCONTINUED | OUTPATIENT
Start: 2023-09-28 | End: 2023-09-30

## 2023-09-28 RX ORDER — POLYETHYLENE GLYCOL 3350 17 G/17G
17 POWDER, FOR SOLUTION ORAL DAILY PRN
Status: DISCONTINUED | OUTPATIENT
Start: 2023-09-28 | End: 2023-10-02 | Stop reason: HOSPADM

## 2023-09-28 RX ORDER — LOSARTAN POTASSIUM 25 MG/1
25 TABLET ORAL DAILY
Status: DISCONTINUED | OUTPATIENT
Start: 2023-09-28 | End: 2023-10-02 | Stop reason: HOSPADM

## 2023-09-28 RX ORDER — NALOXONE HYDROCHLORIDE 0.4 MG/ML
0.2 INJECTION, SOLUTION INTRAMUSCULAR; INTRAVENOUS; SUBCUTANEOUS
Status: DISCONTINUED | OUTPATIENT
Start: 2023-09-28 | End: 2023-10-02 | Stop reason: HOSPADM

## 2023-09-28 RX ORDER — PROCHLORPERAZINE 25 MG
25 SUPPOSITORY, RECTAL RECTAL EVERY 12 HOURS PRN
Status: DISCONTINUED | OUTPATIENT
Start: 2023-09-28 | End: 2023-10-02 | Stop reason: HOSPADM

## 2023-09-28 RX ORDER — GADOBUTROL 604.72 MG/ML
12 INJECTION INTRAVENOUS ONCE
Status: COMPLETED | OUTPATIENT
Start: 2023-09-28 | End: 2023-09-28

## 2023-09-28 RX ORDER — AMPICILLIN TRIHYDRATE 500 MG
500 CAPSULE ORAL 4 TIMES DAILY
Qty: 28 CAPSULE | Refills: 0 | Status: ON HOLD | OUTPATIENT
Start: 2023-09-28 | End: 2023-10-01

## 2023-09-28 RX ORDER — SIMVASTATIN 20 MG
20 TABLET ORAL AT BEDTIME
Status: DISCONTINUED | OUTPATIENT
Start: 2023-09-28 | End: 2023-10-02 | Stop reason: HOSPADM

## 2023-09-28 RX ORDER — ONDANSETRON 2 MG/ML
4 INJECTION INTRAMUSCULAR; INTRAVENOUS EVERY 6 HOURS PRN
Status: DISCONTINUED | OUTPATIENT
Start: 2023-09-28 | End: 2023-09-30

## 2023-09-28 RX ORDER — FAMOTIDINE 10 MG
10 TABLET ORAL 2 TIMES DAILY
Status: DISCONTINUED | OUTPATIENT
Start: 2023-09-28 | End: 2023-10-02 | Stop reason: HOSPADM

## 2023-09-28 RX ADMIN — FAMOTIDINE 10 MG: 10 TABLET ORAL at 21:20

## 2023-09-28 RX ADMIN — AMPICILLIN SODIUM 2 G: 2 INJECTION, POWDER, FOR SOLUTION INTRAMUSCULAR; INTRAVENOUS at 17:55

## 2023-09-28 RX ADMIN — SIMVASTATIN 20 MG: 20 TABLET, FILM COATED ORAL at 21:20

## 2023-09-28 RX ADMIN — GADOBUTROL 12 ML: 604.72 INJECTION INTRAVENOUS at 23:19

## 2023-09-28 RX ADMIN — ACETAMINOPHEN 975 MG: 325 TABLET, FILM COATED ORAL at 17:52

## 2023-09-28 RX ADMIN — INSULIN ASPART 3 UNITS: 100 INJECTION, SOLUTION INTRAVENOUS; SUBCUTANEOUS at 19:16

## 2023-09-28 RX ADMIN — INSULIN GLARGINE 60 UNITS: 100 INJECTION, SOLUTION SUBCUTANEOUS at 22:48

## 2023-09-28 RX ADMIN — METFORMIN HYDROCHLORIDE 1000 MG: 500 TABLET, FILM COATED ORAL at 17:53

## 2023-09-28 RX ADMIN — AMPICILLIN SODIUM 2 G: 2 INJECTION, POWDER, FOR SOLUTION INTRAMUSCULAR; INTRAVENOUS at 21:20

## 2023-09-28 ASSESSMENT — PAIN SCALES - GENERAL: PAINLEVEL: MILD PAIN (2)

## 2023-09-28 ASSESSMENT — COLUMBIA-SUICIDE SEVERITY RATING SCALE - C-SSRS
4. HAVE YOU HAD THESE THOUGHTS AND HAD SOME INTENTION OF ACTING ON THEM?: NO
3. HAVE YOU BEEN THINKING ABOUT HOW YOU MIGHT KILL YOURSELF?: NO
1. IN THE PAST MONTH, HAVE YOU WISHED YOU WERE DEAD OR WISHED YOU COULD GO TO SLEEP AND NOT WAKE UP?: NO
5. HAVE YOU STARTED TO WORK OUT OR WORKED OUT THE DETAILS OF HOW TO KILL YOURSELF? DO YOU INTEND TO CARRY OUT THIS PLAN?: NO
6. HAVE YOU EVER DONE ANYTHING, STARTED TO DO ANYTHING, OR PREPARED TO DO ANYTHING TO END YOUR LIFE?: NO
2. HAVE YOU ACTUALLY HAD ANY THOUGHTS OF KILLING YOURSELF IN THE PAST MONTH?: NO

## 2023-09-28 ASSESSMENT — ACTIVITIES OF DAILY LIVING (ADL)
ADLS_ACUITY_SCORE: 33

## 2023-09-28 NOTE — LETTER
9/28/2023         RE: Jairo Escamilla  [address] [place] [address]        Dear Colleague,    Thank you for referring your patient, Jairo Escamilla, to the [facility] PODIATRY. Please see a copy of my visit note below.    Foot & Ankle Surgery  September 28, 2023    S:  Patient in today sp partial left great toe amputation by myself on 8/19/2023 with completion of the amputation by Dr. Marc On 8/21/2023. .  He was last seen 2 weeks ago at which point all sutures were removed and he was advised on daily dressing changes.  He presents today with a couple day history of worsening redness and swelling in the foot.  He has been feeling ill, fevers and chills.    /80   Wt 127 kg (280 lb)   BMI 40.18 kg/m        ROS - positive for CC.  Patient denies current nausea, vomiting, chills, fevers, belly pain, calf pain, chest pain or SOB.  Complete remainder of ROS is otherwise neg.    PE -the left forefoot is erythematous and edematous and quite warm to the touch.  Skin shows no trophic, color or temperature changes otherwise.  No calf redness, swelling or pain noted otherwise.    Pathology -   Final Diagnosis   Toe, left great, amputation:  -Bone margin with acute inflammation present in the periosteal soft tissue but no evidence of osteomyelitis  -Lesion is deep ulcer with purulent acute inflammation and suppurative osteomyelitis   -Additional/separate tissue in sample shows muscle and fat with acute inflammation and necrosis       Cultures -   3+ Staphylococcus aureus Abnormal       2+ Streptococcus agalactiae (Group B Streptococcus) Abnormal    This organism is susceptible to ampicillin, penicillin, vancomycin and the cephalosporins. If treatment is required and your patient is allergic to penicillin, contact the microbiology lab within 5 days to request susceptibility testing.   4+ Enterococcus faecalis Abnormal        A/P - 53 year old yo patient approx 5 weeks sp above  procedure  -I personally reviewed and interpreted the x-ray results today.  No gas is seen.  However, there are subtle erosive changes to the lateral first metatarsal head, seen on the oblique, concerning for osteomyelitis  -We reviewed culture results and antibiotic options.  We discussed options for outpatient antibiotic management versus admission.  We are going to attempt to facilitate direct admission to [facility].  -In case he is not able to get in today, I have sent a prescription for ampicillin 500 mg        Filiberto Mercedes DPM FACFAS FACFAOM  Podiatric Foot & Ankle Surgeon  [facility]  [phone]    Disclaimer: This note consists of symbols derived from keyboarding, dictation and/or voice recognition software. As a result, there may be errors in the script that have gone undetected. Please consider this when interpreting information found in this chart.      Again, thank you for allowing me to participate in the care of your patient.        Sincerely,        Filiberto Mercedes DPM, DOE

## 2023-09-28 NOTE — PHARMACY-ADMISSION MEDICATION HISTORY
Pharmacist Admission Medication History    Admission medication history is complete. The information provided in this note is only as accurate as the sources available at the time of the update.    Medication reconciliation/reorder completed by provider prior to medication history? No    Information Source(s): Patient and CareEverywhere/SureScripts via in-person  --> reviewed fill history  --> reviewed discharge summary medication list from 8/22/23  --> discussed medications with patient    Pertinent Information: patient is a reliable historian. Losartan fill history indicates dose of 50 mg daily, but this was reduced on 8/22/23 discharge, and patient endorses taking lower dose of 25 mg daily.   Patient confirmed insulin regimen, of note - aspart has not been filled since 1/2023, glargine not filled since 5/19/23.     Podiatry wrote Rx for ampicillin PTA - patient only took first dose in course.     Changes made to PTA medication list:  Added: None  Deleted: None  Changed: simvastatin AM --> PM dosing    Medication Affordability:  Not including over the counter (OTC) medications, was there a time in the past 3 months when you did not take your medications as prescribed because of cost?: No    Allergies reviewed with patient and updates made in EHR: yes    Medication History Completed By: Nasrin Delcid [facility] 9/28/2023 2:09 PM    Prior to Admission medications    Medication Sig Last Dose Taking? Auth Provider   acetaminophen (TYLENOL) 325 MG tablet Take 2 tablets (650 mg) by mouth every 6 hours as needed for mild pain or other (and adjunct with moderate or severe pain or per patient request) 9/26/2023 at PRN Yes Maria De Jesus Ludwig MD   ampicillin (PRINCIPEN) 500 MG capsule Take 1 capsule (500 mg) by mouth 4 times daily for 7 days 9/28/2023 at new Rx 9/28/23, took 1 dose Yes Filiberto Mercedes DPM   cimetidine (TAGAMET) 200 MG tablet Take 1 tablet (200 mg) by mouth 2 times daily 9/28/2023 at AM Yes Oziel  "Maria De Jesus Vidal MD   insulin aspart (NOVOLOG PEN) 100 UNIT/ML pen Inject 25 Units Subcutaneous 3 times daily (with meals) Along with adjustment scale of 1:50 as directed up to 75 units daily. 9/27/2023 at PM Yes Sandoval Wilkinson MD   insulin glargine (BASAGLAR KWIKPEN) 100 UNIT/ML pen Inject 60 Units Subcutaneous 2 times daily 9/27/2023 at PM Yes Sandoval Wilkinson MD   losartan (COZAAR) 25 MG tablet Take 1 tablet (25 mg) by mouth daily 9/27/2023 at PM Yes Maria De Jesus Ludwig MD   metFORMIN (GLUCOPHAGE XR) 500 MG 24 hr tablet TAKE 2 TABLETS BY MOUTH TWICE DAILY 9/28/2023 at AM Yes Sandoval Wilkinson MD   oxyCODONE (ROXICODONE) 5 MG tablet Take 1 tablet (5 mg) by mouth every 4 hours as needed for moderate pain Past Week at PRN Yes Maria De Jesus Ludwig MD   semaglutide (OZEMPIC, 0.25 OR 0.5 MG/DOSE,) 2 MG/1.5ML SOPN pen Inject 0.5 mg Subcutaneous every 7 days 9/25/2023 at Unknown time Yes Sandoval Wilkinson MD   simvastatin (ZOCOR) 20 MG tablet TAKE 1 TABLET(20 MG) BY MOUTH IN THE MORNING  Patient taking differently: Take 20 mg by mouth At Bedtime 9/27/2023 at PM Yes Sandoval Wilkinson MD   tadalafil (CIALIS) 20 MG tablet Take 1 tablet (20 mg) by mouth daily as needed (ED) More than a month at PRN Yes Sandoval Wilkinson MD   testosterone cypionate (DEPOTESTOSTERONE) 200 MG/ML injection Inject 1 mL (200 mg) into the muscle every 14 days 9/12/2023 at due 9/26/23 per pt Yes Sandoval Wilkinson MD   blood glucose (ONETOUCH ULTRA) test strip Use to test blood sugars tid or as directed.   Sandoval Wilkinson MD   Continuous Blood Gluc Sensor (DEXCOM G6 SENSOR) MISC 1 each every 10 days Change every 10 days.   Sandoval Wilkinson MD   Continuous Blood Gluc Transmit (DEXCOM G6 TRANSMITTER) MISC USE AS DIRECTED. CHANGE EVERY 3 MONTHS   Sandoval Wilkinson MD   insulin pen needle (32G X 6 MM) 32G X 6 MM miscellaneous Use 6 pen needles daily or as directed.   Sandoval Wilkinson MD   needle, disp, 18G X 1\" MISC 1 Needle every 14 days Used to withdraw the " "testosterone medication from the vial.   Sandoval Wilkinson MD   Needle, Disp, 25G X 1\" MISC 1 Needle every 14 days Used to inject the testosterone medication.   Sandoval Wilkisnon MD       "

## 2023-09-28 NOTE — PROGRESS NOTES
Foot & Ankle Surgery  September 28, 2023    S:  Patient in today sp partial left great toe amputation by myself on 8/19/2023 with completion of the amputation by Dr. Marc On 8/21/2023. .  He was last seen 2 weeks ago at which point all sutures were removed and he was advised on daily dressing changes.  He presents today with a couple day history of worsening redness and swelling in the foot.  He has been feeling ill, fevers and chills.    /80   Wt 127 kg (280 lb)   BMI 40.18 kg/m        ROS - positive for CC.  Patient denies current nausea, vomiting, chills, fevers, belly pain, calf pain, chest pain or SOB.  Complete remainder of ROS is otherwise neg.    PE -the left forefoot is erythematous and edematous and quite warm to the touch.  Skin shows no trophic, color or temperature changes otherwise.  No calf redness, swelling or pain noted otherwise.    Pathology -   Final Diagnosis   Toe, left great, amputation:  -Bone margin with acute inflammation present in the periosteal soft tissue but no evidence of osteomyelitis  -Lesion is deep ulcer with purulent acute inflammation and suppurative osteomyelitis   -Additional/separate tissue in sample shows muscle and fat with acute inflammation and necrosis       Cultures -   3+ Staphylococcus aureus Abnormal       2+ Streptococcus agalactiae (Group B Streptococcus) Abnormal    This organism is susceptible to ampicillin, penicillin, vancomycin and the cephalosporins. If treatment is required and your patient is allergic to penicillin, contact the microbiology lab within 5 days to request susceptibility testing.   4+ Enterococcus faecalis Abnormal        A/P - 53 year old yo patient approx 5 weeks sp above procedure  -I personally reviewed and interpreted the x-ray results today.  No gas is seen.  However, there are subtle erosive changes to the lateral first metatarsal head, seen on the oblique, concerning for osteomyelitis  -We reviewed culture results and  antibiotic options.  We discussed options for outpatient antibiotic management versus admission.  We are going to attempt to facilitate direct admission to [facility].  -In case he is not able to get in today, I have sent a prescription for ampicillin 500 mg        Filiberto Mercedes DPM FACFAS FACFAOM  Podiatric Foot & Ankle Surgeon  [facility]  [phone]    Disclaimer: This note consists of symbols derived from keyboarding, dictation and/or voice recognition software. As a result, there may be errors in the script that have gone undetected. Please consider this when interpreting information found in this chart.

## 2023-09-28 NOTE — H&P
"[facility]    History and Physical - Hospitalist Service       Date of Admission:  9/28/2023    Assessment & Plan      Jairo Escamilla is a 53 year old male s/p partial left great toe amputation 08/19/2023 with completion of the amputation by Dr. Marc 08/21/2023 who was seen in podiatry clinic and directly admitted to [facility] on 9/28/2023 for concern of left lateral first metatarsal osteomyelitis.     Of note, patient seen in clinic 09/14 where his sutures were removed post complete amputation. There was a residual open area extending down to exposed fat measuring 2.0 x 1.2 cm with surrounding erythema, which was thought to be dermatitis versus infection. Excisional debridement was performed at this time, full-thickness, sharply debriding the wound down to and including exposed sub-cutaneous tissue/fat. Nonviable tissue was excised. No cellulitis appreciated and no indication for further antibiotics. Wound culture obtained. Aerobic cultures at time of 08/19 partial amputation positive for staph aureus, strep agalactiae and enterococcus faecalis. Susceptible to vancomycin, ampicillin and penicillin.     Patient is now five weeks s/p complete amputation and was noted to present to clinic with a three day history of worsening redness and swelling of his left foot. States his pain had been improving just prior to this as he was able to walk with his shoes on and \"almost didn't even feel that I had had this amputation.\" States the pain developed on Tuesday morning and was a dull, diffuse pain localized to the soles of his feet, L>R. States it felt \"almost like I had been walking on them too long, although I hadn't.\" Endorses feeling ill with intermittent fevers (max 100.7 yesterday), chills, decreased appetite, nausea, x1 loose stool and extreme fatigue.     Suspected osteomyelitis of left lateral first metatarsal head    Hx of left great to osteomyelitis now s/p complete amputation, " 08/21/2023  * XR Foot Left G/E 3 Views 09/28: Interval revision amputation of the left first ray with now complete amputation of the great toe. Subtle focal bony lucency at the medial aspect of the left first metatarsal head is new and could represent osteomyelitis in the appropriate clinical context, particularly if a probe can touch bone. Chronic fracture deformity of the proximal fifth metatarsal, unchanged. New tuft fracture second toe distal phalanx. No dislocation. Heel spur with distal Achilles insertional enthesophyte. Arterial calcification. Generalized soft tissue edema.  - Admit to inpatient   - CBC w/ PLT obtained to evaluate for leukocytosis, platelet count and anemia. CBC WNL    - BMP obtained to evaluate for electrolye abnormalities and kidney function   - MR left foot w/o and w/ contrast ordered   - Podiatry consult, appreciate the cares   - Wound culture to be collected by podiatry   - Blood cultures obtained prior to start of IV abx   - Start IV ampicillin 2g q 4 hours given results of aerobic cultures at time of 08/19 and susceptibility testing   - Pain management with scheduled acetaminophen and PRN oxycodone   - AM CBC w/ PLT and BMP     Insulin-dependent Type II DM    Peripheral neuropathy secondary to diabetes   * Prior to admission regimen includes Basaglar 60 units twice daily and NovoLog 25 units 3 times daily. Also takes Ozempic and Metformin 1,000 mg BID. Patient admits he's been noncompliant with monitoring his blood glucose levels and managing his diabetes. A1c 8.4 08/01/2023. Patient voices understanding of the importance of his compliance moving forward for improved management of his wounds.   * Glucose originally 337 09/28. A1c per above. Need improved diabetes management upon discharge. Educator consulted for education surrounding diabetes control and effects on wound care/circulation/healing. Also education surrounding foot care would be appreciated, see below.   - Continue insulin  aspart 25 units subcutaneous TID before meals   - Start sliding scale insulin PRN   - Continue insulin glargine 60 units subcutaneous BID   - Continue metformin 1,000 mg BID   - Discontinue ozempic given worsening nausea and GERD symptoms, can consider resuming upon discharge   - AM BMP     Onychomycosis   * Patient has onychomycosis bilaterally, but particularly on his right foot. Recommend outpatient treatment upon discharge once left foot status stable. Discussed importance of foot care, regular foot exams, carefully trimming nails to prevent future ulcerations/infections, particularly given peripheral neuropathy.   - Consider outpatient management.     HTN   * BPs well managed on current regimen.   - Continue PTA losartan     Probably GERD   - Start famotidine.    Dyslipidemia   - Continue PTA simvastatin     Hypogonadism   - Patient reports 2 days late for testosterone injection.   - Follow-up on this upon discharge.     Obesity   - Diabetes educator to discuss healthy lifestyle changes. Optimization of hypogonadism and diabetes will support reduction in weight/body fat.       Diet: Combination Diet Moderate Consistent Carb (60 g CHO per Meal) Diet  DVT Prophylaxis: Pneumatic Compression Devices  Gill Catheter: Not present  Lines: None     Cardiac Monitoring: None  Code Status: Full Code    Disposition Plan      Expected Discharge Date: 09/30/2023                The patient's care was discussed with the Attending Physician, Dr. Martin Cisse .    Ellie Ogden PA-C  Hospitalist Service  [facility]  Securely message with "[employer] (more info)  Text page via [employer] Paging/Directory   ______________________________________________________________________    Chief Complaint   Left foot pain and swelling     History is obtained from the patient and chart review     History of Present Illness   Jairo Escamilla is a 53 year old male s/p partial left great toe amputation 08/19/2023 with  "completion of the amputation by Dr. Marc 08/21/2023 who was seen in podiatry clinic and directly admitted to [facility] on 9/28/2023 for concern of left lateral first metatarsal osteomyelitis.     Of note, patient seen in clinic 09/14 where his sutures were removed post complete amputation. There was a residual open area extending down to exposed fat measuring 2.0 x 1.2 cm with surrounding erythema, which was thought to be dermatitis versus infection. Excisional debridement was performed at this time, full-thickness, sharply debriding the wound down to and including exposed sub-cutaneous tissue/fat. Nonviable tissue was excised. No cellulitis appreciated and no indication for further antibiotics. Wound culture obtained. Aerobic cultures at time of 08/19 partial amputation positive for staph aureus, strep agalactiae and enterococcus faecalis. Susceptible to vancomycin, ampicillin and penicillin.     Patient is now five weeks s/p complete amputation and was noted to present to clinic with a three day history of worsening redness and swelling of his left foot. States his pain had been improving just prior to this as he was able to walk with his shoes on and \"almost didn't even feel that I had had this amputation.\" States the pain developed on Tuesday morning and was a dull, diffuse pain localized to the soles of his feet, L>R. States it felt \"almost like I had been walking on them too long, although I hadn't.\" Endorses feeling ill with intermittent fevers (max 100.7 yesterday), chills, decreased appetite, nausea, x1 loose stool and extreme fatigue.     Past Medical History    Past Medical History:   Diagnosis Date    Diabetes mellitus (adult onset)     Diabetes mellitus, type 2 (H) 05/14/2008    Fracture of rib 2022    High cholesterol     Hypertension     Obese     Peripheral neuropathy     Pneumonia     Sleep apnea     Subdural hematoma (H)      Past Surgical History   Past Surgical History:   Procedure " "Laterality Date    AMPUTATE TOE(S) Left 2023    Procedure: Left hallux amputation;  Surgeon: Filiberto Mercedes DPM;  Location: [facility] OR    COLONOSCOPY N/A 2022    Procedure: COLONOSCOPY, FLEXIBLE, WITH LESION REMOVAL USING SNARE;  Surgeon: Josh Reeves MD;  Location:  GI    IRRIGATION AND DEBRIDEMENT FOOT, COMBINED Left 2023    Procedure: IRRIGATION AND DEBRIDEMENT LEFT FOOT, REVISION OF AMPUTATION;  Surgeon: Michelle Marc DPM;  Location: [facility] OR    NO HISTORY OF SURGERY       Prior to Admission Medications   Prior to Admission Medications   Prescriptions Last Dose Informant Patient Reported? Taking?   Continuous Blood Gluc Sensor (DEXCOM G6 SENSOR) MISC   No No   Si each every 10 days Change every 10 days.   Continuous Blood Gluc Transmit (DEXCOM G6 TRANSMITTER) MISC   No No   Sig: USE AS DIRECTED. CHANGE EVERY 3 MONTHS   Needle, Disp, 25G X 1\" MISC   No No   Si Needle every 14 days Used to inject the testosterone medication.   acetaminophen (TYLENOL) 325 MG tablet 2023 at PRN Self No Yes   Sig: Take 2 tablets (650 mg) by mouth every 6 hours as needed for mild pain or other (and adjunct with moderate or severe pain or per patient request)   ampicillin (PRINCIPEN) 500 MG capsule 2023 at [facility], took 1 dose Self No Yes   Sig: Take 1 capsule (500 mg) by mouth 4 times daily for 7 days   blood glucose (ONETOUCH ULTRA) test strip   No No   Sig: Use to test blood sugars tid or as directed.   cimetidine (TAGAMET) 200 MG tablet 2023 at AM Self No Yes   Sig: Take 1 tablet (200 mg) by mouth 2 times daily   insulin aspart (NOVOLOG PEN) 100 UNIT/ML pen 2023 at PM Self No Yes   Sig: Inject 25 Units Subcutaneous 3 times daily (with meals) Along with adjustment scale of 1:50 as directed up to 75 units daily.   insulin glargine (BASAGLAR KWIKPEN) 100 UNIT/ML pen 2023 at PM Self No Yes   Sig: Inject 60 Units Subcutaneous 2 times daily   insulin pen needle (32G X 6 MM) " "32G X 6 MM miscellaneous   No No   Sig: Use 6 pen needles daily or as directed.   losartan (COZAAR) 25 MG tablet 2023 at PM Self Yes Yes   Sig: Take 1 tablet (25 mg) by mouth daily   metFORMIN (GLUCOPHAGE XR) 500 MG 24 hr tablet 2023 at AM Self No Yes   Sig: TAKE 2 TABLETS BY MOUTH TWICE DAILY   needle, disp, 18G X 1\" MISC   No No   Si Needle every 14 days Used to withdraw the testosterone medication from the vial.   oxyCODONE (ROXICODONE) 5 MG tablet Past Week at PRN Self No Yes   Sig: Take 1 tablet (5 mg) by mouth every 4 hours as needed for moderate pain   semaglutide (OZEMPIC, 0.25 OR 0.5 MG/DOSE,) 2 MG/1.5ML SOPN pen 2023 at Unknown time Self No Yes   Sig: Inject 0.5 mg Subcutaneous every 7 days   simvastatin (ZOCOR) 20 MG tablet 2023 at PM Self No Yes   Sig: TAKE 1 TABLET(20 MG) BY MOUTH IN THE MORNING   Patient taking differently: Take 20 mg by mouth At Bedtime   tadalafil (CIALIS) 20 MG tablet More than a month at PRN Self No Yes   Sig: Take 1 tablet (20 mg) by mouth daily as needed (ED)   testosterone cypionate (DEPOTESTOSTERONE) 200 MG/ML injection 2023 at due 23 per pt Self No Yes   Sig: Inject 1 mL (200 mg) into the muscle every 14 days      Facility-Administered Medications: None      Allergies   Allergies   Allergen Reactions    Lisinopril      Cough        Physical Exam   Vital Signs: Temp: 99.1  F (37.3  C) Temp src: Oral BP: 126/83 Pulse: 92   Resp: 16 SpO2: 94 % O2 Device: None (Room air)    Weight: 0 lbs 0 oz  GENERAL:  Pleasant, cooperative. Seems somewhat down given status of left foot.   HEENT: Normocephalic, atraumatic.  Extra occular mm intact.  Sclera clear. Mucous membranes moist. No erythema; uvula midline. Neck supple.  PULMONOLOGY: Clear to auscultation bilaterally with good exchange at the bases and no wheeze or crackle.  CARDIAC: Regular rate and rhythm.  No appreciated murmur.    ABDOMEN: Obese, soft, nontender.   MUSCULOSKELETAL:  Moving x 4 " spontaneously. Left forefoot erythematous with 1+ pitting edema extending to ankle. Warm to touch. No calf involvement. Toenails with onychomycosis and are in poor condition (many need to be trimmed).   NEURO: Alert and oriented x3. Nonfocal exam.    Medical Decision Making       65 MINUTES SPENT BY ME on the date of service doing chart review, history, exam, documentation & further activities per the note.      Data     I have personally reviewed the following data over the past 24 hrs:    6.9  \   14.1   / 216     136 99 15.7 /  195 (H)   4.3 21 (L) 0.90 \       Imaging results reviewed over the past 24 hrs:   Recent Results (from the past 24 hour(s))   XR Foot Left G/E 3 Views    Narrative    FOOT LEFT THREE OR MORE VIEWS   DATE/TIME: 9/28/2023 9:07 AM    INDICATION: Status post hallux amputation. Recent onset  redness/swelling in foot. Diabetic ulcer of toe of left foot  associated with type 2 diabetes mellitus, unspecified ulcer stage (H).  Status post amputation of lesser toe, left (H24).    COMPARISON: 8/19/2023      Impression    IMPRESSION: Interval revision amputation of the left first ray with  now complete amputation of the great toe. Subtle focal bony lucency at  the medial aspect of the left first metatarsal head is new and could  represent osteomyelitis in the appropriate clinical context,  particularly if a probe can touch bone. Chronic fracture deformity of  the proximal fifth metatarsal, unchanged. New tuft fracture second toe  distal phalanx. No dislocation. Heel spur with distal Achilles  insertional enthesophyte. Arterial calcification. Generalized soft  tissue edema.    NOTE: ABNORMAL REPORT    THE DICTATION ABOVE DESCRIBES AN ABNORMAL REPORT FOR WHICH FOLLOW-UP  IS NEEDED.         LOU PEREZ MD         SYSTEM ID:  [id number]

## 2023-09-28 NOTE — PLAN OF CARE
Goal Outcome Evaluation:    Diagnosis: left great to osteomyelitis now s/p amputation   Peripheral neuropathy secondary to diabetes   POD#:NA  Mental Status:A & O x 4  Activity/dangle Up independently  Diet:Mod carb diet  Pain:denies but on scheduled tylenol  Gill/Voiding:Voiding in BR  Tele/Restraints/Iso:NA  02/LDA:AR MACK  D/C Date:None  Other Info:Need MRI, check list faxed, on IV antibiotics.

## 2023-09-29 ENCOUNTER — HOME INFUSION (PRE-WILLOW HOME INFUSION) (OUTPATIENT)
Dept: PHARMACY | Facility: CLINIC | Age: 53
End: 2023-09-29

## 2023-09-29 LAB
ANION GAP SERPL CALCULATED.3IONS-SCNC: 12 MMOL/L (ref 7–15)
BUN SERPL-MCNC: 11.3 MG/DL (ref 6–20)
CALCIUM SERPL-MCNC: 9.1 MG/DL (ref 8.6–10)
CHLORIDE SERPL-SCNC: 101 MMOL/L (ref 98–107)
CREAT SERPL-MCNC: 0.78 MG/DL (ref 0.67–1.17)
DEPRECATED HCO3 PLAS-SCNC: 24 MMOL/L (ref 22–29)
EGFRCR SERPLBLD CKD-EPI 2021: >90 ML/MIN/1.73M2
ERYTHROCYTE [DISTWIDTH] IN BLOOD BY AUTOMATED COUNT: 14 % (ref 10–15)
GLUCOSE BLDC GLUCOMTR-MCNC: 160 MG/DL (ref 70–99)
GLUCOSE BLDC GLUCOMTR-MCNC: 167 MG/DL (ref 70–99)
GLUCOSE BLDC GLUCOMTR-MCNC: 187 MG/DL (ref 70–99)
GLUCOSE BLDC GLUCOMTR-MCNC: 187 MG/DL (ref 70–99)
GLUCOSE BLDC GLUCOMTR-MCNC: 193 MG/DL (ref 70–99)
GLUCOSE SERPL-MCNC: 180 MG/DL (ref 70–99)
HCT VFR BLD AUTO: 42 % (ref 40–53)
HGB BLD-MCNC: 13.5 G/DL (ref 13.3–17.7)
MCH RBC QN AUTO: 27.2 PG (ref 26.5–33)
MCHC RBC AUTO-ENTMCNC: 32.1 G/DL (ref 31.5–36.5)
MCV RBC AUTO: 85 FL (ref 78–100)
PLATELET # BLD AUTO: 217 10E3/UL (ref 150–450)
POTASSIUM SERPL-SCNC: 4.1 MMOL/L (ref 3.4–5.3)
RBC # BLD AUTO: 4.97 10E6/UL (ref 4.4–5.9)
SODIUM SERPL-SCNC: 137 MMOL/L (ref 135–145)
WBC # BLD AUTO: 5.2 10E3/UL (ref 4–11)

## 2023-09-29 PROCEDURE — 85027 COMPLETE CBC AUTOMATED: CPT

## 2023-09-29 PROCEDURE — 99253 IP/OBS CNSLTJ NEW/EST LOW 45: CPT | Mod: 57 | Performed by: PODIATRIST

## 2023-09-29 PROCEDURE — 250N000012 HC RX MED GY IP 250 OP 636 PS 637: Performed by: INTERNAL MEDICINE

## 2023-09-29 PROCEDURE — 36415 COLL VENOUS BLD VENIPUNCTURE: CPT

## 2023-09-29 PROCEDURE — 250N000011 HC RX IP 250 OP 636

## 2023-09-29 PROCEDURE — 120N000001 HC R&B MED SURG/OB

## 2023-09-29 PROCEDURE — 250N000013 HC RX MED GY IP 250 OP 250 PS 637

## 2023-09-29 PROCEDURE — 99232 SBSQ HOSP IP/OBS MODERATE 35: CPT | Performed by: INTERNAL MEDICINE

## 2023-09-29 PROCEDURE — 250N000013 HC RX MED GY IP 250 OP 250 PS 637: Performed by: HOSPITALIST

## 2023-09-29 PROCEDURE — 250N000012 HC RX MED GY IP 250 OP 636 PS 637

## 2023-09-29 PROCEDURE — 80048 BASIC METABOLIC PNL TOTAL CA: CPT

## 2023-09-29 RX ADMIN — ACETAMINOPHEN 975 MG: 325 TABLET, FILM COATED ORAL at 09:45

## 2023-09-29 RX ADMIN — INSULIN GLARGINE 60 UNITS: 100 INJECTION, SOLUTION SUBCUTANEOUS at 22:05

## 2023-09-29 RX ADMIN — ACETAMINOPHEN 975 MG: 325 TABLET, FILM COATED ORAL at 18:12

## 2023-09-29 RX ADMIN — AMPICILLIN SODIUM 2 G: 2 INJECTION, POWDER, FOR SOLUTION INTRAMUSCULAR; INTRAVENOUS at 00:49

## 2023-09-29 RX ADMIN — FAMOTIDINE 10 MG: 10 TABLET ORAL at 20:56

## 2023-09-29 RX ADMIN — FAMOTIDINE 10 MG: 10 TABLET ORAL at 09:45

## 2023-09-29 RX ADMIN — INSULIN ASPART 3 UNITS: 100 INJECTION, SOLUTION INTRAVENOUS; SUBCUTANEOUS at 10:29

## 2023-09-29 RX ADMIN — AMPICILLIN SODIUM 2 G: 2 INJECTION, POWDER, FOR SOLUTION INTRAMUSCULAR; INTRAVENOUS at 09:45

## 2023-09-29 RX ADMIN — ACETAMINOPHEN 975 MG: 325 TABLET, FILM COATED ORAL at 00:47

## 2023-09-29 RX ADMIN — AMPICILLIN SODIUM 2 G: 2 INJECTION, POWDER, FOR SOLUTION INTRAMUSCULAR; INTRAVENOUS at 05:24

## 2023-09-29 RX ADMIN — SIMVASTATIN 20 MG: 20 TABLET, FILM COATED ORAL at 21:03

## 2023-09-29 RX ADMIN — METFORMIN HYDROCHLORIDE 1000 MG: 500 TABLET, FILM COATED ORAL at 18:11

## 2023-09-29 RX ADMIN — AMPICILLIN SODIUM 2 G: 2 INJECTION, POWDER, FOR SOLUTION INTRAMUSCULAR; INTRAVENOUS at 20:55

## 2023-09-29 RX ADMIN — AMPICILLIN SODIUM 2 G: 2 INJECTION, POWDER, FOR SOLUTION INTRAMUSCULAR; INTRAVENOUS at 12:52

## 2023-09-29 RX ADMIN — AMPICILLIN SODIUM 2 G: 2 INJECTION, POWDER, FOR SOLUTION INTRAMUSCULAR; INTRAVENOUS at 18:12

## 2023-09-29 RX ADMIN — INSULIN GLARGINE 60 UNITS: 100 INJECTION, SOLUTION SUBCUTANEOUS at 10:23

## 2023-09-29 RX ADMIN — INSULIN ASPART 2 UNITS: 100 INJECTION, SOLUTION INTRAVENOUS; SUBCUTANEOUS at 18:18

## 2023-09-29 RX ADMIN — METFORMIN HYDROCHLORIDE 1000 MG: 500 TABLET, FILM COATED ORAL at 09:45

## 2023-09-29 ASSESSMENT — ACTIVITIES OF DAILY LIVING (ADL)
ADLS_ACUITY_SCORE: 33

## 2023-09-29 NOTE — CONSULTS
"SPIRITUAL HEALTH SERVICES Progress Note  [facility] Ortho    Saw pt Jairo CASTANEDA Andi per consult for emotional support.    Patient/Family Understanding of Illness and Goals of Care - Jairo shared that he had an injury to his toe in early April \"which we were managing medically\" and then very quickly had a toe amputation 5 weeks ago. He was \"surprised\" to end up in the hospital yesterday after going for a follow-up appointment.    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   Jairo reflected on feeling \"alone,\" even though he's surrounded by friends and family.   He named the attributes of being \"independent and stubborn\" as getting in his way of following through on a healthy diabetic diet and caring for himself. \"Tell me I have to do something and I want to do the opposite.\" Offered re-framing that these qualities can be resources, not just barriers to caring for his own health and healing.    Strengths, Coping, and Resources   Jairo named his extended family (brother, sister, 2 sons) and friends as sources of support.  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    Meaning, Beliefs, and Spirituality   Jairo focuses on \"caring for others\" as central to his Scientologist darcy and believes that \"God's love and respect is tied to doing my best with my life.\"   He would welcome a non-emergent visit from the  for sacrament of anointing and the communion, if he remains in the hospital.    Visit ended when the doctor arrived to meet with Jairo.    Plan of Care - Triaged for non-emergent  visit for sacramental support. Spiritual Health remains available. " Please contact as needs arise.    Annamaria Calvert MDiv  Associate   [facility] pager [phone]  [facility] phone [phone]    [facility] available 24/7 for emergent requests/referrals, either by having the on-call  paged or by entering an ASAP/STAT consult in Epic (this will also page the on-call ).

## 2023-09-29 NOTE — PLAN OF CARE
Goal Outcome Evaluation:       Date/Time9/29     Trauma/Ortho/Medical (Choose one) Orhto    Diagnosis:L Big toe OA  Mental Status:4  Activity/dangle pt up ind  Diet:CHO  Pain:denies  Gill/Voiding:BR  Tele/Restraints/Iso:n/a  02/LDA:SL, IV antibiotics  D/C Date:TBD  Other Info:n/a

## 2023-09-29 NOTE — PROGRESS NOTES
[facility]    Received request for benefit check should pt require home IV abx. This patient has coverage for IV ABX through their [facility] IFP plan. The patient has a deductible of $950.00 (met $950.00) and an out of pocket of $7400.00 (met $7400.00). Pt should now be covered at 100%.    Thank you    Katelyn Kwan RN  [facility] Liaison  [phone] (Mon thru Fri 8am - 5pm)  [phone] Office

## 2023-09-29 NOTE — CONSULTS
[place] [facility]  CONSULTATION NOTE    CHIEF COMPLAINT:      I was asked by Ellie Ogden PA-C  to evaluate this patient for left foot     PATIENT HISTORY:  Jairo Escamilla is a 53 year old male with a past medical history significant for what's listed below. He presented for a worsening left foot infection. He was seen by Dr. Mercedes and noted to have worsening redness and swelling. He was admitted in mid August and underwent two procedures. He states the incision healed, but the site that was left open never did. He feels well otherwise. He does have concerns regarding this job. Denies N/F/V/C/D         Review of Systems:  A 10 point review of systems was performed and is positive for that noted above in the patient history.  All other areas are negative.     PAST MEDICAL HISTORY:   Past Medical History:   Diagnosis Date    Diabetes mellitus (adult onset)     Diabetes mellitus, type 2 (H) 05/14/2008    Fracture of rib 2022    High cholesterol     Hypertension     Obese     Peripheral neuropathy     Pneumonia     Sleep apnea     Subdural hematoma (H)         PAST SURGICAL HISTORY:   Past Surgical History:   Procedure Laterality Date    AMPUTATE TOE(S) Left 8/19/2023    Procedure: Left hallux amputation;  Surgeon: Filiberto Mercedes DPM;  Location:  OR    COLONOSCOPY N/A 12/22/2022    Procedure: COLONOSCOPY, FLEXIBLE, WITH LESION REMOVAL USING SNARE;  Surgeon: Josh Reeves MD;  Location:  GI    IRRIGATION AND DEBRIDEMENT FOOT, COMBINED Left 8/21/2023    Procedure: IRRIGATION AND DEBRIDEMENT LEFT FOOT, REVISION OF AMPUTATION;  Surgeon: Michelle Marc DPM;  Location:  OR    NO HISTORY OF SURGERY          MEDICATIONS:  Reviewed in Epic. Current.     ALLERGIES:    Allergies   Allergen Reactions    Lisinopril      Cough          SOCIAL HISTORY:   Social History     Socioeconomic History    Marital status: Single     Spouse name: Not on file    Number of children: Not on  file    Years of education: Not on file    Highest education level: Not on file   Occupational History    Not on file   Tobacco Use    Smoking status: Never    Smokeless tobacco: Never   Vaping Use    Vaping Use: Never used   Substance and Sexual Activity    Alcohol use: Yes     Alcohol/week: 0.0 standard drinks of alcohol     Comment: Very little and infrequently    Drug use: Never    Sexual activity: Yes     Partners: Female     Birth control/protection: Female Surgical, None   Other Topics Concern     Service No    Blood Transfusions No    Caffeine Concern No    Occupational Exposure No    Hobby Hazards No    Sleep Concern No    Stress Concern Yes     Comment: work related    Weight Concern Yes    Special Diet Yes     Comment: medium carb (40-75g per meal)    Back Care No    Exercise Yes    Bike Helmet No    Seat Belt Yes    Self-Exams No    Parent/sibling w/ CABG, MI or angioplasty before 65F 55M? No   Social History Narrative    3/28/2013: Single, .  Lives with his 20 year old son.  Also has an 18 year old son and 22 year old daughter.  Works as a  for [employer].  Active in sports (playing, officiating, watching): softball, football, hockey.     Social Determinants of Health     Financial Resource Strain: Not on file   Food Insecurity: Not on file   Transportation Needs: Not on file   Physical Activity: Not on file   Stress: Not on file   Social Connections: Not on file   Interpersonal Safety: Not on file   Housing Stability: Not on file        FAMILY HISTORY:   Family History   Problem Relation Age of Onset    Diabetes Mother     Hypertension Mother     Cerebrovascular Disease Mother     Obesity Mother     C.A.D. Father         in mid 60's    Cerebrovascular Disease Father     Diabetes Maternal Aunt          of Diabetic complications    Glaucoma Other     Cancer - colorectal No family hx of     Prostate Cancer No family hx of     Macular Degeneration No  family hx of         EXAM:Vitals: /65 (BP Location: Right arm, Patient Position: Semi-Higgins's, Cuff Size: Adult Regular)   Pulse 80   Temp 98.6  F (37  C) (Oral)   Resp 18   SpO2 95%   BMI= There is no height or weight on file to calculate BMI.    LABS:     Last Comprehensive Metabolic Panel:  Sodium   Date Value Ref Range Status   09/28/2023 136 135 - 145 mmol/L Final     Comment:     Reference intervals for this test were updated on 09/26/2023 to more accurately reflect our healthy population. There may be differences in the flagging of prior results with similar values performed with this method. Interpretation of those prior results can be made in the context of the updated reference intervals.    05/18/2020 137 133 - 144 mmol/L Final     Potassium   Date Value Ref Range Status   09/28/2023 4.3 3.4 - 5.3 mmol/L Final   01/12/2022 5.0 3.4 - 5.3 mmol/L Final   05/18/2020 4.0 3.4 - 5.3 mmol/L Final     Chloride   Date Value Ref Range Status   09/28/2023 99 98 - 107 mmol/L Final   01/12/2022 101 94 - 109 mmol/L Final   05/18/2020 102 94 - 109 mmol/L Final     Carbon Dioxide   Date Value Ref Range Status   05/18/2020 25 20 - 32 mmol/L Final     Carbon Dioxide (CO2)   Date Value Ref Range Status   09/28/2023 21 (L) 22 - 29 mmol/L Final   01/12/2022 30 20 - 32 mmol/L Final     Anion Gap   Date Value Ref Range Status   09/28/2023 16 (H) 7 - 15 mmol/L Final   01/12/2022 3 3 - 14 mmol/L Final   05/18/2020 10 3 - 14 mmol/L Final     Glucose   Date Value Ref Range Status   01/12/2022 217 (H) 70 - 99 mg/dL Final   05/18/2020 160 (H) 70 - 99 mg/dL Final     GLUCOSE BY METER POCT   Date Value Ref Range Status   09/29/2023 187 (H) 70 - 99 mg/dL Final     Urea Nitrogen   Date Value Ref Range Status   09/28/2023 15.7 6.0 - 20.0 mg/dL Final   01/12/2022 16 7 - 30 mg/dL Final   05/18/2020 16 7 - 30 mg/dL Final     Creatinine   Date Value Ref Range Status   09/28/2023 0.90 0.67 - 1.17 mg/dL Final   05/18/2020 0.69 0.66 -  1.25 mg/dL Final     GFR Estimate   Date Value Ref Range Status   09/28/2023 >90 >60 mL/min/1.73m2 Final   05/18/2020 >90 >60 mL/min/[1.73_m2] Final     Comment:     Non  GFR Calc  Starting 12/18/2018, serum creatinine based estimated GFR (eGFR) will be   calculated using the Chronic Kidney Disease Epidemiology Collaboration   (CKD-EPI) equation.       Calcium   Date Value Ref Range Status   09/28/2023 8.9 8.6 - 10.0 mg/dL Final   05/18/2020 8.6 8.5 - 10.1 mg/dL Final     Lab Results   Component Value Date    WBC 5.2 09/29/2023    WBC 3.7 12/27/2018     Lab Results   Component Value Date    RBC 4.97 09/29/2023    RBC 4.84 12/27/2018     Lab Results   Component Value Date    HGB 13.5 09/29/2023    HGB 14.1 12/27/2018     Lab Results   Component Value Date    HCT 42.0 09/29/2023    HCT 40.4 12/27/2018     Lab Results   Component Value Date     09/29/2023     12/27/2018      Lab Results   Component Value Date    INR 1.05 02/27/2016        General appearance: Patient is alert and fully cooperative with history & exam.  No sign of distress is noted during the visit.      Respiratory: Breathing is regular & unlabored while sitting.      HEENT: Hearing is intact to spoken word.  Speech is clear.  No gross evidence of visual impairment that would impact ambulation.      Dermatologic: Left foot distal hallux amputation, erythema and drainage noted. First interspace ulcer with drainage to site. Nontender. Edematous. Previous incision site healed.      Vascular: Dorsalis pedis and posterior tibial pulses are intact & regular bilaterally.  CFT and skin temperature is normal to both lower extremities.       Neurologic: Lower extremity sensation is diminished, bilateral foot, to light touch.  No evidence of neurological-based weakness or contracture in the lower extremities.       Musculoskeletal: Patient is ambulatory without an assistive device or brace.  S/p hallux amputation, left foot      Psychiatric: Affect is pleasant & appropriate.      All cultures:  Recent Labs   Lab 09/28/23  1533   CULTURE No growth after 12 hours  No growth after 12 hours        IMAGING:     MRI  IMPRESSION:   Interval amputation of the left great toe, with ulcerated wound along the dorsal aspect of the amputation site and associated osteomyelitis of the left first metatarsal head and distal shaft. Additional diffuse soft tissue necrosis throughout the plantar   aspect of the forefoot and distal midfoot.    Vascular Studies 8/21   The right GER at rest is 1.23. The left GER at rest is 1.7.     WAVEFORMS: The dorsalis pedis and posterior tibial arteries are triphasic bilaterally. Right great toe waveform is normal.                                                                    IMPRESSION:  1.  RIGHT LOWER EXTREMITY: GER at rest is normal. Toe brachial index is normal.  2.  LEFT LOWER EXTREMITY: The left ankle-brachial index is falsely elevated (1.7), likely due to noncompliant, calcified vessels.    I personally reviewed the images and agree with the reports as stated.  Noted osteomyelitis to first metatarsal. Triphasic waveforms on ultrasound.     ASSESSMENT:  S/p recent hallux amputation, with osteomyelitis to first metatarsal   Diabetes Mellitus --> hba1c: 8.4 on 8/1     MEDICAL DECISION MAKING:   -Discussed all findings with patient. Chart and imaging reviewed.     -Discussed clinical findings and MRI which show osteomyelitis to first metatarsal. Patient disappointed to hear this, noting he thought he was doing what he needed for healing. Discussed the unfortunate risk of any surgery is recurrent infection, especially in setting of previous significant infection.   -Discussed need for first ray resection. Patient agrees to this. Will schedule for tomorrow.   -NPO order placed.   -Further plans pending intra-op findings.      Thank you for the consultation request and the opportunity to participate in the care of  Jairo Marc DPM   [place] [facility]  [phone]

## 2023-09-29 NOTE — PROGRESS NOTES
Therapy: IV ABX (Ampicillin)  Insurance: [facility]   Ded: $ 950.00  Met: $950.00    Co-Insurance: 80/20  Max Out of Pocket: $7400.00  Met: $7400.00    The patient should be covered at 100% for IV ABX therapy.      Please contact Intake with any questions, [phone] or In Basket pool, [facility] ([phone]).

## 2023-09-29 NOTE — PLAN OF CARE
Goal Outcome Evaluation:         Pt A&Ox4. VSS on RA. Mod carb diet. Up IND. Voiding in BR. L great toe, red, swollen, left JIN. Denies pain. Baseline neuropathy on BLE. BG checks. L PIV SL w/ int abx. Needs MRI done. Podiatry consult pending. Continue plan of care.

## 2023-09-29 NOTE — PLAN OF CARE
Goal Outcome Evaluation:      Diagnosis: Osteomyelitis of the left big toe  Mental Status: A&O x 4  Activity/dangle: IND  Diet: Regular  Pain:Denies  Gill/Voiding:BR  Tele/Restraints/Iso:NA  02/LDA:AR  D/C Date: To be determine  Other Info: MRI last evening, IV Sl, management continue.

## 2023-09-30 ENCOUNTER — ANESTHESIA (OUTPATIENT)
Dept: SURGERY | Facility: CLINIC | Age: 53
DRG: 617 | End: 2023-09-30
Payer: COMMERCIAL

## 2023-09-30 ENCOUNTER — APPOINTMENT (OUTPATIENT)
Dept: GENERAL RADIOLOGY | Facility: CLINIC | Age: 53
DRG: 617 | End: 2023-09-30
Attending: PODIATRIST
Payer: COMMERCIAL

## 2023-09-30 ENCOUNTER — ANESTHESIA EVENT (OUTPATIENT)
Dept: SURGERY | Facility: CLINIC | Age: 53
DRG: 617 | End: 2023-09-30
Payer: COMMERCIAL

## 2023-09-30 LAB
GLUCOSE BLDC GLUCOMTR-MCNC: 116 MG/DL (ref 70–99)
GLUCOSE BLDC GLUCOMTR-MCNC: 144 MG/DL (ref 70–99)
GLUCOSE BLDC GLUCOMTR-MCNC: 151 MG/DL (ref 70–99)
GLUCOSE BLDC GLUCOMTR-MCNC: 157 MG/DL (ref 70–99)
GLUCOSE BLDC GLUCOMTR-MCNC: 164 MG/DL (ref 70–99)
GLUCOSE BLDC GLUCOMTR-MCNC: 174 MG/DL (ref 70–99)

## 2023-09-30 PROCEDURE — 87185 SC STD ENZYME DETCJ PER NZM: CPT | Performed by: PODIATRIST

## 2023-09-30 PROCEDURE — 87070 CULTURE OTHR SPECIMN AEROBIC: CPT | Performed by: PODIATRIST

## 2023-09-30 PROCEDURE — 250N000011 HC RX IP 250 OP 636: Performed by: REGISTERED NURSE

## 2023-09-30 PROCEDURE — 120N000001 HC R&B MED SURG/OB

## 2023-09-30 PROCEDURE — 360N000082 HC SURGERY LEVEL 2 W/ FLUORO, PER MIN: Performed by: PODIATRIST

## 2023-09-30 PROCEDURE — 710N000009 HC RECOVERY PHASE 1, LEVEL 1, PER MIN: Performed by: PODIATRIST

## 2023-09-30 PROCEDURE — 250N000009 HC RX 250: Performed by: PODIATRIST

## 2023-09-30 PROCEDURE — 88311 DECALCIFY TISSUE: CPT | Mod: TC | Performed by: PODIATRIST

## 2023-09-30 PROCEDURE — 99207 PR NO CHARGE LOS: CPT | Performed by: INTERNAL MEDICINE

## 2023-09-30 PROCEDURE — 250N000013 HC RX MED GY IP 250 OP 250 PS 637: Performed by: PODIATRIST

## 2023-09-30 PROCEDURE — 250N000009 HC RX 250: Performed by: REGISTERED NURSE

## 2023-09-30 PROCEDURE — 272N000001 HC OR GENERAL SUPPLY STERILE: Performed by: PODIATRIST

## 2023-09-30 PROCEDURE — 28122 PARTIAL REMOVAL OF FOOT BONE: CPT | Mod: LT | Performed by: PODIATRIST

## 2023-09-30 PROCEDURE — 999N000141 HC STATISTIC PRE-PROCEDURE NURSING ASSESSMENT: Performed by: PODIATRIST

## 2023-09-30 PROCEDURE — 0Y6N0Z9 DETACHMENT AT LEFT FOOT, PARTIAL 1ST RAY, OPEN APPROACH: ICD-10-PCS | Performed by: PODIATRIST

## 2023-09-30 PROCEDURE — 999N000063 XR FOOT PORT LEFT 3 VIEWS: Mod: LT

## 2023-09-30 PROCEDURE — 88311 DECALCIFY TISSUE: CPT | Mod: 26 | Performed by: PATHOLOGY

## 2023-09-30 PROCEDURE — 258N000003 HC RX IP 258 OP 636: Performed by: ANESTHESIOLOGY

## 2023-09-30 PROCEDURE — 250N000011 HC RX IP 250 OP 636: Performed by: PODIATRIST

## 2023-09-30 PROCEDURE — 250N000011 HC RX IP 250 OP 636

## 2023-09-30 PROCEDURE — 370N000017 HC ANESTHESIA TECHNICAL FEE, PER MIN: Performed by: PODIATRIST

## 2023-09-30 PROCEDURE — 250N000013 HC RX MED GY IP 250 OP 250 PS 637

## 2023-09-30 PROCEDURE — 250N000013 HC RX MED GY IP 250 OP 250 PS 637: Performed by: HOSPITALIST

## 2023-09-30 PROCEDURE — 88305 TISSUE EXAM BY PATHOLOGIST: CPT | Mod: 26 | Performed by: PATHOLOGY

## 2023-09-30 RX ORDER — PROCHLORPERAZINE MALEATE 10 MG
10 TABLET ORAL EVERY 6 HOURS PRN
Status: DISCONTINUED | OUTPATIENT
Start: 2023-09-30 | End: 2023-10-02 | Stop reason: HOSPADM

## 2023-09-30 RX ORDER — MAGNESIUM HYDROXIDE 1200 MG/15ML
LIQUID ORAL PRN
Status: DISCONTINUED | OUTPATIENT
Start: 2023-09-30 | End: 2023-09-30 | Stop reason: HOSPADM

## 2023-09-30 RX ORDER — BUPIVACAINE HYDROCHLORIDE 5 MG/ML
INJECTION, SOLUTION EPIDURAL; INTRACAUDAL PRN
Status: DISCONTINUED | OUTPATIENT
Start: 2023-09-30 | End: 2023-09-30 | Stop reason: HOSPADM

## 2023-09-30 RX ORDER — ONDANSETRON 2 MG/ML
INJECTION INTRAMUSCULAR; INTRAVENOUS PRN
Status: DISCONTINUED | OUTPATIENT
Start: 2023-09-30 | End: 2023-09-30

## 2023-09-30 RX ORDER — ONDANSETRON 4 MG/1
4 TABLET, ORALLY DISINTEGRATING ORAL EVERY 6 HOURS PRN
Status: DISCONTINUED | OUTPATIENT
Start: 2023-09-30 | End: 2023-10-02 | Stop reason: HOSPADM

## 2023-09-30 RX ORDER — ACETAMINOPHEN 325 MG/1
650 TABLET ORAL EVERY 4 HOURS PRN
Status: DISCONTINUED | OUTPATIENT
Start: 2023-10-03 | End: 2023-10-02 | Stop reason: HOSPADM

## 2023-09-30 RX ORDER — POLYETHYLENE GLYCOL 3350 17 G/17G
17 POWDER, FOR SOLUTION ORAL DAILY
Status: DISCONTINUED | OUTPATIENT
Start: 2023-10-01 | End: 2023-10-02 | Stop reason: HOSPADM

## 2023-09-30 RX ORDER — DEXMEDETOMIDINE HYDROCHLORIDE 4 UG/ML
INJECTION, SOLUTION INTRAVENOUS PRN
Status: DISCONTINUED | OUTPATIENT
Start: 2023-09-30 | End: 2023-09-30

## 2023-09-30 RX ORDER — SODIUM CHLORIDE, SODIUM LACTATE, POTASSIUM CHLORIDE, CALCIUM CHLORIDE 600; 310; 30; 20 MG/100ML; MG/100ML; MG/100ML; MG/100ML
INJECTION, SOLUTION INTRAVENOUS CONTINUOUS
Status: DISCONTINUED | OUTPATIENT
Start: 2023-09-30 | End: 2023-09-30 | Stop reason: HOSPADM

## 2023-09-30 RX ORDER — OXYCODONE HYDROCHLORIDE 5 MG/1
10 TABLET ORAL EVERY 4 HOURS PRN
Status: DISCONTINUED | OUTPATIENT
Start: 2023-09-30 | End: 2023-10-02 | Stop reason: HOSPADM

## 2023-09-30 RX ORDER — LIDOCAINE 40 MG/G
CREAM TOPICAL
Status: DISCONTINUED | OUTPATIENT
Start: 2023-09-30 | End: 2023-10-02 | Stop reason: HOSPADM

## 2023-09-30 RX ORDER — BISACODYL 10 MG
10 SUPPOSITORY, RECTAL RECTAL DAILY PRN
Status: DISCONTINUED | OUTPATIENT
Start: 2023-09-30 | End: 2023-10-02 | Stop reason: HOSPADM

## 2023-09-30 RX ORDER — AMOXICILLIN 250 MG
1 CAPSULE ORAL 2 TIMES DAILY
Status: DISCONTINUED | OUTPATIENT
Start: 2023-09-30 | End: 2023-10-02 | Stop reason: HOSPADM

## 2023-09-30 RX ORDER — OXYCODONE HYDROCHLORIDE 5 MG/1
5 TABLET ORAL EVERY 4 HOURS PRN
Status: DISCONTINUED | OUTPATIENT
Start: 2023-09-30 | End: 2023-10-02 | Stop reason: HOSPADM

## 2023-09-30 RX ORDER — ONDANSETRON 2 MG/ML
4 INJECTION INTRAMUSCULAR; INTRAVENOUS EVERY 6 HOURS PRN
Status: DISCONTINUED | OUTPATIENT
Start: 2023-09-30 | End: 2023-10-02 | Stop reason: HOSPADM

## 2023-09-30 RX ORDER — ACETAMINOPHEN 325 MG/1
975 TABLET ORAL EVERY 8 HOURS
Status: DISCONTINUED | OUTPATIENT
Start: 2023-09-30 | End: 2023-10-02 | Stop reason: HOSPADM

## 2023-09-30 RX ADMIN — INSULIN GLARGINE 60 UNITS: 100 INJECTION, SOLUTION SUBCUTANEOUS at 21:29

## 2023-09-30 RX ADMIN — METFORMIN HYDROCHLORIDE 1000 MG: 500 TABLET, FILM COATED ORAL at 17:26

## 2023-09-30 RX ADMIN — AMPICILLIN SODIUM 2 G: 2 INJECTION, POWDER, FOR SOLUTION INTRAMUSCULAR; INTRAVENOUS at 01:40

## 2023-09-30 RX ADMIN — AMPICILLIN SODIUM 2 G: 2 INJECTION, POWDER, FOR SOLUTION INTRAMUSCULAR; INTRAVENOUS at 05:44

## 2023-09-30 RX ADMIN — MIDAZOLAM 2 MG: 1 INJECTION INTRAMUSCULAR; INTRAVENOUS at 11:49

## 2023-09-30 RX ADMIN — INSULIN ASPART 2 UNITS: 100 INJECTION, SOLUTION INTRAVENOUS; SUBCUTANEOUS at 18:57

## 2023-09-30 RX ADMIN — FAMOTIDINE 10 MG: 10 TABLET ORAL at 09:43

## 2023-09-30 RX ADMIN — ONDANSETRON 4 MG: 2 INJECTION INTRAMUSCULAR; INTRAVENOUS at 12:32

## 2023-09-30 RX ADMIN — ACETAMINOPHEN 975 MG: 325 TABLET, FILM COATED ORAL at 01:44

## 2023-09-30 RX ADMIN — ACETAMINOPHEN 975 MG: 325 TABLET, FILM COATED ORAL at 09:43

## 2023-09-30 RX ADMIN — FAMOTIDINE 10 MG: 10 TABLET ORAL at 20:54

## 2023-09-30 RX ADMIN — SODIUM CHLORIDE, POTASSIUM CHLORIDE, SODIUM LACTATE AND CALCIUM CHLORIDE: 600; 310; 30; 20 INJECTION, SOLUTION INTRAVENOUS at 11:27

## 2023-09-30 RX ADMIN — ACETAMINOPHEN 975 MG: 325 TABLET, FILM COATED ORAL at 17:25

## 2023-09-30 RX ADMIN — SENNOSIDES AND DOCUSATE SODIUM 1 TABLET: 50; 8.6 TABLET ORAL at 20:54

## 2023-09-30 RX ADMIN — DEXMEDETOMIDINE HYDROCHLORIDE 8 MCG: 200 INJECTION INTRAVENOUS at 12:12

## 2023-09-30 RX ADMIN — AMPICILLIN SODIUM 2 G: 2 INJECTION, POWDER, FOR SOLUTION INTRAMUSCULAR; INTRAVENOUS at 12:56

## 2023-09-30 RX ADMIN — SIMVASTATIN 20 MG: 20 TABLET, FILM COATED ORAL at 21:29

## 2023-09-30 RX ADMIN — AMPICILLIN SODIUM 2 G: 2 INJECTION, POWDER, FOR SOLUTION INTRAMUSCULAR; INTRAVENOUS at 20:55

## 2023-09-30 RX ADMIN — AMPICILLIN SODIUM 2 G: 2 INJECTION, POWDER, FOR SOLUTION INTRAMUSCULAR; INTRAVENOUS at 09:43

## 2023-09-30 RX ADMIN — AMPICILLIN SODIUM 2 G: 2 INJECTION, POWDER, FOR SOLUTION INTRAMUSCULAR; INTRAVENOUS at 17:27

## 2023-09-30 ASSESSMENT — ACTIVITIES OF DAILY LIVING (ADL)
ADLS_ACUITY_SCORE: 33

## 2023-09-30 NOTE — ANESTHESIA PREPROCEDURE EVALUATION
Anesthesia Pre-Procedure Evaluation    Patient: Jairo Escamilla   MRN: [id number] : 1970        Procedure : Procedure(s):  Left foot first ray resection          Past Medical History:   Diagnosis Date    Diabetes mellitus (adult onset)     Diabetes mellitus, type 2 (H) 2008    Fracture of rib     High cholesterol     Hypertension     Obese     Peripheral neuropathy     Pneumonia     Sleep apnea     Subdural hematoma (H)       Past Surgical History:   Procedure Laterality Date    AMPUTATE TOE(S) Left 2023    Procedure: Left hallux amputation;  Surgeon: Filiberto Mercedes DPM;  Location: [facility] OR    COLONOSCOPY N/A 2022    Procedure: COLONOSCOPY, FLEXIBLE, WITH LESION REMOVAL USING SNARE;  Surgeon: Josh Reeves MD;  Location:  GI    IRRIGATION AND DEBRIDEMENT FOOT, COMBINED Left 2023    Procedure: IRRIGATION AND DEBRIDEMENT LEFT FOOT, REVISION OF AMPUTATION;  Surgeon: Michelle Marc DPM;  Location: [facility] OR    NO HISTORY OF SURGERY        Allergies   Allergen Reactions    Lisinopril      Cough        Social History     Tobacco Use    Smoking status: Never    Smokeless tobacco: Never   Substance Use Topics    Alcohol use: Yes     Alcohol/week: 0.0 standard drinks of alcohol     Comment: Very little and infrequently      Wt Readings from Last 1 Encounters:   23 127 kg (280 lb)      EKG 2018  23-DEC-2018 21:55:56 [facility] ROUTINE RECORD Sinus tachycardia Left anterior fascicular block Cannot rule out Inferior infarct (masked by fascicular block?) , age undetermined Cannot rule out Anterior infarct , age undetermined Abnormal ECG When compared with ECG of 09-MAY-2008 07:30, Vent. rate has increase      Echo 2018  Interpretation Summary     There is mild to moderate concentric left ventricular hypertrophy.  The visual ejection fraction is estimated at 60-65%.  Right ventricular function cannot be assessed due to poor image quality.  Right atrium not well  visualized.  The tricuspid valve is not well visualized.  The aortic valve is not well visualized.  The pulmonic valve is not well visualized.  The study was technically difficult. Contrast was used without apparent  complications.    GATED MYOCARDIAL PERFUSION SCINTIGRAPHY WITH INTRAVENOUS   PHARMACOLOGIC VASODILATATION 2 DAY PROTOCOL (STRESS/REST)   Done on 2008 and 2008.  Jairo Wright is a 38-year-old   male.  :  1970   INDICATION:  Assessment of myocardial perfusion with a history of   chest pain and an abnormal electrocardiogram.  Indication for   pharmacologic stress testing with evaluation of chest pain.   IMPRESSIONS:    I.  Adenosine Test    1.  Adenosine infusion dictated under separate cover.    2.  ECG under separate cover.   II. Myocardial Perfusion Scintigraphy   1.  Myocardial perfusion using single isotope technique was probably   normal.   2.  Gating demonstrated normal left ventricular systolic contraction   and wall thickening    both at rest and post stress.    3.  The ejection fraction was 62% at rest and 61% post stress.    4.  The left ventricular volumes were normal.   5.  Adenosine did not induce any transient ischemic dilatation of the   left ventricle.   6.  No previous study was available for comparison.   Anesthesia Evaluation   Pt has had prior anesthetic. Type: MAC.    No history of anesthetic complications       ROS/MED HX  ENT/Pulmonary:     (+) sleep apnea,                            recent URI,          Neurologic: Comment: Hx of subdural hematoma    (+)    peripheral neuropathy, - peripheral neuropathy.                        (-) no CVA   Cardiovascular:     (+) Dyslipidemia hypertension- -   -  - -                                   (-) CAD   METS/Exercise Tolerance:     Hematologic:       Musculoskeletal: Comment: Suspected osteomyelitis of left lateral first metatarsal head    Hx of left great to osteomyelitis now s/p complete amputation, 2023         GI/Hepatic:    (-) GERD   Renal/Genitourinary:       Endo: Comment: Hypogonadism     (+)  type II DM,   Using insulin,    Diabetic complications: neuropathy.      Obesity,       Psychiatric/Substance Use:       Infectious Disease: Comment: Osteomyelitis    (+) Recent Fever,           Malignancy:       Other:            Physical Exam    Airway        Mallampati: II    Neck ROM: full     Respiratory Devices and Support         Dental       (+) Modest Abnormalities - crowns, retainers, 1 or 2 missing teeth      Cardiovascular   cardiovascular exam normal          Pulmonary   pulmonary exam normal                OUTSIDE LABS:  CBC:   Lab Results   Component Value Date    WBC 5.2 09/29/2023    WBC 6.9 09/28/2023    HGB 13.5 09/29/2023    HGB 14.1 09/28/2023    HCT 42.0 09/29/2023    HCT 43.0 09/28/2023     09/29/2023     09/28/2023     BMP:   Lab Results   Component Value Date     09/29/2023     09/28/2023    POTASSIUM 4.1 09/29/2023    POTASSIUM 4.3 09/28/2023    CHLORIDE 101 09/29/2023    CHLORIDE 99 09/28/2023    CO2 24 09/29/2023    CO2 21 (L) 09/28/2023    BUN 11.3 09/29/2023    BUN 15.7 09/28/2023    CR 0.78 09/29/2023    CR 0.90 09/28/2023     (H) 09/30/2023     (H) 09/29/2023     COAGS:   Lab Results   Component Value Date    PTT 29 02/27/2016    INR 1.05 02/27/2016     POC:   Lab Results   Component Value Date     (H) 12/27/2018     HEPATIC:   Lab Results   Component Value Date    ALBUMIN 4.2 08/18/2023    PROTTOTAL 8.1 08/18/2023    ALT 24 08/18/2023    AST 18 08/18/2023    ALKPHOS 51 08/18/2023    BILITOTAL 1.1 08/18/2023     OTHER:   Lab Results   Component Value Date    LACT 1.2 08/18/2023    A1C 8.4 (H) 08/01/2023    LUKASZ 9.1 09/29/2023    PHOS 3.3 12/24/2018    MAG 1.8 12/24/2018    LIPASE 132 12/23/2018    TSH 2.30 04/19/2023    SED 7 12/02/2015       Anesthesia Plan    ASA Status:  3    NPO Status:  NPO Appropriate    Anesthesia Type: MAC.     - Reason for  MAC: immobility needed, straight local not clinically adequate              Consents    Anesthesia Plan(s) and associated risks, benefits, and realistic alternatives discussed. Questions answered and patient/representative(s) expressed understanding.     - Discussed:     - Discussed with:  Patient            Postoperative Care    Pain management: IV analgesics.   PONV prophylaxis: Ondansetron (or other 5HT-3)     Comments:                Giovanni Iglesias MD

## 2023-09-30 NOTE — PROVIDER NOTIFICATION
Paged  to ask about pt's SV=[id number], he would need 1 unit of Novolog.  Pt is NPO and will be going down for surgery.  Also asked if pt is to get his lantus.  Awaiting call back, new orders.   called back, see new Dr's orders.

## 2023-09-30 NOTE — PROGRESS NOTES
[facility]  Medicine Progress Note - Hospitalist Service  Date of Admission:  9/28/2023    Assessment & Plan   Jairo Escamilla is a 53 year old male with type 1 diabetes, s/p partial left great toe amputation 08/19/2023 with completion of the amputation by Dr. Marc 08/21/2023 who was seen in podiatry clinic and directly admitted to [facility] on 9/28/2023 for concern of left lateral first metatarsal osteomyelitis.      Suspected osteomyelitis of left lateral first metatarsal head    Hx of left great to osteomyelitis now s/p complete amputation, 08/21/2023  *Patient seen in clinic 09/14 with residual open area with excisional debridement. Aerobic cultures at time of 08/19 partial amputation positive for staph aureus, strep agalactiae and enterococcus faecalis. Susceptible to vancomycin, ampicillin and penicillin. Fevers of 100.7 prior to admission   * XR Foot Left G/E 3 Views 09/28 : Interval revision amputation of the left first ray with now complete amputation of the great toe. Subtle focal bony lucency at the medial aspect of the left first metatarsal head is new and could represent osteomyelitis in the appropriate clinical context, particularly if a probe can touch bone. Chronic fracture deformity of the proximal fifth metatarsal, unchanged. New tuft fracture second toe distal phalanx. No dislocation. Heel spur with distal Achilles insertional enthesophyte. Arterial calcification. Generalized soft tissue edema. MRI 9/28 showed ulcerated wound along the dorsal aspect of the amputation site and associated osteomyelitis of the left first metatarsal head and distal shaft with additional diffuse soft tissue necrosis throughout the plantar aspect of the forefoot and distal midfoot.  - Podiatry consult  -To OR today 9/30/2023 for left foot first ray resection  - Blood cultures obtained prior to start of IV abx   - Continue IV ampicillin 2g q 4 hours given results of aerobic cultures at  time of 08/19 and susceptibility testing   - follow up cultures  - Pain management with scheduled acetaminophen and PRN oxycodone      Insulin-dependent Type II DM    Peripheral neuropathy secondary to diabetes   Prior to admission regimen includes Basaglar 60 units twice daily and NovoLog 25 units 3 times daily. Also takes Ozempic and Metformin 1,000 mg BID. Patient admits he's been noncompliant with monitoring his blood glucose levels and managing his diabetes. A1c 8.4 08/01/2023. Patient voices understanding of the importance of his compliance moving forward for improved management of his wounds.   -There is no inpatient diabetes educator so recommend outpatient referral and reeducation as needed.  Also education surrounding foot care would be appreciated, see below.   - Continue insulin aspart 25 units subcutaneous TID before meals (may need to increase if sugars remain uncontrolled)  - Increased to high intensity corrective dose insulin  - Continue insulin glargine 60 units subcutaneous BID (half dose this morning prior to surgery)  - Continue metformin 1,000 mg BID   - Discontinue ozempic given worsening nausea and GERD symptoms, can consider resuming upon discharge     Onychomycosis   - outpatient management per podiatry      Benign essential Hy  BPs well managed on current regimen  - Continue PTA losartan      Probable GERD/gastritis  - Continue famotidine (started this stay)     Dyslipidemia   - Continue PTA simvastatin      Hypogonadism   - Patient reports 2 days late for testosterone injection.   - Follow-up on this upon discharge.      Obesity   - Diabetes educator to discuss healthy lifestyle changes. Optimization of hypogonadism and diabetes will support reduction in weight/body fat.         Diet: NPO per Anesthesia Guidelines for Procedure/Surgery Except for: Meds    DVT Prophylaxis: Pneumatic Compression Devices and Ambulate every shift  Gill Catheter: Not present  Lines: None     Cardiac  "Monitoring: None  Code Status: Full Code      Clinically Significant Risk Factors                  # Hypertension: Noted on problem list       # DMII: A1C = 8.4 % (Ref range: 0.0 - 5.6 %) within past 6 months, PRESENT ON ADMISSION  # Severe Obesity: Estimated body mass index is 40.18 kg/m  as calculated from the following:    Height as of 9/14/23: 1.778 m (5' 10\").    Weight as of an earlier encounter on 9/28/23: 127 kg (280 lb)., PRESENT ON ADMISSION            Disposition Plan     Expected Discharge Date: 09/30/2023                  Maria De Jesus Ludwig MD  Hospitalist Service  [facility]  Securely message with [employer] (more info)  Text page via [employer] Paging/Directory   ______________________________________________________________________    Interval History   To surgery today. Unable to see due to timing of hospitalist rounds and OR time.    Physical Exam   Vital Signs: Temp: 97.1  F (36.2  C) Temp src: Oral BP: (!) 140/79 Pulse: 76   Resp: 16 SpO2: 99 % O2 Device: None (Room air)    Weight: 0 lbs 0 oz    Did not examine today    Medical Decision Making       MANAGEMENT DISCUSSED with the following over the past 24 hours: bedside nurse   NOTE(S)/MEDICAL RECORDS REVIEWED over the past 24 hours: surgery notes, op note, medication admin record  Tests ORDERED & REVIEWED in the past 24 hours:  - See lab/imaging results included in the data section of the note      Data         "

## 2023-09-30 NOTE — ANESTHESIA CARE TRANSFER NOTE
Patient: Jairo Escamilla    Procedure: Procedure(s):  Left foot first ray resection       Diagnosis: Other acute osteomyelitis of left foot (H) [M86.172]  Diagnosis Additional Information: No value filed.    Anesthesia Type:   MAC     Note:    Oropharynx: oropharynx clear of all foreign objects  Level of Consciousness: awake  Oxygen Supplementation: face mask  Level of Supplemental Oxygen (L/min / FiO2): 6  Independent Airway: airway patency satisfactory and stable  Dentition: dentition unchanged  Vital Signs Stable: post-procedure vital signs reviewed and stable  Report to RN Given: handoff report given  Patient transferred to: PACU  Comments: Patient comfortable  Handoff Report: Identifed the Patient, Identified the Reponsible Provider, Reviewed the pertinent medical history, Discussed the surgical course, Reviewed Intra-OP anesthesia mangement and issues during anesthesia, Set expectations for post-procedure period and Allowed opportunity for questions and acknowledgement of understanding      Vitals:  Vitals Value Taken Time   /84 09/30/23 1237   Temp     Pulse 77 09/30/23 1240   Resp 15 09/30/23 1240   SpO2 99 % 09/30/23 1240   Vitals shown include unvalidated device data.    Electronically Signed By: NILTON Sol CRNA  September 30, 2023  12:41 PM

## 2023-09-30 NOTE — ANESTHESIA POSTPROCEDURE EVALUATION
Patient: Jairo Escamilla    Procedure: Procedure(s):  Left foot first ray resection       Anesthesia Type:  MAC    Note:  Disposition: Admission   Postop Pain Control: Uneventful            Sign Out: Well controlled pain   PONV: No   Neuro/Psych: Uneventful            Sign Out: Acceptable/Baseline neuro status   Airway/Respiratory: Uneventful            Sign Out: Acceptable/Baseline resp. status   CV/Hemodynamics: Uneventful            Sign Out: Acceptable CV status; No obvious hypovolemia; No obvious fluid overload   Other NRE: NONE   DID A NON-ROUTINE EVENT OCCUR?            Last vitals:  Vitals Value Taken Time   /79 09/30/23 1345   Temp 36.3  C (97.3  F) 09/30/23 1330   Pulse 75 09/30/23 1351   Resp 10 09/30/23 1351   SpO2 96 % 09/30/23 1352   Vitals shown include unvalidated device data.    Electronically Signed By: Storm Funez MD  September 30, 2023  2:35 PM

## 2023-09-30 NOTE — OR NURSING
Transfer criteria met.  Order received per Dr. Funez to transfer back to [facility] Nursing care unit for continued recovery.  Hand-off report given to RN.  To 2318-1 per cart.  Will transport with capnography monitoring.  Patient has his glasses and cell phone.  Will notify family of transfer.

## 2023-09-30 NOTE — PLAN OF CARE
Goal Outcome Evaluation:       Date/Time9/30 1900-0730    Trauma/Ortho/Medical (Choose one) Orhto    Diagnosis:L Big toe OA  Mental Status: A&O x4  Activity/dangle pt up ind  Diet:NPO  Pain:denies  Gill/Voiding:BR, bladder scanner 27cc   Tele/Restraints/Iso:n/a  02/LDA:SL, IV antibiotics  D/C Date:TBD

## 2023-09-30 NOTE — PLAN OF CARE
Goal Outcome Evaluation:  Date/Time9/30     Trauma/Ortho/Medical (Choose one) Orhto    Diagnosis:L 1st RayResection, up to floor at 1420  Mental Status: A&O x4  Activity/dangle pt up ind  Diet: CHO  Pain:denies  Gill/Voiding:BR  Tele/Restraints/Iso:n/a  02/LDA:SL, IV antibiotics  D/C Date:TBD

## 2023-09-30 NOTE — BRIEF OP NOTE
[facility]    Brief Operative Note    Pre-operative diagnosis: Other acute osteomyelitis of left foot (H) [M86.172]  Diabetic with Neuropathy  Post-operative diagnosis Same as pre-operative diagnosis    Procedure: Left foot first ray resection, Left - Toe    Surgeon: Surgeon(s) and Role:     * Wendy Pate DPM, Podiatry/Foot and Ankle Surgery - Primary  Anesthesia: MAC   Estimated Blood Loss: Less than 50 ml    Drains: 1/4 inch plain packing  Specimens:   ID Type Source Tests Collected by Time Destination   1 : left foot first metatarsal bone head Bone Biopsy Foot, Left SURGICAL PATHOLOGY EXAM Wendy Pate DPM, Podiatry/Foot and Ankle Surgery 9/30/2023 12:21 PM    2 : proximal margins Bone Biopsy Foot, Left SURGICAL PATHOLOGY EXAM Wendy Pate DPM, Podiatry/Foot and Ankle Surgery 9/30/2023 12:21 PM    A : left foot bone Bone Biopsy Foot, Left ANAEROBIC BACTERIAL CULTURE ROUTINE, AEROBIC BACTERIAL CULTURE ROUTINE Wendy Pate DPM, Podiatry/Foot and Ankle Surgery 9/30/2023 12:14 PM      Findings:   None.  Complications: None.  Implants: * No implants in log *

## 2023-09-30 NOTE — OP NOTE
[facility] Podiatry Operative Note    Patient Name:                           Jairo Escamilla  Patient YOB: 1970  Date of Surgery:                       9/30/2023  CSN:                                         [id number]    Pre-operative diagnosis: Other acute osteomyelitis of left foot (H) [M86.172]   Post-operative diagnosis: Same   Procedure: Ulcer excision left foot.   Partial left 1st metatarsal excision   Surgeon: Wendy Pate DPM, Podiatry/Foot and Ankle Surgery   Assistant(s): None   Anesthesia: MAC with local     Hemostasis:                             Electrocautery    Estimated Blood Loss:              20 mL    Speceimens:                            Left first metatarsal bone for path and cultures    Materials:                                  Quarter-inch plain packing      Indications: 53-year-old diabetic male that presented to the hospital with nonhealing wound on the left foot.  He is underwent right great toe amputation previously.  MRI was obtained which showed osteomyelitis in the head of the first metatarsal.  It was discussed with patient to go in and remove this to try to help get rid of the bone infection and prevent worsening or spreading infection.  Risk benefits and complications were discussed with patient no guarantees were made.  He wishes to proceed with surgery.      Procecure: Patient was brought to the operating room placed operating table in supine position.  Anesthesia was administered and local was injected.  The foot was prepped and draped using sterile technique.  Attention was directed to the distal aspect of the left medial foot.  A fishmouth incision was made around the medial aspect of the first metatarsal head full-thickness down to bone excising 2 ulcers one on the medial aspect and 1 on the distal aspect of the first metatarsal head.  This incision measured approximately 1 cm in length and  5 cm in width.  The tissue was then sharply  dissected off of the head of the first metatarsal.  The distal one half of the first metatarsal head was then removed using sagittal saw.  No purulent drainage was noted.  The wound was flushed copious amounts normal saline and bleeding was controlled with electrocautery.  This bone was sent for pathology and cultures.  Using a sterile new clean rongeur some of the bone from the remaining metatarsal was removed and sent for a proximal margin pathology to make sure all bone infection has been removed.  The wound was flushed with copious amounts normal saline.  The wound bed was packed with quarter inch plain packing and the skin was reapproximated using 3-0 Prolene.  Patient's foot was placed in a dry sterile dressing.  Patient taught procedure and anesthesia well was transferred recovery with vital signs stable vascular status intact.        Wendy Pate DPM

## 2023-10-01 LAB
GLUCOSE BLDC GLUCOMTR-MCNC: 109 MG/DL (ref 70–99)
GLUCOSE BLDC GLUCOMTR-MCNC: 113 MG/DL (ref 70–99)
GLUCOSE BLDC GLUCOMTR-MCNC: 139 MG/DL (ref 70–99)
GLUCOSE BLDC GLUCOMTR-MCNC: 151 MG/DL (ref 70–99)
GLUCOSE BLDC GLUCOMTR-MCNC: 95 MG/DL (ref 70–99)

## 2023-10-01 PROCEDURE — 250N000013 HC RX MED GY IP 250 OP 250 PS 637: Performed by: PODIATRIST

## 2023-10-01 PROCEDURE — 250N000011 HC RX IP 250 OP 636: Performed by: PODIATRIST

## 2023-10-01 PROCEDURE — 120N000001 HC R&B MED SURG/OB

## 2023-10-01 PROCEDURE — 99233 SBSQ HOSP IP/OBS HIGH 50: CPT | Performed by: INTERNAL MEDICINE

## 2023-10-01 RX ORDER — ACETAMINOPHEN 325 MG/1
975 TABLET ORAL EVERY 8 HOURS
COMMUNITY
Start: 2023-10-02

## 2023-10-01 RX ORDER — SULFAMETHOXAZOLE/TRIMETHOPRIM 800-160 MG
1 TABLET ORAL 2 TIMES DAILY
Qty: 28 TABLET | Refills: 0 | Status: SHIPPED | OUTPATIENT
Start: 2023-10-02 | End: 2023-10-02

## 2023-10-01 RX ADMIN — SENNOSIDES AND DOCUSATE SODIUM 1 TABLET: 50; 8.6 TABLET ORAL at 08:58

## 2023-10-01 RX ADMIN — ACETAMINOPHEN 975 MG: 325 TABLET, FILM COATED ORAL at 17:44

## 2023-10-01 RX ADMIN — ACETAMINOPHEN 975 MG: 325 TABLET, FILM COATED ORAL at 01:37

## 2023-10-01 RX ADMIN — AMPICILLIN SODIUM 2 G: 2 INJECTION, POWDER, FOR SOLUTION INTRAMUSCULAR; INTRAVENOUS at 08:59

## 2023-10-01 RX ADMIN — FAMOTIDINE 10 MG: 10 TABLET ORAL at 08:58

## 2023-10-01 RX ADMIN — INSULIN GLARGINE 60 UNITS: 100 INJECTION, SOLUTION SUBCUTANEOUS at 21:59

## 2023-10-01 RX ADMIN — AMPICILLIN SODIUM 2 G: 2 INJECTION, POWDER, FOR SOLUTION INTRAMUSCULAR; INTRAVENOUS at 21:20

## 2023-10-01 RX ADMIN — AMPICILLIN SODIUM 2 G: 2 INJECTION, POWDER, FOR SOLUTION INTRAMUSCULAR; INTRAVENOUS at 05:11

## 2023-10-01 RX ADMIN — METFORMIN HYDROCHLORIDE 1000 MG: 500 TABLET, FILM COATED ORAL at 17:44

## 2023-10-01 RX ADMIN — METFORMIN HYDROCHLORIDE 1000 MG: 500 TABLET, FILM COATED ORAL at 08:58

## 2023-10-01 RX ADMIN — AMPICILLIN SODIUM 2 G: 2 INJECTION, POWDER, FOR SOLUTION INTRAMUSCULAR; INTRAVENOUS at 13:24

## 2023-10-01 RX ADMIN — SENNOSIDES AND DOCUSATE SODIUM 1 TABLET: 50; 8.6 TABLET ORAL at 20:22

## 2023-10-01 RX ADMIN — INSULIN ASPART 1 UNITS: 100 INJECTION, SOLUTION INTRAVENOUS; SUBCUTANEOUS at 13:19

## 2023-10-01 RX ADMIN — FAMOTIDINE 10 MG: 10 TABLET ORAL at 20:22

## 2023-10-01 RX ADMIN — AMPICILLIN SODIUM 2 G: 2 INJECTION, POWDER, FOR SOLUTION INTRAMUSCULAR; INTRAVENOUS at 01:37

## 2023-10-01 RX ADMIN — SIMVASTATIN 20 MG: 20 TABLET, FILM COATED ORAL at 21:20

## 2023-10-01 RX ADMIN — ACETAMINOPHEN 975 MG: 325 TABLET, FILM COATED ORAL at 08:58

## 2023-10-01 RX ADMIN — AMPICILLIN SODIUM 2 G: 2 INJECTION, POWDER, FOR SOLUTION INTRAMUSCULAR; INTRAVENOUS at 17:44

## 2023-10-01 RX ADMIN — INSULIN GLARGINE 60 UNITS: 100 INJECTION, SOLUTION SUBCUTANEOUS at 09:00

## 2023-10-01 ASSESSMENT — ACTIVITIES OF DAILY LIVING (ADL)
ADLS_ACUITY_SCORE: 33

## 2023-10-01 NOTE — PLAN OF CARE
Goal Outcome Evaluation:  Date/Time: 10/01     Trauma/Ortho/Medical (Choose one) Orhto    Diagnosis:L 1st RayResection, POD#1  Mental Status: A&O x4  Activity/dangle pt up ind  Diet: CHO  Pain:denies  Gill/Voiding:BR  Tele/Restraints/Iso:n/a  02/LDA:SL, IV antibiotics  D/C Date:Tomorrow?

## 2023-10-01 NOTE — PROGRESS NOTES
[facility]    Medicine Progress Note - Hospitalist Service    Date of Admission:  9/28/2023    Assessment & Plan   Jairo Escamilla is a delightful 53 year old male with type 1 diabetes, s/p partial left great toe amputation 08/19/2023 with completion of the amputation by Dr. Marc 08/21/2023 who was seen in podiatry clinic and directly admitted to [facility] on 9/28/2023 for concern of left lateral first metatarsal osteomyelitis.      Suspected osteomyelitis of left lateral first metatarsal head    Hx of left great to osteomyelitis now s/p complete amputation, 08/21/2023  *Patient seen in clinic 09/14 with residual open area with excisional debridement. Aerobic cultures at time of 08/19 partial amputation positive for staph aureus, strep agalactiae and enterococcus faecalis. Susceptible to vancomycin, ampicillin and penicillin. Fevers of 100.7 prior to admission   * XR Foot Left G/E 3 Views 09/28 : Interval revision amputation of the left first ray with now complete amputation of the great toe. Subtle focal bony lucency at the medial aspect of the left first metatarsal head is new and could represent osteomyelitis in the appropriate clinical context, particularly if a probe can touch bone. Chronic fracture deformity of the proximal fifth metatarsal, unchanged. New tuft fracture second toe distal phalanx. No dislocation. Heel spur with distal Achilles insertional enthesophyte. Arterial calcification. Generalized soft tissue edema. MRI 9/28 showed ulcerated wound along the dorsal aspect of the amputation site and associated osteomyelitis of the left first metatarsal head and distal shaft with additional diffuse soft tissue necrosis throughout the plantar aspect of the forefoot and distal midfoot.  - Podiatry consulted  -underwent left foot first ray resection on 9/30/23  - Blood cultures obtained prior to start of IV abx: No growth to date  - Continue IV ampicillin 2g q 4 hours given  results of aerobic cultures at time of 08/19 and susceptibility testing   -Plan to discharge with 2-week duration of twice daily Bactrim.  Sending to pharmacy now so that he will have them at discharge tomorrow.  -Still needs orthotics consultation to be completed for his walking boot. Placed consult order for Monday.  - Pain management with scheduled acetaminophen and PRN oxycodone      Insulin-dependent Type II DM    Peripheral neuropathy secondary to diabetes   Prior to admission regimen includes Basaglar 60 units twice daily and NovoLog 25 units 3 times daily. Also takes Ozempic and Metformin 1,000 mg BID. Patient admits he's been noncompliant with monitoring his blood glucose levels and managing his diabetes. A1c 8.4 08/01/2023. Patient voices understanding of the importance of his compliance moving forward for improved management of his wounds.   -There is no inpatient diabetes educator so recommend outpatient referral and reeducation as needed.  Also education surrounding foot care would be appreciated, see below.   - Continue insulin aspart 25 units subcutaneous TID before meals (may need to increase if sugars remain uncontrolled).  Will prescribe at discharge to his home pharmacy in [place].  - high intensity corrective dose insulin  - Continue insulin glargine 60 units subcutaneous BID  - Continue metformin 1,000 mg BID   - Discontinued ozempic given worsening nausea and GERD symptoms, can consider resuming upon discharge     Onychomycosis   - outpatient management per podiatry      Benign essential hypertension  BPs well managed on current regimen  - Continue PTA losartan      Probable GERD/gastritis  - Continue famotidine (started this stay)     Dyslipidemia   - Continue PTA simvastatin      Hypogonadism   - Patient reports 2 days late for testosterone injection.   - Follow-up on this upon discharge.      Obesity   - Diabetes educator to discuss healthy lifestyle changes. Optimization of hypogonadism and  "diabetes will support reduction in weight/body fat.         Diet: Moderate Consistent Carb (60 g CHO per Meal) Diet    DVT Prophylaxis: Pneumatic Compression Devices and Ambulate every shift  Gill Catheter: Not present  Lines: None     Cardiac Monitoring: None  Code Status: Full Code      Clinically Significant Risk Factors                  # Hypertension: Noted on problem list       # DMII: A1C = 8.4 % (Ref range: 0.0 - 5.6 %) within past 6 months, PRESENT ON ADMISSION  # Severe Obesity: Estimated body mass index is 40.18 kg/m  as calculated from the following:    Height as of this encounter: 1.778 m (5' 10\").    Weight as of this encounter: 127 kg (280 lb)., PRESENT ON ADMISSION            Disposition Plan      Expected Discharge Date: 10/02/2023                Discharge home Monday 10/2 after orthotics consult and fitting of left post-op boot. Rx for bactrim sent to our discharge pharmacy. Insulin Rx sent to [facility] pharmacy per Jairo's request.   Maria De Jesus Ludwig MD  Hospitalist Service  [facility]  Securely message with UserVoice (more info)  Text page via [employer] Paging/Directory   ______________________________________________________________________    Interval History   Blood sugars look much better after resuming his home regimen and with regular mealtimes per hospital schedule.  Pain well controlled.  No nausea.  Talked through his antibiotic plan and need for improved diabetes management.  He has excellent insight and had many ideas as to how he can manage this better at home.  He is concerned about not being able to get his NovoLog refilled as he has had some challenges with pharmacy and insurance coverage.    Physical Exam   Vital Signs: Temp: 97.8  F (36.6  C) Temp src: Oral BP: 118/83 Pulse: 88   Resp: 16 SpO2: 97 % O2 Device: None (Room air) Oxygen Delivery: 2 LPM  Weight: 280 lbs 0 oz    General Appearance: Sitting up at the edge of bed, no acute distress, pleasant, " talkative  Respiratory: clear to auscultation bilaterally with good inspiratory effort, no wheezes or crackles  Cardiovascular: Regular rate and rhythm, no murmur  GI: Protuberant, soft, nontender  Skin: No acute rashes    Medical Decision Making       MANAGEMENT DISCUSSED with the following over the past 24 hours: Patient, bedside nurse   NOTE(S)/MEDICAL RECORDS REVIEWED over the past 24 hours: Podiatry notes, nursing notes, therapy notes, pain medication ministration record, glycemic control documentation  Tests ORDERED & REVIEWED in the past 24 hours:  - See lab/imaging results included in the data section of the note      Data

## 2023-10-01 NOTE — PLAN OF CARE
Goal Outcome Evaluation:                      Date/Time: 10/01 2813-2757    Trauma/Ortho/Medical (Choose one) Orhto    Diagnosis:L 1st RayResection, POD#1  Mental Status: A&O x4  Activity/dangle pt up ind  Diet: CHO  Pain:denies  Gill/Voiding:BR  Tele/Restraints/Iso:n/a  02/LDA:SL, IV antibiotics  D/C Date:TBD

## 2023-10-01 NOTE — PROGRESS NOTES
"Podiatry / Foot and Ankle Surgery Progress Note    October 1, 2023    Subject: Patient was seen at bedside.  Notes that overall he is doing well.  Some pain to the foot but tolerable.    Objective:  Vitals: /65   Pulse 91   Temp 97.3  F (36.3  C) (Oral)   Resp 20   Ht 1.778 m (5' 10\")   Wt 127 kg (280 lb)   SpO2 95%   BMI 40.18 kg/m    BMI= Body mass index is 40.18 kg/m .    WBC   Date Value Ref Range Status   12/27/2018 3.7 (L) 4.0 - 11.0 10e9/L Final     WBC Count   Date Value Ref Range Status   09/29/2023 5.2 4.0 - 11.0 10e3/uL Final     A1C: 8.4 (8/1/23)    General:  Patient is alert and orientated.  NAD.    Vascular:  DP and PT pulses are palpable.  No varicosities noted  CFT's < 3secs.  Skin temp is normal.    Neuro:  Light and gross touch sensation diminished to feet.    Derm: Dressing is clean dry and intact.  Sutures are intact.  Packing is in place.  No redness, dehiscence or signs of acute infection noted.    Musculoskeletal:  left 1st ray now amputated.     Imaging: left foot post op xray - I personally reviewed images - terval transmetatarsal amputation of the first ray. The resection margins are irregular on the first 3 views, so follow-up radiographs suggested to monitor for possible residual or recurrent osteomyelitis. There has otherwise been no   change.    Cultures:  currently negative.    Pathology:  pending    Assessment: 53-year-old diabetic male status post left partial first ray amputation.    Plan:   - POD1  -Dressing changed at bedside.  Packing pulled.  -Keep foot dressing dry.  -Minimal heel weightbearing in postop boot on left foot for transfers only.  Do not want patient walking on the foot.  -At this point no further surgery is planned on the foot.  -Patient okay to be discharged from a podiatry standpoint.  Discharge Geronimo placed.  -Would recommend home on oral Bactrim 800mg for 14 days.  -We will have patient follow-up with me in 1 week.  -Podiatry will sign off.  Please " call further questions or concerns      Wendy Pate DPM, Podiatry/Foot and Ankle Surgery  7:56 AM

## 2023-10-02 ENCOUNTER — APPOINTMENT (OUTPATIENT)
Dept: PHYSICAL THERAPY | Facility: CLINIC | Age: 53
DRG: 617 | End: 2023-10-02
Attending: PODIATRIST
Payer: COMMERCIAL

## 2023-10-02 VITALS
DIASTOLIC BLOOD PRESSURE: 89 MMHG | RESPIRATION RATE: 16 BRPM | TEMPERATURE: 97.6 F | WEIGHT: 280 LBS | BODY MASS INDEX: 40.09 KG/M2 | SYSTOLIC BLOOD PRESSURE: 140 MMHG | HEART RATE: 74 BPM | OXYGEN SATURATION: 95 % | HEIGHT: 70 IN

## 2023-10-02 LAB
GLUCOSE BLDC GLUCOMTR-MCNC: 104 MG/DL (ref 70–99)
GLUCOSE BLDC GLUCOMTR-MCNC: 107 MG/DL (ref 70–99)
GLUCOSE BLDC GLUCOMTR-MCNC: 98 MG/DL (ref 70–99)

## 2023-10-02 PROCEDURE — 97161 PT EVAL LOW COMPLEX 20 MIN: CPT | Mod: GP

## 2023-10-02 PROCEDURE — 250N000013 HC RX MED GY IP 250 OP 250 PS 637: Performed by: PODIATRIST

## 2023-10-02 PROCEDURE — L3908 WHO COCK-UP NONMOLDE PRE OTS: HCPCS

## 2023-10-02 PROCEDURE — 250N000012 HC RX MED GY IP 250 OP 636 PS 637

## 2023-10-02 PROCEDURE — 99239 HOSP IP/OBS DSCHRG MGMT >30: CPT | Performed by: HOSPITALIST

## 2023-10-02 PROCEDURE — 97530 THERAPEUTIC ACTIVITIES: CPT | Mod: GP

## 2023-10-02 PROCEDURE — 250N000011 HC RX IP 250 OP 636: Performed by: PODIATRIST

## 2023-10-02 PROCEDURE — 97116 GAIT TRAINING THERAPY: CPT | Mod: GP

## 2023-10-02 RX ORDER — SULFAMETHOXAZOLE/TRIMETHOPRIM 800-160 MG
1 TABLET ORAL 2 TIMES DAILY
Qty: 28 TABLET | Refills: 0 | Status: SHIPPED | OUTPATIENT
Start: 2023-10-02 | End: 2023-11-03

## 2023-10-02 RX ADMIN — METFORMIN HYDROCHLORIDE 1000 MG: 500 TABLET, FILM COATED ORAL at 08:48

## 2023-10-02 RX ADMIN — ACETAMINOPHEN 975 MG: 325 TABLET, FILM COATED ORAL at 09:31

## 2023-10-02 RX ADMIN — FAMOTIDINE 10 MG: 10 TABLET ORAL at 08:48

## 2023-10-02 RX ADMIN — ACETAMINOPHEN 975 MG: 325 TABLET, FILM COATED ORAL at 01:02

## 2023-10-02 RX ADMIN — AMPICILLIN SODIUM 2 G: 2 INJECTION, POWDER, FOR SOLUTION INTRAMUSCULAR; INTRAVENOUS at 04:58

## 2023-10-02 RX ADMIN — AMPICILLIN SODIUM 2 G: 2 INJECTION, POWDER, FOR SOLUTION INTRAMUSCULAR; INTRAVENOUS at 08:48

## 2023-10-02 RX ADMIN — INSULIN GLARGINE 60 UNITS: 100 INJECTION, SOLUTION SUBCUTANEOUS at 09:25

## 2023-10-02 RX ADMIN — AMPICILLIN SODIUM 2 G: 2 INJECTION, POWDER, FOR SOLUTION INTRAMUSCULAR; INTRAVENOUS at 01:02

## 2023-10-02 RX ADMIN — SENNOSIDES AND DOCUSATE SODIUM 1 TABLET: 50; 8.6 TABLET ORAL at 08:48

## 2023-10-02 RX ADMIN — POLYETHYLENE GLYCOL 3350 17 G: 17 POWDER, FOR SOLUTION ORAL at 08:48

## 2023-10-02 ASSESSMENT — ACTIVITIES OF DAILY LIVING (ADL)
ADLS_ACUITY_SCORE: 33

## 2023-10-02 NOTE — PROGRESS NOTES
"   10/02/23 0947   Appointment Info   Signing Clinician's Name / Credentials (PT) Bayron Art, PT, DPT   Rehab Comments (PT) minimal heel weight bearing in post op boot left foot for transfers only       Present no   Living Environment   People in Home significant other;child(kyra), adult   Current Living Arrangements house   Home Accessibility stairs within home   Number of Stairs, Within Home, Primary five   Stair Railings, Within Home, Primary railings on both sides of stairs;railings safe and in good condition   Transportation Anticipated car, drives self   Living Environment Comments Pt lives in a split level home with his fiance and son. Split level home with 5 stairs up and 5 stairs down.   Self-Care   Usual Activity Tolerance good   Current Activity Tolerance moderate   Regular Exercise Yes   Activity/Exercise Type other (see comments)  (active job as umpire)   Equipment Currently Used at Home other (see comments)  (knee scooter)   Fall history within last six months no   Activity/Exercise/Self-Care Comment Pt reporting IND at baseline without use of AD for mobility or ADLs. Was previously using a knee scooter with mobility following previous hospitalization, but admits to only using it ~4-5 times in the past few weeks.   General Information   Onset of Illness/Injury or Date of Surgery 09/28/23   Referring Physician Wendy Pate, DPM   Patient/Family Therapy Goals Statement (PT) return home   Pertinent History of Current Problem (include personal factors and/or comorbidities that impact the POC) Per chart review, \"Jairo Escamilla is a delightful 53 year old male with type 1 diabetes, s/p partial left great toe amputation 08/19/2023 with completion of the amputation by Dr. Marc 08/21/2023 who was seen in podiatry clinic and directly admitted to [facility] on 9/28/2023 for concern of left lateral first metatarsal osteomyelitis. Now s/p left foot first ray resection on " "9/30/23.\"   Existing Precautions/Restrictions weight bearing   Weight-Bearing Status - LLE other (see comments)  (\"minimal heel weight bearing in post op boot left foot for transfers only\")   Cognition   Affect/Mental Status (Cognition) WNL   Orientation Status (Cognition) oriented x 4   Follows Commands (Cognition) WNL   Integumentary/Edema   Integumentary/Edema no deficits were identifed   Integumentary/Edema Comments L foot in offloading shoe   Posture    Posture Not impaired   Range of Motion (ROM)   Range of Motion ROM is WFL   Strength (Manual Muscle Testing)   Strength (Manual Muscle Testing) strength is WFL   Bed Mobility   Comment, (Bed Mobility) IND   Transfers   Comment, (Transfers) mod I w/ FWW   Gait/Stairs (Locomotion)   Distance in Feet 5' eval   Distance in Feet (Gait) 40'   Comment, (Gait/Stairs) Pt ambulated 5' w/ FWW for eval w/ CGA   Balance   Balance Comments moderate dynamic balance impairment   Sensory Examination   Sensory Perception patient reports no sensory changes   Clinical Impression   Criteria for Skilled Therapeutic Intervention Yes, treatment indicated   PT Diagnosis (PT) impaired functional mobility   Influenced by the following impairments impaired activity tolerance, dynamic balance   Functional limitations due to impairments limited IND with mobility   Clinical Presentation (PT Evaluation Complexity) Stable/Uncomplicated   Clinical Presentation Rationale clinical judgement   Clinical Decision Making (Complexity) low complexity   Planned Therapy Interventions (PT) gait training;transfer training;stair training;bed mobility training;home exercise program;patient/family education;ROM (range of motion);strengthening;progressive activity/exercise;home program guidelines   Anticipated Equipment Needs at Discharge (PT) walker, rolling;wheelchair   Risk & Benefits of therapy have been explained evaluation/treatment results reviewed;care plan/treatment goals reviewed;risks/benefits " reviewed;current/potential barriers reviewed;participants voiced agreement with care plan;participants included;patient   PT Total Evaluation Time   PT Eval, Low Complexity Minutes (15216) 7   Plan of Care Review   Plan of Care Reviewed With patient   Physical Therapy Goals   PT Frequency One time eval and treatment only   PT Predicted Duration/Target Date for Goal Attainment 10/02/23   PT Goals Bed Mobility;Transfers;Gait;Stairs   PT: Bed Mobility Independent;Supine to/from sit;Goal Met;Completed   PT: Transfers Modified independent;Sit to/from stand;Bed to/from chair;Assistive device;Within precautions;Goal Met;Completed   PT: Gait Supervision/stand-by assist;Assistive device;25 feet;Within precautions;Goal Met;Completed   PT: Stairs Supervision/stand-by assist;Within precautions;5 stairs;Rail on both sides;Goal Met;Completed   Interventions   Interventions Quick Adds Gait Training;Therapeutic Activity   Therapeutic Activity   Therapeutic Activities: dynamic activities to improve functional performance Minutes (68536) 8   Symptoms Noted During/After Treatment None   Treatment Detail/Skilled Intervention Greeted pt upon arrival to room. Pt agreeable to working with PT. Pt sitting up on edge of bed upon entering room. Sit>stand w/ FWW and mod I. Cueing for safe and efficient sit<>stand procedure including scooting to the front edge of the chair, to lean forward with nose over toes, and hand and foot placement. Education provided for min weight bearing through heel on LLE with use of boot for transfers only. Pt demonstrating good understanding. Stand pivot transfers bed<>wheelchair while maintaining good min weight bearing through LLE. Pt declining attempting use of knee scooter with mobility. Discussed other options including use of w/c, pt agreeable to this plan. Will discuss with care coordinator. Pt left with all needs met and call light within reach.   Gait Training   Gait Training Minutes (73116) 12   Symptoms  "Noted During/After Treatment (Gait Training) fatigue   Treatment Detail/Skilled Intervention Pt ambulated 40' w/ FWW and CGA, progressing to SBA throughout. Education provided regarding weight bearing restrictions on LLE with post-op boot. Patient able to mostly adhere to NWBing on LLE, but at times, needing minor balance assist with heel on LLE with less than minimal weight bearing at times. Pt ascended and descended 5 stairs with SBA and bilateral handrail support with min weight bearing through LLE with post-op boot. Cueing to move slow and controlled with each step with good stability noted throughout.   PT Discharge Planning   PT Plan goals met, d/c   PT Discharge Recommendation (DC Rec) home with assist   PT Rationale for DC Rec Pt is below baseline mobility levels at this time, but mobilizing safely with use of FWW. Patient declining use of knee scooter, stating that he used one after previous hospitalization and did not feel stable with it. Recommend use of wheelchair use for longer distances following discharge to adhere to \"minimal heel weight bearing in post op boot for transfers only\" orders. Care coordinator to coordinate obtaining wheelchair for use at home. Will plan to discharge with FWW for use at home.   PT Brief overview of current status mod I w/ FWW transfers and short distance ambulation   Total Session Time   Timed Code Treatment Minutes 20   Total Session Time (sum of timed and untimed services) 27     "

## 2023-10-02 NOTE — PLAN OF CARE
"Physical Therapy Discharge Summary    Reason for therapy discharge:    All goals and outcomes met, no further needs identified.    Progress towards therapy goal(s). See goals on Care Plan in [facility] electronic health record for goal details.  Goals met    Therapy recommendation(s):    Pt is below baseline mobility levels at this time, but mobilizing safely with use of FWW. Patient declining use of knee scooter, stating that he used one after previous hospitalization and did not feel stable with it. Recommend use of wheelchair use for longer distances following discharge to adhere to \"minimal heel weight bearing in post op boot for transfers only\" orders. Care coordinator to coordinate obtaining wheelchair for use at home. Will plan to discharge with FWW for use at home.      "

## 2023-10-02 NOTE — DISCHARGE SUMMARY
"[facility]  Hospitalist Discharge Summary      Date of Admission:  9/28/2023  Date of Discharge:  10/2/2023  Discharging Provider: Wilda Paredes DO  Discharge Service: Hospitalist Service    Discharge Diagnoses   Suspected osteomyelitis of left lateral first metatarsal head    Hx of left great to osteomyelitis now s/p complete amputation, 08/21/2023  Insulin-dependent Type II DM    Peripheral neuropathy secondary to diabetes   Onychomycosis   Benign essential hypertension  Probable GERD/gastritis  Dyslipidemia   Hypogonadism   Obesity     Clinically Significant Risk Factors     # DMII: A1C = 8.4 % (Ref range: 0.0 - 5.6 %) within past 6 months    # Severe Obesity: Estimated body mass index is 40.18 kg/m  as calculated from the following:    Height as of this encounter: 1.778 m (5' 10\").    Weight as of this encounter: 127 kg (280 lb).       Follow-ups Needed After Discharge   Follow-up Appointments     Follow-up and recommended labs and tests       Follow up with Dr. Pate in 1 week from discharge from the hospital.         For appointments, questions or concerns: call: [phone]    Office fax number:  [phone]        Follow-up and recommended labs and tests       Follow up with primary care provider, Sandoval Wilkinson, within 7 days for   hospital follow- up.  No follow up labs or test are needed.            Unresulted Labs Ordered in the Past 30 Days of this Admission       Date and Time Order Name Status Description    9/30/2023 12:24 PM Surgical Pathology Exam In process     9/30/2023 12:14 PM Bone Biopsy Aerobic Bacterial Culture Routine Preliminary     9/30/2023 12:14 PM Anaerobic Bacterial Culture Routine Preliminary     9/28/2023  3:22 PM Blood Culture Peripheral Blood Preliminary     9/28/2023  3:22 PM Blood Culture Peripheral Blood Preliminary         These results will be followed up by Dr Pate with podiatry.  Received ampicillin while inpatient, discharging with 2 weeks of " bactrim.    Discharge Disposition   Discharged to home  Condition at discharge: Stable    Hospital Course   Jairo Escamilla is a delightful 53 year old male with type 1 diabetes, s/p partial left great toe amputation 08/19/2023 with completion of the amputation by Dr. Marc 08/21/2023 who was seen in podiatry clinic and directly admitted to [facility] on 9/28/2023 for concern of left lateral first metatarsal osteomyelitis.      Suspected osteomyelitis of left lateral first metatarsal head    Hx of left great to osteomyelitis now s/p complete amputation, 08/21/2023  *Patient seen in clinic 09/14 with residual open area with excisional debridement. Aerobic cultures at time of 08/19 partial amputation positive for staph aureus, strep agalactiae and enterococcus faecalis. Susceptible to vancomycin, ampicillin and penicillin. Fevers of 100.7 prior to admission   * XR Foot Left G/E 3 Views 09/28 : Interval revision amputation of the left first ray with now complete amputation of the great toe. Subtle focal bony lucency at the medial aspect of the left first metatarsal head is new and could represent osteomyelitis in the appropriate clinical context, particularly if a probe can touch bone. Chronic fracture deformity of the proximal fifth metatarsal, unchanged. New tuft fracture second toe distal phalanx. No dislocation. Heel spur with distal Achilles insertional enthesophyte. Arterial calcification. Generalized soft tissue edema. MRI 9/28 showed ulcerated wound along the dorsal aspect of the amputation site and associated osteomyelitis of the left first metatarsal head and distal shaft with additional diffuse soft tissue necrosis throughout the plantar aspect of the forefoot and distal midfoot.  *9/30/23 underwent left foot first ray resection  - Podiatry appreciated, follow up in one week with Dr Pate  - blood cx NGTD  - placed on ampicillin inpatient, discharging with 2 week course of BID bactrim  - orthotics  provided walking boot for patient on 10/2  - pain controlled with tylenol  - walker and wheelchair DME ordered on discharge  - follow up with PCP as needed     Insulin-dependent Type II DM    Peripheral neuropathy secondary to diabetes   PTA Basaglar 60 units twice daily and NovoLog 25 units 3 times daily. Also takes Ozempic and Metformin 1,000 mg BID. Patient admits he's been noncompliant with monitoring his blood glucose levels and managing his diabetes. A1c 8.4 08/01/2023. Patient voices understanding of the importance of his compliance moving forward for improved management of his wounds.   -diabetic educator referral ordered on discharge  - Continue insulin aspart 25 units subcutaneous TID before meals (sent to his [place] pharmacy)  - Continue insulin glargine 60 units subcutaneous BID  - Continue metformin 1,000 mg BID   - Discontinued ozempic given worsening nausea and GERD symptoms, can consider resuming after discharge once his nausea/reflux symptoms are resolved     Onychomycosis   - outpatient management per podiatry      Benign essential hypertension  BPs well managed on current regimen  - Continue PTA losartan      Probable GERD/gastritis  - Continue cimetidine     Dyslipidemia   - Continue PTA simvastatin      Hypogonadism   - Patient reports 2 days late for testosterone injection on admit  - follow up with PCP, resume injections on discharge     Obesity   Follow up with PCP/diabetic educator     Consultations This Hospital Stay   CARE MANAGEMENT / SOCIAL WORK IP CONSULT  PODIATRY IP CONSULT  VASCULAR ACCESS ADULT IP CONSULT  SPIRITUAL HEALTH SERVICES IP CONSULT  PHYSICAL THERAPY ADULT IP CONSULT  ORTHOSIS EXTREMITY LOWER REFERRAL IP CONSULT  ORTHOSIS EXTREMITY LOWER REFERRAL IP CONSULT    Code Status   Full Code    Time Spent on this Encounter   Wilda ROWLEY DO, personally saw the patient today and spent greater than 30 minutes discharging this patient.       Wilda Paredes DO  [facility]  [facility]  [address] [place] [place] [address]  Phone: [phone]  Fax: [phone]  ______________________________________________________________________    Physical Exam   Vital Signs: Temp: 97.6  F (36.4  C) Temp src: Oral BP: (!) 140/89 Pulse: 74   Resp: 16 SpO2: 95 % O2 Device: None (Room air)    Weight: 280 lbs 0 oz    Patient seen and examined. He is feeling well. Tolerating boot, likes it a little better than his last boot. Pain controlled with tylenol. Discussed discharge plan of care and he is agreeable. Stable for discharge to home.    Constitutional: Awake, alert, cooperative, no apparent distress  Respiratory: Clear to auscultation bilaterally, no crackles or wheezing  Cardiovascular: Regular rate and rhythm, normal S1 and S2, and no murmur noted  GI: Normal bowel sounds, soft, non-distended, non-tender, obese  Skin/Integumen: No rashes, no cyanosis  Other:  Left foot in orthotic boot       Primary Care Physician   Sandoval Wilkinson    Discharge Orders      Diabetes Educator Referral      Follow-up and recommended labs and tests     Follow up with Dr. Pate in 1 week from discharge from the hospital.         For appointments, questions or concerns: call: [phone]    Office fax number:  [phone]     Activity    Your activity upon discharge: Minimal heel weight bearing on left foot in boot for transfers only.  No more than 10 minutes on foot an hour.     Wound care and dressings    Instructions to care for your wound at home:  Keep foot and dressing dry. Will change at first clinic visit.     Reason for your hospital stay    Left foot surgery for osteomyelitis     Follow-up and recommended labs and tests     Follow up with primary care provider, Sandoval Wilkinson, within 7 days for hospital follow- up.  No follow up labs or test are needed.     Full Code     Walker Order for DME - ONLY FOR DME    I, the undersigned, certify that the above prescribed supplies are medically necessary for  this patient and is both reasonable and necessary in reference to accepted standards of medical and necessary in reference to accepted standards of medical practice in the treatment of this patient's condition and is not prescribed as a convenience.      Wheelchair Order for DME - ONLY FOR DME    I, the undersigned, certify that the above prescribed supplies are medically necessary for this patient and is both reasonable and necessary in reference to accepted standards of medical and necessary in reference to accepted standards of medical practice in the treatment of this patient's condition and is not prescribed as a convenience.      Diet    Follow this diet upon discharge:       Moderate Consistent Carb (60 g CHO per Meal) Diet       Significant Results and Procedures   Most Recent 3 CBC's:  Recent Labs   Lab Test 09/29/23  0804 09/28/23  1533 08/19/23  0456   WBC 5.2 6.9 10.0   HGB 13.5 14.1 13.8   MCV 85 84 85    216 215     Most Recent 3 BMP's:  Recent Labs   Lab Test 10/02/23  0818 10/02/23  0557 10/02/23  0206 09/29/23  1028 09/29/23  0804 09/28/23  1755 09/28/23  1533 08/18/23  1854 08/18/23  1408   NA  --   --   --   --  137  --  136  --  136   POTASSIUM  --   --   --   --  4.1  --  4.3  --  4.2   CHLORIDE  --   --   --   --  101  --  99  --  98   CO2  --   --   --   --  24  --  21*  --  24   BUN  --   --   --   --  11.3  --  15.7  --  10.9   CR  --   --   --   --  0.78  --  0.90  --  0.81   ANIONGAP  --   --   --   --  12  --  16*  --  14   LUKASZ  --   --   --   --  9.1  --  8.9  --  9.2   * 98 104*   < > 180*   < > 337*   < > 162*    < > = values in this interval not displayed.   ,   Results for orders placed or performed during the hospital encounter of 09/28/23   MR Foot Left w/o & w Contrast    Narrative    EXAM: MR FOOT LEFT W/O and W CONTRAST  LOCATION: [facility]  DATE: 9/28/2023    INDICATION: Osteomyelitis, status post amputation of the left great  toe.  COMPARISON: 08/18/2023.  TECHNIQUE: Routine. Additional postgadolinium T1 sequences were obtained.  IV CONTRAST: 12 mL Gadavist.    FINDINGS:     Study degraded by motion.    Interval resection of the left great toe, with ulcerating soft tissue wound along the dorsal aspect of the amputation site, extending deep to abut the first metatarsal head. Enhancing marrow edema within the first metatarsal head and distal shaft is   suggestive of acute osteomyelitis.    There is prominent, hypoenhancing soft tissue throughout the plantar aspect of the forefoot and midfoot, suggesting soft tissue necrosis. This is best appreciated on the axial postcontrast images.    Small rim-enhancing fluid collection along the medial aspect of the first metatarsal head appears in continuity with the ulcerating wound at the dorsum of the amputation site, series 10 image 16. This measures up to 11 mm.    There is subacute on chronic denervation of the intrinsic foot musculature, with additional enhancing myositis involving the medial forefoot musculature.    Flexor and extensor tendons appear normal in course and caliber.    Enhancing cutaneous thickening and subcutis induration, greatest throughout the dorsal and medial forefoot/midfoot, favoring a cellulitis.    No evidence of fracture, stress fracture, or avascular necrosis. Mild to moderate polyarticular chondrosis.      Impression    IMPRESSION:    Interval amputation of the left great toe, with ulcerated wound along the dorsal aspect of the amputation site and associated osteomyelitis of the left first metatarsal head and distal shaft. Additional diffuse soft tissue necrosis throughout the plantar   aspect of the forefoot and distal midfoot.   X-ray lt Foot 3 vw port: In PACU    Narrative    EXAM: XR FOOT PORT LEFT 3 VIEWS  LOCATION: [facility]  DATE: 9/30/2023    INDICATION: post op  COMPARISON: Radiographs from 09/28/2023. MRI from 09/20/2023.       Impression    IMPRESSION: Interval transmetatarsal amputation of the first ray. The resection margins are irregular on the first 3 views, so follow-up radiographs suggested to monitor for possible residual or recurrent osteomyelitis. There has otherwise been no   change.       Discharge Medications   Current Discharge Medication List        START taking these medications    Details   sulfamethoxazole-trimethoprim (BACTRIM DS) 800-160 MG tablet Take 1 tablet by mouth 2 times daily  Qty: 28 tablet, Refills: 0    Associated Diagnoses: Acute hematogenous osteomyelitis of left foot (H)           CONTINUE these medications which have CHANGED    Details   acetaminophen (TYLENOL) 325 MG tablet Take 3 tablets (975 mg) by mouth every 8 hours    Associated Diagnoses: Type 2 diabetes mellitus with hyperglycemia, with long-term current use of insulin (H)      cimetidine (TAGAMET) 200 MG tablet Take 1 tablet (200 mg) by mouth 2 times daily      insulin aspart (NOVOLOG PEN) 100 UNIT/ML pen Inject 25 Units Subcutaneous 3 times daily (with meals) for 90 days Along with adjustment scale of 1:50 as directed up to 75 units daily.  Qty: 67.5 mL, Refills: 1    Associated Diagnoses: Type 2 diabetes mellitus with hyperglycemia, with long-term current use of insulin (H)           CONTINUE these medications which have NOT CHANGED    Details   insulin glargine (BASAGLAR KWIKPEN) 100 UNIT/ML pen Inject 60 Units Subcutaneous 2 times daily  Qty: 36 mL, Refills: 11    Comments: If Basaglar is not covered by insurance, may substitute Lantus or Semglee or other insulin glargine product per insurance preference at same dose and frequency.    Associated Diagnoses: Type 2 diabetes mellitus with hyperglycemia, with long-term current use of insulin (H)      losartan (COZAAR) 25 MG tablet Take 1 tablet (25 mg) by mouth daily      metFORMIN (GLUCOPHAGE XR) 500 MG 24 hr tablet TAKE 2 TABLETS BY MOUTH TWICE DAILY  Qty: 360 tablet, Refills: 2    Associated  "Diagnoses: Type 2 diabetes mellitus with hyperglycemia, with long-term current use of insulin (H)      oxyCODONE (ROXICODONE) 5 MG tablet Take 1 tablet (5 mg) by mouth every 4 hours as needed for moderate pain  Qty: 20 tablet, Refills: 0    Associated Diagnoses: Osteomyelitis of left foot, unspecified type (H)      simvastatin (ZOCOR) 20 MG tablet TAKE 1 TABLET(20 MG) BY MOUTH IN THE MORNING  Qty: 90 tablet, Refills: 2    Associated Diagnoses: Hyperlipidemia LDL goal <100      tadalafil (CIALIS) 20 MG tablet Take 1 tablet (20 mg) by mouth daily as needed (ED)  Qty: 12 tablet, Refills: 11    Associated Diagnoses: Erectile dysfunction, unspecified erectile dysfunction type      testosterone cypionate (DEPOTESTOSTERONE) 200 MG/ML injection Inject 1 mL (200 mg) into the muscle every 14 days  Qty: 2 mL, Refills: 3    Associated Diagnoses: Hypogonadism male      blood glucose (ONETOUCH ULTRA) test strip Use to test blood sugars tid or as directed.  Qty: 100 each, Refills: prn    Associated Diagnoses: Type 2 diabetes mellitus without complication, with long-term current use of insulin (H)      Continuous Blood Gluc Sensor (DEXCOM G6 SENSOR) MISC 1 each every 10 days Change every 10 days.  Qty: 3 each, Refills: 11    Associated Diagnoses: Type 2 diabetes mellitus with hyperglycemia, with long-term current use of insulin (H)      Continuous Blood Gluc Transmit (DEXCOM G6 TRANSMITTER) MISC USE AS DIRECTED. CHANGE EVERY 3 MONTHS  Qty: 1 each, Refills: 0    Associated Diagnoses: Type 2 diabetes mellitus with hyperglycemia, with long-term current use of insulin (H)      insulin pen needle (32G X 6 MM) 32G X 6 MM miscellaneous Use 6 pen needles daily or as directed.  Qty: 180 each, Refills: 3    Associated Diagnoses: Type 2 diabetes mellitus with hyperglycemia, with long-term current use of insulin (H)      needle, disp, 18G X 1\" MISC 1 Needle every 14 days Used to withdraw the testosterone medication from the vial.  Qty: 50 " "each, Refills: 0    Associated Diagnoses: Hypogonadism male      Needle, Disp, 25G X 1\" MISC 1 Needle every 14 days Used to inject the testosterone medication.  Qty: 50 each, Refills: 0    Associated Diagnoses: Hypogonadism male           STOP taking these medications       ampicillin (PRINCIPEN) 500 MG capsule Comments:   Reason for Stopping:         semaglutide (OZEMPIC, 0.25 OR 0.5 MG/DOSE,) 2 MG/1.5ML SOPN pen Comments:   Reason for Stopping:             Allergies   Allergies   Allergen Reactions    Lisinopril      Cough       "

## 2023-10-02 NOTE — CONSULTS
"Care Management Discharge Note    Discharge Date: 10/02/2023       Discharge Disposition:  Home    Discharge Services:  n/a    Discharge DME:  Wheelchair-In the process of ordering a wheelchair with [facility].    Discharge Transportation:  friend    Private pay costs discussed: Not applicable    Does the patient's insurance plan have a 3 day qualifying hospital stay waiver?  Yes     Which insurance plan 3 day waiver is available? Alternative insurance waiver    Will the waiver be used for post-acute placement? No    PAS Confirmation Code:  n/a  Patient/family educated on Medicare website which has current facility and service quality ratings:  yes    Education Provided on the Discharge Plan:  yes  Persons Notified of Discharge Plans:  yes  Patient/Family in Agreement with the Plan:  yes    Handoff Referral Completed: Yes, Outpatient Care Coordination Referral    Additional Information:  Writer informed that patient has been medically cleared to discharge home today. Physical Therapy is recommending patient use a wheelchair for discharge. Writer met with patient in his room. Introduced self and role as Care Coordinator. Patient said from last admission he had been renting a scooter but felt \"unbalanced\" on it. He ended up returning the scooter. Patient would prefer a wheelchair for when he goes back to work later this week. Writer contacted MD for wheelchair orders. Plan is to discharge patient home today and the wheelchair will be delivered to his home once it has been authorized. Writer has faxed orders and chart information to [facility], attention Intake. Fax # [phone].            Leta Pozo RN  Care Coordinator  [phone]    "

## 2023-10-02 NOTE — PLAN OF CARE
ate/Time: 10/02 night   Diagnosis:L 1st RayResection,   Mental Status: A&O x4  Activity/dangle pt up ind  Diet: CHO  Pain:denies  Gill/Voiding:BR  Tele/Restraints/Iso:n/a  02/LDA:SL, IV antibiotics  D/C Date:Tomorrow

## 2023-10-02 NOTE — PLAN OF CARE
Goal Outcome Evaluation:    Diagnosis:L 1st RayResection,   Mental Status: A&O x4  Activity/dangle pt up ind  Diet: CHO  Pain:denies  Gill/Voiding:BR  Tele/Restraints/Iso:n/a  02/LDA:SL, IV antibiotics  D/C Date: Reviewed discharge instruction with the pt and pt discharged to home.

## 2023-10-02 NOTE — PLAN OF CARE
Goal Outcome Evaluation:       Date/Time: 10/02 [id number]  Diagnosis:L 1st RayResection,   Mental Status: A&O x4  Activity/dangle pt up ind  Diet: CHO  Pain:denies  Gill/Voiding:BR  Tele/Restraints/Iso:n/a  02/LDA:SL, IV antibiotics  D/C Date:Tomorrow

## 2023-10-02 NOTE — PROGRESS NOTES
DME (Durable Medical Equipment) Orders and Documentation  Orders Placed This Encounter   Procedures    Pneumatic Compression Device (Equipment) - Bilateral Calf    Walker Order for DME - ONLY FOR DME    Wheelchair Order for DME - ONLY FOR DME      The patient was assessed and it was determined the patient is in need of the following listed DME Supplies/Equipment. Please complete supporting documentation below to demonstrate medical necessity.      Wheelchair Documentation  1. The patient has mobility limitations that impairs their ability to participate in one or more mobility related activities: Toileting. Grooming  2. The patient's mobility limitations cannot be safely resolved by using a cane/walker:No  3. The patients home has adequate access to use a manual wheelchair:Yes  4. The use of a manual wheelchair on a regular basis will improve the patients ability to participate in mobility related ADL's at home:Yes  5. The patient is willing to use a manual wheelchair at home:Yes  6. The patient has adequate upper body strength and the mental capability to safely use a manual wheelchair and/or has a caregiver that is able to assist: Yes  7. Does the patient have a lower extremity injury or edema?Yes    Reason for Type of Wheelchair  Patient weight: 280 lbs 0 oz  Heavy Duty Wheelchair: Patient is over the 250lb weight limit for other wheelchairs.    **Use of a manual wheelchair will significantly improve the patient's ability to participate in MRADLs and the patient will use it on a regular basis in the home. The patient has not expressed an unwillingness to use the manual wheelchair that is provided in the home.**      DME All Other Item(s) Documentation    List reason for need and supporting documentation for medical necessity below for each DME item.     1. Walker needed for short distances, but wheelchair needed for longer distances to adhere to minimal heel weight bearing restrictions.

## 2023-10-03 LAB
BACTERIA BLD CULT: NO GROWTH
BACTERIA BLD CULT: NO GROWTH

## 2023-10-04 LAB
BACTERIA BONE ANAEROBE+AEROBE CULT: ABNORMAL

## 2023-10-07 LAB
BACTERIA BONE ANAEROBE+AEROBE CULT: ABNORMAL
BACTERIA BONE ANAEROBE+AEROBE CULT: ABNORMAL

## 2023-10-10 ENCOUNTER — OFFICE VISIT (OUTPATIENT)
Dept: PODIATRY | Facility: CLINIC | Age: 53
End: 2023-10-10
Attending: PODIATRIST
Payer: COMMERCIAL

## 2023-10-10 VITALS — DIASTOLIC BLOOD PRESSURE: 78 MMHG | SYSTOLIC BLOOD PRESSURE: 118 MMHG | WEIGHT: 280 LBS | BODY MASS INDEX: 40.18 KG/M2

## 2023-10-10 DIAGNOSIS — Z98.890 POST-OPERATIVE STATE: ICD-10-CM

## 2023-10-10 DIAGNOSIS — E11.42 DIABETIC POLYNEUROPATHY ASSOCIATED WITH TYPE 2 DIABETES MELLITUS (H): Primary | ICD-10-CM

## 2023-10-10 DIAGNOSIS — S98.112A AMPUTATION OF LEFT GREAT TOE (H): ICD-10-CM

## 2023-10-10 PROCEDURE — 99024 POSTOP FOLLOW-UP VISIT: CPT | Performed by: PODIATRIST

## 2023-10-10 NOTE — LETTER
[address]  [place] [address]                  [phone]   May 22, 2018    Jairo Escamilla  [facility] [facility]  [place] [address]        Jairo,    It looks like your A1c has increased from 7.2 a year ago to 8.7 today.    I think to reverse this trend we should probably slowly increase your insulin.  Let's start by increasing her Lantus insulin from a dose of 60 units before bedtime to 65 units before bedtime.  We'll know that we are adjusting this correctly if your morning sugars are less than 100, but not too low.  You can increase by 5 units every 3 or 4 days as long as you are checking her morning sugars and making sure not getting too low.    I would also try to add in an additional dose of the NovoLog at lunchtime, to make sure that your having some coverage for her lunchtime meal.  Your sugars after lunch breakfast and dinner should probably be no greater than 150.  If they are rising above 150,  it is likely that you needed a higher dose of your insulin at that meal.    Let us have you make a follow-up appointment in August to recheck this A1c to make sure it is improving.  Good luck with the weight loss as well.    It was good seeing you in the office.  Hope you have a great rest of the day!    Sandoval Wilkinson MD  Internal Medicine and Pediatrics            Results for orders placed or performed in visit on 05/22/18   Hemoglobin A1c   Result Value Ref Range    Hemoglobin A1C 8.7 (H) 0 - 5.6 %      Infliximab Counseling:  I discussed with the patient the risks of infliximab including but not limited to myelosuppression, immunosuppression, autoimmune hepatitis, demyelinating diseases, lymphoma, and serious infections.  The patient understands that monitoring is required including a PPD at baseline and must alert us or the primary physician if symptoms of infection or other concerning signs are noted.

## 2023-10-10 NOTE — PROGRESS NOTES
PATIENT HISTORY:  Jairo Escamilla is a 53 year old male who presents to clinic for 1 week status post partial left first ray amputation.  Notes that he is doing well.  Has been doing good keeping weight off the foot with the wheelchair.  Denies fever, nausea, vomiting.  Does not note any real pain but does have some baseline neuropathy.    Review of Systems:  Patient denies fever, chills, rash, wound, stiffness, limping,  weakness, heart burn, blood in stool, chest pain with activity, calf pain when walking, shortness of breath with activity, chronic cough, easy bleeding/bruising, swelling of ankles, excessive thirst, fatigue, depression, anxiety.  Patient admits to numbness.     PAST MEDICAL HISTORY:   Past Medical History:   Diagnosis Date    Diabetes mellitus (adult onset)     Diabetes mellitus, type 2 (H) 05/14/2008    Fracture of rib 2022    High cholesterol     Hypertension     Obese     Peripheral neuropathy     Pneumonia     Sleep apnea     Subdural hematoma (H)         PAST SURGICAL HISTORY:   Past Surgical History:   Procedure Laterality Date    AMPUTATE TOE(S) Left 8/19/2023    Procedure: Left hallux amputation;  Surgeon: Filiberto Mercedes DPM;  Location:  OR    AMPUTATE TOE(S) Left 9/30/2023    Procedure: Left foot first ray resection;  Surgeon: Wendy Pate DPM, Podiatry/Foot and Ankle Surgery;  Location:  OR    COLONOSCOPY N/A 12/22/2022    Procedure: COLONOSCOPY, FLEXIBLE, WITH LESION REMOVAL USING SNARE;  Surgeon: Josh Reeves MD;  Location:  GI    IRRIGATION AND DEBRIDEMENT FOOT, COMBINED Left 8/21/2023    Procedure: IRRIGATION AND DEBRIDEMENT LEFT FOOT, REVISION OF AMPUTATION;  Surgeon: Michelle Marc DPM;  Location:  OR    NO HISTORY OF SURGERY          MEDICATIONS:   Current Outpatient Medications:     acetaminophen (TYLENOL) 325 MG tablet, Take 3 tablets (975 mg) by mouth every 8 hours, Disp: , Rfl:     blood glucose (ONETOUCH ULTRA) test strip, Use to test blood sugars tid  "or as directed., Disp: 100 each, Rfl: prn    cimetidine (TAGAMET) 200 MG tablet, Take 1 tablet (200 mg) by mouth 2 times daily, Disp: , Rfl:     Continuous Blood Gluc Sensor (DEXCOM G6 SENSOR) MISC, 1 each every 10 days Change every 10 days., Disp: 3 each, Rfl: 11    Continuous Blood Gluc Transmit (DEXCOM G6 TRANSMITTER) MISC, USE AS DIRECTED. CHANGE EVERY 3 MONTHS, Disp: 1 each, Rfl: 0    insulin aspart (NOVOLOG PEN) 100 UNIT/ML pen, Inject 25 Units Subcutaneous 3 times daily (with meals) for 90 days Along with adjustment scale of 1:50 as directed up to 75 units daily., Disp: 67.5 mL, Rfl: 1    insulin glargine (BASAGLAR KWIKPEN) 100 UNIT/ML pen, Inject 60 Units Subcutaneous 2 times daily, Disp: 36 mL, Rfl: 11    insulin pen needle (32G X 6 MM) 32G X 6 MM miscellaneous, Use 6 pen needles daily or as directed., Disp: 180 each, Rfl: 3    losartan (COZAAR) 25 MG tablet, Take 1 tablet (25 mg) by mouth daily, Disp: , Rfl:     metFORMIN (GLUCOPHAGE XR) 500 MG 24 hr tablet, TAKE 2 TABLETS BY MOUTH TWICE DAILY, Disp: 360 tablet, Rfl: 2    needle, disp, 18G X 1\" MISC, 1 Needle every 14 days Used to withdraw the testosterone medication from the vial., Disp: 50 each, Rfl: 0    Needle, Disp, 25G X 1\" MISC, 1 Needle every 14 days Used to inject the testosterone medication., Disp: 50 each, Rfl: 0    sulfamethoxazole-trimethoprim (BACTRIM DS) 800-160 MG tablet, Take 1 tablet by mouth 2 times daily, Disp: 28 tablet, Rfl: 0    tadalafil (CIALIS) 20 MG tablet, Take 1 tablet (20 mg) by mouth daily as needed (ED), Disp: 12 tablet, Rfl: 11    testosterone cypionate (DEPOTESTOSTERONE) 200 MG/ML injection, Inject 1 mL (200 mg) into the muscle every 14 days, Disp: 2 mL, Rfl: 3    oxyCODONE (ROXICODONE) 5 MG tablet, Take 1 tablet (5 mg) by mouth every 4 hours as needed for moderate pain (Patient not taking: Reported on 10/10/2023), Disp: 20 tablet, Rfl: 0    simvastatin (ZOCOR) 20 MG tablet, TAKE 1 TABLET(20 MG) BY MOUTH IN THE MORNING " (Patient taking differently: Take 20 mg by mouth At Bedtime), Disp: 90 tablet, Rfl: 2     ALLERGIES:    Allergies   Allergen Reactions    Lisinopril      Cough          SOCIAL HISTORY:   Social History     Socioeconomic History    Marital status: Single     Spouse name: Not on file    Number of children: Not on file    Years of education: Not on file    Highest education level: Not on file   Occupational History    Not on file   Tobacco Use    Smoking status: Never    Smokeless tobacco: Never   Vaping Use    Vaping Use: Never used   Substance and Sexual Activity    Alcohol use: Yes     Alcohol/week: 0.0 standard drinks of alcohol     Comment: Very little and infrequently    Drug use: Never    Sexual activity: Yes     Partners: Female     Birth control/protection: Female Surgical, None   Other Topics Concern     Service No    Blood Transfusions No    Caffeine Concern No    Occupational Exposure No    Hobby Hazards No    Sleep Concern No    Stress Concern Yes     Comment: work related    Weight Concern Yes    Special Diet Yes     Comment: medium carb (40-75g per meal)    Back Care No    Exercise Yes    Bike Helmet No    Seat Belt Yes    Self-Exams No    Parent/sibling w/ CABG, MI or angioplasty before 65F 55M? No   Social History Narrative    3/28/2013: Single, .  Lives with his 20 year old son.  Also has an 18 year old son and 22 year old daughter.  Works as a  for [employer].  Active in sports (playing, officiating, watching): softball, football, hockey.     Social Determinants of Health     Financial Resource Strain: Not on file   Food Insecurity: Not on file   Transportation Needs: Not on file   Physical Activity: Not on file   Stress: Not on file   Social Connections: Not on file   Interpersonal Safety: Not on file   Housing Stability: Not on file        FAMILY HISTORY:   Family History   Problem Relation Age of Onset    Diabetes Mother     Hypertension Mother      Cerebrovascular Disease Mother     Obesity Mother     C.A.D. Father         in mid 60's    Cerebrovascular Disease Father     Diabetes Maternal Aunt          of Diabetic complications    Glaucoma Other     Cancer - colorectal No family hx of     Prostate Cancer No family hx of     Macular Degeneration No family hx of         EXAM:Vitals: /78   Wt 127 kg (280 lb)   BMI 40.18 kg/m    BMI= Body mass index is 40.18 kg/m .    A1C: 8.4 (23)    General appearance: Patient is alert and fully cooperative with history & exam.  No sign of distress is noted during the visit.     Psychiatric: Affect is pleasant & appropriate.  Patient appears motivated to improve health.     Respiratory: Breathing is regular & unlabored while sitting.     HEENT: Hearing is intact to spoken word.  Speech is clear.  No gross evidence of visual impairment that would impact ambulation.     Dermatologic: Dressing is clean dry and intact.  Sutures are intact.  No redness, dehiscence or signs of acute infection noted.     Vascular: DP & PT pulses are intact & regular bilaterally.  No significant edema or varicosities noted.  CFT and skin temperature is normal to both lower extremities.     Neurologic: Lower extremity sensation is diminished to feet.     Musculoskeletal: Patient is ambulatory without assistive device or brace.  Left great toe now amputated.      ASSESSMENT:    Diabetic polyneuropathy associated with type 2 diabetes mellitus (H)  Post-operative state  Amputation of left great toe (H24)     Medical Decision Making/Plan:  Reviewed patient's chart in epic.  At this time the dressing was changed.  He will continue to keep the foot dressing clean and dry.  He will continue nonweightbearing on the left foot as he has been doing.  We will have him follow-up weekly for the next 5 weeks to reassess the incision area to see when the stitches are ready to come out.    All questions were answered to patient's satisfaction and he will  call further questions or concerns    Patient risk factor: Patient is at low risk for infection.        Wendy Pate DPM, Podiatry/Foot and Ankle Surgery

## 2023-10-10 NOTE — LETTER
10/10/2023         RE: Jairo Escamilla  [address] [place] [address]        Dear Colleague,    Thank you for referring your patient, Jairo Escamilla, to the [facility] PODIATRY. Please see a copy of my visit note below.    PATIENT HISTORY:  Jairo Escamilla is a 53 year old male who presents to clinic for 1 week status post partial left first ray amputation.  Notes that he is doing well.  Has been doing good keeping weight off the foot with the wheelchair.  Denies fever, nausea, vomiting.  Does not note any real pain but does have some baseline neuropathy.    Review of Systems:  Patient denies fever, chills, rash, wound, stiffness, limping,  weakness, heart burn, blood in stool, chest pain with activity, calf pain when walking, shortness of breath with activity, chronic cough, easy bleeding/bruising, swelling of ankles, excessive thirst, fatigue, depression, anxiety.  Patient admits to numbness.     PAST MEDICAL HISTORY:   Past Medical History:   Diagnosis Date     Diabetes mellitus (adult onset)      Diabetes mellitus, type 2 (H) 05/14/2008     Fracture of rib 2022     High cholesterol      Hypertension      Obese      Peripheral neuropathy      Pneumonia      Sleep apnea      Subdural hematoma (H)         PAST SURGICAL HISTORY:   Past Surgical History:   Procedure Laterality Date     AMPUTATE TOE(S) Left 8/19/2023    Procedure: Left hallux amputation;  Surgeon: Filiberto Mercedes DPM;  Location:  OR     AMPUTATE TOE(S) Left 9/30/2023    Procedure: Left foot first ray resection;  Surgeon: Wendy Ptae DPM, Podiatry/Foot and Ankle Surgery;  Location:  OR     COLONOSCOPY N/A 12/22/2022    Procedure: COLONOSCOPY, FLEXIBLE, WITH LESION REMOVAL USING SNARE;  Surgeon: Josh Reeves MD;  Location:  GI     IRRIGATION AND DEBRIDEMENT FOOT, COMBINED Left 8/21/2023    Procedure: IRRIGATION AND DEBRIDEMENT LEFT FOOT, REVISION OF AMPUTATION;  Surgeon: Michelle Marc DPM;  Location:  Medication sent   "[facility] OR     NO HISTORY OF SURGERY          MEDICATIONS:   Current Outpatient Medications:      acetaminophen (TYLENOL) 325 MG tablet, Take 3 tablets (975 mg) by mouth every 8 hours, Disp: , Rfl:      blood glucose (ONETOUCH ULTRA) test strip, Use to test blood sugars tid or as directed., Disp: 100 each, Rfl: prn     cimetidine (TAGAMET) 200 MG tablet, Take 1 tablet (200 mg) by mouth 2 times daily, Disp: , Rfl:      Continuous Blood Gluc Sensor (DEXCOM G6 SENSOR) MISC, 1 each every 10 days Change every 10 days., Disp: 3 each, Rfl: 11     Continuous Blood Gluc Transmit (DEXCOM G6 TRANSMITTER) MISC, USE AS DIRECTED. CHANGE EVERY 3 MONTHS, Disp: 1 each, Rfl: 0     insulin aspart (NOVOLOG PEN) 100 UNIT/ML pen, Inject 25 Units Subcutaneous 3 times daily (with meals) for 90 days Along with adjustment scale of 1:50 as directed up to 75 units daily., Disp: 67.5 mL, Rfl: 1     insulin glargine (BASAGLAR KWIKPEN) 100 UNIT/ML pen, Inject 60 Units Subcutaneous 2 times daily, Disp: 36 mL, Rfl: 11     insulin pen needle (32G X 6 MM) 32G X 6 MM miscellaneous, Use 6 pen needles daily or as directed., Disp: 180 each, Rfl: 3     losartan (COZAAR) 25 MG tablet, Take 1 tablet (25 mg) by mouth daily, Disp: , Rfl:      metFORMIN (GLUCOPHAGE XR) 500 MG 24 hr tablet, TAKE 2 TABLETS BY MOUTH TWICE DAILY, Disp: 360 tablet, Rfl: 2     needle, disp, 18G X 1\" MISC, 1 Needle every 14 days Used to withdraw the testosterone medication from the vial., Disp: 50 each, Rfl: 0     Needle, Disp, 25G X 1\" MISC, 1 Needle every 14 days Used to inject the testosterone medication., Disp: 50 each, Rfl: 0     sulfamethoxazole-trimethoprim (BACTRIM DS) 800-160 MG tablet, Take 1 tablet by mouth 2 times daily, Disp: 28 tablet, Rfl: 0     tadalafil (CIALIS) 20 MG tablet, Take 1 tablet (20 mg) by mouth daily as needed (ED), Disp: 12 tablet, Rfl: 11     testosterone cypionate (DEPOTESTOSTERONE) 200 MG/ML injection, Inject 1 mL (200 mg) into the muscle every 14 days, " Disp: 2 mL, Rfl: 3     oxyCODONE (ROXICODONE) 5 MG tablet, Take 1 tablet (5 mg) by mouth every 4 hours as needed for moderate pain (Patient not taking: Reported on 10/10/2023), Disp: 20 tablet, Rfl: 0     simvastatin (ZOCOR) 20 MG tablet, TAKE 1 TABLET(20 MG) BY MOUTH IN THE MORNING (Patient taking differently: Take 20 mg by mouth At Bedtime), Disp: 90 tablet, Rfl: 2     ALLERGIES:    Allergies   Allergen Reactions     Lisinopril      Cough          SOCIAL HISTORY:   Social History     Socioeconomic History     Marital status: Single     Spouse name: Not on file     Number of children: Not on file     Years of education: Not on file     Highest education level: Not on file   Occupational History     Not on file   Tobacco Use     Smoking status: Never     Smokeless tobacco: Never   Vaping Use     Vaping Use: Never used   Substance and Sexual Activity     Alcohol use: Yes     Alcohol/week: 0.0 standard drinks of alcohol     Comment: Very little and infrequently     Drug use: Never     Sexual activity: Yes     Partners: Female     Birth control/protection: Female Surgical, None   Other Topics Concern      Service No     Blood Transfusions No     Caffeine Concern No     Occupational Exposure No     Hobby Hazards No     Sleep Concern No     Stress Concern Yes     Comment: work related     Weight Concern Yes     Special Diet Yes     Comment: medium carb (40-75g per meal)     Back Care No     Exercise Yes     Bike Helmet No     Seat Belt Yes     Self-Exams No     Parent/sibling w/ CABG, MI or angioplasty before 65F 55M? No   Social History Narrative    3/28/2013: Single, .  Lives with his 20 year old son.  Also has an 18 year old son and 22 year old daughter.  Works as a  for [employer].  Active in sports (playing, officiating, watching): softball, football, hockey.     Social Determinants of Health     Financial Resource Strain: Not on file   Food Insecurity: Not on file    Transportation Needs: Not on file   Physical Activity: Not on file   Stress: Not on file   Social Connections: Not on file   Interpersonal Safety: Not on file   Housing Stability: Not on file        FAMILY HISTORY:   Family History   Problem Relation Age of Onset     Diabetes Mother      Hypertension Mother      Cerebrovascular Disease Mother      Obesity Mother      C.A.D. Father         in mid 60's     Cerebrovascular Disease Father      Diabetes Maternal Aunt          of Diabetic complications     Glaucoma Other      Cancer - colorectal No family hx of      Prostate Cancer No family hx of      Macular Degeneration No family hx of         EXAM:Vitals: /78   Wt 127 kg (280 lb)   BMI 40.18 kg/m    BMI= Body mass index is 40.18 kg/m .    A1C: 8.4 (23)    General appearance: Patient is alert and fully cooperative with history & exam.  No sign of distress is noted during the visit.     Psychiatric: Affect is pleasant & appropriate.  Patient appears motivated to improve health.     Respiratory: Breathing is regular & unlabored while sitting.     HEENT: Hearing is intact to spoken word.  Speech is clear.  No gross evidence of visual impairment that would impact ambulation.     Dermatologic: Dressing is clean dry and intact.  Sutures are intact.  No redness, dehiscence or signs of acute infection noted.     Vascular: DP & PT pulses are intact & regular bilaterally.  No significant edema or varicosities noted.  CFT and skin temperature is normal to both lower extremities.     Neurologic: Lower extremity sensation is diminished to feet.     Musculoskeletal: Patient is ambulatory without assistive device or brace.  Left great toe now amputated.      ASSESSMENT:    Diabetic polyneuropathy associated with type 2 diabetes mellitus (H)  Post-operative state  Amputation of left great toe (H24)     Medical Decision Making/Plan:  Reviewed patient's chart in epic.  At this time the dressing was changed.  He will  continue to keep the foot dressing clean and dry.  He will continue nonweightbearing on the left foot as he has been doing.  We will have him follow-up weekly for the next 5 weeks to reassess the incision area to see when the stitches are ready to come out.    All questions were answered to patient's satisfaction and he will call further questions or concerns    Patient risk factor: Patient is at low risk for infection.        Wendy Pate DPM, Podiatry/Foot and Ankle Surgery      Again, thank you for allowing me to participate in the care of your patient.        Sincerely,        Wendy Pate DPM, Podiatry/Foot and Ankle Surgery

## 2023-10-10 NOTE — PATIENT INSTRUCTIONS
Thank you for choosing [facility] Podiatry / Foot & Ankle Surgery!    DR CHAUDHARY'S CLINIC:  [facility]   [address]   [place] [address]      TRIAGE LINE: [phone]  APPOINTMENTS: [phone]  RADIOLOGY: [phone]  SET UP SURGERY: [phone]  PHYSICAL THERAPY: [phone]   FAX NUMBER: [phone]  BILLING QUESTIONS: [phone]       Follow up: Weekly x 5 weeks

## 2023-10-17 ENCOUNTER — OFFICE VISIT (OUTPATIENT)
Dept: PODIATRY | Facility: CLINIC | Age: 53
End: 2023-10-17
Payer: COMMERCIAL

## 2023-10-17 VITALS — WEIGHT: 280 LBS | BODY MASS INDEX: 40.18 KG/M2 | SYSTOLIC BLOOD PRESSURE: 118 MMHG | DIASTOLIC BLOOD PRESSURE: 80 MMHG

## 2023-10-17 DIAGNOSIS — S98.112A AMPUTATION OF LEFT GREAT TOE (H): ICD-10-CM

## 2023-10-17 DIAGNOSIS — E11.42 DIABETIC POLYNEUROPATHY ASSOCIATED WITH TYPE 2 DIABETES MELLITUS (H): Primary | ICD-10-CM

## 2023-10-17 DIAGNOSIS — Z98.890 POST-OPERATIVE STATE: ICD-10-CM

## 2023-10-17 PROCEDURE — 99024 POSTOP FOLLOW-UP VISIT: CPT | Performed by: PODIATRIST

## 2023-10-17 NOTE — LETTER
10/17/2023         RE: Jairo Escamilla  [address] [place] [address]        Dear Colleague,    Thank you for referring your patient, Jairo Escamilla, to the [facility] PODIATRY. Please see a copy of my visit note below.    Podiatry / Foot and Ankle Surgery Progress Note    October 17, 2023    Subject: Patient was seen for 2 weeks status post partial left first ray amputation.  Notes that he is doing well.  Has been doing good keeping weight off the foot with the wheelchair.  Denies fever, nausea, vomiting.  Does not note any real pain but does have some baseline neuropathy.     Objective:  Vitals: /80   Wt 127 kg (280 lb)   BMI 40.18 kg/m    BMI= Body mass index is 40.18 kg/m .    A1C: 8.4 (8/1/23)     General appearance: Patient is alert and fully cooperative with history & exam.  No sign of distress is noted during the visit.     Psychiatric: Affect is pleasant & appropriate.  Patient appears motivated to improve health.     Respiratory: Breathing is regular & unlabored while sitting.     HEENT: Hearing is intact to spoken word.  Speech is clear.  No gross evidence of visual impairment that would impact ambulation.     Dermatologic: Dressing is clean dry and intact.  Sutures are intact.  No redness, dehiscence or signs of acute infection noted.     Vascular: DP & PT pulses are intact & regular bilaterally.  No significant edema or varicosities noted.  CFT and skin temperature is normal to both lower extremities.     Neurologic: Lower extremity sensation is diminished to feet.     Musculoskeletal: Patient is ambulatory without assistive device or brace.  Left great toe now amputated.      ASSESSMENT:    Diabetic polyneuropathy associated with type 2 diabetes mellitus (H)  Post-operative state  Amputation of left great toe (H24)     Medical Decision Making/Plan:  Reviewed patient's chart in epic.  At this time the dressing was changed.  He will continue to keep the foot  dressing clean and dry.  He will continue nonweightbearing on the left foot as he has been doing.  We will have him follow-up weekly for the next 3 weeks to reassess the incision area to see when the stitches are ready to come out.     All questions were answered to patient's satisfaction and he will call further questions or concerns     Patient risk factor: Patient is at low risk for infection.     Wendy Pate DPM, Podiatry/Foot and Ankle Surgery            Again, thank you for allowing me to participate in the care of your patient.        Sincerely,        Wendy Pate DPM, Podiatry/Foot and Ankle Surgery

## 2023-10-17 NOTE — PROGRESS NOTES
Podiatry / Foot and Ankle Surgery Progress Note    October 17, 2023    Subject: Patient was seen for 2 weeks status post partial left first ray amputation.  Notes that he is doing well.  Has been doing good keeping weight off the foot with the wheelchair.  Denies fever, nausea, vomiting.  Does not note any real pain but does have some baseline neuropathy.     Objective:  Vitals: /80   Wt 127 kg (280 lb)   BMI 40.18 kg/m    BMI= Body mass index is 40.18 kg/m .    A1C: 8.4 (8/1/23)     General appearance: Patient is alert and fully cooperative with history & exam.  No sign of distress is noted during the visit.     Psychiatric: Affect is pleasant & appropriate.  Patient appears motivated to improve health.     Respiratory: Breathing is regular & unlabored while sitting.     HEENT: Hearing is intact to spoken word.  Speech is clear.  No gross evidence of visual impairment that would impact ambulation.     Dermatologic: Dressing is clean dry and intact.  Sutures are intact.  No redness, dehiscence or signs of acute infection noted.     Vascular: DP & PT pulses are intact & regular bilaterally.  No significant edema or varicosities noted.  CFT and skin temperature is normal to both lower extremities.     Neurologic: Lower extremity sensation is diminished to feet.     Musculoskeletal: Patient is ambulatory without assistive device or brace.  Left great toe now amputated.      ASSESSMENT:    Diabetic polyneuropathy associated with type 2 diabetes mellitus (H)  Post-operative state  Amputation of left great toe (H24)     Medical Decision Making/Plan:  Reviewed patient's chart in epic.  At this time the dressing was changed.  He will continue to keep the foot dressing clean and dry.  He will continue nonweightbearing on the left foot as he has been doing.  We will have him follow-up weekly for the next 3 weeks to reassess the incision area to see when the stitches are ready to come out.     All questions were  answered to patient's satisfaction and he will call further questions or concerns     Patient risk factor: Patient is at low risk for infection.     Wendy Pate DPM, Podiatry/Foot and Ankle Surgery

## 2023-10-22 DIAGNOSIS — E11.65 TYPE 2 DIABETES MELLITUS WITH HYPERGLYCEMIA, WITH LONG-TERM CURRENT USE OF INSULIN (H): ICD-10-CM

## 2023-10-22 DIAGNOSIS — Z79.4 TYPE 2 DIABETES MELLITUS WITH HYPERGLYCEMIA, WITH LONG-TERM CURRENT USE OF INSULIN (H): ICD-10-CM

## 2023-10-23 RX ORDER — PROCHLORPERAZINE 25 MG/1
SUPPOSITORY RECTAL
Qty: 3 EACH | Refills: 5 | Status: SHIPPED | OUTPATIENT
Start: 2023-10-23 | End: 2024-03-01

## 2023-10-24 ENCOUNTER — OFFICE VISIT (OUTPATIENT)
Dept: PODIATRY | Facility: CLINIC | Age: 53
End: 2023-10-24
Payer: COMMERCIAL

## 2023-10-24 VITALS
DIASTOLIC BLOOD PRESSURE: 88 MMHG | HEIGHT: 70 IN | BODY MASS INDEX: 40.09 KG/M2 | SYSTOLIC BLOOD PRESSURE: 132 MMHG | WEIGHT: 280 LBS

## 2023-10-24 DIAGNOSIS — Z98.890 POST-OPERATIVE STATE: ICD-10-CM

## 2023-10-24 DIAGNOSIS — E11.42 DIABETIC POLYNEUROPATHY ASSOCIATED WITH TYPE 2 DIABETES MELLITUS (H): Primary | ICD-10-CM

## 2023-10-24 DIAGNOSIS — S98.112A AMPUTATION OF LEFT GREAT TOE (H): ICD-10-CM

## 2023-10-24 PROCEDURE — 99024 POSTOP FOLLOW-UP VISIT: CPT | Performed by: PODIATRIST

## 2023-10-24 NOTE — PROGRESS NOTES
"Podiatry / Foot and Ankle Surgery Progress Note    October 24, 2023    Subject: Patient was seen for 3 weeks status post partial left first ray amputation.  Notes that he is doing well.  Has been doing good keeping weight off the foot with the wheelchair.  Denies fever, nausea, vomiting.  Does not note any real pain but does have some baseline neuropathy.      Objective:  Vitals: /88   Ht 1.778 m (5' 10\")   Wt 127 kg (280 lb)   BMI 40.18 kg/m    BMI= Body mass index is 40.18 kg/m .  A1C: 8.4 (8/1/23)     General appearance: Patient is alert and fully cooperative with history & exam.  No sign of distress is noted during the visit.     Psychiatric: Affect is pleasant & appropriate.  Patient appears motivated to improve health.     Respiratory: Breathing is regular & unlabored while sitting.     HEENT: Hearing is intact to spoken word.  Speech is clear.  No gross evidence of visual impairment that would impact ambulation.     Dermatologic: Dressing is clean dry and intact.  Sutures are intact.  No redness, dehiscence or signs of acute infection noted.     Vascular: DP & PT pulses are intact & regular bilaterally.  No significant edema or varicosities noted.  CFT and skin temperature is normal to both lower extremities.     Neurologic: Lower extremity sensation is diminished to feet.     Musculoskeletal: Patient is ambulatory without assistive device or brace.  Left great toe now amputated.      ASSESSMENT:    Diabetic polyneuropathy associated with type 2 diabetes mellitus (H)  Post-operative state  Amputation of left great toe (H24)     Medical Decision Making/Plan:  Reviewed patient's chart in epic.  At this time the dressing was changed.  He will continue to keep the foot dressing clean and dry.  He will continue nonweightbearing on the left foot as he has been doing.  We will have him follow-up weekly for the next 2 weeks to reassess the incision area to see when the stitches are ready to come out.     All " questions were answered to patient's satisfaction and he will call further questions or concerns     Patient risk factor: Patient is at low risk for infection.     Wendy Pate DPM, Podiatry/Foot and Ankle Surgery

## 2023-10-24 NOTE — PATIENT INSTRUCTIONS
Thank you for choosing [facility] Podiatry / Foot & Ankle Surgery!    DR CHAUDHARY'S CLINIC:  [facility]   [address]   [place] [address]      TRIAGE LINE: [phone]  APPOINTMENTS: [phone]  RADIOLOGY: [phone]  SET UP SURGERY: [phone]  PHYSICAL THERAPY: [phone]   FAX NUMBER: [phone]  BILLING QUESTIONS: [phone]       Follow up: 1 week      [facility]  [facility]  [address] beds at the [facility] [address]  [place] [address]  Phone: [phone]  Fax: [phone] [facility]  [address]  [place] [address]  Phone: [phone]   Fax: [phone]   [facility]  [address]  [place] [address]  Phone: [phone]  Fax: [phone] [facility]  [address]  [place] [address]  Phone: [phone]   Fax: [phone]   [facility]   [address]  [place] [address]  Phone: [phone]  Fax: [phone] * Please call any location listed to make an appointment for a casting/fitting. Your referral was sent to their central office and they will all have the order on file.     WEARING YOUR CUSTOM FOOT ORTHOTICS   Most insurance plans cover one pair of orthotics per year. You must check with your   insurance plan to see what your payment responsibility will be. Please call your   insurance company by calling the number on the back of your insurance card.   Orthotic's are non-refundable and non-returnable.   Orthotics are made of various designs. Some orthotics are covered with material that extends beyond your toes. If your orthotic is of this design, you will likely need to trim the toe end to get a proper fit. The insole from your shoe can be used as a template. Simply overlay the shoe insert on top of the custom orthotic. Align the heel end while tracing the length of the insert  onto the custom orthotic. Use a large scissor to trim the toe end until you get a proper fit in the shoe.   The orthotic needs to be pushed as far back in the shoe as possible. The heel portion should not ride forward so as not to irritate your heel.   Orthotics are designed to work with socks. Excessive perspiration will shorten the life span of the orthotics. Remove the orthotic from the shoe frequently for proper drying.   The break-in period lasts for weeks. People new to orthotics will likely experience new aches and pains. The orthotic is forcing your foot into a new position. Arch, foot and leg muscle aches and fatigue are common during these weeks. Minor discomfort can be considered normal break in phenomenon. Start wearing your orthotic around your home your first day. Limited activity for one to two hours is recommended. You can increase one or two additional hours each day provided the aches and pains are subsiding. The degree of discomfort, fatigue and problems will dictate the speed of break in. You may require multiple weeks to work up to full time use.   Do not continue wearing your orthotics if they are creating problems such as blisters or sores. Do not hesitate to call the clinic to speak with a nurse regarding orthotic   break in, fit, trimming, etc. You may also need to see the doctor if the orthotics are   simply not working out. Adjustments are sometimes made to improve orthotic   function.     Orthotics will only work in certain styles and types of shoes. Orthotics rarely work in dress shoes. Slip-ons, clogs, sandals and heels are particularly troublesome. Specially designed orthotics may be necessary for these types of shoes. Your custom orthotic was designed for activities that require appropriate walking or running shoes. Lace up athletic shoes, walking shoes or work boots should work appropriately. You may need a wider or longer shoe. Shoes with a removable  or insert work best.  In general, you want to remove an insert from the shoe before placing the orthotic into the shoe. Shoes without a removable liner may not work as well.     When purchasing new shoes, bring your orthotics along to get a proper fit. Shop at stores that are familiar with orthotics.   Frequent washing of the orthotic may shorten the life span of the top cover. The top cover can be replaced but will generally last one to five years depending on use and foot perspiration.

## 2023-10-24 NOTE — LETTER
"    10/24/2023         RE: Jairo Escamilla  [address] [place] [address]        Dear Colleague,    Thank you for referring your patient, Jairo Escamilla, to the [facility] POD[facility]. Please see a copy of my visit note below.    Podiatry / Foot and Ankle Surgery Progress Note    October 24, 2023    Subject: Patient was seen for 3 weeks status post partial left first ray amputation.  Notes that he is doing well.  Has been doing good keeping weight off the foot with the wheelchair.  Denies fever, nausea, vomiting.  Does not note any real pain but does have some baseline neuropathy.      Objective:  Vitals: /88   Ht 1.778 m (5' 10\")   Wt 127 kg (280 lb)   BMI 40.18 kg/m    BMI= Body mass index is 40.18 kg/m .  A1C: 8.4 (8/1/23)     General appearance: Patient is alert and fully cooperative with history & exam.  No sign of distress is noted during the visit.     Psychiatric: Affect is pleasant & appropriate.  Patient appears motivated to improve health.     Respiratory: Breathing is regular & unlabored while sitting.     HEENT: Hearing is intact to spoken word.  Speech is clear.  No gross evidence of visual impairment that would impact ambulation.     Dermatologic: Dressing is clean dry and intact.  Sutures are intact.  No redness, dehiscence or signs of acute infection noted.     Vascular: DP & PT pulses are intact & regular bilaterally.  No significant edema or varicosities noted.  CFT and skin temperature is normal to both lower extremities.     Neurologic: Lower extremity sensation is diminished to feet.     Musculoskeletal: Patient is ambulatory without assistive device or brace.  Left great toe now amputated.      ASSESSMENT:    Diabetic polyneuropathy associated with type 2 diabetes mellitus (H)  Post-operative state  Amputation of left great toe (H24)     Medical Decision Making/Plan:  Reviewed patient's chart in epic.  At this time the dressing was changed.  He will continue " to keep the foot dressing clean and dry.  He will continue nonweightbearing on the left foot as he has been doing.  We will have him follow-up weekly for the next 2 weeks to reassess the incision area to see when the stitches are ready to come out.     All questions were answered to patient's satisfaction and he will call further questions or concerns     Patient risk factor: Patient is at low risk for infection.     Wendy Pate DPM, Podiatry/Foot and Ankle Surgery          Again, thank you for allowing me to participate in the care of your patient.        Sincerely,        Wendy Pate DPM, Podiatry/Foot and Ankle Surgery

## 2023-10-31 ENCOUNTER — OFFICE VISIT (OUTPATIENT)
Dept: PODIATRY | Facility: CLINIC | Age: 53
End: 2023-10-31
Payer: COMMERCIAL

## 2023-10-31 VITALS — WEIGHT: 280 LBS | SYSTOLIC BLOOD PRESSURE: 118 MMHG | DIASTOLIC BLOOD PRESSURE: 80 MMHG | BODY MASS INDEX: 40.18 KG/M2

## 2023-10-31 DIAGNOSIS — S98.112A AMPUTATION OF LEFT GREAT TOE (H): ICD-10-CM

## 2023-10-31 DIAGNOSIS — Z98.890 POST-OPERATIVE STATE: ICD-10-CM

## 2023-10-31 DIAGNOSIS — E11.42 DIABETIC POLYNEUROPATHY ASSOCIATED WITH TYPE 2 DIABETES MELLITUS (H): Primary | ICD-10-CM

## 2023-10-31 PROCEDURE — 99024 POSTOP FOLLOW-UP VISIT: CPT | Performed by: PODIATRIST

## 2023-10-31 ASSESSMENT — ANXIETY QUESTIONNAIRES
6. BECOMING EASILY ANNOYED OR IRRITABLE: MORE THAN HALF THE DAYS
4. TROUBLE RELAXING: NOT AT ALL
5. BEING SO RESTLESS THAT IT IS HARD TO SIT STILL: NOT AT ALL
7. FEELING AFRAID AS IF SOMETHING AWFUL MIGHT HAPPEN: SEVERAL DAYS
GAD7 TOTAL SCORE: 5
IF YOU CHECKED OFF ANY PROBLEMS ON THIS QUESTIONNAIRE, HOW DIFFICULT HAVE THESE PROBLEMS MADE IT FOR YOU TO DO YOUR WORK, TAKE CARE OF THINGS AT HOME, OR GET ALONG WITH OTHER PEOPLE: SOMEWHAT DIFFICULT
2. NOT BEING ABLE TO STOP OR CONTROL WORRYING: NOT AT ALL
1. FEELING NERVOUS, ANXIOUS, OR ON EDGE: SEVERAL DAYS
3. WORRYING TOO MUCH ABOUT DIFFERENT THINGS: SEVERAL DAYS
GAD7 TOTAL SCORE: 5

## 2023-10-31 NOTE — PROGRESS NOTES
Podiatry / Foot and Ankle Surgery Progress Note    October 31, 2023    Subject: Patient was seen for  4 weeks status post partial left first ray amputation.  Notes that he is doing well.  Has been doing good keeping weight off the foot with the wheelchair.  Denies fever, nausea, vomiting.  Does not note any real pain but does have some baseline neuropathy.      Objective:  Vitals: Wt 127 kg (280 lb)   BMI 40.18 kg/m    BMI= Body mass index is 40.18 kg/m .    A1C: 8.4 (8/1/23)     General appearance: Patient is alert and fully cooperative with history & exam.  No sign of distress is noted during the visit.     Psychiatric: Affect is pleasant & appropriate.  Patient appears motivated to improve health.     Respiratory: Breathing is regular & unlabored while sitting.     HEENT: Hearing is intact to spoken word.  Speech is clear.  No gross evidence of visual impairment that would impact ambulation.     Dermatologic: Dressing is clean dry and intact.  Sutures are intact.  No redness, dehiscence or signs of acute infection noted.     Vascular: DP & PT pulses are intact & regular bilaterally.  No significant edema or varicosities noted.  CFT and skin temperature is normal to both lower extremities.     Neurologic: Lower extremity sensation is diminished to feet.     Musculoskeletal: Patient is ambulatory without assistive device or brace.  Left great toe now amputated.      ASSESSMENT:    Diabetic polyneuropathy associated with type 2 diabetes mellitus (H)  Post-operative state  Amputation of left great toe (H24)     Medical Decision Making/Plan:  Reviewed patient's chart in epic.  At this time, the sutures were removed.  Patient can get the foot wet.  He can lotion the foot.  He can start walking in the boot.  He does not have to wear a big bulky dressing and can wear a sock.  Recommend a large Band-Aid over the incision for the next week or 2 to help protect the incision area.  He is getting fitted for shoes and inserts  today.  We will have him follow-up in 1 month for reassessment.     All questions were answered to patient's satisfaction and he will call further questions or concerns     Patient risk factor: Patient is at low risk for infection.     Wendy Pate DPM, Podiatry/Foot and Ankle Surgery

## 2023-10-31 NOTE — PATIENT INSTRUCTIONS
Thank you for choosing [facility] Podiatry / Foot & Ankle Surgery!    DR CHAUDHARY'S CLINIC:  [facility]   [address]   [place] [address]      TRIAGE LINE: [phone]  APPOINTMENTS: [phone]  RADIOLOGY: [phone]  SET UP SURGERY: [phone]  PHYSICAL THERAPY: [phone]   FAX NUMBER: [phone]  BILLING QUESTIONS: [phone]       Follow up: 1 month follow up      SCAR CARE PROTOCOL  Scarring is an unfortunate but unavoidable part of surgery.  Every person scars differently and there is no way to predict how an individual's final scar will look.  Now that the sutures have been removed one can begin taking some steps to help minimize the appearance of scarring.    WOUND HEALING  As soon as the skin is incised during surgery, the body is taking steps to prepare for healing. After about 3 days, the body has sent cells to the incision to begin the healing process. These cells, called fibroblasts, make collagen, a protein in the skin that helps provide strength. Once the skin has been sufficiently strengthened, the sutures are removed. Over the next year, the body synthesizes new collagen and breaks down old collagen to help achieve a strong scar that allows the foot/ankle to function appropriately. This is where patients can help the appearance of the scar, as it will change over the next year.    STEPS  1. Do not expose the scar to the sun for 1 year.  2. Any sun exposure may permanently darken the appearance of the scar.  3. Wear shoes/socks or cover your scar with zinc oxide.  4. Massage the scar 2-3 times per day.  -Massage the entire length of the scar with gentle to moderate pressure.  -Pressure can help flatten the scar.  5. Lotion/Vitamin E helps keep the tissue soft.  6. Try over the counter scar products such as Mederma or Scar Zone.  -These are available at any pharmacy without a prescription.  -Patients must use these for extended periods of time (6-12  "months) to see a difference.      DIABETES AND YOUR FEET  Diabetes can result in several problems in the feet including ulcers (open sores) and amputations. Two of the most important reasons why people develop foot problems when they have diabetes is : 1. Neuropathy (loss of feeling)  2. Vascular disease (loss or decrease of blood flow).    Neuropathy is a term used to describe a loss of nerve function.  Patients with diabetes are at risk of developing neuropathy if their sugars continue to run high and are above the normal value. One theory for neuropathy is that the \"extra\" sugar in the body enters the nerves and is broken down. These by-products build up in the nerve causing it to swell and impairing nerve function. Often times, this can be prevented by controlling your sugars, dieting and exercise.    When a person develops neuropathy, they usually begin to feel numbness or tingling in their feet and sometime in their legs.  Other symptoms may include painful burning or hot feet, tingling or feeling like insects or ants are crawling on your feet or legs.  If the diabetes is sever and the sugars run high for long periods of time, neuropathy can also occur in the hands.    Vascular disease  is a term used to describe a loss or decrease in circulation (blood flow). There is a problem in getting blood and oxygen to areas that need it. Similar to neuropathy, sugars can build up in the walls of the arteries (blood vessels) and cause them to become swollen, thickened and hardened. This decreases the amount of blood that can go to an area that needs it. Though this is common in the legs of diabetic patients, it can also affect other arteries (blood vessels) in the body such as in the heart and eyes.    In the legs, vascular disease usually results in cramping. Patients who develop leg cramps after walking the same distance every time (i.e. One block, half a mile, ect.) need to let their doctors know so that their " circulation may be checked. Cramps causing severe pain in the feet and/or legs while sleeping and the cramps go away when you stand or hang your legs off the side of the bed, may also be a sign of poor blood circulation.  Occasional cramping in cold weather or on rare occasions with activity may not be due to poor circulation, but you should inform your doctor.    PREVENTION OF THESE DISEASES  The key to prevention is good blood sugar control. Poor blood sugar control is a big reason many of these problems start. Physical activity (exercise) is a very good way to help decrease your blood sugars. Exercise can lower your blood sugar, blood pressure, and cholesterol. It also reduces your risk for heart disease and stroke, relieves stress, and strengthens your heart, muscles and bones.  In addition, regular activity helps insulin work better, improves your blood circulation, and keeps your joints flexible. If you're trying to lose weight, a combination of exercise and wise food choices can help you reach your target weight and maintain it.      PAIN MANAGEMENT (**Please speak with your primary doctor about any medications**)  1.Blood Sugar Control - Most important  2. Medications such as:  Amytriptylline, duloxetine, gabapentin, lyrica, tramadol (talk with your primary care doctor about this).     NUTRITION:  Nutrition is also important to help with healing. If your body does not have what it needs, it can't heal.   Increasing your protein intake is important.  With wounds you need 60-90gm of protein a day to help with healing. Over the counter protein shakes such as Michael, Glucerna, Ensure, ect... can help to supplement your daily protein intake.   It is also important to take Vitamins to help with healing.  Vitamins such as B12, B6 and Vitamin D3 are important for healing. These can be gotten over the counter at pharmacies or at stores like [employer] or the [employer].    I can also prescribe a dietary supplement called  "\"Rheumate\" that has a lot of essential vitamins in one capsule.  This may not be covered by insurance though.     FOOT CARE RECOMMENDATIONS   1. Wash your feet with lukewarm water and a mild soap and then dry them thoroughly, especially between the toes.     2. Examine your feet daily looking for cuts, corns, blisters, cracks, ect, especially after wearing new shoes. Make sure to look between your toes. If you cannot see the bottom of your feet, set a mirror on the floor and hold your foot over it, or ask a spouse, friend or family member to examine your feet for you. Contact your doctor immediately if new problems are noted or if sores are not healing.     3. Immediately apply moisturizer to the tops and bottoms of your feet, avoiding areas between the toes. Hand lotion (Intesive Care, Nadine, Eucerin, Neutrogena, Curel, ect) is sufficient unless your doctor prescribes a medicated lotion. Apply sunscreen to your feet when going swimming outside.     4. Use clean comfortable shoes, wear white socks (if you have any bleeding or drainage, you will see it on white socks). Socks should not have thick seams or cut off the circulation around the leg. Break in new shoes slowly and rotate with older shoes until broken in. Check the inside of your shoes with your hand to look for areas of irritation or objects that may have fallen into your shoes.       5. Keep slippers by the side of your bed for use during the night.     6.  Shoes should be fitted by a professional and should not cause areas of irritation.  Check your feet regularly when wearing a new pair of shoes and replace them as needed.     7.  Talk to your doctor about proper exercise. Exercise and stretching stimulate blood flow to your feet and maintain proper glucose levels.     8.  Monitor your blood glucose level as instructed by your doctor. Notify your doctor immediately if your blood sugar is abnormally high or low.    9. Cut your nails straight across, but " then gently round any sharp edges with a cardboard nail file. If you have neuropathy, peripheral vascular disease or cannot see that well to trim your own toenails contact [employer] ([phone]) or [employer] ([phone]).      THINGS TO AVOID DOING   1.  Do not soak your feet if you have an open sore. Use only lukewarm water and always check the temperature with your hand as hot water can easily burn your feet.       2.  Never use a hot water bottle or heating pad on your feet. Also do not apply cold compresses to your feet. With decreased sensation, you could burn or freeze your feet.       3.  Do not apply any of these to your feet:    -  Over the counter medicine for corns or warts    -  Harsh chemicals like boric acid    -  Do not self-treat corns, cuts, blisters or infections. Always consult your doctor.       4.  Do not wear sandals, slippers or walk barefoot, especially on hot sand or concrete or other harsh surfaces.     5.  If you smoke, stop!!!

## 2023-10-31 NOTE — LETTER
10/31/2023         RE: Jairo Escamilla  [address] [place] [address]        Dear Colleague,    Thank you for referring your patient, Jairo Escamilla, to the [facility] PODIATRY. Please see a copy of my visit note below.    Podiatry / Foot and Ankle Surgery Progress Note    October 31, 2023    Subject: Patient was seen for  4 weeks status post partial left first ray amputation.  Notes that he is doing well.  Has been doing good keeping weight off the foot with the wheelchair.  Denies fever, nausea, vomiting.  Does not note any real pain but does have some baseline neuropathy.      Objective:  Vitals: Wt 127 kg (280 lb)   BMI 40.18 kg/m    BMI= Body mass index is 40.18 kg/m .    A1C: 8.4 (8/1/23)     General appearance: Patient is alert and fully cooperative with history & exam.  No sign of distress is noted during the visit.     Psychiatric: Affect is pleasant & appropriate.  Patient appears motivated to improve health.     Respiratory: Breathing is regular & unlabored while sitting.     HEENT: Hearing is intact to spoken word.  Speech is clear.  No gross evidence of visual impairment that would impact ambulation.     Dermatologic: Dressing is clean dry and intact.  Sutures are intact.  No redness, dehiscence or signs of acute infection noted.     Vascular: DP & PT pulses are intact & regular bilaterally.  No significant edema or varicosities noted.  CFT and skin temperature is normal to both lower extremities.     Neurologic: Lower extremity sensation is diminished to feet.     Musculoskeletal: Patient is ambulatory without assistive device or brace.  Left great toe now amputated.      ASSESSMENT:    Diabetic polyneuropathy associated with type 2 diabetes mellitus (H)  Post-operative state  Amputation of left great toe (H24)     Medical Decision Making/Plan:  Reviewed patient's chart in epic.  At this time, the sutures were removed.  Patient can get the foot wet.  He can lotion the  Patient is a 73y old  Male who presents with a chief complaint of back pain (30 Dec 2019 19:55)    SUBJECTIVE / OVERNIGHT EVENTS: overnight events noted    ROS:  Resp: No cough no sputum production  CVS: No chest pain no palpitations no orthopnea  GI: no N/V/D  : no dysuria, no hematuria  Neuro: no weakness no paresthesias  Heme: No petechiae no easy bruising  Msk: No joint pain no swelling  Skin: No rash no itching        MEDICATIONS  (STANDING):  atorvastatin 80 milliGRAM(s) Oral at bedtime  chlorhexidine 2% Cloths 1 Application(s) Topical <User Schedule>  lisinopril 5 milliGRAM(s) Oral daily  metoprolol succinate ER 50 milliGRAM(s) Oral daily    MEDICATIONS  (PRN):  acetaminophen   Tablet .. 1000 milliGRAM(s) Oral every 6 hours PRN Mild Pain (1 - 3)  nitroglycerin     SubLingual 0.4 milliGRAM(s) SubLingual every 5 minutes PRN Chest Pain        CAPILLARY BLOOD GLUCOSE        I&O's Summary      Vital Signs Last 24 Hrs  T(C): 36.3 (31 Dec 2019 10:09), Max: 36.6 (30 Dec 2019 20:12)  T(F): 97.3 (31 Dec 2019 10:09), Max: 97.9 (30 Dec 2019 20:12)  HR: 71 (31 Dec 2019 10:09) (58 - 72)  BP: 129/71 (31 Dec 2019 10:09) (110/65 - 146/82)  BP(mean): --  RR: 18 (31 Dec 2019 10:09) (17 - 18)  SpO2: 96% (31 Dec 2019 10:09) (96% - 100%)    Vital Signs Last 24 Hrs  T(C): 36.3 (31 Dec 2019 10:09), Max: 36.6 (30 Dec 2019 20:12)  T(F): 97.3 (31 Dec 2019 10:09), Max: 97.9 (30 Dec 2019 20:12)  HR: 71 (31 Dec 2019 10:09) (58 - 72)  BP: 129/71 (31 Dec 2019 10:09) (110/65 - 146/82)  BP(mean): --  RR: 18 (31 Dec 2019 10:09) (17 - 18)  SpO2: 96% (31 Dec 2019 10:09) (96% - 100%)    PHYSICAL EXAM: vital signs as above  in no apparent distress  HEENT: RACHELLE EOMI  Neck: Supple, no JVD, no thyromegaly  Lungs: no wheeze, no crackles  CVS: S1 S2 soft ejection systolic murmur best heard at left sternal border  Abdomen: no tenderness, no organomegaly, BS present  Neuro: AO x 3 no focal weakness, power continues to be grade 5/5 bilaterally   no sensory abnormalities  Psych: appropriate affect  Skin: warm, dry  Ext: no cyanosis or clubbing, no edema  Msk: no joint swelling or deformities  Back: no CVA tenderness, no kyphosis/scoliosis    LABS:                        14.3   4.08  )-----------( 128      ( 31 Dec 2019 09:28 )             43.3     12-30    137  |  101  |  19  ----------------------------<  98  3.8   |  25  |  1.35<H>    Ca    9.6      30 Dec 2019 12:06    TPro  7.5  /  Alb  4.5  /  TBili  0.7  /  DBili  x   /  AST  28  /  ALT  33  /  AlkPhos  74  12-30          Urinalysis Basic - ( 30 Dec 2019 13:17 )    Color: Yellow / Appearance: Clear / S.022 / pH: x  Gluc: x / Ketone: Negative  / Bili: Negative / Urobili: Negative   Blood: x / Protein: Negative / Nitrite: Negative   Leuk Esterase: Negative / RBC: 2 /hpf / WBC 1 /HPF   Sq Epi: x / Non Sq Epi: 0 /hpf / Bacteria: Negative          All consultant(s) notes reviewed and care discussed with other providers        Contact Number, Dr Conrad [phone] foot.  He can start walking in the boot.  He does not have to wear a big bulky dressing and can wear a sock.  Recommend a large Band-Aid over the incision for the next week or 2 to help protect the incision area.  He is getting fitted for shoes and inserts today.  We will have him follow-up in 1 month for reassessment.     All questions were answered to patient's satisfaction and he will call further questions or concerns     Patient risk factor: Patient is at low risk for infection.     Wendy Pate DPM, Podiatry/Foot and Ankle Surgery      Again, thank you for allowing me to participate in the care of your patient.        Sincerely,        Wendy Pate DPM, Podiatry/Foot and Ankle Surgery

## 2023-10-31 NOTE — COMMUNITY RESOURCES LIST (ENGLISH)
10/31/2023   [facility] [place]  N/A  For questions about this resource list or additional care needs, please contact your primary care clinic or care manager.  Phone: [phone]   Email: N/A   Address: [address] [place] [id number]   Hours: N/A        Financial Stability       Rent and mortgage payment assistance  1  [facility] Community Development Agency (CDA) Distance: 0.49 miles      In-Person   [address] [place] [address]  Language: English  Hours: Mon - Fri 9:00 AM - 4:00 PM  Fees: Free   Phone: (984) 907-3314 Email: info@[email or web address]. Website: http://[email or web address] Connect/     2  [address] [place] [address] - Rent payment assistance Distance: 5.94 miles      In-Person, Phone/Virtual   [address] [place] [address]  Language: English  Hours: Mon 8:00 AM - 4:00 PM , Tue 8:00 AM - 7:00 PM , Wed - Thu 8:00 AM - 4:00 PM  Fees: Free   Phone: (131) 511-6610 Email: info@[facility].org Website: https://[facility]Love[facility].org/resources/resource-centers/          Important Numbers & Websites       Emergency Services   911  [facility] Services   311  Poison Control   (834) 152-3026  Suicide Prevention Lifeline   (549) 918-6358 (TALK)  Child Abuse Hotline   (180) 228-3839 (4-A-Child)  Sexual Assault Hotline   (188) 611-6108 (HOPE)  National Runaway Safeline   (263) 508-6970 (RUNAWAY)  All-Options Talkline   (127) 381-8139  Substance Abuse Referral   (895) 355-8039 (HELP)

## 2023-11-01 DIAGNOSIS — E29.1 HYPOGONADISM MALE: ICD-10-CM

## 2023-11-01 RX ORDER — TESTOSTERONE CYPIONATE 200 MG/ML
200 INJECTION, SOLUTION INTRAMUSCULAR
Qty: 2 ML | Refills: 3 | Status: SHIPPED | OUTPATIENT
Start: 2023-11-01 | End: 2024-02-01

## 2023-11-03 ENCOUNTER — OFFICE VISIT (OUTPATIENT)
Dept: PEDIATRICS | Facility: CLINIC | Age: 53
End: 2023-11-03
Payer: COMMERCIAL

## 2023-11-03 VITALS
SYSTOLIC BLOOD PRESSURE: 137 MMHG | BODY MASS INDEX: 41.85 KG/M2 | RESPIRATION RATE: 20 BRPM | WEIGHT: 291.7 LBS | OXYGEN SATURATION: 94 % | DIASTOLIC BLOOD PRESSURE: 85 MMHG | HEART RATE: 91 BPM

## 2023-11-03 DIAGNOSIS — Z13.9 ENCOUNTER FOR SCREENING INVOLVING SOCIAL DETERMINANTS OF HEALTH (SDOH): ICD-10-CM

## 2023-11-03 DIAGNOSIS — I10 HYPERTENSION, UNSPECIFIED TYPE: ICD-10-CM

## 2023-11-03 DIAGNOSIS — E78.5 HYPERLIPIDEMIA LDL GOAL <100: ICD-10-CM

## 2023-11-03 DIAGNOSIS — E66.01 MORBID OBESITY (H): ICD-10-CM

## 2023-11-03 DIAGNOSIS — G62.9 PERIPHERAL POLYNEUROPATHY: ICD-10-CM

## 2023-11-03 DIAGNOSIS — Z79.4 TYPE 2 DIABETES MELLITUS WITH HYPERGLYCEMIA, WITH LONG-TERM CURRENT USE OF INSULIN (H): Primary | ICD-10-CM

## 2023-11-03 DIAGNOSIS — S06.5XAA SDH (SUBDURAL HEMATOMA) (H): ICD-10-CM

## 2023-11-03 DIAGNOSIS — E11.65 TYPE 2 DIABETES MELLITUS WITH HYPERGLYCEMIA, WITH LONG-TERM CURRENT USE OF INSULIN (H): Primary | ICD-10-CM

## 2023-11-03 DIAGNOSIS — E29.1 HYPOGONADISM MALE: ICD-10-CM

## 2023-11-03 PROBLEM — M86.9 OSTEOMYELITIS OF LEFT FOOT, UNSPECIFIED TYPE (H): Status: RESOLVED | Noted: 2023-08-18 | Resolved: 2023-11-03

## 2023-11-03 LAB
ALBUMIN SERPL BCG-MCNC: 4.3 G/DL (ref 3.5–5.2)
ALP SERPL-CCNC: 46 U/L (ref 40–129)
ALT SERPL W P-5'-P-CCNC: 53 U/L (ref 0–70)
ANION GAP SERPL CALCULATED.3IONS-SCNC: 12 MMOL/L (ref 7–15)
AST SERPL W P-5'-P-CCNC: 33 U/L (ref 0–45)
BASOPHILS # BLD AUTO: 0 10E3/UL (ref 0–0.2)
BASOPHILS NFR BLD AUTO: 1 %
BILIRUB SERPL-MCNC: 0.8 MG/DL
BUN SERPL-MCNC: 15.6 MG/DL (ref 6–20)
CALCIUM SERPL-MCNC: 9.4 MG/DL (ref 8.6–10)
CHLORIDE SERPL-SCNC: 104 MMOL/L (ref 98–107)
CREAT SERPL-MCNC: 0.83 MG/DL (ref 0.67–1.17)
CRP SERPL HS-MCNC: 3.28 MG/L
DEPRECATED HCO3 PLAS-SCNC: 24 MMOL/L (ref 22–29)
EGFRCR SERPLBLD CKD-EPI 2021: >90 ML/MIN/1.73M2
EOSINOPHIL # BLD AUTO: 0.1 10E3/UL (ref 0–0.7)
EOSINOPHIL NFR BLD AUTO: 3 %
ERYTHROCYTE [DISTWIDTH] IN BLOOD BY AUTOMATED COUNT: 16.5 % (ref 10–15)
ERYTHROCYTE [SEDIMENTATION RATE] IN BLOOD BY WESTERGREN METHOD: 9 MM/HR (ref 0–20)
GLUCOSE SERPL-MCNC: 217 MG/DL (ref 70–99)
HBA1C MFR BLD: 7.5 % (ref 0–5.6)
HCT VFR BLD AUTO: 47.9 % (ref 40–53)
HGB BLD-MCNC: 15.4 G/DL (ref 13.3–17.7)
IMM GRANULOCYTES # BLD: 0 10E3/UL
IMM GRANULOCYTES NFR BLD: 0 %
LYMPHOCYTES # BLD AUTO: 1.5 10E3/UL (ref 0.8–5.3)
LYMPHOCYTES NFR BLD AUTO: 35 %
MCH RBC QN AUTO: 28.1 PG (ref 26.5–33)
MCHC RBC AUTO-ENTMCNC: 32.2 G/DL (ref 31.5–36.5)
MCV RBC AUTO: 87 FL (ref 78–100)
MONOCYTES # BLD AUTO: 0.5 10E3/UL (ref 0–1.3)
MONOCYTES NFR BLD AUTO: 11 %
NEUTROPHILS # BLD AUTO: 2.2 10E3/UL (ref 1.6–8.3)
NEUTROPHILS NFR BLD AUTO: 50 %
PLATELET # BLD AUTO: 190 10E3/UL (ref 150–450)
POTASSIUM SERPL-SCNC: 4.4 MMOL/L (ref 3.4–5.3)
PROT SERPL-MCNC: 7.6 G/DL (ref 6.4–8.3)
RBC # BLD AUTO: 5.49 10E6/UL (ref 4.4–5.9)
SODIUM SERPL-SCNC: 140 MMOL/L (ref 135–145)
WBC # BLD AUTO: 4.4 10E3/UL (ref 4–11)

## 2023-11-03 PROCEDURE — 36415 COLL VENOUS BLD VENIPUNCTURE: CPT | Performed by: INTERNAL MEDICINE

## 2023-11-03 PROCEDURE — 83036 HEMOGLOBIN GLYCOSYLATED A1C: CPT | Performed by: INTERNAL MEDICINE

## 2023-11-03 PROCEDURE — 80053 COMPREHEN METABOLIC PANEL: CPT | Performed by: INTERNAL MEDICINE

## 2023-11-03 PROCEDURE — 99214 OFFICE O/P EST MOD 30 MIN: CPT | Mod: 24 | Performed by: INTERNAL MEDICINE

## 2023-11-03 PROCEDURE — 90686 IIV4 VACC NO PRSV 0.5 ML IM: CPT | Performed by: INTERNAL MEDICINE

## 2023-11-03 PROCEDURE — 86141 C-REACTIVE PROTEIN HS: CPT | Performed by: INTERNAL MEDICINE

## 2023-11-03 PROCEDURE — 90480 ADMN SARSCOV2 VAC 1/ONLY CMP: CPT | Performed by: INTERNAL MEDICINE

## 2023-11-03 PROCEDURE — 85652 RBC SED RATE AUTOMATED: CPT | Performed by: INTERNAL MEDICINE

## 2023-11-03 PROCEDURE — 85025 COMPLETE CBC W/AUTO DIFF WBC: CPT | Performed by: INTERNAL MEDICINE

## 2023-11-03 PROCEDURE — 91320 SARSCV2 VAC 30MCG TRS-SUC IM: CPT | Performed by: INTERNAL MEDICINE

## 2023-11-03 PROCEDURE — 90471 IMMUNIZATION ADMIN: CPT | Performed by: INTERNAL MEDICINE

## 2023-11-03 ASSESSMENT — PAIN SCALES - GENERAL: PAINLEVEL: NO PAIN (0)

## 2023-11-03 NOTE — PROGRESS NOTES
"  Assessment & Plan     Encounter for screening involving social determinants of health (SDoH)      Type 2 diabetes mellitus with hyperglycemia, with long-term current use of insulin (H)  Poor control by CGM.   Patient Instructions   Lab work today:  We can do labs in the exam room today, or you can get them done downstairs in the lab.      If you are going downstairs:  Directions:  As you walk through the first floor, you'll see (on the right) first the pharmacy, then some bathrooms, then the \"Lab and Imaging\" area. Give them your name at the window there and wait for them to call you.     Resume ozempic 0.25 mg weekly.  Then 0.5 mg after 1 month.     No chagnes in your insulins for now.    Follow up in 3 months.       - semaglutide (OZEMPIC) 2 MG/3ML pen; Inject 0.25 mg Subcutaneous every 7 days For 4 doses.  Then if no side effects, increase to 0.5 mg every 7 days thereafter.  - Hemoglobin A1c; Future  - CRP cardiac risk; Future  - CBC with platelets and differential; Future  - ESR: Erythrocyte sedimentation rate; Future  - Comprehensive metabolic panel (BMP + Alb, Alk Phos, ALT, AST, Total. Bili, TP); Future  - Hemoglobin A1c  - CRP cardiac risk  - CBC with platelets and differential  - ESR: Erythrocyte sedimentation rate  - Comprehensive metabolic panel (BMP + Alb, Alk Phos, ALT, AST, Total. Bili, TP)    Morbid obesity (H)  Discussed carbs.  Ozepmic should help with weight.     Hypogonadism male      Hypertension, unspecified type  At goal.     Hyperlipidemia LDL goal <100  Ongoing statin.     Peripheral polyneuropathy      SDH (subdural hematoma) (H)  No aspirin.  Asymptomatic.  No recurrence.                  Sandoval Wilkinson MD  [facility] DANIELA Gill is a 53 year old, presenting for the following health issues:  Diabetes and MH Follow Up        11/3/2023     9:14 AM   Additional Questions   Roomed by Heaven Brizuela   Accompanied by N/A       History of Present Illness       Mental " Health Follow-up:  Patient presents to follow-up on Depression & Anxiety.Patient's depression since last visit has been:  Medium  The patient is having other symptoms associated with depression.  Patient's anxiety since last visit has been:  Better  The patient is not having other symptoms associated with anxiety.  Any significant life events: financial concerns and health concerns  Patient is not feeling anxious or having panic attacks.  Patient has no concerns about alcohol or drug use.    Diabetes:   He presents for follow up of diabetes.   He is checking home blood glucose with a continuous glucose monitor.   He checks blood glucose before and after meals and at bedtime.  Blood glucose is sometimes over 200 and never under 70. He is aware of hypoglycemia symptoms including shakiness, weakness and lethargy.   He is concerned about blood sugar frequently over 200.   He is having numbness in feet.            He eats 0-1 servings of fruits and vegetables daily.He consumes 0 sweetened beverage(s) daily.He exercises with enough effort to increase his heart rate 10 to 19 minutes per day.  He exercises with enough effort to increase his heart rate 4 days per week. He is missing 1 dose(s) of medications per week.  He is not taking prescribed medications regularly due to remembering to take.       Since last visit:  great left toe amputation.  Stitches now out.  Wearing boot for 1 more week, then will be removing it.  No antibiotic.      Diabetes mellitus: was 8.4 in August.  Then had the toe infection, surgery, etc.      He is frustrated with his diabetes mellitus;  currently he's on basaglar 60 twice daily and novolog 25 three times daily. Average sugars are 160.  Last 10 days, surgars have been higher; not at goal.  Not sure why.    Uses CGM. Not on ozempic for last 5 weeks.  Has gained about 5 pounds.                  Review of Systems   CONSTITUTIONAL: NEGATIVE for fever, chills, change in weight  INTEGUMENTARY/SKIN:  NEGATIVE for worrisome rashes, moles or lesions  EYES: NEGATIVE for vision changes or irritation  ENT/MOUTH: NEGATIVE for ear, mouth and throat problems  RESP: NEGATIVE for significant cough or SOB  BREAST: NEGATIVE for masses, tenderness or discharge  CV: NEGATIVE for chest pain, palpitations or peripheral edema  GI: NEGATIVE for nausea, abdominal pain, heartburn, or change in bowel habits  : NEGATIVE for frequency, dysuria, or hematuria  MUSCULOSKELETAL: NEGATIVE for significant arthralgias or myalgia  NEURO: NEGATIVE for weakness, dizziness or paresthesias  ENDOCRINE: NEGATIVE for temperature intolerance, skin/hair changes  HEME: NEGATIVE for bleeding problems  PSYCHIATRIC: NEGATIVE for changes in mood or affect      Objective    /85 (BP Location: Right arm, Patient Position: Sitting, Cuff Size: Adult Large)   Pulse 91   Resp 20   Wt 132.3 kg (291 lb 11.2 oz)   SpO2 94%   BMI 41.85 kg/m    Body mass index is 41.85 kg/m .  Physical Exam   GENERAL: healthy, alert and no distress  EYES: Eyes grossly normal to inspection, PERRL and conjunctivae and sclerae normal  HENT: ear canals and TM's normal, nose and mouth without ulcers or lesions  NECK: no adenopathy, no asymmetry, masses, or scars and thyroid normal to palpation  RESP: lungs clear to auscultation - no rales, rhonchi or wheezes  CV: regular rate and rhythm, normal S1 S2, no S3 or S4, no murmur, click or rub, no peripheral edema and peripheral pulses strong  ABDOMEN: soft, nontender, no hepatosplenomegaly, no masses and bowel sounds normal  MS: no gross musculoskeletal defects noted, no edema  SKIN: no suspicious lesions or rashes  NEURO: Normal strength and tone, mentation intact and speech normal  PSYCH: mentation appears normal, affect normal/bright

## 2023-11-03 NOTE — COMMUNITY RESOURCES LIST (ENGLISH)
11/03/2023   [facility]  N/A  For questions about this resource list or additional care needs, please contact your primary care clinic or care manager.  Phone: [phone]   Email: N/A   Address: [address] [place] [address]   Hours: N/A        Financial Stability       Rent and mortgage payment assistance  1  [facility] Community Development Agency (CDA) Distance: 0.49 miles      In-Person   [address] [place] [address]  Language: English  Hours: Mon - Fri 9:00 AM - 4:00 PM  Fees: Free   Phone: (792) 728-2672 Email: info@[facility]. Website: http://[email or web address]/     2  [address] [place] [address] - Rent payment assistance Distance: 5.94 miles      In-Person, Phone/Virtual   [address] [place] [address]  Language: English  Hours: Mon 8:00 AM - 4:00 PM , Tue 8:00 AM - 7:00 PM , Wed - Thu 8:00 AM - 4:00 PM  Fees: Free   Phone: (493) 366-6941 Email: info@[facility].org Website: https://[facility].org/resources/resource-centers/          Important Numbers & Websites       Emergency Services   911  [facility]   311  Poison Control   (407) 852-1663  Suicide Prevention Lifeline   (569) 573-5990 (TALK)  Child Abuse Hotline   (788) 877-5508 (4-A-Child)  Sexual Assault Hotline   (309) 976-3053 (HOPE)  National Runaway Safeline   (532) 804-1696 (RUNAWAY)  All-Options Talkline   (819) 537-5422  Substance Abuse Referral   (960) 817-1227 (HELP)

## 2023-11-03 NOTE — PATIENT INSTRUCTIONS
"Lab work today:  We can do labs in the exam room today, or you can get them done downstairs in the lab.      If you are going downstairs:  Directions:  As you walk through the first floor, you'll see (on the right) first the pharmacy, then some bathrooms, then the \"Lab and Imaging\" area. Give them your name at the window there and wait for them to call you.     Resume ozempic 0.25 mg weekly.  Then 0.5 mg after 1 month.     No chagnes in your insulins for now.    Follow up in 3 months.      "

## 2023-11-09 ENCOUNTER — MYC REFILL (OUTPATIENT)
Dept: PEDIATRICS | Facility: CLINIC | Age: 53
End: 2023-11-09
Payer: COMMERCIAL

## 2023-11-09 DIAGNOSIS — I10 HYPERTENSION, UNSPECIFIED TYPE: Primary | ICD-10-CM

## 2023-11-09 RX ORDER — LOSARTAN POTASSIUM 25 MG/1
25 TABLET ORAL DAILY
Qty: 90 TABLET | Refills: 3 | Status: SHIPPED | OUTPATIENT
Start: 2023-11-09

## 2023-11-15 ENCOUNTER — MYC MEDICAL ADVICE (OUTPATIENT)
Dept: PODIATRY | Facility: CLINIC | Age: 53
End: 2023-11-15
Payer: COMMERCIAL

## 2023-11-15 DIAGNOSIS — S98.111A AMPUTATION OF RIGHT GREAT TOE (H): ICD-10-CM

## 2023-11-15 DIAGNOSIS — I87.2 EDEMA OF RIGHT LOWER EXTREMITY DUE TO PERIPHERAL VENOUS INSUFFICIENCY: ICD-10-CM

## 2023-11-15 DIAGNOSIS — Z98.890 POST-OPERATIVE STATE: ICD-10-CM

## 2023-11-15 DIAGNOSIS — E11.42 DIABETIC POLYNEUROPATHY ASSOCIATED WITH TYPE 2 DIABETES MELLITUS (H): Primary | ICD-10-CM

## 2023-11-16 NOTE — TELEPHONE ENCOUNTER
Patient is s/p partial left first ray amputation (DOS 9/30/23). Please see patient MyChart message and advise on response.     Laura Batista MSA, ATC  Certified Athletic Trainer

## 2023-11-16 NOTE — TELEPHONE ENCOUNTER
Carito Gill,    Swelling is not that uncommon after surgery (or multiple surgeries) in the foot.  I recommend compression stockings to wear during the day to help with this. If you would like an order compression socks I can put this in and you can pick it up at the [facility] supply store in [place].   I also recommend elevating the foot above the heart 2-3 times a day for 15 minutes.    If you are concerned about infection, then I would recommend follow up in clinic.     Wendy Pate DPM

## 2023-11-17 NOTE — TELEPHONE ENCOUNTER
Patient would like order for compression stockings placed. Order pended for provider review/ signature.     Laura Batista MSA, ATC  Certified Athletic Trainer

## 2023-11-28 ENCOUNTER — OFFICE VISIT (OUTPATIENT)
Dept: PODIATRY | Facility: CLINIC | Age: 53
End: 2023-11-28
Payer: COMMERCIAL

## 2023-11-28 VITALS — BODY MASS INDEX: 41.75 KG/M2 | DIASTOLIC BLOOD PRESSURE: 84 MMHG | SYSTOLIC BLOOD PRESSURE: 126 MMHG | WEIGHT: 291 LBS

## 2023-11-28 DIAGNOSIS — Z98.890 POST-OPERATIVE STATE: ICD-10-CM

## 2023-11-28 DIAGNOSIS — S98.112A AMPUTATION OF LEFT GREAT TOE (H): ICD-10-CM

## 2023-11-28 DIAGNOSIS — E11.42 DIABETIC POLYNEUROPATHY ASSOCIATED WITH TYPE 2 DIABETES MELLITUS (H): Primary | ICD-10-CM

## 2023-11-28 PROCEDURE — 99024 POSTOP FOLLOW-UP VISIT: CPT | Performed by: PODIATRIST

## 2023-11-28 NOTE — LETTER
11/28/2023         RE: Jairo Escamilla  [address] [place] [address]        Dear Colleague,    Thank you for referring your patient, Jairo Escamilla, to the [facility] PODIATRY. Please see a copy of my visit note below.    Podiatry / Foot and Ankle Surgery Progress Note    November 28, 2023    Subject: Patient was seen for 2-month status post partial first ray amputation left foot.  Notes that he is doing well.  No concerns today.    Objective:  Vitals: Wt 132 kg (291 lb)   BMI 41.75 kg/m    BMI= Body mass index is 41.75 kg/m .  A1C: 8.4 (8/1/23)     General appearance: Patient is alert and fully cooperative with history & exam.  No sign of distress is noted during the visit.     Psychiatric: Affect is pleasant & appropriate.  Patient appears motivated to improve health.     Respiratory: Breathing is regular & unlabored while sitting.     HEENT: Hearing is intact to spoken word.  Speech is clear.  No gross evidence of visual impairment that would impact ambulation.     Dermatologic: Incision is well-healed.  No open lesions noted.  No hyperkeratotic tissue areas noted.  No signs of infection noted.     Vascular: DP & PT pulses are intact & regular bilaterally.  No significant edema or varicosities noted.  CFT and skin temperature is normal to both lower extremities.     Neurologic: Lower extremity sensation is diminished to feet.     Musculoskeletal: Patient is ambulatory without assistive device or brace.  Left great toe now amputated.      ASSESSMENT:    Diabetic polyneuropathy associated with type 2 diabetes mellitus (H)  Post-operative state  Amputation of left great toe (H24)     Medical Decision Making/Plan:  Reviewed patient's chart in epic.  At this time, the foot appears to be doing well.  He is back in shoes and inserts.  He can get the foot wet and go back to swimming.  Recommend water shoes in the pool or lake.  Recommend following up in 8 to 10 weeks for reassessment as  he is at higher risk of possible ulceration.  We discussed that if he notices any calluses to come in and be seen.    All questions were answered to patient's satisfaction and he will call further questions or concerns     Patient risk factor: Patient is at low risk for infection.     Wendy Pate DPM, Podiatry/Foot and Ankle Surgery      Again, thank you for allowing me to participate in the care of your patient.        Sincerely,        Wendy Pate DPM, Podiatry/Foot and Ankle Surgery

## 2023-11-28 NOTE — PROGRESS NOTES
Podiatry / Foot and Ankle Surgery Progress Note    November 28, 2023    Subject: Patient was seen for 2-month status post partial first ray amputation left foot.  Notes that he is doing well.  No concerns today.    Objective:  Vitals: Wt 132 kg (291 lb)   BMI 41.75 kg/m    BMI= Body mass index is 41.75 kg/m .  A1C: 8.4 (8/1/23)     General appearance: Patient is alert and fully cooperative with history & exam.  No sign of distress is noted during the visit.     Psychiatric: Affect is pleasant & appropriate.  Patient appears motivated to improve health.     Respiratory: Breathing is regular & unlabored while sitting.     HEENT: Hearing is intact to spoken word.  Speech is clear.  No gross evidence of visual impairment that would impact ambulation.     Dermatologic: Incision is well-healed.  No open lesions noted.  No hyperkeratotic tissue areas noted.  No signs of infection noted.     Vascular: DP & PT pulses are intact & regular bilaterally.  No significant edema or varicosities noted.  CFT and skin temperature is normal to both lower extremities.     Neurologic: Lower extremity sensation is diminished to feet.     Musculoskeletal: Patient is ambulatory without assistive device or brace.  Left great toe now amputated.      ASSESSMENT:    Diabetic polyneuropathy associated with type 2 diabetes mellitus (H)  Post-operative state  Amputation of left great toe (H24)     Medical Decision Making/Plan:  Reviewed patient's chart in epic.  At this time, the foot appears to be doing well.  He is back in shoes and inserts.  He can get the foot wet and go back to swimming.  Recommend water shoes in the pool or lake.  Recommend following up in 8 to 10 weeks for reassessment as he is at higher risk of possible ulceration.  We discussed that if he notices any calluses to come in and be seen.    All questions were answered to patient's satisfaction and he will call further questions or concerns     Patient risk factor: Patient is  at low risk for infection.     Wendy Pate DPM, Podiatry/Foot and Ankle Surgery

## 2023-12-26 ENCOUNTER — MYC REFILL (OUTPATIENT)
Dept: PEDIATRICS | Facility: CLINIC | Age: 53
End: 2023-12-26
Payer: COMMERCIAL

## 2023-12-26 DIAGNOSIS — E78.5 HYPERLIPIDEMIA LDL GOAL <100: ICD-10-CM

## 2023-12-27 DIAGNOSIS — E11.65 TYPE 2 DIABETES MELLITUS WITH HYPERGLYCEMIA, WITH LONG-TERM CURRENT USE OF INSULIN (H): ICD-10-CM

## 2023-12-27 DIAGNOSIS — Z79.4 TYPE 2 DIABETES MELLITUS WITH HYPERGLYCEMIA, WITH LONG-TERM CURRENT USE OF INSULIN (H): ICD-10-CM

## 2023-12-27 RX ORDER — SIMVASTATIN 20 MG
20 TABLET ORAL AT BEDTIME
Qty: 90 TABLET | Refills: 3 | Status: SHIPPED | OUTPATIENT
Start: 2023-12-27

## 2023-12-27 NOTE — TELEPHONE ENCOUNTER
Pending Prescriptions:                       Disp   Refills    insulin pen needle (32G X 6 MM) 32G X 6 M*180 ea*3            Sig: Use 6 pen needles daily or as directed.    Pt would like more than one month at a time

## 2024-01-11 NOTE — PATIENT INSTRUCTIONS
Patient Education   Diabetes weakens the blood vessels all over the body, including the eyes. Damage to the blood vessels in the eyes can cause swelling or bleeding into part of the eye (called the retina). This is called diabetic retinopathy (JEFFERSON-tin-AH-puh-thee). If not treated, this disease can cause vision loss or blindness.   Symptoms may include blurred or distorted vision, but many people have no symptoms. It's important to see your eye doctor regularly to check for problems.   Early treatment and good control can help protect your vision. Here are the things you can do to help prevent vision loss:      1. Keep your blood sugar levels under tight control.      2. Bring high blood pressure under control.      3. No smoking.      4. Have yearly dilated eye exams.  You had one isolated hemorrhage so ok to monitor in one year    Meibomian gland dysfunction or Posterior Blepharitis, is characterized by inflammation along both the uppper and lower eyelid margins. A single row of these glands is present in each lid with openings along the lid margins.  It is often found in association with skin conditions such as rosacea and seborrheic dermatitis.    Symptoms include:  ?Red eyes  ?Gritty or burning sensation  ?Excessive tearing  ?Itchy eyelids  ?Red, swollen eyelids  ?Crusting or matting of eyelashes in the morning  ?Light sensitivity  ?Blurred vision    It is important to keep cosmetics from blocking these oil glands. If blocked, they do not   excrete oil into the tear film, which causes the tears to evaporate quickly.   This may result in watery eyes.  There is also an increase of bacterial growth when the tear film is unstable, leading to further ocular surface inflammation.    Treatment:  1. Warm compresses for 5-10 minutes twice daily     2.  Keep the eyelid margins clean by using a commercial eye scrub or mild baby shampoo on a washcloth 1-2x daily    3. Use preservative free artificial tears 4-8x daily      Received refill request for Rebif Rebidose from [facility]; Patient was last seen in Sep 2023 and has follow up appointment in Sep 2024 with Dr Salazar. Pt on [facility] Lif[facility] patient assistance program. Refilled per [facility] refill protocol.    Jesusita Desai RN       For warm compresses    Moisten a washcloth with hot water, or microwave for 10 seconds, being careful to not get the cloth too hot.   Then put the washcloth onto your eyelids for 5 minutes. It will cool quickly so a rice pack or eyemask that can be heated and laid on top of the washcloth will help retain the heat.    Omega 3 fatty acid supplements taken once to twice daily and artificial tears such as Soothe xp, Refresh optive , Retaine and systane balance are also an additional treatment to control inflammation and help soothe your eyes.       I did see the past record from Dr Wright, and your glaucoma test was actually improved in pressure reading, nerves look the same so that is good!

## 2024-01-15 ENCOUNTER — MYC REFILL (OUTPATIENT)
Dept: PEDIATRICS | Facility: CLINIC | Age: 54
End: 2024-01-15
Payer: COMMERCIAL

## 2024-01-15 DIAGNOSIS — E11.65 TYPE 2 DIABETES MELLITUS WITH HYPERGLYCEMIA, WITH LONG-TERM CURRENT USE OF INSULIN (H): ICD-10-CM

## 2024-01-15 DIAGNOSIS — Z79.4 TYPE 2 DIABETES MELLITUS WITH HYPERGLYCEMIA, WITH LONG-TERM CURRENT USE OF INSULIN (H): ICD-10-CM

## 2024-01-15 RX ORDER — METFORMIN HCL 500 MG
TABLET, EXTENDED RELEASE 24 HR ORAL
Qty: 360 TABLET | Refills: 0 | Status: SHIPPED | OUTPATIENT
Start: 2024-01-15 | End: 2024-03-01

## 2024-01-29 ASSESSMENT — ENCOUNTER SYMPTOMS
HEMATOCHEZIA: 0
FEVER: 0
MYALGIAS: 1
HEMATURIA: 0
CONSTIPATION: 0
HEARTBURN: 0
HEADACHES: 0
PARESTHESIAS: 0
DYSURIA: 0
EYE PAIN: 0
COUGH: 1
DIARRHEA: 0
ABDOMINAL PAIN: 0
PALPITATIONS: 0
JOINT SWELLING: 1
SORE THROAT: 0
WEAKNESS: 0
SHORTNESS OF BREATH: 0
DIZZINESS: 0
FREQUENCY: 0
ARTHRALGIAS: 0
NERVOUS/ANXIOUS: 1
CHILLS: 0
NAUSEA: 0

## 2024-01-30 ENCOUNTER — OFFICE VISIT (OUTPATIENT)
Dept: PODIATRY | Facility: CLINIC | Age: 54
End: 2024-01-30
Payer: COMMERCIAL

## 2024-01-30 VITALS
DIASTOLIC BLOOD PRESSURE: 80 MMHG | SYSTOLIC BLOOD PRESSURE: 126 MMHG | HEIGHT: 70 IN | BODY MASS INDEX: 41.66 KG/M2 | WEIGHT: 291 LBS

## 2024-01-30 DIAGNOSIS — S98.112A AMPUTATION OF LEFT GREAT TOE (H): ICD-10-CM

## 2024-01-30 DIAGNOSIS — E11.42 DIABETIC POLYNEUROPATHY ASSOCIATED WITH TYPE 2 DIABETES MELLITUS (H): Primary | ICD-10-CM

## 2024-01-30 PROCEDURE — 99213 OFFICE O/P EST LOW 20 MIN: CPT | Performed by: PODIATRIST

## 2024-01-30 NOTE — PROGRESS NOTES
"Podiatry / Foot and Ankle Surgery Progress Note    January 30, 2024    Subject: Patient was seen for follow up on left foot.    No concerns today.     Objective:  Vitals: /80   Ht 1.778 m (5' 10\")   Wt 132 kg (291 lb)   BMI 41.75 kg/m    BMI= Body mass index is 41.75 kg/m .    A1C: 7.5 (11/3/23)    General:  Patient is alert and orientated.  NAD.    Dermatologic: Incision is well-healed.  No open lesions noted.  No hyperkeratotic tissue areas noted.  No signs of infection noted.     Vascular: DP & PT pulses are intact & regular bilaterally.  No significant edema or varicosities noted.  CFT and skin temperature is normal to both lower extremities.     Neurologic: Lower extremity sensation is diminished to feet.     Musculoskeletal: Patient is ambulatory without assistive device or brace.  Left great toe now amputated.      ASSESSMENT:     Diabetic polyneuropathy associated with type 2 diabetes mellitus (H)  Amputation of left great toe (H24)       Medical Decision Making/Plan:  Reviewed patient's chart in epic.  At this time, the foot appears to be doing well.  He is back in shoes and inserts.  He can get the foot wet and go back to swimming.  Recommend water shoes in the pool or lake.  Recommend following up in 6 months for reassessment as he is at higher risk of possible ulceration.  We discussed that if he notices any calluses to come in and be seen.     All questions were answered to patient's satisfaction and he will call further questions or concerns     Patient risk factor: Patient is at low risk for infection.     Wendy Pate DPM, Podiatry/Foot and Ankle Surgery          "

## 2024-01-30 NOTE — LETTER
"    1/30/2024         RE: Jairo Escamilla  [address] [place] [address]        Dear Colleague,    Thank you for referring your patient, Jairo Escamilla, to the [facility] PODI[facility]. Please see a copy of my visit note below.    Podiatry / Foot and Ankle Surgery Progress Note    January 30, 2024    Subject: Patient was seen for follow up on left foot.    No concerns today.     Objective:  Vitals: /80   Ht 1.778 m (5' 10\")   Wt 132 kg (291 lb)   BMI 41.75 kg/m    BMI= Body mass index is 41.75 kg/m .    A1C: 7.5 (11/3/23)    General:  Patient is alert and orientated.  NAD.    Dermatologic: Incision is well-healed.  No open lesions noted.  No hyperkeratotic tissue areas noted.  No signs of infection noted.     Vascular: DP & PT pulses are intact & regular bilaterally.  No significant edema or varicosities noted.  CFT and skin temperature is normal to both lower extremities.     Neurologic: Lower extremity sensation is diminished to feet.     Musculoskeletal: Patient is ambulatory without assistive device or brace.  Left great toe now amputated.      ASSESSMENT:     Diabetic polyneuropathy associated with type 2 diabetes mellitus (H)  Amputation of left great toe (H24)       Medical Decision Making/Plan:  Reviewed patient's chart in epic.  At this time, the foot appears to be doing well.  He is back in shoes and inserts.  He can get the foot wet and go back to swimming.  Recommend water shoes in the pool or lake.  Recommend following up in 6 months for reassessment as he is at higher risk of possible ulceration.  We discussed that if he notices any calluses to come in and be seen.     All questions were answered to patient's satisfaction and he will call further questions or concerns     Patient risk factor: Patient is at low risk for infection.     Wendy Pate DPM, Podiatry/Foot and Ankle Surgery            Again, thank you for allowing me to participate in the care of your " patient.        Sincerely,        Wendy Pate DPM, Podiatry/Foot and Ankle Surgery

## 2024-02-01 ENCOUNTER — OFFICE VISIT (OUTPATIENT)
Dept: PEDIATRICS | Facility: CLINIC | Age: 54
End: 2024-02-01
Payer: COMMERCIAL

## 2024-02-01 VITALS
SYSTOLIC BLOOD PRESSURE: 124 MMHG | HEART RATE: 86 BPM | WEIGHT: 291 LBS | HEIGHT: 70 IN | DIASTOLIC BLOOD PRESSURE: 72 MMHG | RESPIRATION RATE: 17 BRPM | BODY MASS INDEX: 41.66 KG/M2 | TEMPERATURE: 97.7 F

## 2024-02-01 DIAGNOSIS — R40.0 DAYTIME SOMNOLENCE: ICD-10-CM

## 2024-02-01 DIAGNOSIS — F33.0 MILD EPISODE OF RECURRENT MAJOR DEPRESSIVE DISORDER (H): ICD-10-CM

## 2024-02-01 DIAGNOSIS — E29.1 HYPOGONADISM MALE: ICD-10-CM

## 2024-02-01 DIAGNOSIS — E11.65 TYPE 2 DIABETES MELLITUS WITH HYPERGLYCEMIA, WITH LONG-TERM CURRENT USE OF INSULIN (H): ICD-10-CM

## 2024-02-01 DIAGNOSIS — N52.9 ERECTILE DYSFUNCTION, UNSPECIFIED ERECTILE DYSFUNCTION TYPE: ICD-10-CM

## 2024-02-01 DIAGNOSIS — Z00.00 ENCOUNTER FOR ROUTINE ADULT HEALTH EXAMINATION WITHOUT ABNORMAL FINDINGS: Primary | ICD-10-CM

## 2024-02-01 DIAGNOSIS — N52.9 ERECTILE DYSFUNCTION, UNSPECIFIED ERECTILE DYSFUNCTION TYPE: Primary | ICD-10-CM

## 2024-02-01 DIAGNOSIS — Z79.4 TYPE 2 DIABETES MELLITUS WITH HYPERGLYCEMIA, WITH LONG-TERM CURRENT USE OF INSULIN (H): ICD-10-CM

## 2024-02-01 DIAGNOSIS — Z89.422 S/P AMPUTATION OF LESSER TOE, LEFT (H): ICD-10-CM

## 2024-02-01 DIAGNOSIS — S06.5XAA SDH (SUBDURAL HEMATOMA) (H): ICD-10-CM

## 2024-02-01 DIAGNOSIS — E66.01 MORBID OBESITY (H): ICD-10-CM

## 2024-02-01 PROBLEM — M86.9 OSTEOMYELITIS OF LEFT FOOT (H): Status: RESOLVED | Noted: 2023-09-28 | Resolved: 2024-02-01

## 2024-02-01 LAB
ALBUMIN SERPL BCG-MCNC: 4.2 G/DL (ref 3.5–5.2)
ALP SERPL-CCNC: 79 U/L (ref 40–150)
ALT SERPL W P-5'-P-CCNC: 50 U/L (ref 0–70)
ANION GAP SERPL CALCULATED.3IONS-SCNC: 13 MMOL/L (ref 7–15)
AST SERPL W P-5'-P-CCNC: 45 U/L (ref 0–45)
BILIRUB SERPL-MCNC: 0.8 MG/DL
BUN SERPL-MCNC: 15.1 MG/DL (ref 6–20)
CALCIUM SERPL-MCNC: 9.2 MG/DL (ref 8.6–10)
CHLORIDE SERPL-SCNC: 100 MMOL/L (ref 98–107)
CHOLEST SERPL-MCNC: 139 MG/DL
CREAT SERPL-MCNC: 0.8 MG/DL (ref 0.67–1.17)
CREAT UR-MCNC: 141 MG/DL
DEPRECATED HCO3 PLAS-SCNC: 23 MMOL/L (ref 22–29)
EGFRCR SERPLBLD CKD-EPI 2021: >90 ML/MIN/1.73M2
FASTING STATUS PATIENT QL REPORTED: YES
GLUCOSE SERPL-MCNC: 179 MG/DL (ref 70–99)
HBA1C MFR BLD: 8 % (ref 0–5.6)
HDLC SERPL-MCNC: 27 MG/DL
LDLC SERPL CALC-MCNC: 53 MG/DL
MICROALBUMIN UR-MCNC: 38.3 MG/L
MICROALBUMIN/CREAT UR: 27.16 MG/G CR (ref 0–17)
NONHDLC SERPL-MCNC: 112 MG/DL
POTASSIUM SERPL-SCNC: 4 MMOL/L (ref 3.4–5.3)
PROT SERPL-MCNC: 7.5 G/DL (ref 6.4–8.3)
SODIUM SERPL-SCNC: 136 MMOL/L (ref 135–145)
TRIGL SERPL-MCNC: 296 MG/DL
TSH SERPL DL<=0.005 MIU/L-ACNC: 2.82 UIU/ML (ref 0.3–4.2)
VIT D+METAB SERPL-MCNC: 14 NG/ML (ref 20–50)

## 2024-02-01 PROCEDURE — 36415 COLL VENOUS BLD VENIPUNCTURE: CPT | Performed by: INTERNAL MEDICINE

## 2024-02-01 PROCEDURE — 82306 VITAMIN D 25 HYDROXY: CPT | Performed by: INTERNAL MEDICINE

## 2024-02-01 PROCEDURE — 80053 COMPREHEN METABOLIC PANEL: CPT | Performed by: INTERNAL MEDICINE

## 2024-02-01 PROCEDURE — 84443 ASSAY THYROID STIM HORMONE: CPT | Performed by: INTERNAL MEDICINE

## 2024-02-01 PROCEDURE — 80061 LIPID PANEL: CPT | Performed by: INTERNAL MEDICINE

## 2024-02-01 PROCEDURE — 99396 PREV VISIT EST AGE 40-64: CPT | Performed by: INTERNAL MEDICINE

## 2024-02-01 PROCEDURE — 82570 ASSAY OF URINE CREATININE: CPT | Performed by: INTERNAL MEDICINE

## 2024-02-01 PROCEDURE — 82043 UR ALBUMIN QUANTITATIVE: CPT | Performed by: INTERNAL MEDICINE

## 2024-02-01 PROCEDURE — 83036 HEMOGLOBIN GLYCOSYLATED A1C: CPT | Performed by: INTERNAL MEDICINE

## 2024-02-01 RX ORDER — INSULIN GLARGINE 100 [IU]/ML
60 INJECTION, SOLUTION SUBCUTANEOUS 2 TIMES DAILY
Qty: 36 ML | Refills: 11 | Status: SHIPPED | OUTPATIENT
Start: 2024-02-01 | End: 2024-03-01 | Stop reason: ALTCHOICE

## 2024-02-01 RX ORDER — SILDENAFIL 100 MG/1
100 TABLET, FILM COATED ORAL DAILY PRN
Qty: 30 TABLET | Refills: 5 | Status: SHIPPED | OUTPATIENT
Start: 2024-02-01

## 2024-02-01 RX ORDER — TESTOSTERONE CYPIONATE 200 MG/ML
200 INJECTION, SOLUTION INTRAMUSCULAR
Qty: 2 ML | Refills: 3 | Status: SHIPPED | OUTPATIENT
Start: 2024-02-01 | End: 2024-09-03

## 2024-02-01 ASSESSMENT — ENCOUNTER SYMPTOMS
WEAKNESS: 0
PALPITATIONS: 0
DIARRHEA: 0
NERVOUS/ANXIOUS: 1
SORE THROAT: 0
FEVER: 0
DIZZINESS: 0
EYE PAIN: 0
JOINT SWELLING: 1
NAUSEA: 0
CONSTIPATION: 0
ARTHRALGIAS: 0
ABDOMINAL PAIN: 0
MYALGIAS: 1
DYSURIA: 0
HEADACHES: 0
SHORTNESS OF BREATH: 0
FREQUENCY: 0
CHILLS: 0
HEMATURIA: 0
COUGH: 1

## 2024-02-01 ASSESSMENT — PATIENT HEALTH QUESTIONNAIRE - PHQ9
10. IF YOU CHECKED OFF ANY PROBLEMS, HOW DIFFICULT HAVE THESE PROBLEMS MADE IT FOR YOU TO DO YOUR WORK, TAKE CARE OF THINGS AT HOME, OR GET ALONG WITH OTHER PEOPLE: SOMEWHAT DIFFICULT
SUM OF ALL RESPONSES TO PHQ QUESTIONS 1-9: 9
SUM OF ALL RESPONSES TO PHQ QUESTIONS 1-9: 9

## 2024-02-01 ASSESSMENT — ANXIETY QUESTIONNAIRES
GAD7 TOTAL SCORE: 2
6. BECOMING EASILY ANNOYED OR IRRITABLE: NOT AT ALL
GAD7 TOTAL SCORE: 2
4. TROUBLE RELAXING: NOT AT ALL
1. FEELING NERVOUS, ANXIOUS, OR ON EDGE: SEVERAL DAYS
3. WORRYING TOO MUCH ABOUT DIFFERENT THINGS: SEVERAL DAYS
7. FEELING AFRAID AS IF SOMETHING AWFUL MIGHT HAPPEN: NOT AT ALL
5. BEING SO RESTLESS THAT IT IS HARD TO SIT STILL: NOT AT ALL
7. FEELING AFRAID AS IF SOMETHING AWFUL MIGHT HAPPEN: NOT AT ALL
2. NOT BEING ABLE TO STOP OR CONTROL WORRYING: NOT AT ALL
8. IF YOU CHECKED OFF ANY PROBLEMS, HOW DIFFICULT HAVE THESE MADE IT FOR YOU TO DO YOUR WORK, TAKE CARE OF THINGS AT HOME, OR GET ALONG WITH OTHER PEOPLE?: NOT DIFFICULT AT ALL
GAD7 TOTAL SCORE: 2
IF YOU CHECKED OFF ANY PROBLEMS ON THIS QUESTIONNAIRE, HOW DIFFICULT HAVE THESE PROBLEMS MADE IT FOR YOU TO DO YOUR WORK, TAKE CARE OF THINGS AT HOME, OR GET ALONG WITH OTHER PEOPLE: NOT DIFFICULT AT ALL

## 2024-02-01 ASSESSMENT — PAIN SCALES - GENERAL: PAINLEVEL: NO PAIN (0)

## 2024-02-01 NOTE — PROGRESS NOTES
Preventive Care Visit  [facility] DANIELA Wilkinson MD, Internal Medicine - Pediatrics  Feb 1, 2024      SUBJECTIVE:   Jairo is a 53 year old, presenting for the following:  Physical        2/1/2024     7:18 AM   Additional Questions   Roomed by Asiya MATA LPN     Healthy Habits:     Getting at least 3 servings of Calcium per day:  NO    Bi-annual eye exam:  Yes    Dental care twice a year:  Yes    Sleep apnea or symptoms of sleep apnea:  Daytime drowsiness, Excessive snoring and Sleep apnea    Diet:  Diabetic    Frequency of exercise:  1 day/week    Duration of exercise:  15-30 minutes    Taking medications regularly:  Yes    Additional concerns today:  No    Today's PHQ-9 Score:       2/1/2024     7:10 AM   PHQ-9 SCORE   PHQ-9 Total Score MyChart 9 (Mild depression)   PHQ-9 Total Score 9     Feels like his diabetes mellitus is probably not good.  Stopped wearing his Dexcom since numbers where high despite efforts to cut back on his diet.    AMs over 200.  And throughout the day.  On better days will be closer to 150-160, but usually over 200s.     Taking 0.5 ozempic.    Taking 60 twice daily basaglar and novolog 25 three times daily.      Cialis feels like not working well; would like to resume viagra. ]            Have you ever done Advance Care Planning? (For example, a Health Directive, POLST, or a discussion with a medical provider or your loved ones about your wishes): No, advance care planning information given to patient to review.  Patient plans to discuss their wishes with loved ones or provider.      Social History     Tobacco Use    Smoking status: Never    Smokeless tobacco: Never   Substance Use Topics    Alcohol use: Yes     Alcohol/week: 0.0 standard drinks of alcohol     Comment: Very little and infrequently             1/29/2024     3:58 PM   Alcohol Use   Prescreen: >3 drinks/day or >7 drinks/week? No       Last PSA:   Prostate Specific Antigen Screen   Date Value Ref Range Status    08/01/2023 0.46 0.00 - 3.50 ng/mL Final       Reviewed orders with patient. Reviewed health maintenance and updated orders accordingly - Yes  Lab work is in process  Labs reviewed in EPIC    Reviewed and updated as needed this visit by clinical staff   Tobacco  Allergies  Meds              Reviewed and updated as needed this visit by Provider                  Past Medical History:   Diagnosis Date    Diabetes mellitus (adult onset)     Diabetes mellitus, type 2 (H) 05/14/2008    Fracture of rib 2022    High cholesterol     Hypertension     Obese     Peripheral neuropathy     Pneumonia     Sleep apnea     Subdural hematoma (H)       Past Surgical History:   Procedure Laterality Date    AMPUTATE TOE(S) Left 8/19/2023    Procedure: Left hallux amputation;  Surgeon: Filiberto Mercedes DPM;  Location:  OR    AMPUTATE TOE(S) Left 9/30/2023    Procedure: Left foot first ray resection;  Surgeon: Wendy Pate DPM, Podiatry/Foot and Ankle Surgery;  Location:  OR    COLONOSCOPY N/A 12/22/2022    Procedure: COLONOSCOPY, FLEXIBLE, WITH LESION REMOVAL USING SNARE;  Surgeon: Josh Reeves MD;  Location:  GI    IRRIGATION AND DEBRIDEMENT FOOT, COMBINED Left 8/21/2023    Procedure: IRRIGATION AND DEBRIDEMENT LEFT FOOT, REVISION OF AMPUTATION;  Surgeon: Michelle Marc DPM;  Location:  OR    NO HISTORY OF SURGERY       Review of Systems   Constitutional:  Negative for chills and fever.   HENT:  Negative for congestion, ear pain, hearing loss and sore throat.    Eyes:  Negative for pain and visual disturbance.   Respiratory:  Positive for cough. Negative for shortness of breath.    Cardiovascular:  Negative for chest pain and palpitations.   Gastrointestinal:  Negative for abdominal pain, constipation, diarrhea and nausea.   Genitourinary:  Negative for dysuria, frequency, genital sores, hematuria, penile discharge and urgency.   Musculoskeletal:  Positive for joint swelling and myalgias. Negative for  "arthralgias.   Skin:  Negative for rash.   Neurological:  Negative for dizziness, weakness and headaches.   Psychiatric/Behavioral:  The patient is nervous/anxious.          OBJECTIVE:   /72   Pulse 86   Temp 97.7  F (36.5  C) (Oral)   Resp 17   Ht 1.778 m (5' 10\")   Wt 132 kg (291 lb)   BMI 41.75 kg/m     Estimated body mass index is 41.75 kg/m  as calculated from the following:    Height as of this encounter: 1.778 m (5' 10\").    Weight as of this encounter: 132 kg (291 lb).  Physical Exam  GENERAL: alert and no distress  EYES: Eyes grossly normal to inspection, PERRL and conjunctivae and sclerae normal  HENT: ear canals and TM's normal, nose and mouth without ulcers or lesions  NECK: no adenopathy, no asymmetry, masses, or scars  RESP: lungs clear to auscultation - no rales, rhonchi or wheezes  CV: regular rate and rhythm, normal S1 S2, no S3 or S4, no murmur, click or rub, no peripheral edema  ABDOMEN: soft, nontender, no hepatosplenomegaly, no masses and bowel sounds normal   (male): normal male genitalia without lesions or urethral discharge, no hernia  MS: no gross musculoskeletal defects noted, no edema  SKIN: no suspicious lesions or rashes  NEURO: Normal strength and tone, mentation intact and speech normal  PSYCH: mentation appears normal, affect normal/bright  LYMPH: no cervical, supraclavicular, axillary, or inguinal adenopathy    Diagnostic Test Results:  Labs reviewed in Epic    ASSESSMENT/PLAN:   Type 2 diabetes mellitus with hyperglycemia, with long-term current use of insulin (H)  Control is borderline.  Increase ozempic and follow up with MTM this month.  Weight loss and carbs discussed.   - Lipid panel reflex to direct LDL Non-fasting; Future  - Albumin Random Urine Quantitative with Creat Ratio; Future  - Hemoglobin A1c; Future  - Comprehensive metabolic panel (BMP + Alb, Alk Phos, ALT, AST, Total. Bili, TP); Future  - TSH with free T4 reflex; Future  - insulin glargine 100 UNIT/ML " pen; Inject 60 Units Subcutaneous 2 times daily  - insulin aspart (NOVOLOG PEN) 100 UNIT/ML pen; Inject 25 Units Subcutaneous 3 times daily (with meals) Along with adjustment scale of 1:50 as directed up to 75 units daily.  - Semaglutide, 1 MG/DOSE, (OZEMPIC) 4 MG/3ML pen; Inject 1 mg Subcutaneous every 7 days  - Lipid panel reflex to direct LDL Non-fasting  - Albumin Random Urine Quantitative with Creat Ratio  - Hemoglobin A1c  - Comprehensive metabolic panel (BMP + Alb, Alk Phos, ALT, AST, Total. Bili, TP)  - TSH with free T4 reflex    Erectile dysfunction, unspecified erectile dysfunction type  Change to viagra at patient request.   - sildenafil (VIAGRA) 100 MG tablet; Take 1 tablet (100 mg) by mouth daily as needed (ED)    S/P amputation of lesser toe, left (H24)  Healed.  Follows with podiatry.     Mild episode of recurrent major depressive disorder (H24)  Symptoms ongoing. He'd like to address weight more effectively, with weight management referral.    SDH (subdural hematoma) (H)  Resolved.  Asymptomatic.     Morbid obesity (H)  Referred for possible bariatric surgery.   - Adult Comprehensive Weight Management  Referral; Future    Encounter for routine adult health examination without abnormal findings  Discussed diet, exercise, testicular self exam, blood pressure, cholesterol, and need for cancer surveillance at appropriate ages.     Hypogonadism male  Recheck.   - testosterone cypionate (DEPOTESTOSTERONE) 200 MG/ML injection; Inject 1 mL (200 mg) into the muscle every 14 days  - Testosterone, total; Future  - Testosterone, total    Daytime somnolence  Likley from continuous positive airway pressure, low testosterone, depression, obesity, and diabetes mellitus.  Resuming his continuous positive airway pressure.   - Vitamin D Deficiency; Future  - Vitamin D Deficiency    Patient has been advised of split billing requirements and indicates understanding: Yes      Counseling  Reviewed preventive  "health counseling, as reflected in patient instructions       Regular exercise       Healthy diet/nutrition       Vision screening       Colorectal cancer screening       Prostate cancer screening      BMI  Estimated body mass index is 41.75 kg/m  as calculated from the following:    Height as of this encounter: 1.778 m (5' 10\").    Weight as of this encounter: 132 kg (291 lb).   Weight management plan: Specific weight management program called   discussed      He reports that he has never smoked. He has never used smokeless tobacco.            Signed Electronically by: Sandoval Wilkinson MD  Answers submitted by the patient for this visit:  Patient Health Questionnaire (Submitted on 2/1/2024)  If you checked off any problems, how difficult have these problems made it for you to do your work, take care of things at home, or get along with other people?: Somewhat difficult  PHQ9 TOTAL SCORE: 9  SAGE-7 (Submitted on 2/1/2024)  SAGE 7 TOTAL SCORE: 2  Annual Preventive Visit (Submitted on 1/29/2024)  Chief Complaint: Annual Exam:  Blood in stool: No  heartburn: No  peripheral edema: No  mood changes: Yes  Skin sensation changes: No  impotence: Yes    "

## 2024-02-01 NOTE — PATIENT INSTRUCTIONS
"Colonoscopy in 2027 or later.    Let's change your ozempic to 1 mg weekly.  Let us know if you have side effects.    Change the cialis back to sildenafil 100 mg; sent in.    Lab work today:  We can do labs in the exam room today, or you can get them done downstairs in the lab.      If you are going downstairs:  Directions:  As you walk through the first floor, you'll see (on the right) first the pharmacy, then some bathrooms, then the \"Lab and Imaging\" area. Give them your name at the window there and wait for them to call you.     Set up appointment with MTM for this month or next.    Follow up with me in 3 months.    Shots and cancer screenings up to date.     Let's have a referral to the weight management center.    Checking testosterone today.    Resume your continuous positive airway pressure.    Sandoval Wilkinson MD  Internal Medicine and Pediatrics           "

## 2024-02-02 ENCOUNTER — LAB (OUTPATIENT)
Dept: LAB | Facility: CLINIC | Age: 54
End: 2024-02-02
Payer: COMMERCIAL

## 2024-02-02 DIAGNOSIS — N52.9 ERECTILE DYSFUNCTION, UNSPECIFIED ERECTILE DYSFUNCTION TYPE: ICD-10-CM

## 2024-02-02 PROCEDURE — 36415 COLL VENOUS BLD VENIPUNCTURE: CPT

## 2024-02-02 PROCEDURE — 84403 ASSAY OF TOTAL TESTOSTERONE: CPT

## 2024-02-06 LAB — TESTOST SERPL-MCNC: 801 NG/DL (ref 240–950)

## 2024-02-29 NOTE — PROGRESS NOTES
Medication Therapy Management (MTM) Encounter    ASSESSMENT:                            Medication Adherence/Access: See below for considerations    Diabetes:  A1c not at goal < 7%. Continuous glucose monitor time in target not at goal. Patient would benefit from increasing basal insulin. Also would benefit from changing to a higher concentration pen as he goes through his Lantus pens quickly and has to administer large volumes of the dose. He is experiencing post prandial hyperglycemia, recommend he start an adjustment scale with mealtime insulin.     GERD    GI bloating likely from Ozempic but he does not feel he can adjust his diet to smaller portion to help mitigate side effects, therefore will recommend over-the-counter simethicone to help with gas.     Hypertension   Blood pressure at goal < 130/80 mm Hg.    Hyperlipidemia   Stable, elevated TG will focus on improving diabetes first.     PLAN:                            Cost [place]:  Signed patient up for savings cards for insulin during visit. Encourage he sign up once a year to help reduce copay    Diabetes:  Increase Long-acting insulin to 64 units twice daily. Change Lantus to Tresiba high concentration pen. (Insurance did not cover, pharmacy helped initiate a PA, in process).  Take Novolog BEFORE meals, increase to 34 units and add adjustment scale of 1 unit per 50 when blood sugar over 150.     GI indigestion:  Simethicone chewable prn  If having loose stools and ongoing indigestion, try over-the-counter Pepto bismol    Follow-up: No follow-ups on file.    SUBJECTIVE/OBJECTIVE:                          Jairo Escamilla is a 54 year old male coming in for a follow-up visit from 5/2022.       Reason for visit: follow-up on diabetes.    Allergies/ADRs: Reviewed in chart  Past Medical History: Reviewed in chart  Tobacco: He reports that he has never smoked. He has never used smokeless tobacco.  Alcohol: not currently using    Medication Adherence/Access:  Medication barriers: although affordable copay but cost is adding up, becoming more of a burden. Fills rx at [facility].     Diabetes :  Lantus 60 units twice daily   Novolog 25 units 3 times daily per rx, but has increased to 30 units usually takes after eating due to forgets.  Metformin 1000 mg twice daily   Ozempic 1 mg weekly, Sunday was the first dose  Previously very diarrhea, nauseated, gassy on the 1 mg. Currently has a lot of gas but diarrhea is better, only a few times with diarrhea.   Patient referred with management clinic by primary care provider.   Blood sugar monitoring: [id number]         Diet/Exercise: usually twice a day - usually fast food. Drink diet pop.  May snack just occasional.       Eye exam is up to date  Foot exam is up to date  Urine Albumin:   Lab Results   Component Value Date    UMALCR 27.16 (H) 02/01/2024      Lab Results   Component Value Date    A1C 8.0 (H) 02/01/2024       GERD    Cimetidine 200 mg twice daily   Patient reports no current symptoms of heartburn but has a lot of bloating distention discomfort.        Hypertension   Losartan 25 mg daily   Patient reports no current medication side effects  Patient does not self-monitor blood pressure.       BP Readings from Last 3 Encounters:   03/01/24 126/70   02/01/24 124/72   01/30/24 126/80     Pulse Readings from Last 3 Encounters:   03/01/24 102   02/01/24 86   11/03/23 91       Hyperlipidemia   Simvastatin 20 mg at bedtime   Patient reports no significant myalgias or other side effects.     Recent Labs   Lab Test 02/01/24  0816 01/24/23  1119   CHOL 139 143   HDL 27* 35*   LDL 53 52   TRIG 296* 279*       Today's Vitals: /70   Pulse 102   Wt 294 lb 11.2 oz (133.7 kg)   SpO2 93%   BMI 42.29 kg/m    ----------------      I spent 30 minutes with this patient today. All changes were made via collaborative practice agreement with Sandoval Wilkinson MD. A copy of the visit note was provided to the patient's  provider(s).    A summary of these recommendations was given to the patient.    Marta Cardoza, PharmD  Medication Therapy Management Pharmacist     Medication Therapy Recommendations  Indigestion    Current Medication: simethicone (MYLICON) 125 MG chewable tablet   Rationale: Synergistic therapy - Needs additional medication therapy - Indication   Recommendation: Start Medication   Status: Accepted per CPA         Type 2 diabetes mellitus with hyperglycemia (H)    Current Medication: insulin aspart (NOVOLOG PEN) 100 UNIT/ML pen   Rationale: Dose too low - Dosage too low - Effectiveness   Recommendation: Increase Dose   Status: Accepted per CPA          Current Medication: insulin glargine (LANTUS SOLOSTAR) 100 UNIT/ML pen   Rationale: Dose too low - Dosage too low - Effectiveness   Recommendation: Increase Dose   Status: Accepted per CPA

## 2024-03-01 ENCOUNTER — MYC MEDICAL ADVICE (OUTPATIENT)
Dept: PHARMACY | Facility: CLINIC | Age: 54
End: 2024-03-01
Payer: COMMERCIAL

## 2024-03-01 ENCOUNTER — OFFICE VISIT (OUTPATIENT)
Dept: PHARMACY | Facility: CLINIC | Age: 54
End: 2024-03-01
Payer: COMMERCIAL

## 2024-03-01 ENCOUNTER — TELEPHONE (OUTPATIENT)
Dept: PEDIATRICS | Facility: CLINIC | Age: 54
End: 2024-03-01
Payer: COMMERCIAL

## 2024-03-01 VITALS
OXYGEN SATURATION: 93 % | BODY MASS INDEX: 42.29 KG/M2 | SYSTOLIC BLOOD PRESSURE: 126 MMHG | DIASTOLIC BLOOD PRESSURE: 70 MMHG | WEIGHT: 294.7 LBS | HEART RATE: 102 BPM

## 2024-03-01 DIAGNOSIS — K30 INDIGESTION: ICD-10-CM

## 2024-03-01 DIAGNOSIS — E78.5 HYPERLIPIDEMIA LDL GOAL <100: ICD-10-CM

## 2024-03-01 DIAGNOSIS — I10 HYPERTENSION, UNSPECIFIED TYPE: ICD-10-CM

## 2024-03-01 DIAGNOSIS — Z79.4 TYPE 2 DIABETES MELLITUS WITH HYPERGLYCEMIA, WITH LONG-TERM CURRENT USE OF INSULIN (H): Primary | ICD-10-CM

## 2024-03-01 DIAGNOSIS — E11.65 TYPE 2 DIABETES MELLITUS WITH HYPERGLYCEMIA, WITH LONG-TERM CURRENT USE OF INSULIN (H): Primary | ICD-10-CM

## 2024-03-01 DIAGNOSIS — K30 INDIGESTION: Primary | ICD-10-CM

## 2024-03-01 PROCEDURE — 99605 MTMS BY PHARM NP 15 MIN: CPT | Performed by: PHARMACIST

## 2024-03-01 PROCEDURE — 99607 MTMS BY PHARM ADDL 15 MIN: CPT | Performed by: PHARMACIST

## 2024-03-01 RX ORDER — SIMETHICONE 125 MG
125 TABLET,CHEWABLE ORAL 4 TIMES DAILY PRN
Qty: 90 TABLET | Refills: 1 | Status: SHIPPED | OUTPATIENT
Start: 2024-03-01

## 2024-03-01 RX ORDER — METFORMIN HCL 500 MG
TABLET, EXTENDED RELEASE 24 HR ORAL
Qty: 360 TABLET | Refills: 0 | Status: SHIPPED | OUTPATIENT
Start: 2024-03-01 | End: 2024-07-23

## 2024-03-01 RX ORDER — INSULIN GLARGINE 300 U/ML
64 INJECTION, SOLUTION SUBCUTANEOUS 2 TIMES DAILY
Qty: 45 ML | Refills: 1 | Status: SHIPPED | OUTPATIENT
Start: 2024-03-01 | End: 2024-03-01

## 2024-03-01 RX ORDER — INSULIN GLARGINE 100 [IU]/ML
64 INJECTION, SOLUTION SUBCUTANEOUS 2 TIMES DAILY
Qty: 115 ML | Refills: 0 | Status: SHIPPED | OUTPATIENT
Start: 2024-03-01 | End: 2024-04-05

## 2024-03-01 RX ORDER — INSULIN DEGLUDEC 200 U/ML
64 INJECTION, SOLUTION SUBCUTANEOUS 2 TIMES DAILY
Qty: 60 ML | Refills: 3 | Status: SHIPPED | OUTPATIENT
Start: 2024-03-01 | End: 2024-03-01

## 2024-03-01 RX ORDER — INSULIN DEGLUDEC 200 U/ML
64 INJECTION, SOLUTION SUBCUTANEOUS 2 TIMES DAILY
Qty: 60 ML | Refills: 1 | Status: SHIPPED | OUTPATIENT
Start: 2024-03-01 | End: 2024-04-05

## 2024-03-01 RX ORDER — INSULIN DEGLUDEC 200 U/ML
64 INJECTION, SOLUTION SUBCUTANEOUS 2 TIMES DAILY
Status: SHIPPED
Start: 2024-03-01 | End: 2024-03-01

## 2024-03-01 NOTE — TELEPHONE ENCOUNTER
Prior Authorization Retail Medication Request    Medication/Dose: Tresiba Flextouch 200u/ml  Diagnosis and ICD code (if different than what is on RX):    New/renewal/insurance change PA/secondary ins. PA:  Previously Tried and Failed:    Rationale:      Insurance   Primary: [employer]  Insurance ID:  [id number]    Secondary (if applicable):  Insurance ID:      Pharmacy Information (if different than what is on RX)  Name:  [facility]  Phone:  [phone]  Fax:    Thank you,  [facility]

## 2024-03-01 NOTE — PATIENT INSTRUCTIONS
"Recommendation(s) from today's visit:                                                      Increase Novolog to 34 units plus scale:  An insulin correction scale use 1 unit rapid-acting insulin (Humalog or Novolog) to correct for every 50 mg/dL that blood glucose is above 150 mg/dL.  150-199 mg/dL:    add 1 units  200-249 mg/dL:    add 2 units    250-299 mg/dL:    add 3 units  300-349 mg/dL:    add 4 units  350-399 mg/dL:    add 5 units  >400 mg/dL:         add 6 units      Change Lantus to Tresiba. Use up the Lantus take 64 units twice daily.     Use those coupon cards!    Simethicone chewables as needed for gas  Pepto Bismol if gassy and loose stools      Follow-up: No follow-ups on file.    My Clinical Pharmacist's contact information:                                                      Marta Cardoza, PharmD  Medication therapy management clinical pharmacist    It was great speaking with you today.  I value your experience and would be very thankful for your time in providing feedback in our clinic survey. In the next few days, you may receive an email or text message from [employer] with a link to a survey related to your  clinical pharmacist.\"     To schedule another MTM appointment, please call the clinic directly [phone] or you may call the MTM scheduling line at [phone].    "

## 2024-03-14 NOTE — TELEPHONE ENCOUNTER
Central Prior Authorization Team   Phone: [phone]    PA Initiation    Medication: Tresiba Flextouch 200u/ml  Insurance Company: [employer] - Phone [phone] Fax [phone]  Pharmacy Filling the Rx: [facility] - [address]   Filling Pharmacy Phone: [phone]  Filling Pharmacy Fax:    Start Date: 3/14/2024

## 2024-03-15 NOTE — TELEPHONE ENCOUNTER
Prior Authorization Approval    Authorization Effective Date: 3/15/2024  Authorization Expiration Date: 3/15/2025  Medication: Tresiba Flextouch 200u/ml  Approved Dose/Quantity:    Reference #:     Insurance Company: [employer] - Phone [phone] Fax [phone]  Expected CoPay:       CoPay Card Available:      Foundation Assistance Needed:    Which Pharmacy is filling the prescription (Not needed for infusion/clinic administered): [facility] - [address]   Pharmacy Notified:  Yes  Patient Notified:  **Instructed pharmacy to notify patient when script is ready to /ship.**

## 2024-03-19 RX ORDER — FAMOTIDINE 20 MG/1
20 TABLET, FILM COATED ORAL 2 TIMES DAILY
Qty: 180 TABLET | Refills: 0 | Status: SHIPPED | OUTPATIENT
Start: 2024-03-19 | End: 2024-06-20

## 2024-03-19 NOTE — TELEPHONE ENCOUNTER
Message from pharmacy concern for drug interaction of metformin and cimetidine potentially increasing metformin concentration.     Recommend stop cimetidine. Start famotidine instead.    Per cpa

## 2024-04-05 ENCOUNTER — OFFICE VISIT (OUTPATIENT)
Dept: PHARMACY | Facility: CLINIC | Age: 54
End: 2024-04-05
Payer: COMMERCIAL

## 2024-04-05 VITALS
BODY MASS INDEX: 41.77 KG/M2 | WEIGHT: 291.1 LBS | OXYGEN SATURATION: 94 % | DIASTOLIC BLOOD PRESSURE: 72 MMHG | HEART RATE: 90 BPM | SYSTOLIC BLOOD PRESSURE: 136 MMHG

## 2024-04-05 DIAGNOSIS — F43.23 ADJUSTMENT DISORDER WITH MIXED ANXIETY AND DEPRESSED MOOD: ICD-10-CM

## 2024-04-05 DIAGNOSIS — Z79.4 TYPE 2 DIABETES MELLITUS WITH HYPERGLYCEMIA, WITH LONG-TERM CURRENT USE OF INSULIN (H): Primary | ICD-10-CM

## 2024-04-05 DIAGNOSIS — E11.65 TYPE 2 DIABETES MELLITUS WITH HYPERGLYCEMIA, WITH LONG-TERM CURRENT USE OF INSULIN (H): Primary | ICD-10-CM

## 2024-04-05 DIAGNOSIS — K30 INDIGESTION: ICD-10-CM

## 2024-04-05 PROCEDURE — 99606 MTMS BY PHARM EST 15 MIN: CPT | Performed by: PHARMACIST

## 2024-04-05 PROCEDURE — 99607 MTMS BY PHARM ADDL 15 MIN: CPT | Performed by: PHARMACIST

## 2024-04-05 RX ORDER — INSULIN GLARGINE 100 [IU]/ML
68 INJECTION, SOLUTION SUBCUTANEOUS 2 TIMES DAILY
Status: SHIPPED
Start: 2024-04-05 | End: 2024-05-06

## 2024-04-05 RX ORDER — BLOOD-GLUCOSE SENSOR
1 EACH MISCELLANEOUS
Qty: 6 EACH | Refills: 3 | Status: SHIPPED | OUTPATIENT
Start: 2024-04-05

## 2024-04-05 NOTE — PROGRESS NOTES
Medication Therapy Management (MTM) Encounter    ASSESSMENT:                            Medication Adherence/Access: No issues identified    Diabetes/MDD:   Given GI side effects still present will hold on any further Ozempic titration. With his elevated BMI and desire for weight loss, I agree that working with a [facility] on his mental health motivation to make changes would be beneficial. He has a history of MDD and not on any current treatment. His continuous glucose monitor is improving based on preference visit, however not at goal yes of > 70%. Therefore will recommend we add on SGLT2i Jardiance. Invokana not recommend due to history of toe amputation. Trials involving empagliflozin have not consistently shown an increased risk of lower limb amputation associated with its use, therefore would encourage patient monitor at this as the benefit for SGLT2i to improve his blood sugar control should also help prevent risk of infection/amputation.     GERD:   improving    PLAN:                            Increase Lantus 68 units twice daily. Patient to transition to Tresiba once down with home Lantus supply.     Start Jardiance 10 mg in the morning. Patient to fill out coupon online.  - monitor feet daily - if you have any cut or sore let us know right away and will hold medication.  - check basic metabolic panel & a1c and 1 months      If dexcom high cost have pharmacy check katie 3 cost.  If changing to katie 3, then connect to the Clinic on the [employer].    [facility] referral    Follow-up: Return in about 1 month (around 5/5/2024) for primary care + get BMP; 3 months with JAMISON mccarty.    SUBJECTIVE/OBJECTIVE:                          Jairo Escamilla is a 54 year old male coming in for a follow-up visit.       Reason for visit: diabetes follow-up.    Allergies/ADRs: Reviewed in chart  Past Medical History: Reviewed in chart  Tobacco: He reports that he has never smoked. He has never used smokeless tobacco.  Alcohol: not currently  "using  Social: sports  for kids    Medication Adherence/Access:   Medication barriers: although affordable copay the cost is adding up, becoming more of a burden. Provided coupon cards last visit but patient has not been able to use.   Fills rx at [facility].     Diabetes /MDD:  Lantus 64 units twice daily   Novolog before meals 34 units + 1:50 when blood sugar over 150  Metformin 1000 mg twice daily   Ozempic 1 mg weekly, Sunday   Side effects: Getting intermittent moments of feeling \"blah\"/queasy but not has bad as before when he went up on Ozempic at this dose. This time he reports going much better. He does get diarrhea about once a day on average or sometimes can be every other or multiple days.   Patient referred with management clinic by primary care provider.   Blood sugar monitoring: [id number]       Diet/Exercise: usually twice a day - usually fast food. Drink diet pop.  May snack just occasional.  Further discussion, he shares interested in working with a therapist because he knows the things he needs to do and change but just can't get himself to make these changes. He also thinks possibly how he grew up with the mentality to have to finish his plate plays a role with his dietary habits. He would like a referral for therapy to work on his depression, motivation to get at the reason as to why he can't make these changes.     Eye exam is up to date  Foot exam: due, but examines his feet daily, history of toe amputation, follows podiatry.  Urine Albumin:   Lab Results   Component Value Date    UMALCR 27.16 (H) 02/01/2024      Lab Results   Component Value Date    A1C 8.0 (H) 02/01/2024       GERD    Famotidine 20 mg twice daily   Simethicone chewable as needed   Drug interaction with metformin therefore changed Cimetidine 200 mg twice daily. He does feel the simethicone does help lessen the severity of the symptoms. The nausea and bloating is most common in the morning.    "       Today's Vitals: /72   Pulse 90   Wt 291 lb 1.6 oz (132 kg)   SpO2 94%   BMI 41.77 kg/m    ----------------      I spent 30 minutes with this patient today. All changes were made via collaborative practice agreement with Sandoval Wilkinson MD. A copy of the visit note was provided to the patient's provider(s).    A summary of these recommendations was given to the patient.    Marta Cardoza, PharmD  Medication Therapy Management Pharmacist       Medication Therapy Recommendations  Type 2 diabetes mellitus with hyperglycemia (H)    Current Medication: Continuous Blood Gluc Sensor (DEXCOM G7 SENSOR) MISC   Rationale: Cannot afford medication product - Cost - Adherence   Recommendation: Change Medication - FreeStyle Mono 3 Sensor Misc   Status: Accepted per CPA          Current Medication: empagliflozin (JARDIANCE) 10 MG TABS tablet   Rationale: Synergistic therapy - Needs additional medication therapy - Indication   Recommendation: Start Medication   Status: Accepted per CPA

## 2024-04-05 NOTE — PATIENT INSTRUCTIONS
"Recommendation(s) from today's visit:                                                      Increase Lantus 68 units twice daily.     Start Jardiance 10 mg in the morning   - monitor feet daily - if you have any cut or sore let us know right away and will hold medication.  - check basic metabolic panel & a1c and 1 months     If dexcom high cost have pharmacy check katie 3 cost.  Connect to the Clinic on the [employer].  Step 1: Open the Settings Menu. Click the three blue lines to open the Settings Menu.  Step 2: Click Connected Apps.  Step 3: Click Connect or Manage next to [employer].  Step 4: Clinic Connect to a Practice:  Step 5: Link to a practice: To connect to [facility] enter the following Practice ID: [id number]  in the field provided and click the Add button.     Follow-up: 1 month lab     My Clinical Pharmacist's contact information:                                                      Marta Cardoza, PharmD  Medication therapy management clinical pharmacist    It was great speaking with you today.  I value your experience and would be very thankful for your time in providing feedback in our clinic survey. In the next few days, you may receive an email or text message from [employer] with a link to a survey related to your  clinical pharmacist.\"     To schedule another MTM appointment, please call the clinic directly [phone] or you may call the MTM scheduling line at [phone].    "

## 2024-05-03 ENCOUNTER — OFFICE VISIT (OUTPATIENT)
Dept: PEDIATRICS | Facility: CLINIC | Age: 54
End: 2024-05-03
Attending: INTERNAL MEDICINE
Payer: COMMERCIAL

## 2024-05-03 VITALS
TEMPERATURE: 97.8 F | HEART RATE: 92 BPM | RESPIRATION RATE: 20 BRPM | HEIGHT: 70 IN | OXYGEN SATURATION: 95 % | BODY MASS INDEX: 41.16 KG/M2 | WEIGHT: 287.5 LBS | SYSTOLIC BLOOD PRESSURE: 135 MMHG | DIASTOLIC BLOOD PRESSURE: 84 MMHG

## 2024-05-03 DIAGNOSIS — S06.5XAA SDH (SUBDURAL HEMATOMA) (H): ICD-10-CM

## 2024-05-03 DIAGNOSIS — E78.5 HYPERLIPIDEMIA LDL GOAL <100: ICD-10-CM

## 2024-05-03 DIAGNOSIS — E29.1 HYPOGONADISM MALE: ICD-10-CM

## 2024-05-03 DIAGNOSIS — I10 HYPERTENSION, UNSPECIFIED TYPE: ICD-10-CM

## 2024-05-03 DIAGNOSIS — Z79.4 TYPE 2 DIABETES MELLITUS WITH HYPERGLYCEMIA, WITH LONG-TERM CURRENT USE OF INSULIN (H): Primary | ICD-10-CM

## 2024-05-03 DIAGNOSIS — E11.65 TYPE 2 DIABETES MELLITUS WITH HYPERGLYCEMIA, WITH LONG-TERM CURRENT USE OF INSULIN (H): Primary | ICD-10-CM

## 2024-05-03 DIAGNOSIS — R05.1 ACUTE COUGH: ICD-10-CM

## 2024-05-03 PROBLEM — L08.9 DIABETIC FOOT INFECTION (H): Status: RESOLVED | Noted: 2023-08-18 | Resolved: 2024-05-03

## 2024-05-03 PROBLEM — E11.628 DIABETIC FOOT INFECTION (H): Status: RESOLVED | Noted: 2023-08-18 | Resolved: 2024-05-03

## 2024-05-03 LAB — HBA1C MFR BLD: 6.4 % (ref 0–5.6)

## 2024-05-03 PROCEDURE — 83036 HEMOGLOBIN GLYCOSYLATED A1C: CPT | Performed by: INTERNAL MEDICINE

## 2024-05-03 PROCEDURE — G2211 COMPLEX E/M VISIT ADD ON: HCPCS | Performed by: INTERNAL MEDICINE

## 2024-05-03 PROCEDURE — 99207 PR FOOT EXAM NO CHARGE: CPT | Performed by: INTERNAL MEDICINE

## 2024-05-03 PROCEDURE — 80053 COMPREHEN METABOLIC PANEL: CPT | Performed by: INTERNAL MEDICINE

## 2024-05-03 PROCEDURE — 99214 OFFICE O/P EST MOD 30 MIN: CPT | Performed by: INTERNAL MEDICINE

## 2024-05-03 PROCEDURE — 36415 COLL VENOUS BLD VENIPUNCTURE: CPT | Performed by: INTERNAL MEDICINE

## 2024-05-03 RX ORDER — ACETAMINOPHEN AND CODEINE PHOSPHATE 300; 30 MG/1; MG/1
1 TABLET ORAL EVERY 6 HOURS PRN
Qty: 15 TABLET | Refills: 0 | Status: SHIPPED | OUTPATIENT
Start: 2024-05-03 | End: 2024-07-15

## 2024-05-03 ASSESSMENT — PATIENT HEALTH QUESTIONNAIRE - PHQ9
SUM OF ALL RESPONSES TO PHQ QUESTIONS 1-9: 2
10. IF YOU CHECKED OFF ANY PROBLEMS, HOW DIFFICULT HAVE THESE PROBLEMS MADE IT FOR YOU TO DO YOUR WORK, TAKE CARE OF THINGS AT HOME, OR GET ALONG WITH OTHER PEOPLE: NOT DIFFICULT AT ALL
SUM OF ALL RESPONSES TO PHQ QUESTIONS 1-9: 2

## 2024-05-03 ASSESSMENT — ENCOUNTER SYMPTOMS: COUGH: 1

## 2024-05-03 ASSESSMENT — PAIN SCALES - GENERAL: PAINLEVEL: NO PAIN (0)

## 2024-05-03 NOTE — PROGRESS NOTES
"  Assessment & Plan     Type 2 diabetes mellitus with hyperglycemia, with long-term current use of insulin (H)  Parameters well controlled.  Continue secondary risk factor modification for BP, cholesterol, anticoagulation, and smoking cessation. Will likely taper up on jardiance with MTM, and down on insulin.   - Comprehensive metabolic panel (BMP + Alb, Alk Phos, ALT, AST, Total. Bili, TP); Future  - Hemoglobin A1c; Future  - Comprehensive metabolic panel (BMP + Alb, Alk Phos, ALT, AST, Total. Bili, TP)  - Hemoglobin A1c  - FOOT EXAM    SDH (subdural hematoma) (H)  No aspirin.     Hypogonadism male  Ongoing testosterone     Hypertension, unspecified type  At goal     Hyperlipidemia LDL goal <100  Ongoing statin.     Acute cough  Saline spray (nonmedicated salt water) in small squirt bottles can be used every hour or two during the day, as can humidifiers during the night.  Steam showers can help keep mucous loose.       For adults and kids over 6, expectorants (like \"Mucinex\" or \"Robitussin\") may help, as can using cough supressants (like the \"DM\" in Mucinex DM and Robitussin DM).    Might benefit from antihistamines like Zyrtec (cetirizine) for relief of congestion; the dose is usually 10 mg for adults, 5 mg for school aged kids, and 2.5 mg for toddlers.   - acetaminophen-codeine (TYLENOL #3) 300-30 MG per tablet; Take 1 tablet by mouth every 6 hours as needed for severe pain              See Patient Instructions    Mireya Gill is a 54 year old, presenting for the following health issues:  Diabetes and Cough (X 2 weeks, had fevers but its stopped, still has congestions and cough. )        5/3/2024     8:45 AM   Additional Questions   Roomed by RITIKA Alvarez   Accompanied by n/a         5/3/2024     8:45 AM   Patient Reported Additional Medications   Patient reports taking the following new medications n/a     Cough  This is a new problem. The current episode started more than 1 week ago. The problem occurs " "every few minutes. The problem has been gradually improving. There has been no fever. Associated symptoms include chest pain. He has tried decongestants and cough syrup for the symptoms. The treatment provided moderate relief.   History of Present Illness       Diabetes:   He presents for follow up of diabetes.   He is checking home blood glucose with a continuous glucose monitor.   He checks blood glucose before and after meals and at bedtime.  Blood glucose is sometimes over 200 and sometimes under 70. He is aware of hypoglycemia symptoms including weakness and lethargy.    He has no concerns regarding his diabetes at this time.  He is having numbness in feet.                Seen by MTM; added in Jardiance.    Increased insulin to 68 twice daily; but since numbers were better, went to 64 twice daily, and novlog 30 three times daily, plus ISS.     AM sugars running almost always in range, with Jardiance.      Remains on ozempic weekly;     Was sick 2 weeks ago with extreme fatigue, cough, congestion, fever. Now still with paroxsysmal cough, worse with lying flat.  Nonsmoker.               Review of Systems  Constitutional, HEENT, cardiovascular, pulmonary, GI, , musculoskeletal, neuro, skin, endocrine and psych systems are negative, except as otherwise noted.      Objective    /84   Pulse 92   Temp 97.8  F (36.6  C) (Tympanic)   Resp 20   Ht 1.778 m (5' 10\")   Wt 130.4 kg (287 lb 8 oz)   SpO2 95%   BMI 41.25 kg/m    Body mass index is 41.25 kg/m .  Physical Exam   GENERAL: alert and no distress  EYES: Eyes grossly normal to inspection, PERRL and conjunctivae and sclerae normal  HENT: ear canals and TM's normal, nose and mouth without ulcers or lesions  NECK: no adenopathy, no asymmetry, masses, or scars  RESP: lungs clear to auscultation - no rales, rhonchi or wheezes  CV: regular rate and rhythm, normal S1 S2, no S3 or S4, no murmur, click or rub, no peripheral edema  ABDOMEN: soft, nontender, no " hepatosplenomegaly, no masses and bowel sounds normal  MS: no gross musculoskeletal defects noted, no edema  SKIN: no suspicious lesions or rashes  NEURO: Normal strength and tone, mentation intact and speech normal  PSYCH: mentation appears normal, affect normal/bright  Diabetic foot exam: normal DP and PT pulses, no trophic changes or ulcerative lesions, and normal sensory exam            Signed Electronically by: Sandoval Wilkinson MD      The longitudinal plan of care for the diagnosis(es)/condition(s) as documented were addressed during this visit. Due to the added complexity in care, I will continue to support Jairo in the subsequent management and with ongoing continuity of care.

## 2024-05-03 NOTE — PATIENT INSTRUCTIONS
"Lab work today:  We can do labs in the exam room today, or you can get them done downstairs in the lab.      I will message Marta about your results; we may increase Jardiance and slowly taper down on the insulin.    For your cough;    Saline spray (nonmedicated salt water) in small squirt bottles can be used every hour or two during the day, as can humidifiers during the night.  Steam showers can help keep mucous loose.       For adults and kids over 6, expectorants (like \"Mucinex\" or \"Robitussin\") may help, as can using cough supressants (like the \"DM\" in Mucinex DM and Robitussin DM).    Might benefit from antihistamines like Zyrtec (cetirizine) for relief of congestion; the dose is usually 10 mg for adults.    Begin tylenol with codeine nightly before bed.       "

## 2024-05-04 LAB
ALBUMIN SERPL BCG-MCNC: 4.7 G/DL (ref 3.5–5.2)
ALP SERPL-CCNC: 58 U/L (ref 40–150)
ALT SERPL W P-5'-P-CCNC: 66 U/L (ref 0–70)
ANION GAP SERPL CALCULATED.3IONS-SCNC: 12 MMOL/L (ref 7–15)
AST SERPL W P-5'-P-CCNC: 49 U/L (ref 0–45)
BILIRUB SERPL-MCNC: 0.9 MG/DL
BUN SERPL-MCNC: 17.4 MG/DL (ref 6–20)
CALCIUM SERPL-MCNC: 9.9 MG/DL (ref 8.6–10)
CHLORIDE SERPL-SCNC: 103 MMOL/L (ref 98–107)
CREAT SERPL-MCNC: 0.99 MG/DL (ref 0.67–1.17)
DEPRECATED HCO3 PLAS-SCNC: 24 MMOL/L (ref 22–29)
EGFRCR SERPLBLD CKD-EPI 2021: >90 ML/MIN/1.73M2
GLUCOSE SERPL-MCNC: 117 MG/DL (ref 70–99)
POTASSIUM SERPL-SCNC: 4.6 MMOL/L (ref 3.4–5.3)
PROT SERPL-MCNC: 8.1 G/DL (ref 6.4–8.3)
SODIUM SERPL-SCNC: 139 MMOL/L (ref 135–145)

## 2024-05-13 ASSESSMENT — SLEEP AND FATIGUE QUESTIONNAIRES
HOW LIKELY ARE YOU TO NOD OFF OR FALL ASLEEP WHILE SITTING INACTIVE IN A PUBLIC PLACE: SLIGHT CHANCE OF DOZING
HOW LIKELY ARE YOU TO NOD OFF OR FALL ASLEEP WHILE SITTING QUIETLY AFTER LUNCH WITHOUT ALCOHOL: MODERATE CHANCE OF DOZING
HOW LIKELY ARE YOU TO NOD OFF OR FALL ASLEEP WHILE LYING DOWN TO REST IN THE AFTERNOON WHEN CIRCUMSTANCES PERMIT: HIGH CHANCE OF DOZING
HOW LIKELY ARE YOU TO NOD OFF OR FALL ASLEEP WHILE SITTING AND READING: MODERATE CHANCE OF DOZING
HOW LIKELY ARE YOU TO NOD OFF OR FALL ASLEEP WHEN YOU ARE A PASSENGER IN A CAR FOR AN HOUR WITHOUT A BREAK: SLIGHT CHANCE OF DOZING
HOW LIKELY ARE YOU TO NOD OFF OR FALL ASLEEP IN A CAR, WHILE STOPPED FOR A FEW MINUTES IN TRAFFIC: WOULD NEVER DOZE
HOW LIKELY ARE YOU TO NOD OFF OR FALL ASLEEP WHILE SITTING AND TALKING TO SOMEONE: SLIGHT CHANCE OF DOZING
HOW LIKELY ARE YOU TO NOD OFF OR FALL ASLEEP WHILE WATCHING TV: SLIGHT CHANCE OF DOZING

## 2024-05-13 NOTE — PROGRESS NOTES
Outpatient Sleep Medicine Consultation:      Name: Jairo Escamilla MRN# [id number]   Age: 54 year old YOB: 1970     Date of Consultation: May 13, 2024  Consultation is requested by: No referring provider defined for this encounter. No ref. provider found  Primary care provider: Sandoval Wilkinson       Reason for Sleep Consult:     Jairo Escamilla is sent by No ref. provider found for a sleep consultation regarding TIANNA.    Patient s Reason for visit  Jairo Escamlila main reason for visit: Follow up  Patient states problem(s) started: Years  Jairo Escamilla's goals for this visit: Improvement of sleep           Assessment and Plan:     Summary Sleep Diagnoses and Recommendations:  (G47.33) TIANNA (obstructive sleep apnea)  (primary encounter diagnosis)  Comment: Jairo was diagnosed with very severe TIANNA in 2016. He was initiated on auto CPAP 9-15 cm, and that was later changed to 7-11 cm. He feels he can only sleep about an hour with it and then he wakes up. He is not sure why he wakes. He denies pressure or mask discomfort. He recently got a full face mask and that works better than his prior nasal mask. He has been trying to use the mask nightly in the last week or so. His AHI is a little elevated, about 10/hr, mostly obstructive. The pressure is at the upper limit most of the time. He would like a new machine because he wants to be able to track his data and his current machine is almost 8 years old and no longer transmits data. His weight is essentially unchanged since his sleep study.   Plan: Comprehensive DME        Changed pressures to 7-14 cm. We tried the mask fit function and he was shown how to access his data on the machine. We reviewed compliance goals. He was encouraged to practice wearing the CPAP while awake to get used to it, or during naps. We reviewed how to adjust the humidity settings.  A prescription was written for new supplies. We reviewed recommendations for cleaning and replacing supplies. We  discussed that he should compress his sleep opportunity as he gets more use on the CPAP. This should help improve his sleep efficiency and limit awakenings.         Comorbid Diagnoses:  HTN, DM 2, peripheral neuropathy, subdural hematoma (2019), BMI 40    Summary Counseling:    Sleep Testing Reviewed  Obstructive Sleep Apnea Reviewed  Complications of Untreated Sleep Apnea Reviewed      Patient will follow up 3 months after getting a replacement machine.  Bennett Goltz, PA-C      Total time spent reviewing medical records, history and physical examination, review of previous testing and interpretation as well as documentation on this date:57 min    CC: No ref. provider found          History of Present Illness:     Jairo Escamilla presents to re-establish care for previously diagnosed TIANNA. He was last seen by my colleague, Dr. Darell Kiser, in 2016. He was on auto CPAP 9-15 cm. He did not use it much after a few weeks. He struggled to be able to sleep with it. He is interested in trying to get back on it. His machine does not have remote monitoring capability. He has been trying to use it again in the last week. His pressures are now 7-11 cm. He still struggles to get more than an hour. He wakes frequently. He is not really sure what is causing the problem. He was having some issues with the hose since he flips side to side. Lately, he has not been changing positions as much.  He is not sure why he wakes. He initially tried a nasal mask but thinks he was breathing through his mouth. He got a full face mask just recently and it is a little better. He feels the pressures are not that noticeable when the mask is sealing well, not too high or low. He feels the mask is sealing well. He does not know if he snores with it. He denies dry nose or mouth with it. He has been having congestion from a URI, a little better in the last week.  His weight is down 6# since his sleep study.    He is tired all of the time, especially  after meals. He started vit D supplements and that has helped some. His blood sugars have been better controlled lately.     The compliance data shows that from 2/14/24-5/13/24, the patient used the CPAP for 5/90 nights, 0% of nights for >4 hours.  The 95th% pressure is 10.9 cm.  The 95th% leak is 6 lpm.  The average nightly usage is 66 min.  The average AHI is 9.9/hr (2.7/hr OA, 6.2/hr hyp).       Past Sleep Evaluations: His polysomnogram from 04/22/2016 showed an apnea-hypopnea index of 113.7 events per hour and a lowest oxygen saturation of 55%. There was sleep-related hypoxemia with a total of 89.4 minutes with a saturation below 89%. CPAP was titrated to 9 cm with some residual post-arousal centrals. Wt: 285#.     SLEEP-WAKE SCHEDULE:     Work/School Days: Patient goes to school/work: Yes   Usually gets into bed at 12am  Takes patient about 1 hour to fall asleep, falls asleep in a few minutes with CPAP. He waits until he is ready to fall asleep.  Has trouble falling asleep Rarely nights per week  Wakes up in the middle of the night 3 or 4 times. Lately 1-2 times for the restroom.  Wakes up due to Use the bathroom;Uncertain  He has trouble falling back asleep Occasionally times a week.   It usually takes ?? to get back to sleep  Patient is usually up at 9am  Uses alarm: No    Weekends/Non-work Days/All Other Days:  Usually gets into bed at 12am   Takes patient about 1 hour to fall asleep  Patient is usually up at 9 or 10 am  Uses alarm: No    Sleep Need  Patient gets  6 or 7 sleep on average   Patient thinks he needs about ?? Im always tired sleep    Jairo A Andi prefers to sleep in this position(s): Side;Head Elevated sometimes wakes on his back.   Patient states they do the following activities in bed: Eat;Watch TV;Use phone, computer, or tablet    Naps  Patient takes a purposeful nap 5 or 6 times a week and naps are usually 2 hours in duration, whenever he can  He feels better after a nap: Yes  He dozes off  unintentionally 3 or 4 days per week. Sometimes when at work because it is slow now.  Patient has had a driving accident or near-miss due to sleepiness/drowsiness: No      SLEEP DISRUPTIONS:    Breathing/Snoring  Patient snores:Yes  Other people complain about his snoring: Yes  Patient has been told he stops breathing in his sleep:Yes  He has issues with the following: Morning mouth dryness;Stuffy nose when you wake up;Getting up to urinate more than once. no morning headaches. rare nocturnal heartburn or reflux. some nasal congestion at night.    Movement:  Patient gets pain, discomfort, with an urge to move:  No restless legs symptoms  It happens when he is resting:  No  It happens more at night:  No  Patient has been told he kicks his legs at night:  No     Behaviors in Sleep:  Jairo Escamilla has experienced the following behaviors while sleeping: Sleep-talking all the time per his girlfriend.  Pt denies bruxism, sleep walking, and dream enactment behavior. Pt denies sleep paralysis, hypnagogue and cataplexy.       Is there anything else you would like your sleep provider to know:        CAFFEINE AND OTHER SUBSTANCES:    Patient consumes caffeinated beverages per day:  2-4 diet soda  Last caffeine use is usually: 8 or 9 pm  List of any prescribed or over the counter stimulants that patient takes:    List of any prescribed or over the counter sleep medication patient takes: Advil or tylenol pm.  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  He lives with his girlfriend and son (31).      SCALES:    EPWORTH SLEEPINESS SCALE         5/13/2024    10:06 AM    [place] Sleepiness Scale ( M.W. Johns   [id number]<br>ESS - [place]/English - Final version - 21 Nov 07 - [facility] [place].)   Sitting and reading Moderate chance of dozing   Watching TV Slight chance of dozing   Sitting, inactive in a public place (e.g. a theatre or a meeting) Slight chance of dozing   As a passenger in a car for an hour without a break Slight chance of dozing   Lying down to rest in the afternoon when circumstances permit High chance of dozing   Sitting and talking to someone Slight chance of dozing   Sitting quietly after a lunch without alcohol Moderate chance of dozing   In a car, while stopped for a few minutes in traffic Would never doze   [place] Score (MC) 11   [place] Score (Sleep) 11         INSOMNIA SEVERITY INDEX (WENDI)          5/13/2024     9:48 AM   Insomnia Severity Index (WENDI)   Difficulty falling asleep 0   Difficulty staying asleep 3   Problems waking up too early 2   How SATISFIED/DISSATISFIED are you with your CURRENT sleep pattern? 4   How NOTICEABLE to others do you think your sleep problem is in terms of impairing the quality of your life? 2   How WORRIED/DISTRESSED are you about your current sleep problem? 3   To what extent do you consider your sleep problem to INTERFERE with your daily functioning (e.g. daytime fatigue, mood, ability to function at work/daily chores, concentration, memory, mood, etc.) CURRENTLY? 3   WENDI Total Score 17       Guidelines for Scoring/Interpretation:  Total score categories:  0-7 = No clinically significant insomnia   8-14 = Subthreshold insomnia   15-21 = Clinical insomnia (moderate severity)  22-28 = Clinical insomnia (severe)  Used via courtesy of www.LookBookerealth.va.gov with permission from Martin Wolf PhD., [facility]      STOP BANG         5/13/2024    10:07 AM   STOP BANG Questionnaire (  2008, the American Society of Anesthesiologists, Inc. [employer]   1. Snoring - Do you snore loudly (louder than talking or loud enough to be heard through  "closed doors)? Yes   2. Tired - Do you often feel tired, fatigued, or sleepy during daytime? Yes   3. Observed - Has anyone observed you stop breathing during your sleep? Yes   4. Blood pressure - Do you have or are you being treated for high blood pressure? No   5. BMI - BMI more than 35 kg/m2? Yes   6. Age - Age over 50 yr old? Yes   7. Neck circumference - Neck circumference greater than 40 cm? Yes   8. Gender - Gender male? Yes   STOP BANG Score (MC): 6 (High risk of TIANNA)         GAD7        2/1/2024     7:11 AM   SAGE-7    1. Feeling nervous, anxious, or on edge 1   2. Not being able to stop or control worrying 0   3. Worrying too much about different things 1   4. Trouble relaxing 0   5. Being so restless that it is hard to sit still 0   6. Becoming easily annoyed or irritable 0   7. Feeling afraid, as if something awful might happen 0   SAGE-7 Total Score 2   If you checked any problems, how difficult have they made it for you to do your work, take care of things at home, or get along with other people? Not difficult at all         CAGE-AID         No data to display                CAGE-AID reprinted with permission from the [place] Medical Journal, TEMITOPE Jc. and LIZ Shields, \"Conjoint screening questionnaires for alcohol and drug abuse\" [place] Medical Journal 94: 135-140, 1995.      PATIENT HEALTH QUESTIONNAIRE-9 (PHQ - 9)        5/3/2024     8:43 AM   PHQ-9 ([employer])   1.  Little interest or pleasure in doing things 0   2.  Feeling down, depressed, or hopeless 0   3.  Trouble falling or staying asleep, or sleeping too much 0   4.  Feeling tired or having little energy 1   5.  Poor appetite or overeating 1   6.  Feeling bad about yourself - or that you are a failure or have let yourself or your family down 0   7.  Trouble concentrating on things, such as reading the newspaper or watching television 0   8.  Moving or speaking so slowly that other people could have noticed. Or the opposite - being so " fidgety or restless that you have been moving around a lot more than usual 0   9.  Thoughts that you would be better off dead, or of hurting yourself in some way 0   PHQ-9 Total Score 2   6.  Feeling bad about yourself 0   7.  Trouble concentrating 0   8.  Moving slowly or restless 0   9.  Suicidal or self-harm thoughts 0   1.  Little interest or pleasure in doing things Not at all   2.  Feeling down, depressed, or hopeless Not at all   3.  Trouble falling or staying asleep, or sleeping too much Not at all   4.  Feeling tired or having little energy Several days   5.  Poor appetite or overeating Several days   6.  Feeling bad about yourself Not at all   7.  Trouble concentrating Not at all   8.  Moving slowly or restless Not at all   9.  Suicidal or self-harm thoughts Not at all   PHQ-9 via [employer] TOTAL SCORE-----> 2 (Minimal depression)   Difficulty at work, home, or with people Not difficult at all       Developed by Rosalia Purdy, Merlene Edouard, Nilesh Bryan and colleagues, with an educational huan from [employer] No permission required to reproduce, translate, display or distribute.        Allergies:    Allergies   Allergen Reactions    Lisinopril      Cough         Medications:    Current Outpatient Medications   Medication Sig Dispense Refill    acetaminophen (TYLENOL) 325 MG tablet Take 3 tablets (975 mg) by mouth every 8 hours      acetaminophen-codeine (TYLENOL #3) 300-30 MG per tablet Take 1 tablet by mouth every 6 hours as needed for severe pain 15 tablet 0    blood glucose (ONETOUCH ULTRA) test strip Use to test blood sugars tid or as directed. 100 each prn    Continuous Blood Gluc Sensor (FREESTYLE MARGARETTE 3 SENSOR) MISC 1 each every 14 days 6 each 3    empagliflozin (JARDIANCE) 25 MG TABS tablet Take 1 tablet (25 mg) by mouth daily 90 tablet 1    famotidine (PEPCID) 20 MG tablet Take 1 tablet (20 mg) by mouth 2 times daily 180 tablet 0    insulin aspart (NOVOLOG PEN) 100 UNIT/ML pen  "Inject 28 Units Subcutaneous 3 times daily (with meals) Along with adjustment scale of 1:50 when blood sugar over 150, max 100 units daily 90 mL 1    insulin glargine (LANTUS SOLOSTAR) 100 UNIT/ML pen Inject 60 Units Subcutaneous 2 times daily Then switch to Tresiba.      insulin pen needle (32G X 6 MM) 32G X 6 MM miscellaneous Use 6 pen needles daily or as directed. 200 each 2    losartan (COZAAR) 25 MG tablet Take 1 tablet (25 mg) by mouth daily 90 tablet 3    metFORMIN (GLUCOPHAGE XR) 500 MG 24 hr tablet TAKE 2 TABLETS BY MOUTH TWICE DAILY 360 tablet 0    needle, disp, 18G X 1\" MISC 1 Needle every 14 days Used to withdraw the testosterone medication from the vial. 50 each 0    Needle, Disp, 25G X 1\" MISC 1 Needle every 14 days Used to inject the testosterone medication. 50 each 0    reason aspirin not prescribed, intentional, Please choose reason not prescribed from choices below.      Semaglutide, 1 MG/DOSE, (OZEMPIC) 4 MG/3ML pen Inject 1 mg Subcutaneous every 7 days 9 mL 1    sildenafil (VIAGRA) 100 MG tablet Take 1 tablet (100 mg) by mouth daily as needed (ED) 30 tablet 5    simethicone (MYLICON) 125 MG chewable tablet Take 1 tablet (125 mg) by mouth 4 times daily as needed for intestinal gas 90 tablet 1    simvastatin (ZOCOR) 20 MG tablet Take 1 tablet (20 mg) by mouth at bedtime 90 tablet 3    testosterone cypionate (DEPOTESTOSTERONE) 200 MG/ML injection Inject 1 mL (200 mg) into the muscle every 14 days 2 mL 3       Problem List:  Patient Active Problem List    Diagnosis Date Noted    S/P amputation of lesser toe, left (H24) 02/01/2024     Priority: Medium    Type 2 diabetes mellitus with hyperglycemia, with long-term current use of insulin (H) 08/18/2023     Priority: Medium    Mild episode of recurrent major depressive disorder (H24) 04/24/2023     Priority: Medium    Hypogonadism male 04/21/2023     Priority: Medium    Gastroenteritis 12/24/2018     Priority: Medium    Peripheral polyneuropathy " 05/22/2018     Priority: Medium    Type 2 diabetes mellitus with hyperglycemia (H) 03/06/2017     Priority: Medium    SDH (subdural hematoma) (H) 02/27/2016     Priority: Medium    Morbid obesity (H) 10/21/2015     Priority: Medium    Hyperlipidemia LDL goal <100 03/28/2013     Priority: Medium    Hypertension  BP goal <140/90 03/28/2013     Priority: Medium        Past Medical/Surgical History:  Past Medical History:   Diagnosis Date    Diabetes mellitus (adult onset)     Diabetes mellitus, type 2 (H) 05/14/2008    Fracture of rib 2022    High cholesterol     Hypertension     Obese     Peripheral neuropathy     Pneumonia     Sleep apnea     Subdural hematoma (H)      Past Surgical History:   Procedure Laterality Date    AMPUTATE TOE(S) Left 8/19/2023    Procedure: Left hallux amputation;  Surgeon: Filiberto Mercedes DPM;  Location:  OR    AMPUTATE TOE(S) Left 9/30/2023    Procedure: Left foot first ray resection;  Surgeon: Wendy Pate DPM, Podiatry/Foot and Ankle Surgery;  Location:  OR    COLONOSCOPY N/A 12/22/2022    Procedure: COLONOSCOPY, FLEXIBLE, WITH LESION REMOVAL USING SNARE;  Surgeon: Josh Reeves MD;  Location:  GI    IRRIGATION AND DEBRIDEMENT FOOT, COMBINED Left 8/21/2023    Procedure: IRRIGATION AND DEBRIDEMENT LEFT FOOT, REVISION OF AMPUTATION;  Surgeon: Michelle Marc DPM;  Location:  OR    NO HISTORY OF SURGERY         Social History:  Social History     Socioeconomic History    Marital status: Single     Spouse name: Not on file    Number of children: Not on file    Years of education: Not on file    Highest education level: Not on file   Occupational History    Not on file   Tobacco Use    Smoking status: Never    Smokeless tobacco: Never   Vaping Use    Vaping status: Never Used   Substance and Sexual Activity    Alcohol use: Yes     Alcohol/week: 0.0 standard drinks of alcohol     Comment: Very little and infrequently    Drug use: Never    Sexual activity: Yes      Partners: Female     Birth control/protection: Female Surgical, None   Other Topics Concern     Service No    Blood Transfusions No    Caffeine Concern No    Occupational Exposure No    Hobby Hazards No    Sleep Concern No    Stress Concern Yes     Comment: work related    Weight Concern Yes    Special Diet Yes     Comment: medium carb (40-75g per meal)    Back Care No    Exercise Yes    Bike Helmet No    Seat Belt Yes    Self-Exams No    Parent/sibling w/ CABG, MI or angioplasty before 65F 55M? No   Social History Narrative    3/28/2013: Single, .  Lives with his 20 year old son.  Also has an 18 year old son and 22 year old daughter.  Works as a  for [employer].  Active in sports (playing, officiating, watching): softball, football, hockey.     Social Determinants of Health     Financial Resource Strain: Low Risk  (1/29/2024)    Financial Resource Strain     Within the past 12 months, have you or your family members you live with been unable to get utilities (heat, electricity) when it was really needed?: No   Food Insecurity: Low Risk  (1/29/2024)    Food Insecurity     Within the past 12 months, did you worry that your food would run out before you got money to buy more?: No     Within the past 12 months, did the food you bought just not last and you didn t have money to get more?: No   Transportation Needs: Low Risk  (1/29/2024)    Transportation Needs     Within the past 12 months, has lack of transportation kept you from medical appointments, getting your medicines, non-medical meetings or appointments, work, or from getting things that you need?: No   Physical Activity: Not on file   Stress: Not on file   Social Connections: Not on file   Interpersonal Safety: Low Risk  (2/1/2024)    Interpersonal Safety     Do you feel physically and emotionally safe where you currently live?: Yes     Within the past 12 months, have you been hit, slapped, kicked or otherwise  "physically hurt by someone?: No     Within the past 12 months, have you been humiliated or emotionally abused in other ways by your partner or ex-partner?: No   Housing Stability: Low Risk  (2024)    Housing Stability     Do you have housing? : Yes     Are you worried about losing your housing?: No   Recent Concern: Housing Stability - High Risk (10/31/2023)    Housing Stability     Do you have housing? : Yes     Are you worried about losing your housing?: Yes       Family History:  Family History   Problem Relation Age of Onset    Diabetes Mother     Hypertension Mother     Cerebrovascular Disease Mother     Obesity Mother     C.A.D. Father         in mid 60's    Cerebrovascular Disease Father     Diabetes Maternal Aunt          of Diabetic complications    Glaucoma Other     Cancer - colorectal No family hx of     Prostate Cancer No family hx of     Macular Degeneration No family hx of        Review of Systems:  A complete review of systems reviewed by me is negative with the exeption of what has been mentioned in the history of present illness.  In the last TWO WEEKS have you experienced any of the following symptoms?  Fevers: No  Night Sweats: No  Weight Gain: No  Pain at Night: No  Double Vision: No  Changes in Vision: No  Difficulty Breathing through Nose: No  Sore Throat in Morning: No  Dry Mouth in the Morning: No  Shortness of Breath Lying Flat: No  Shortness of Breath With Activity: Yes  Awakening with Shortness of Breath: Yes  Increased Cough: No  Heart Racing at Night: Yes  Swelling in Feet or Legs: No  Diarrhea at Night: Yes  Heartburn at Night: No  Urinating More than Once at Night: Yes  Losing Control of Urine at Night: No  Joint Pains at Night: No  Headaches in Morning: No  Weakness in Arms or Legs: No  Depressed Mood: Yes  Anxiety: Yes     Physical Examination:  Vitals: /84   Pulse 95   Ht 1.778 m (5' 10\")   Wt 126.6 kg (279 lb)   SpO2 95%   BMI 40.03 kg/m        Neck Cir (cm): " "56 cm      GENERAL APPEARANCE: healthy, alert, no distress, and cooperative  EYES: Eyes grossly normal to inspection, PERRL, conjunctivae and sclerae normal, and lids and lashes normal  HENT: oropharynx crowded, tongue base enlarged, and upper plate dentures  NECK: no adenopathy, no asymmetry, masses, or scars, thyroid normal to palpation, and trachea midline and normal to palpation  RESP: lungs clear to auscultation - no rales, rhonchi or wheezes  CV: regular rates and rhythm, no murmur, click or rub, and no irregular beats  LYMPHATICS: no cervical adenopathy  MS: extremities normal- no gross deformities noted  NEURO: Normal strength and tone, mentation intact, and speech normal  Mallampati Class: IV.  Tonsillar Stage: 1  hidden by pillars.         Data: All pertinent previous laboratory data reviewed     Recent Labs   Lab Test 05/03/24  0942 02/01/24  0816    136   POTASSIUM 4.6 4.0   CHLORIDE 103 100   CO2 24 23   ANIONGAP 12 13   * 179*   BUN 17.4 15.1   CR 0.99 0.80   LUKASZ 9.9 9.2       Recent Labs   Lab Test 11/03/23  1001   WBC 4.4   RBC 5.49   HGB 15.4   HCT 47.9   MCV 87   MCH 28.1   MCHC 32.2   RDW 16.5*          Recent Labs   Lab Test 05/03/24  0942   PROTTOTAL 8.1   ALBUMIN 4.7   BILITOTAL 0.9   ALKPHOS 58   AST 49*   ALT 66       TSH   Date Value   02/01/2024 2.82 uIU/mL   04/19/2023 2.30 uIU/mL   01/12/2022 2.11 mU/L   05/18/2020 1.25 mU/L   04/22/2019 2.35 mU/L       No results found for: \"UAMP\", \"UBARB\", \"BENZODIAZEUR\", \"UCANN\", \"UCOC\", \"OPIT\", \"UPCP\"    No results found for: \"IRONSAT\", \"SK96085\", \"PIERCE\"    No results found for: \"PH\", \"PHARTERIAL\", \"PO2\", \"CC9NVCOXKYE\", \"SAT\", \"PCO2\", \"HCO3\", \"BASEEXCESS\", \"BERTHA\", \"BEB\"    @LABRCNTIPR(phv:4,pco2v:4,po2v:4,hco3v:4,hang:4,o2per:4)@    Echocardiology: No results found for this or any previous visit (from the past 4320 hour(s)).    Chest x-ray: No results found for this or any previous visit from the past 365 days.      Chest CT: No " "results found for this or any previous visit from the past 365 days.      PFT: Most Recent [facility] Pulmonary Function Testing    No results found for: \"20001\"        Bennett Ezra Goltz, PA-C, STEPH[facility] 5/13/2024          "

## 2024-05-14 ENCOUNTER — OFFICE VISIT (OUTPATIENT)
Dept: SLEEP MEDICINE | Facility: CLINIC | Age: 54
End: 2024-05-14
Payer: COMMERCIAL

## 2024-05-14 VITALS
HEART RATE: 95 BPM | HEIGHT: 70 IN | SYSTOLIC BLOOD PRESSURE: 130 MMHG | DIASTOLIC BLOOD PRESSURE: 84 MMHG | OXYGEN SATURATION: 95 % | BODY MASS INDEX: 39.94 KG/M2 | WEIGHT: 279 LBS

## 2024-05-14 DIAGNOSIS — G47.33 OSA (OBSTRUCTIVE SLEEP APNEA): Primary | ICD-10-CM

## 2024-05-14 PROCEDURE — 99204 OFFICE O/P NEW MOD 45 MIN: CPT | Performed by: PHYSICIAN ASSISTANT

## 2024-05-14 NOTE — NURSING NOTE
"Chief Complaint   Patient presents with    Sleep Problem     Reestablish care, New supplies - just recently started to use CPAP       Initial /84   Pulse 95   Ht 1.778 m (5' 10\")   Wt 126.6 kg (279 lb)   SpO2 95%   BMI 40.03 kg/m   Estimated body mass index is 40.03 kg/m  as calculated from the following:    Height as of this encounter: 1.778 m (5' 10\").    Weight as of this encounter: 126.6 kg (279 lb).    Medication Reconciliation: complete  ESS 11  Neck circumference: 56 centimeters.  Amanda Aponte MA   " Palliative Medicine    CODE STATUS: DNR; limited interventions; no feeding tube    AMD Status: on file naming her daughter, Ciera Crandall, as her MPOA     3/8/2022 0930 Seen today in room 358 along with Jimenez Cameron NP. Lying in bed with head of bed elevated. Awake, alert. Speaking on phone to her brother. Shy, daughter in the room    Advanced Steps [facility] (Physician Orders for Scope of Treatment)  Participants:   [x] Patient    [x] Healthcare agent (already designated in existing ACP document)    Name: Emerald Lema    Relationship to Patient:daughter    Phone number:   [] Other Surrogate Decision Maker / Next of Terrie 71    Name:     Relationship to Patient:    Phone Number:      Advanced Steps® ACP Facilitator: Jimenez Cameron NP    Conversation Topics   Understanding of Medical Condition/s AND Potential Complications: patient and daughter supportive of DNR based on age and co-morbidities.      Cardiopulmonary Resuscitation    Order Elected for CPR:  []  Attempt Resuscitation [x]  Do Not Attempt Resuscitation  When NOT in Cardiopulmonary Arrest, Order Elected:    [] Comfort Measures  [x] Limited Additional Interventions  [] Full Interventions  Artificially Administered Nutrition, Order Elected:  [x] No Feeding Tube   [] Feeding Tube for a defined trial period  [] Feeding Tube long-term if indicated    The following was provided (check all that apply):     Healthcare Decision Information Cards:   [] Help with Breathing Facts   [] Tube Feeding Facts   [] CPR Facts      [] Review of existing Advance Directive  [] Assistance with Completion of New Advance Directive   [x] Review of [place] Form       Meeting Outcomes:   [] ACP discussion completed   [x] [place] form completed  [x] [place] prepared for Provider review and signature   [x] Original placed on Chart, if in facility (form to be sent with patient at discharge)  [x] Copy given to healthcare agent [] Copy scanned to electronic medical record    After meeting with patient and  Her daughter/MPOAShy, the goals of care have been defined. Code status remains: DNR/DNI; limited interventions; no feeding tube  AMD status: on file naming her daughter as MPOA Will sign off but remain available for reconsult as needed/if appropriate.      Thank you for the Palliative Medicine consult and allowing us to participate in the care of Amelia Roa RN, MSN  Palliative Medicine     [phone]

## 2024-05-23 ENCOUNTER — DOCUMENTATION ONLY (OUTPATIENT)
Dept: SLEEP MEDICINE | Facility: CLINIC | Age: 54
End: 2024-05-23
Payer: COMMERCIAL

## 2024-05-23 DIAGNOSIS — G47.33 OBSTRUCTIVE SLEEP APNEA (ADULT) (PEDIATRIC): Primary | ICD-10-CM

## 2024-05-28 ENCOUNTER — DOCUMENTATION ONLY (OUTPATIENT)
Dept: SLEEP MEDICINE | Facility: CLINIC | Age: 54
End: 2024-05-28
Payer: COMMERCIAL

## 2024-05-29 NOTE — PROGRESS NOTES
3 day Sleep therapy management telephone visit    Diagnostic AHI:   PS.7           Confirmed with patient at time of call- Yes Patient is still interested in STM service       Subjective measures:  Pt reports doing well with CPAP other than some water gurgling and heating element problems. Pt is going to contact [facility]. Pt was given my contact information and instructed to reach out with any questions or concerns.       Order settings:  CPAP MIN CPAP MAX   7 cm H2O 14 cm H2O       Device settings:  CPAP MIN CPAP MAX EPR RESMED SOFT RESPONSE SETTING   7.0 cm  H20 14.0 cm  H20 TWO OFF       Compliance 21 %    Assessment: Nightly usage most nights under four hours      Action plan: Patient to have 14 day STM visit.     Patient has the following upcoming sleep appts:  Future Sleep Appointments         Provider Department    12/10/2024 10:30 AM (Arrive by 10:15 AM) Goltz, Bennett Ezra, PA-C [facility] [place]            Replacement device: No  STM ordered by provider: Yes     Total time spent on accessing and  interpreting remote patient PAP therapy data  10 minutes    Total time spent counseling, coaching  and reviewing PAP therapy data with patient  2 minutes    [id number] no

## 2024-06-06 ENCOUNTER — VIRTUAL VISIT (OUTPATIENT)
Dept: PSYCHOLOGY | Facility: CLINIC | Age: 54
End: 2024-06-06
Attending: INTERNAL MEDICINE
Payer: COMMERCIAL

## 2024-06-06 DIAGNOSIS — F43.23 ADJUSTMENT DISORDER WITH MIXED ANXIETY AND DEPRESSED MOOD: ICD-10-CM

## 2024-06-06 PROCEDURE — 90791 PSYCH DIAGNOSTIC EVALUATION: CPT | Mod: 95 | Performed by: COUNSELOR

## 2024-06-06 ASSESSMENT — PATIENT HEALTH QUESTIONNAIRE - PHQ9
SUM OF ALL RESPONSES TO PHQ QUESTIONS 1-9: 9
10. IF YOU CHECKED OFF ANY PROBLEMS, HOW DIFFICULT HAVE THESE PROBLEMS MADE IT FOR YOU TO DO YOUR WORK, TAKE CARE OF THINGS AT HOME, OR GET ALONG WITH OTHER PEOPLE: SOMEWHAT DIFFICULT
SUM OF ALL RESPONSES TO PHQ QUESTIONS 1-9: 9

## 2024-06-06 ASSESSMENT — COLUMBIA-SUICIDE SEVERITY RATING SCALE - C-SSRS
6. HAVE YOU EVER DONE ANYTHING, STARTED TO DO ANYTHING, OR PREPARED TO DO ANYTHING TO END YOUR LIFE?: NO
ATTEMPT LIFETIME: NO
TOTAL  NUMBER OF INTERRUPTED ATTEMPTS LIFETIME: NO
1. HAVE YOU WISHED YOU WERE DEAD OR WISHED YOU COULD GO TO SLEEP AND NOT WAKE UP?: NO
TOTAL  NUMBER OF ABORTED OR SELF INTERRUPTED ATTEMPTS LIFETIME: NO
2. HAVE YOU ACTUALLY HAD ANY THOUGHTS OF KILLING YOURSELF?: NO

## 2024-06-06 NOTE — PROGRESS NOTES
"    [facility]       PATIENT'S NAME: Jairo Escamilla  PREFERRED NAME: Jairo  PRONOUNS:       MRN: [id number]  : 1970  ADDRESS: [facility] [place] [address]  ACCT. NUMBER:  [id number]  DATE OF SERVICE: 24  START TIME: 1100  END TIME: 1200  PREFERRED PHONE: [phone]  May we leave a program related message: Yes  EMERGENCY CONTACT: was obtained .  SERVICE MODALITY:  Video Visit:      Provider verified identity through the following two step process.  Patient provided:  Patient  and Patient address    Telemedicine Visit: The patient's condition can be safely assessed and treated via synchronous audio and visual telemedicine encounter.      Reason for Telemedicine Visit: Services only offered telehealth    Originating Site (Patient Location): Patient's home    Distant Site (Provider Location): [facility]    Consent:  The patient/guardian has verbally consented to: the potential risks and benefits of telemedicine (video visit) versus in person care; bill my insurance or make self-payment for services provided; and responsibility for payment of non-covered services.     Patient would like the video invitation sent by:  My Chart    Mode of Communication:  Video Conference via [facility]    Distant Location (Provider):  Off-site    As the provider I attest to compliance with applicable laws and regulations related to telemedicine.    UNIVERSAL ADULT Mental Health DIAGNOSTIC ASSESSMENT    Identifying Information:  Patient is a 54 year old,   individual.  Patient was referred for an assessment by primary care provider.  Patient attended the session alone.    Chief Complaint:   The reason for seeking services at this time is: \"Minor Depression, Anxiety, Confidence,\".  The problem(s) began 02/10/22.  Patient reports tress with employment, struggling to take of self when always taking care of others.    Patient has not " "attempted to resolve these concerns in the past.    Social/Family History:  Patient reported they grew up in [place]  .  They were raised by biological mother; stepfather; foster parents  .  Parents one or both remarried.  Patient reported that their childhood was \"difficulty, father left at age 9, foster care for three years, mother was ill and moved around every 6-12 months\".  Patient described their current relationships with family of origin as \"mom is interesting, not as close with sister, very close with brother\".     The patient describes their cultural background as .  Cultural influences and impact on patient's life structure, values, norms, and healthcare: lots of discrimination due to poverty level,.  Contextual influences on patient's health include: Contextual Factors: Individual Factors relationship conflict .    These factors will be addressed in the Preliminary Treatment plan. Patient identified their preferred language to be English. Patient reported they does not need the assistance of an  or other support involved in therapy.     Patient reported had no significant delays in developmental tasks.   Patient's highest education level was high school graduate  .  Patient identified the following learning problems: none reported.  Modifications will not be used to assist communication in therapy.  Patient reports they are  able to understand written materials.    Patient reported the following relationship history:   young and .  Patient's current relationship status is has a partner or significant other.   Patient identified their sexual orientation as heterosexual.  Patient reported having 3 child(kyra). Patient identified partner; siblings; adult child as part of their support system.  Patient identified the quality of these relationships as inconsistent,  .      Patient's current living/housing situation involves staying in own home/apartment.  The immediate " members of family and household include Issa Jaramillo, 31,Son  and they report that housing is stable.    Patient is currently employed fulltime.  Patient reports their finances are obtained through employment; partner. Patient does identify finances as a current stressor.      Patient reported that they have not been involved with the legal system. Patient does not report being under probation/ parole/ jurisdiction. They are not under any current court jurisdiction. .    Patient's Strengths and Limitations:  Patient identified the following strengths or resources that will help them succeed in treatment: commitment to health and well being. Things that may interfere with the patient's success in treatment include: none identified.     Assessments:  The following assessments were completed by patient for this visit:      PHQ9:       8/19/2022    10:36 AM 1/24/2023    10:14 AM 4/24/2023    10:15 AM 7/31/2023    11:12 AM 2/1/2024     7:10 AM 5/3/2024     8:43 AM 6/6/2024    10:35 AM   PHQ-9 SCORE   PHQ-9 Total Score MyChart 1 (Minimal depression)  6 (Mild depression) 5 (Mild depression) 9 (Mild depression) 2 (Minimal depression) 9 (Mild depression)   PHQ-9 Total Score 1 3 6 5 9 2 9     GAD2:       6/3/2024     3:52 PM   SAGE-2   Feeling nervous, anxious, or on edge 1   Not being able to stop or control worrying 1   SAGE-2 Total Score 2       CAGE-AID:       6/3/2024     3:55 PM   CAGE-AID Total Score   Total Score 0   Total Score MyChart 0 (A total score of 2 or greater is considered clinically significant)     PROMIS 10-Global Health (only subscores and total score):       5/17/2024     3:55 PM 6/3/2024     3:54 PM   PROMIS-10 Scores Only   Global Mental Health Score 12 12   Global Physical Health Score 8 10   PROMIS TOTAL - SUBSCORES 20 22     [place] Suicide Severity Rating Scale (Lifetime/Recent)      12/23/2018     9:18 PM 9/28/2023     1:56 PM 6/6/2024    11:23 AM   [place] Suicide Severity Rating  (Lifetime/Recent)   Q1 Wished to be Dead (Past Month) no no    Q2 Suicidal Thoughts (Past Month) no no    Q3 Suicidal Thought Method  no    Q4 Suicidal Intent without Specific Plan  no    Q5 Suicide Intent with Specific Plan  no    Q6 Suicide Behavior (Lifetime) no no    Level of Risk per Screen  low risk    Q1 Wish to be Dead (Lifetime)   N   Q2 Non-Specific Active Suicidal Thoughts (Lifetime)   N   Actual Attempt (Lifetime)   N   Has subject engaged in non-suicidal self-injurious behavior? (Lifetime)   N   Interrupted Attempts (Lifetime)   N   Aborted or Self-Interrupted Attempt (Lifetime)   N   Preparatory Acts or Behavior (Lifetime)   N   Calculated C-SSRS Risk Score (Lifetime/Recent)   No Risk Indicated       Personal and Family Medical History:  Patient does not report a family history of mental health concerns.  Patient reports family history includes C.A.D. in his father; Cerebrovascular Disease in his father and mother; Diabetes in his maternal aunt and mother; Glaucoma in an other family member; Hypertension in his mother; Obesity in his mother..     Patient does not report Mental Health Diagnosis or Treatment.      Patient has had a physical exam to rule out medical causes for current symptoms.  Date of last physical exam was within the past year. Client was encouraged to follow up with PCP if symptoms were to develop. The patient has a [facility] Provider, who is named Sandoval Wilkinson.  Patient reports the following current medical concerns: Diabetes .  Patient denies any issues with pain..   There are significant appetite / nutritional concerns / weight changes.   Patient does not report a history of head injury / trauma / cognitive impairment.      Patient reports current meds as:   Current Outpatient Medications   Medication Sig Dispense Refill    acetaminophen (TYLENOL) 325 MG tablet Take 3 tablets (975 mg) by mouth every 8 hours      acetaminophen-codeine (TYLENOL #3) 300-30 MG per tablet  "Take 1 tablet by mouth every 6 hours as needed for severe pain 15 tablet 0    blood glucose (ONETOUCH ULTRA) test strip Use to test blood sugars tid or as directed. 100 each prn    Continuous Blood Gluc Sensor (FREESTYLE MARGARETTE 3 SENSOR) MISC 1 each every 14 days 6 each 3    empagliflozin (JARDIANCE) 25 MG TABS tablet Take 1 tablet (25 mg) by mouth daily 90 tablet 1    famotidine (PEPCID) 20 MG tablet Take 1 tablet (20 mg) by mouth 2 times daily 180 tablet 0    insulin aspart (NOVOLOG PEN) 100 UNIT/ML pen Inject 28 Units Subcutaneous 3 times daily (with meals) Along with adjustment scale of 1:50 when blood sugar over 150, max 100 units daily 90 mL 1    insulin glargine (LANTUS SOLOSTAR) 100 UNIT/ML pen Inject 60 Units Subcutaneous 2 times daily Then switch to Tresiba.      insulin pen needle (32G X 6 MM) 32G X 6 MM miscellaneous Use 6 pen needles daily or as directed. 200 each 2    losartan (COZAAR) 25 MG tablet Take 1 tablet (25 mg) by mouth daily 90 tablet 3    metFORMIN (GLUCOPHAGE XR) 500 MG 24 hr tablet TAKE 2 TABLETS BY MOUTH TWICE DAILY 360 tablet 0    needle, disp, 18G X 1\" MISC 1 Needle every 14 days Used to withdraw the testosterone medication from the vial. 50 each 0    Needle, Disp, 25G X 1\" MISC 1 Needle every 14 days Used to inject the testosterone medication. 50 each 0    reason aspirin not prescribed, intentional, Please choose reason not prescribed from choices below.      Semaglutide, 1 MG/DOSE, (OZEMPIC) 4 MG/3ML pen Inject 1 mg Subcutaneous every 7 days 9 mL 1    sildenafil (VIAGRA) 100 MG tablet Take 1 tablet (100 mg) by mouth daily as needed (ED) 30 tablet 5    simethicone (MYLICON) 125 MG chewable tablet Take 1 tablet (125 mg) by mouth 4 times daily as needed for intestinal gas 90 tablet 1    simvastatin (ZOCOR) 20 MG tablet Take 1 tablet (20 mg) by mouth at bedtime 90 tablet 3    testosterone cypionate (DEPOTESTOSTERONE) 200 MG/ML injection Inject 1 mL (200 mg) into the muscle every 14 days 2 " mL 3     No current facility-administered medications for this visit.       Medication Adherence:  Patient reports taking.  .    Patient Allergies:    Allergies   Allergen Reactions    Lisinopril      Cough         Medical History:    Past Medical History:   Diagnosis Date    Diabetes mellitus (adult onset)     Diabetes mellitus, type 2 (H) 05/14/2008    Fracture of rib 2022    High cholesterol     Hypertension     Obese     Peripheral neuropathy     Pneumonia     Sleep apnea     Subdural hematoma (H)          Current Mental Status Exam:   Appearance:  Appropriate    Eye Contact:  Good   Psychomotor:  Normal       Gait / station:  no problem  Attitude / Demeanor: Cooperative  Interested  Speech      Rate / Production: Normal/ Responsive      Volume:  Normal  volume      Language:  intact and no problems  Mood:   Normal  Affect:   Appropriate    Thought Content: Clear   Thought Process: Logical       Associations: No loosening of associations  Insight:   Good   Judgment:  Intact   Orientation:  All  Attention/concentration: Good    Substance Use:   Patient did not report a family history of substance use concerns; see medical history section for details.  Patient has not received chemical dependency treatment in the past.  Patient has not ever been to detox.      Patient is not currently receiving any chemical dependency treatment.           Substance History of use Age of first use Date of last use     Pattern and duration of use (include amounts and frequency)   Alcohol used in the past   24 05/01/24 REPORTS SUBSTANCE USE: N/A   Cannabis   never used       Amphetamines   never used       Cocaine/crack    never used        Hallucinogens never used          Inhalants never used          Heroin never used          Other Opiates used in the past 47 10/31/23 REPORTS SUBSTANCE USE: N/A   Benzodiazepine   never used       Barbiturates never used       Over the counter meds used in the past 10? 05/05/24 REPORTS SUBSTANCE  USE: N/A   Caffeine currently use 10   REPORTS SUBSTANCE USE: N/A   Nicotine  never used       Other substances not listed above:  Identify:  never used         Patient reported the following problems as a result of their substance use: no problems, not applicable.    Substance Use: No symptoms    Based on the negative CAGE score and clinical interview there  are not indications of drug or alcohol abuse.    Significant Losses / Trauma / Abuse / Neglect Issues:   Patient did not  serve in the .  There are indications or report of significant loss, trauma, abuse or neglect issues related to: are no indications and client denies any losses, trauma, abuse, or neglect concerns but denies concerns  Concerns for possible neglect are not present.     Safety Assessment:   Patient denies current homicidal ideation and behaviors.  Patient denies current self-injurious ideation and behaviors.    Patient denied risk behaviors associated with substance use.   Patient denies any high risk behaviors associated with mental health symptoms.  Patient reports the following current concerns for their personal safety: None.  Patient reports there are not firearms in the house.       There are no firearms in the home..    History of Safety Concerns:  Patient denied a history of homicidal ideation.     Patient denied a history of personal safety concerns.    Patient denied a history of assaultive behaviors.    Patient denied a history of sexual assault behaviors.     Patient denied a history of risk behaviors associated with substance use.  Patient denies any history of high risk behaviors associated with mental health symptoms.  Patient reports the following protective factors: dedication to family or friends; abstinence from substances; healthy fear of risky behaviors or pain    Risk Plan:  See Recommendations for Safety and Risk Management Plan    Review of Symptoms per patient report:   Depression: Change in sleep, Lack of  interest, Change in energy level, Difficulties concentrating, Change in appetite, Irritability, and Feeling sad, down, or depressed  Mary:  No Symptoms  Psychosis: No Symptoms  Anxiety: Nervousness and Sleep disturbance  Panic:  No symptoms  Post Traumatic Stress Disorder:  Impaired functioning   Eating Disorder: No Symptoms  ADD / ADHD:  No symptoms  Conduct Disorder: No symptoms  Autism Spectrum Disorder: No symptoms  Obsessive Compulsive Disorder: No Symptoms    Patient reports the following compulsive behaviors and treatment history:  Patient denies .      Diagnostic Criteria:    - Fatigue or loss of energy.    - Feelings of worthlessness or inappropriate guilt.    - Diminished ability to think or concentrate, or indecisiveness.   B) The symptoms cause clinically significant distress or impairment in social, occupational, or other important areas of functioning  C) The episode is not attributable to the physiological effects of a substance or to another medical condition  D) The occurence of major depressive episode is not better explained by other thought / psychotic disorders  E) There has never been a manic episode or hypomanic episode    Functional Status:  Patient reports the following functional impairments:  chronic disease management, management of the household and or completion of tasks, relationship(s), and self-care.     Nonprogrammatic care:  Patient is requesting basic services to address current mental health concerns.    Clinical Summary:  1. Psychosocial, Cultural and Contextual Factors: relationship conflict  .  2. Principal DSM5 Diagnoses  (Sustained by DSM5 Criteria Listed Above):   311 (F32.9) Unspecified Depressive Disorder .  3. Other Diagnoses that is relevant to services:   None at this time.  4. Provisional Diagnosis:  further diagnosis clarification will be beneficial  5. Prognosis: Expect Improvement.  6. Likely consequences of symptoms if not treated: higher level of care.  7.  Client strengths include:  caring, creative, educated, empathetic, and employed .     Recommendations:     1. Plan for Safety and Risk Management:   Safety and Risk: Recommended that patient call 911 or go to the local ED should there be a change in any of these risk factors..          Report to child / adult protection services was NA.     2. Patient's identified  none identified .     3. Initial Treatment will focus on:    Depressed Mood -   .     4. Resources/Service Plan:    services are not indicated.   Modifications to assist communication are not indicated.   Additional disability accommodations are not indicated.      5. Collaboration:   Collaboration / coordination of treatment will be initiated with the following  support professionals: primary care physician.      6.  Referrals:   The following referral(s) will be initiated: Outpatient Mental Bc Therapy.       A Release of Information has been obtained for the following:  None at this time. .     Clinical Substantiation/medical necessity for the above recommendations:    Patient is a 54 year old who presents with mood concerns.  Patient denies previous mental health concerns and/or providers.    Patients acute suicide risk was determined to be  minimal due to the following factors: Denial of suicidal thoughts, no history of suicide attempts.   Patient denies current thoughts of suicidal ideation and self harm and reports ability to keep self safe.      Patient is not currently under the influence of alcohol or illicit substances, denies experiencing command hallucinations, and has no immediate access to firearms. Protective factors include: Patient reports the following protective factors: dedication to family/friends, safe and stable environment, daily obligations, committment to well-being, sense of personal control or determination and access to a variety of clinical interventions    Patient denies current substance use concerns and  reports ability to maintain safety when using substances.    Patient would benefit from more extensive mental health support such as individual therapy weekly to help mitigate risk of hospitalization or suicidality. Patient is in agreement with plan.      7. GRETCHEN:    GRETCHEN:  Discussed the general effects of drugs and alcohol on health and well-being. Provider gave patient printed information about the effects of chemical use on their health and well being. Recommendations:  to abstain from all mood altering substances .     8. Records:   These were reviewed at time of assessment.   Information in this assessment was obtained from the medical record and  provided by patient who is a good historian.    Patient will have open access to their mental health medical record.    9.   Interactive Complexity: No    10. Safety Plan:     Provider Name/ Credentials:  Alicia Verma [facility]  June 6, 2024

## 2024-06-07 ENCOUNTER — DOCUMENTATION ONLY (OUTPATIENT)
Dept: SLEEP MEDICINE | Facility: CLINIC | Age: 54
End: 2024-06-07
Payer: COMMERCIAL

## 2024-06-07 NOTE — PROGRESS NOTES
14  DAY STM VISIT    Diagnostic AHI: PS.7 PSG         Message left for patient to return call     Assessment: Pt not meeting objective benchmarks for AHI and compliance      Action plan: waiting for patient to return call.  and pt to have 30 day STM visit.      Device type: Auto-CPAP    PAP settings:  CPAP MIN CPAP MAX 95TH % PRESSURE EPR RESMED SOFT RESPONSE SETTING   7.0 cm  H20 14.0 cm  H20 13.5 cm  H20  TWO OFF     Mask type:      Objective measures: 14 day rolling measures   COMPLIANCE LEAK AHI AVERAGE USE IN MINUTES   50 % 0.8 5.72 194   GOAL >70% GOAL < 24 LPM GOAL <5 GOAL >240      Patient has the following upcoming sleep appts:  Future Sleep Appointments         Provider Department    12/10/2024 10:30 AM (Arrive by 10:15 AM) Goltz, Bennett Ezra, PA-C [facility] [place]            Total time spent on accessing and interpreting remote patient PAP therapy data  10 minutes    Total time spent counseling, coaching  and reviewing PAP therapy data with patient  1 minute    [id number]  [id number]  no (3 day STM)

## 2024-06-11 DIAGNOSIS — E29.1 HYPOGONADISM MALE: ICD-10-CM

## 2024-06-11 RX ORDER — SYRINGE-NEEDLE,INSULIN,0.5 ML 27GX1/2"
SYRINGE, EMPTY DISPOSABLE MISCELLANEOUS
Qty: 100 EACH | Refills: 1 | Status: SHIPPED | OUTPATIENT
Start: 2024-06-11

## 2024-06-11 NOTE — TELEPHONE ENCOUNTER
"Pt needs renewals on his scripts for testosterone injection.  Please send new orders for 18g x 1\" needles and 25g x1\" needles.    We also need a new rx for a syringe to attach the needles to. We have previously dispensed BD Plastikpak Syringe 3ml. Please review and send a new order. We are looking for a total of three items in this request.    Thank you,  Erlinda Wadsworth, [facility]   "

## 2024-06-14 ENCOUNTER — LAB (OUTPATIENT)
Dept: LAB | Facility: CLINIC | Age: 54
End: 2024-06-14
Payer: COMMERCIAL

## 2024-06-14 DIAGNOSIS — Z79.4 TYPE 2 DIABETES MELLITUS WITH HYPERGLYCEMIA, WITH LONG-TERM CURRENT USE OF INSULIN (H): ICD-10-CM

## 2024-06-14 DIAGNOSIS — E11.65 TYPE 2 DIABETES MELLITUS WITH HYPERGLYCEMIA, WITH LONG-TERM CURRENT USE OF INSULIN (H): ICD-10-CM

## 2024-06-14 LAB
ANION GAP SERPL CALCULATED.3IONS-SCNC: 12 MMOL/L (ref 7–15)
BUN SERPL-MCNC: 20.6 MG/DL (ref 6–20)
CALCIUM SERPL-MCNC: 9.6 MG/DL (ref 8.6–10)
CHLORIDE SERPL-SCNC: 101 MMOL/L (ref 98–107)
CREAT SERPL-MCNC: 0.94 MG/DL (ref 0.67–1.17)
DEPRECATED HCO3 PLAS-SCNC: 26 MMOL/L (ref 22–29)
EGFRCR SERPLBLD CKD-EPI 2021: >90 ML/MIN/1.73M2
GLUCOSE SERPL-MCNC: 101 MG/DL (ref 70–99)
POTASSIUM SERPL-SCNC: 4.4 MMOL/L (ref 3.4–5.3)
SODIUM SERPL-SCNC: 139 MMOL/L (ref 135–145)

## 2024-06-14 PROCEDURE — 80048 BASIC METABOLIC PNL TOTAL CA: CPT

## 2024-06-14 PROCEDURE — 36415 COLL VENOUS BLD VENIPUNCTURE: CPT

## 2024-06-17 ENCOUNTER — VIRTUAL VISIT (OUTPATIENT)
Dept: PSYCHOLOGY | Facility: CLINIC | Age: 54
End: 2024-06-17
Payer: COMMERCIAL

## 2024-06-17 DIAGNOSIS — F43.23 ADJUSTMENT DISORDER WITH MIXED ANXIETY AND DEPRESSED MOOD: Primary | ICD-10-CM

## 2024-06-17 PROCEDURE — 90837 PSYTX W PT 60 MINUTES: CPT | Mod: 95 | Performed by: COUNSELOR

## 2024-06-17 NOTE — PROGRESS NOTES
[facility]                                     Progress Note    Patient Name: Jairo Escamilla  Date: 24         Service Type: Individual      Session Start Time: 1000  Session End Time: 1055     Session Length: 55 minutes    Session #: 1    Attendees: Client    Service Modality:  Video Visit:      Provider verified identity through the following two step process.  Patient provided:  Patient  and Patient address    Telemedicine Visit: The patient's condition can be safely assessed and treated via synchronous audio and visual telemedicine encounter.      Reason for Telemedicine Visit: Services only offered telehealth    Originating Site (Patient Location): Patient's home    Distant Site (Provider Location): Provider Remote Setting- Home Office    Consent:  The patient/guardian has verbally consented to: the potential risks and benefits of telemedicine (video visit) versus in person care; bill my insurance or make self-payment for services provided; and responsibility for payment of non-covered services.     Patient would like the video invitation sent by:  My Chart    Mode of Communication:  Video Conference via Amwell    Distant Location (Provider):  Off-site    As the provider I attest to compliance with applicable laws and regulations related to telemedicine.    DATA  Interactive Complexity: No  Crisis: No        Progress Since Last Session (Related to Symptoms / Goals / Homework):   Symptoms: Improving      Homework: Partially completed      Episode of Care Goals: Minimal progress - ACTION (Actively working towards change); Intervened by reinforcing change plan / affirming steps taken     Current / Ongoing Stressors and Concerns:   Lack of family connection     Treatment Objective(s) Addressed in This Session:   use distraction each time intrusive worry surfaces       Intervention:   DBT: WISE MIND    Assessments completed prior to visit:  The following assessments were completed by  patient for this visit:      PHQ9:       8/19/2022    10:36 AM 1/24/2023    10:14 AM 4/24/2023    10:15 AM 7/31/2023    11:12 AM 2/1/2024     7:10 AM 5/3/2024     8:43 AM 6/6/2024    10:35 AM   PHQ-9 SCORE   PHQ-9 Total Score MyChart 1 (Minimal depression)  6 (Mild depression) 5 (Mild depression) 9 (Mild depression) 2 (Minimal depression) 9 (Mild depression)   PHQ-9 Total Score 1 3 6 5 9 2 9     GAD2:       6/3/2024     3:52 PM   SAGE-2   Feeling nervous, anxious, or on edge 1   Not being able to stop or control worrying 1   SAGE-2 Total Score 2       PROMIS 10-Global Health (only subscores and total score):       5/17/2024     3:55 PM 6/3/2024     3:54 PM   PROMIS-10 Scores Only   Global Mental Health Score 12 12   Global Physical Health Score 8 10   PROMIS TOTAL - SUBSCORES 20 22         ASSESSMENT: Current Emotional / Mental Status (status of significant symptoms):   Risk status (Self / Other harm or suicidal ideation)   Patient denies current fears or concerns for personal safety.   Patient denies current or recent suicidal ideation or behaviors.   Patient denies current or recent homicidal ideation or behaviors.   Patient denies current or recent self injurious behavior or ideation.   Patient denies other safety concerns.   Patient reports there has been no change in risk factors since their last session.     Patient reports there has been no change in protective factors since their last session.     Recommended that patient call 911 or go to the local ED should there be a change in any of these risk factors.     Appearance:   Appropriate    Eye Contact:   Good    Psychomotor Behavior: Normal    Attitude:   Cooperative    Orientation:   All   Speech    Rate / Production: Normal/ Responsive Normal     Volume:  Normal    Mood:    Normal   Affect:    Appropriate    Thought Content:  Clear    Thought Form:  Coherent  Logical    Insight:    Good      Medication Review:   No current psychiatric medications  prescribed     Medication Compliance:   Yes     Changes in Health Issues:   None reported     Chemical Use Review:   Substance Use: Chemical use reviewed, no active concerns identified      Tobacco Use: No current tobacco use.      Diagnosis:  1. Adjustment disorder with mixed anxiety and depressed mood        Collateral Reports Completed:   Not Applicable    PLAN: (Patient Tasks / Therapist Tasks / Other)  Patient to reflect on needs in relationships and identify opportunities in schedule until next session.        Alicia Verma [facility]

## 2024-06-20 ENCOUNTER — OFFICE VISIT (OUTPATIENT)
Dept: OPTOMETRY | Facility: CLINIC | Age: 54
End: 2024-06-20
Payer: COMMERCIAL

## 2024-06-20 DIAGNOSIS — K30 INDIGESTION: ICD-10-CM

## 2024-06-20 DIAGNOSIS — E11.3299 NONPROLIFERATIVE DIABETIC RETINOPATHY ASSOCIATED WITH TYPE 2 DIABETES MELLITUS (H): Primary | ICD-10-CM

## 2024-06-20 PROCEDURE — 92014 COMPRE OPH EXAM EST PT 1/>: CPT | Performed by: OPTOMETRIST

## 2024-06-20 RX ORDER — FAMOTIDINE 20 MG/1
20 TABLET, FILM COATED ORAL 2 TIMES DAILY
Qty: 180 TABLET | Refills: 1 | Status: SHIPPED | OUTPATIENT
Start: 2024-06-20 | End: 2024-07-15

## 2024-06-20 ASSESSMENT — CONF VISUAL FIELD
OD_SUPERIOR_NASAL_RESTRICTION: 0
METHOD: COUNTING FINGERS
OS_SUPERIOR_NASAL_RESTRICTION: 0
OD_NORMAL: 1
OD_INFERIOR_TEMPORAL_RESTRICTION: 0
OS_NORMAL: 1
OS_SUPERIOR_TEMPORAL_RESTRICTION: 0
OD_SUPERIOR_TEMPORAL_RESTRICTION: 0
OS_INFERIOR_TEMPORAL_RESTRICTION: 0
OD_INFERIOR_NASAL_RESTRICTION: 0
OS_INFERIOR_NASAL_RESTRICTION: 0

## 2024-06-20 ASSESSMENT — EXTERNAL EXAM - LEFT EYE: OS_EXAM: NORMAL

## 2024-06-20 ASSESSMENT — VISUAL ACUITY
METHOD: SNELLEN - LINEAR
OD_SC: 20/25
OS_SC: 20/40
OD_SC: 20/80
OS_SC: 20/20

## 2024-06-20 ASSESSMENT — TONOMETRY
OD_IOP_MMHG: 18
IOP_METHOD: APPLANATION
OS_IOP_MMHG: 18

## 2024-06-20 ASSESSMENT — EXTERNAL EXAM - RIGHT EYE: OD_EXAM: NORMAL

## 2024-06-20 ASSESSMENT — CUP TO DISC RATIO
OD_RATIO: 0.6
OS_RATIO: 0.6

## 2024-06-20 ASSESSMENT — REFRACTION_MANIFEST: OD_SPHERE: DEFER

## 2024-06-20 NOTE — PATIENT INSTRUCTIONS
Patient Education   Diabetes weakens the blood vessels all over the body, including the eyes. Damage to the blood vessels in the eyes can cause swelling or bleeding into part of the eye (called the retina). This is called diabetic retinopathy (JEFFERSON-tin--pu-thee). If not treated, this disease can cause vision loss or blindness.   Symptoms may include blurred or distorted vision, but many people have no symptoms. It's important to see your eye doctor regularly to check for problems.   Early treatment and good control can help protect your vision. Here are the things you can do to help prevent vision loss:      1. Keep your blood sugar levels under tight control.      2. Bring high blood pressure under control.      3. No smoking.      4. Have yearly dilated eye exams.    stable

## 2024-06-20 NOTE — PROGRESS NOTES
Chief Complaint   Patient presents with    Diabetic Eye Exam     Lab Results   Component Value Date    A1C 6.4 05/03/2024    A1C 8.0 02/01/2024    A1C 7.5 11/03/2023    A1C 8.4 08/01/2023    A1C 6.8 04/19/2023    A1C 7.6 08/17/2020    A1C 8.2 05/18/2020    A1C 8.2 04/22/2019    A1C 8.1 01/11/2019    A1C 10.1 12/24/2018       No refraction today - uncovered service    Last Eye Exam: 06/2023  Dilated Previously: Yes, side effects of dilation explained today    What are you currently using to see?  readers    Distance Vision Acuity: Satisfied with vision    Near Vision Acuity: Satisfied with vision while reading and using computer with readers    Eye Comfort: good  Do you use eye drops? : No      Julissa Watkinsor - Optometric Assistant      Medical, surgical and family histories reviewed and updated 6/20/2024.       OBJECTIVE: See Ophthalmology exam    ASSESSMENT:    ICD-10-CM    1. Nonproliferative diabetic retinopathy associated with type 2 diabetes mellitus (H)  E11.3299         Stable without macular edema   PLAN:  Ongoing tight control   Jairo A Andi aware  eye exam results will be sent to System, Provider Not In.

## 2024-06-20 NOTE — LETTER
6/20/2024      Jairo Escamilla  [address] [place] [address]      Dear Colleague,    Thank you for referring your patient, Jairo Escamilla, to the [facility]. Please see a copy of my visit note below.    Chief Complaint   Patient presents with     Diabetic Eye Exam     Lab Results   Component Value Date    A1C 6.4 05/03/2024    A1C 8.0 02/01/2024    A1C 7.5 11/03/2023    A1C 8.4 08/01/2023    A1C 6.8 04/19/2023    A1C 7.6 08/17/2020    A1C 8.2 05/18/2020    A1C 8.2 04/22/2019    A1C 8.1 01/11/2019    A1C 10.1 12/24/2018       No refraction today - uncovered service    Last Eye Exam: 06/2023  Dilated Previously: Yes, side effects of dilation explained today    What are you currently using to see?  readers    Distance Vision Acuity: Satisfied with vision    Near Vision Acuity: Satisfied with vision while reading and using computer with readers    Eye Comfort: good  Do you use eye drops? : No      Julissa Cruz - Optometric Assistant      Medical, surgical and family histories reviewed and updated 6/20/2024.       OBJECTIVE: See Ophthalmology exam    ASSESSMENT:    ICD-10-CM    1. Nonproliferative diabetic retinopathy associated with type 2 diabetes mellitus (H)  E11.3299         Stable without macular edema   PLAN:  Ongoing tight control   Jairo Escamilla aware  eye exam results will be sent to System, Provider Not In.           Again, thank you for allowing me to participate in the care of your patient.        Sincerely,        Devika Croft OD

## 2024-07-01 ENCOUNTER — VIRTUAL VISIT (OUTPATIENT)
Dept: PSYCHOLOGY | Facility: CLINIC | Age: 54
End: 2024-07-01
Payer: COMMERCIAL

## 2024-07-01 DIAGNOSIS — F43.23 ADJUSTMENT DISORDER WITH MIXED ANXIETY AND DEPRESSED MOOD: Primary | ICD-10-CM

## 2024-07-01 PROCEDURE — 90837 PSYTX W PT 60 MINUTES: CPT | Mod: 95 | Performed by: COUNSELOR

## 2024-07-01 NOTE — PROGRESS NOTES
[facility]                                     Progress Note    Patient Name: Jairo Escamilla  Date: 24         Service Type: Individual      Session Start Time: 1000  Session End Time: 1055     Session Length: 55 minutes    Session #: 2    Attendees: Client    Service Modality:  Video Visit:      Provider verified identity through the following two step process.  Patient provided:  Patient  and Patient address    Telemedicine Visit: The patient's condition can be safely assessed and treated via synchronous audio and visual telemedicine encounter.      Reason for Telemedicine Visit: Services only offered telehealth    Originating Site (Patient Location): Patient's home    Distant Site (Provider Location): Provider Remote Setting- Home Office    Consent:  The patient/guardian has verbally consented to: the potential risks and benefits of telemedicine (video visit) versus in person care; bill my insurance or make self-payment for services provided; and responsibility for payment of non-covered services.     Patient would like the video invitation sent by:  My Chart    Mode of Communication:  Video Conference via Amwell    Distant Location (Provider):  Off-site    As the provider I attest to compliance with applicable laws and regulations related to telemedicine.    DATA  Interactive Complexity: No  Crisis: No        Progress Since Last Session (Related to Symptoms / Goals / Homework):   Symptoms: Improving      Homework: Partially completed      Episode of Care Goals: Minimal progress - ACTION (Actively working towards change); Intervened by reinforcing change plan / affirming steps taken     Current / Ongoing Stressors and Concerns:   Managing work and family life     Treatment Objective(s) Addressed in This Session:   use distraction each time intrusive worry surfaces       Intervention:   DBT: WISE MIND    Assessments completed prior to visit:  The following assessments were completed by  patient for this visit:      PHQ9:       8/19/2022    10:36 AM 1/24/2023    10:14 AM 4/24/2023    10:15 AM 7/31/2023    11:12 AM 2/1/2024     7:10 AM 5/3/2024     8:43 AM 6/6/2024    10:35 AM   PHQ-9 SCORE   PHQ-9 Total Score MyChart 1 (Minimal depression)  6 (Mild depression) 5 (Mild depression) 9 (Mild depression) 2 (Minimal depression) 9 (Mild depression)   PHQ-9 Total Score 1 3 6 5 9 2 9     GAD2:       6/3/2024     3:52 PM   SAGE-2   Feeling nervous, anxious, or on edge 1   Not being able to stop or control worrying 1   SAGE-2 Total Score 2       PROMIS 10-Global Health (only subscores and total score):       5/17/2024     3:55 PM 6/3/2024     3:54 PM   PROMIS-10 Scores Only   Global Mental Health Score 12 12   Global Physical Health Score 8 10   PROMIS TOTAL - SUBSCORES 20 22         ASSESSMENT: Current Emotional / Mental Status (status of significant symptoms):   Risk status (Self / Other harm or suicidal ideation)   Patient denies current fears or concerns for personal safety.   Patient denies current or recent suicidal ideation or behaviors.   Patient denies current or recent homicidal ideation or behaviors.   Patient denies current or recent self injurious behavior or ideation.   Patient denies other safety concerns.   Patient reports there has been no change in risk factors since their last session.     Patient reports there has been no change in protective factors since their last session.     Recommended that patient call 911 or go to the local ED should there be a change in any of these risk factors.     Appearance:   Appropriate    Eye Contact:   Good    Psychomotor Behavior: Normal    Attitude:   Cooperative    Orientation:   All   Speech    Rate / Production: Normal/ Responsive Normal     Volume:  Normal    Mood:    Normal   Affect:    Appropriate    Thought Content:  Clear    Thought Form:  Coherent  Logical    Insight:    Good      Medication Review:   No current psychiatric medications  prescribed     Medication Compliance:   Yes     Changes in Health Issues:   None reported     Chemical Use Review:   Substance Use: Chemical use reviewed, no active concerns identified      Tobacco Use: No current tobacco use.      Diagnosis:  1. Adjustment disorder with mixed anxiety and depressed mood        Collateral Reports Completed:   Not Applicable    PLAN: (Patient Tasks / Therapist Tasks / Other)  Patient to create list of times available to present to son to spend time together until next session.        Alicia Verma [facility]

## 2024-07-15 ENCOUNTER — OFFICE VISIT (OUTPATIENT)
Dept: PHARMACY | Facility: CLINIC | Age: 54
End: 2024-07-15
Payer: COMMERCIAL

## 2024-07-15 VITALS
OXYGEN SATURATION: 94 % | HEART RATE: 83 BPM | SYSTOLIC BLOOD PRESSURE: 106 MMHG | BODY MASS INDEX: 40.79 KG/M2 | DIASTOLIC BLOOD PRESSURE: 66 MMHG | WEIGHT: 284.3 LBS

## 2024-07-15 DIAGNOSIS — Z79.4 TYPE 2 DIABETES MELLITUS WITH HYPERGLYCEMIA, WITH LONG-TERM CURRENT USE OF INSULIN (H): Primary | ICD-10-CM

## 2024-07-15 DIAGNOSIS — E11.65 TYPE 2 DIABETES MELLITUS WITH HYPERGLYCEMIA, WITH LONG-TERM CURRENT USE OF INSULIN (H): Primary | ICD-10-CM

## 2024-07-15 DIAGNOSIS — E66.01 MORBID OBESITY (H): ICD-10-CM

## 2024-07-15 DIAGNOSIS — E78.5 HYPERLIPIDEMIA LDL GOAL <100: ICD-10-CM

## 2024-07-15 DIAGNOSIS — K30 INDIGESTION: ICD-10-CM

## 2024-07-15 DIAGNOSIS — R11.0 NAUSEA: ICD-10-CM

## 2024-07-15 PROCEDURE — 99607 MTMS BY PHARM ADDL 15 MIN: CPT | Performed by: PHARMACIST

## 2024-07-15 PROCEDURE — 99606 MTMS BY PHARM EST 15 MIN: CPT | Performed by: PHARMACIST

## 2024-07-15 RX ORDER — TIRZEPATIDE 5 MG/.5ML
5 INJECTION, SOLUTION SUBCUTANEOUS
Qty: 2 ML | Refills: 0 | Status: SHIPPED | OUTPATIENT
Start: 2024-07-15

## 2024-07-15 RX ORDER — ONDANSETRON 4 MG/1
4 TABLET, ORALLY DISINTEGRATING ORAL DAILY PRN
Qty: 10 TABLET | Refills: 0 | Status: SHIPPED | OUTPATIENT
Start: 2024-07-15

## 2024-07-15 RX ORDER — FAMOTIDINE 40 MG/1
40 TABLET, FILM COATED ORAL DAILY PRN
Qty: 90 TABLET | Refills: 0 | Status: SHIPPED | OUTPATIENT
Start: 2024-07-15

## 2024-07-15 NOTE — PATIENT INSTRUCTIONS
"Recommendation(s) from today's visit:                                                      If your katie falls off, call the [employer] [phone].     Stop regular use of famotidine. Change to famotidine 40 mg in the morning when needed.     Try to eat a small portion right in the morning. Avoid 2 large meals a day. Instead split that meal into two.     Let's see if Mounjaro is covered. If it is once you are ready to switch from Ozempic. We will have you stop Ozempic 1 mg and then start Mounjaro 5 mg the following week as planned instead. From there you will take Mounjaro 5 mg once a week for 4 total weeks and check-in with Marta to see how your stomach is feeling.    Follow-up:     My Clinical Pharmacist's contact information:                                                      Marta Cardoza, PharmD  Medication therapy management clinical pharmacist    It was great speaking with you today.  I value your experience and would be very thankful for your time in providing feedback in our clinic survey. In the next few days, you may receive an email or text message from [employer] with a link to a survey related to your  clinical pharmacist.\"     To schedule another MTM appointment, please call the clinic directly [phone] or you may call the MTM scheduling line at [phone].    "

## 2024-07-17 ASSESSMENT — SLEEP AND FATIGUE QUESTIONNAIRES
HOW LIKELY ARE YOU TO NOD OFF OR FALL ASLEEP WHILE SITTING AND READING: MODERATE CHANCE OF DOZING
HOW LIKELY ARE YOU TO NOD OFF OR FALL ASLEEP WHILE SITTING INACTIVE IN A PUBLIC PLACE: SLIGHT CHANCE OF DOZING
HOW LIKELY ARE YOU TO NOD OFF OR FALL ASLEEP WHILE SITTING AND TALKING TO SOMEONE: SLIGHT CHANCE OF DOZING
HOW LIKELY ARE YOU TO NOD OFF OR FALL ASLEEP WHILE SITTING QUIETLY AFTER LUNCH WITHOUT ALCOHOL: MODERATE CHANCE OF DOZING
HOW LIKELY ARE YOU TO NOD OFF OR FALL ASLEEP IN A CAR, WHILE STOPPED FOR A FEW MINUTES IN TRAFFIC: WOULD NEVER DOZE
HOW LIKELY ARE YOU TO NOD OFF OR FALL ASLEEP WHILE LYING DOWN TO REST IN THE AFTERNOON WHEN CIRCUMSTANCES PERMIT: HIGH CHANCE OF DOZING
HOW LIKELY ARE YOU TO NOD OFF OR FALL ASLEEP WHEN YOU ARE A PASSENGER IN A CAR FOR AN HOUR WITHOUT A BREAK: SLIGHT CHANCE OF DOZING
HOW LIKELY ARE YOU TO NOD OFF OR FALL ASLEEP WHILE WATCHING TV: SLIGHT CHANCE OF DOZING

## 2024-07-18 RX ORDER — INSULIN DEGLUDEC 200 U/ML
58 INJECTION, SOLUTION SUBCUTANEOUS 2 TIMES DAILY
Qty: 60 ML | Refills: 1 | Status: SHIPPED | OUTPATIENT
Start: 2024-07-18

## 2024-07-18 RX ORDER — TIRZEPATIDE 7.5 MG/.5ML
7.5 INJECTION, SOLUTION SUBCUTANEOUS
Qty: 2 ML | Refills: 0 | Status: SHIPPED | OUTPATIENT
Start: 2024-08-08

## 2024-07-18 NOTE — PROGRESS NOTES
Virtual Visit Details    Type of service:  Video Visit   Start Time: 10:00 AM   End Time: 10:35 AM    Originating Location (pt. Location): Home    Distant Location (provider location):  Off-site  Platform used for Video Visit: [facility]    CPAP Follow-Up Visit:    Date on this visit: 7/19/2024    Jairo Escamilla has a follow-up visit today to review his CPAP use for TIANNA. He was initially seen for snoring and daytime fatigue and sleepiness.     Previous Study Results:   04/22/2016 showed an apnea-hypopnea index of 113.7 events per hour and a lowest oxygen saturation of 55%. There was sleep-related hypoxemia with a total of 89.4 minutes with a saturation below 89%. CPAP was titrated to 9 cm with some residual post-arousal centrals. Wt: 285#.         [employer]Med (5/23/24)  Auto-PAP 7.0 - 14.0 cmH2O 30 day usage data:    76% of days with > 4 hours of use. 5/30 days with no use.   Average use 316 minutes per day.   95%ile Leak 1.25 L/min.   CPAP 95% pressure 13.8 cm.   AHI 4.65 events per hour.       It is going better than the last time he tried using it. He is able to get 5-7 hours of use. He does not notice a lot of difference with using it though. He still gets tired in the middle of the day, but mostly when he is sedentary. In the last couple of days, he had more leak. He has not cleaned his mask since Monday.   He sleeps mostly on his sides.        DME [facility]    Do you use a CPAP Machine at home: Yes  Overall, on a scale of 0-10 how would you rate your CPAP (0 poor, 10 great): 7    What type of mask do you use: Full Face Mask Lisandra  Is your mask comfortable: Yes   If not, why:    How often do you replace supplies:    Is your mask leaking: Yes, if he turns a little. When he tightens the straps, it digs in behind his ear.   If yes, where do you feel it: No  How many night per week does the mask leak (0-7): 2    Do you notice snoring with mask on: No  Do you notice gasping arousals with mask on: No  Are you having significant oral  or nasal dryness: No some, but not daily.   Are you using the humidifier: yes  Does the water chamber run out before the night is over: yes  Do you get condensation in the mask or hose:yes, likes the temp cooler (68). Fixed with turning the temp to auto.  Is the pressure setting comfortable: Yes  If not, why:      Typical bedtime: 1am  Sleep latency on PAP therapy: 10-15 minutes  Typical wake time: 8am  Wakes 1 times per night for 5-10 minutes. Reason for waking: restroom, usually between 3:30-4:30 AM  How many hours on average per night are you using PAP therapy: 5-6  How many hours are you sleeping per night: 7-8  Do you feel well rested in the morning: No    Naps: not often that he gets the chance, but he likes to nap, for 60 minutes.     He is often sleepy between 3-5 PM.    Weight change since sleep study: 284 lbs      Past medical/surgical history, family history, social history, medications and allergies were reviewed.      Problem List:  Patient Active Problem List    Diagnosis Date Noted    S/P amputation of lesser toe, left (H24) 02/01/2024     Priority: Medium    Type 2 diabetes mellitus with hyperglycemia, with long-term current use of insulin (H) 08/18/2023     Priority: Medium    Mild episode of recurrent major depressive disorder (H24) 04/24/2023     Priority: Medium    Hypogonadism male 04/21/2023     Priority: Medium    Gastroenteritis 12/24/2018     Priority: Medium    Peripheral polyneuropathy 05/22/2018     Priority: Medium    Type 2 diabetes mellitus with hyperglycemia (H) 03/06/2017     Priority: Medium    SDH (subdural hematoma) (H) 02/27/2016     Priority: Medium    Morbid obesity (H) 10/21/2015     Priority: Medium    Hyperlipidemia LDL goal <100 03/28/2013     Priority: Medium    Hypertension  BP goal <140/90 03/28/2013     Priority: Medium        Impression/Plan:    (G47.10,  G47.30) Hypersomnia with sleep apnea  (primary encounter diagnosis), (G47.33) TIANNA (obstructive sleep  apnea)  Comment: Jairo is using CPAP most nights for about 7 hours per night. He still notes getting quite sleepy in the 3-5 PM range when he is doing quiet tasks. His download shows well-controlled apnea for the most part and very low leak except for 2 days recently.  His only concern about CPAP is that the strap digs into his skin.  Plan: modafinil (PROVIGIL) 200 MG tablet        I changed pressures to auto CPAP 7-15 cm. A prescription was written for new supplies. We reviewed recommendations for cleaning and replacing supplies.   I prescribed 200 mg modafinil for residual sleepiness in the afternoon despite getting regular 7 hours of CPAP use and apparently well-controlled apnea. Will send CSA.  Consider CPAP strap wraps for issues with the headgear digging into your skin (look online or ask [facility]).  Consider CPAP pillows to help limit the pillow pushing on the mask with side sleeping (more variety online than in stores).       He will follow up with me in about 1 year(s).     35 minutes were spent on the date of the encounter doing chart review, history and exam, documentation and further activities as noted above.     Bennett Goltz, PA-C    CC: No ref. provider found

## 2024-07-19 ENCOUNTER — MYC MEDICAL ADVICE (OUTPATIENT)
Dept: SLEEP MEDICINE | Facility: CLINIC | Age: 54
End: 2024-07-19

## 2024-07-19 ENCOUNTER — VIRTUAL VISIT (OUTPATIENT)
Dept: SLEEP MEDICINE | Facility: CLINIC | Age: 54
End: 2024-07-19
Payer: COMMERCIAL

## 2024-07-19 VITALS
DIASTOLIC BLOOD PRESSURE: 66 MMHG | WEIGHT: 284 LBS | SYSTOLIC BLOOD PRESSURE: 106 MMHG | BODY MASS INDEX: 40.66 KG/M2 | HEIGHT: 70 IN

## 2024-07-19 DIAGNOSIS — G47.30 HYPERSOMNIA WITH SLEEP APNEA: Primary | ICD-10-CM

## 2024-07-19 DIAGNOSIS — G47.33 OSA (OBSTRUCTIVE SLEEP APNEA): ICD-10-CM

## 2024-07-19 DIAGNOSIS — G47.10 HYPERSOMNIA WITH SLEEP APNEA: Primary | ICD-10-CM

## 2024-07-19 PROCEDURE — 99214 OFFICE O/P EST MOD 30 MIN: CPT | Mod: 95 | Performed by: PHYSICIAN ASSISTANT

## 2024-07-19 RX ORDER — MODAFINIL 200 MG/1
200 TABLET ORAL DAILY
Qty: 30 TABLET | Refills: 0 | Status: SHIPPED | OUTPATIENT
Start: 2024-07-19 | End: 2024-09-03

## 2024-07-19 ASSESSMENT — SLEEP AND FATIGUE QUESTIONNAIRES
HOW LIKELY ARE YOU TO NOD OFF OR FALL ASLEEP WHILE SITTING AND TALKING TO SOMEONE: SLIGHT CHANCE OF DOZING
HOW LIKELY ARE YOU TO NOD OFF OR FALL ASLEEP WHILE SITTING AND READING: MODERATE CHANCE OF DOZING
HOW LIKELY ARE YOU TO NOD OFF OR FALL ASLEEP WHEN YOU ARE A PASSENGER IN A CAR FOR AN HOUR WITHOUT A BREAK: SLIGHT CHANCE OF DOZING
HOW LIKELY ARE YOU TO NOD OFF OR FALL ASLEEP WHILE SITTING INACTIVE IN A PUBLIC PLACE: SLIGHT CHANCE OF DOZING
HOW LIKELY ARE YOU TO NOD OFF OR FALL ASLEEP WHILE WATCHING TV: SLIGHT CHANCE OF DOZING
HOW LIKELY ARE YOU TO NOD OFF OR FALL ASLEEP WHILE LYING DOWN TO REST IN THE AFTERNOON WHEN CIRCUMSTANCES PERMIT: HIGH CHANCE OF DOZING
HOW LIKELY ARE YOU TO NOD OFF OR FALL ASLEEP IN A CAR, WHILE STOPPED FOR A FEW MINUTES IN TRAFFIC: WOULD NEVER DOZE
HOW LIKELY ARE YOU TO NOD OFF OR FALL ASLEEP WHILE SITTING QUIETLY AFTER LUNCH WITHOUT ALCOHOL: MODERATE CHANCE OF DOZING

## 2024-07-19 ASSESSMENT — PAIN SCALES - GENERAL: PAINLEVEL: NO PAIN (0)

## 2024-07-19 NOTE — NURSING NOTE
Patient declined individual medication review by support staff because - no changes from 7/15/24 review.       Has patient had flu shot for current/most recent flu season? If so, when? Yes: 1/24/23    Current patient location: [facility] [address]  [facility] [address]    Is the patient currently in the state of [place]? YES    Visit mode:VIDEO    If the visit is dropped, the patient can be reconnected by: VIDEO VISIT: Text to cell phone:   Telephone Information:   Mobile [phone]       Will anyone else be joining the visit? NO  (If patient encounters technical issues they should call [phone] :[phone])    How would you like to obtain your AVS? MyChart    Are changes needed to the allergy or medication list? No    Are refills needed on medications prescribed by this physician? NO    Reason for visit: RECHECK    Migdalia CHAPMAN

## 2024-07-19 NOTE — PATIENT INSTRUCTIONS
Consider CPAP strap wraps for issues with the headgear digging into your skin (look online or ask [facility]).  Consider CPAP pillows to help limit the pillow pushing on the mask with side sleeping (more variety online than in stores).

## 2024-07-23 DIAGNOSIS — Z79.4 TYPE 2 DIABETES MELLITUS WITH HYPERGLYCEMIA, WITH LONG-TERM CURRENT USE OF INSULIN (H): ICD-10-CM

## 2024-07-23 DIAGNOSIS — E11.65 TYPE 2 DIABETES MELLITUS WITH HYPERGLYCEMIA, WITH LONG-TERM CURRENT USE OF INSULIN (H): ICD-10-CM

## 2024-07-23 RX ORDER — METFORMIN HCL 500 MG
TABLET, EXTENDED RELEASE 24 HR ORAL
Qty: 360 TABLET | Refills: 0 | Status: SHIPPED | OUTPATIENT
Start: 2024-07-23

## 2024-07-31 ENCOUNTER — OFFICE VISIT (OUTPATIENT)
Dept: PODIATRY | Facility: CLINIC | Age: 54
End: 2024-07-31
Payer: COMMERCIAL

## 2024-07-31 VITALS — BODY MASS INDEX: 40.75 KG/M2 | DIASTOLIC BLOOD PRESSURE: 78 MMHG | WEIGHT: 284 LBS | SYSTOLIC BLOOD PRESSURE: 118 MMHG

## 2024-07-31 DIAGNOSIS — E11.42 DIABETIC POLYNEUROPATHY ASSOCIATED WITH TYPE 2 DIABETES MELLITUS (H): Primary | ICD-10-CM

## 2024-07-31 PROCEDURE — 99212 OFFICE O/P EST SF 10 MIN: CPT | Performed by: PODIATRIST

## 2024-07-31 NOTE — LETTER
7/31/2024      Jairo Escamilla  [address] [place] [address]      Dear Colleague,    Thank you for referring your patient, Jairo Escamilla, to the [facility]. Please see a copy of my visit note below.    Podiatry / Foot and Ankle Surgery Progress Note    July 31, 2024    Subject: Patient was seen for follow-up on left great toe amputation.  Notes everything is doing well.  No concerns today.    Objective:  Vitals: /78   Wt 128.8 kg (284 lb)   BMI 40.75 kg/m      A1C: 6.4 (5/2/2024)     General:  Patient is alert and orientated.  NAD.     Dermatologic: Incision is well-healed.  No open lesions noted.  No hyperkeratotic tissue areas noted.  No signs of infection noted.     Vascular: DP & PT pulses are intact & regular bilaterally.  No significant edema or varicosities noted.  CFT and skin temperature is normal to both lower extremities.     Neurologic: Lower extremity sensation is diminished to feet.     Musculoskeletal: Patient is ambulatory without assistive device or brace.  Left great toe now amputated.      ASSESSMENT:     Diabetic polyneuropathy associated with type 2 diabetes mellitus (H)  Amputation of left great toe (H24)     Medical Decision Making/Plan:  Reviewed patient[facility] chart in [facility].  Foot appears to be doing well.  We discussed that at this point we will have him follow-up as needed.  Talked about that if he notices any calluses to come in right away to get his foot checked as these are areas of pressure that can lead to ulceration.  Continue to lotion the feet daily and check his feet a few times a day.     All questions were answered to patient's satisfaction and he will call further questions or concerns     Patient risk factor: Patient is at low risk for infection.    Wendy Pate DPM, Podiatry/Foot and Ankle Surgery      Again, thank you for allowing me to participate in the care of your patient.        Sincerely,        Wendy Pate DPM,  Podiatry/Foot and Ankle Surgery

## 2024-07-31 NOTE — PROGRESS NOTES
Podiatry / Foot and Ankle Surgery Progress Note    July 31, 2024    Subject: Patient was seen for follow-up on left great toe amputation.  Notes everything is doing well.  No concerns today.    Objective:  Vitals: /78   Wt 128.8 kg (284 lb)   BMI 40.75 kg/m      A1C: 6.4 (5/2/2024)     General:  Patient is alert and orientated.  NAD.     Dermatologic: Incision is well-healed.  No open lesions noted.  No hyperkeratotic tissue areas noted.  No signs of infection noted.     Vascular: DP & PT pulses are intact & regular bilaterally.  No significant edema or varicosities noted.  CFT and skin temperature is normal to both lower extremities.     Neurologic: Lower extremity sensation is diminished to feet.     Musculoskeletal: Patient is ambulatory without assistive device or brace.  Left great toe now amputated.      ASSESSMENT:     Diabetic polyneuropathy associated with type 2 diabetes mellitus (H)  Amputation of left great toe (H24)     Medical Decision Making/Plan:  Reviewed patient's chart in [facility].  Foot appears to be doing well.  We discussed that at this point we will have him follow-up as needed.  Talked about that if he notices any calluses to come in right away to get his foot checked as these are areas of pressure that can lead to ulceration.  Continue to lotion the feet daily and check his feet a few times a day.     All questions were answered to patient's satisfaction and he will call further questions or concerns     Patient risk factor: Patient is at low risk for infection.    Wendy Pate DPM, Podiatry/Foot and Ankle Surgery

## 2024-09-03 ENCOUNTER — MYC REFILL (OUTPATIENT)
Dept: PEDIATRICS | Facility: CLINIC | Age: 54
End: 2024-09-03
Payer: COMMERCIAL

## 2024-09-03 DIAGNOSIS — G47.33 OSA (OBSTRUCTIVE SLEEP APNEA): ICD-10-CM

## 2024-09-03 DIAGNOSIS — G47.10 HYPERSOMNIA WITH SLEEP APNEA: ICD-10-CM

## 2024-09-03 DIAGNOSIS — E29.1 HYPOGONADISM MALE: ICD-10-CM

## 2024-09-03 DIAGNOSIS — G47.30 HYPERSOMNIA WITH SLEEP APNEA: ICD-10-CM

## 2024-09-03 RX ORDER — TESTOSTERONE CYPIONATE 200 MG/ML
200 INJECTION, SOLUTION INTRAMUSCULAR
Qty: 2 ML | Refills: 3 | Status: SHIPPED | OUTPATIENT
Start: 2024-09-03

## 2024-09-04 RX ORDER — MODAFINIL 200 MG/1
200 TABLET ORAL DAILY
Qty: 30 TABLET | Refills: 0 | Status: SHIPPED | OUTPATIENT
Start: 2024-09-04

## 2024-10-18 DIAGNOSIS — Z79.4 TYPE 2 DIABETES MELLITUS WITH HYPERGLYCEMIA, WITH LONG-TERM CURRENT USE OF INSULIN (H): ICD-10-CM

## 2024-10-18 DIAGNOSIS — E11.65 TYPE 2 DIABETES MELLITUS WITH HYPERGLYCEMIA, WITH LONG-TERM CURRENT USE OF INSULIN (H): ICD-10-CM

## 2024-10-21 ENCOUNTER — MYC REFILL (OUTPATIENT)
Dept: PEDIATRICS | Facility: CLINIC | Age: 54
End: 2024-10-21
Payer: COMMERCIAL

## 2024-10-21 DIAGNOSIS — E11.65 TYPE 2 DIABETES MELLITUS WITH HYPERGLYCEMIA, WITH LONG-TERM CURRENT USE OF INSULIN (H): ICD-10-CM

## 2024-10-21 DIAGNOSIS — Z79.4 TYPE 2 DIABETES MELLITUS WITH HYPERGLYCEMIA, WITH LONG-TERM CURRENT USE OF INSULIN (H): ICD-10-CM

## 2024-10-21 RX ORDER — METFORMIN HYDROCHLORIDE 500 MG/1
TABLET, EXTENDED RELEASE ORAL
Qty: 360 TABLET | Refills: 0 | OUTPATIENT
Start: 2024-10-21

## 2024-10-21 RX ORDER — METFORMIN HYDROCHLORIDE 500 MG/1
TABLET, EXTENDED RELEASE ORAL
Qty: 360 TABLET | Refills: 3 | Status: SHIPPED | OUTPATIENT
Start: 2024-10-21

## 2024-11-08 ENCOUNTER — OFFICE VISIT (OUTPATIENT)
Dept: PEDIATRICS | Facility: CLINIC | Age: 54
End: 2024-11-08
Attending: INTERNAL MEDICINE
Payer: COMMERCIAL

## 2024-11-08 VITALS
SYSTOLIC BLOOD PRESSURE: 110 MMHG | HEART RATE: 82 BPM | TEMPERATURE: 97.5 F | WEIGHT: 284.8 LBS | BODY MASS INDEX: 40.77 KG/M2 | OXYGEN SATURATION: 99 % | RESPIRATION RATE: 14 BRPM | HEIGHT: 70 IN | DIASTOLIC BLOOD PRESSURE: 64 MMHG

## 2024-11-08 DIAGNOSIS — Z79.4 TYPE 2 DIABETES MELLITUS WITH HYPERGLYCEMIA, WITH LONG-TERM CURRENT USE OF INSULIN (H): Primary | ICD-10-CM

## 2024-11-08 DIAGNOSIS — L97.529 NON-PRESSURE CHRONIC ULCER OF OTHER PART OF LEFT FOOT WITH UNSPECIFIED SEVERITY (H): ICD-10-CM

## 2024-11-08 DIAGNOSIS — E78.5 HYPERLIPIDEMIA LDL GOAL <100: ICD-10-CM

## 2024-11-08 DIAGNOSIS — E11.65 TYPE 2 DIABETES MELLITUS WITH HYPERGLYCEMIA, WITH LONG-TERM CURRENT USE OF INSULIN (H): Primary | ICD-10-CM

## 2024-11-08 LAB
EST. AVERAGE GLUCOSE BLD GHB EST-MCNC: 134 MG/DL
HBA1C MFR BLD: 6.3 % (ref 0–5.6)

## 2024-11-08 PROCEDURE — 83036 HEMOGLOBIN GLYCOSYLATED A1C: CPT | Performed by: INTERNAL MEDICINE

## 2024-11-08 PROCEDURE — 91320 SARSCV2 VAC 30MCG TRS-SUC IM: CPT | Performed by: INTERNAL MEDICINE

## 2024-11-08 PROCEDURE — 36415 COLL VENOUS BLD VENIPUNCTURE: CPT | Performed by: INTERNAL MEDICINE

## 2024-11-08 PROCEDURE — 90673 RIV3 VACCINE NO PRESERV IM: CPT | Performed by: INTERNAL MEDICINE

## 2024-11-08 PROCEDURE — 90471 IMMUNIZATION ADMIN: CPT | Performed by: INTERNAL MEDICINE

## 2024-11-08 PROCEDURE — 99214 OFFICE O/P EST MOD 30 MIN: CPT | Mod: 25 | Performed by: INTERNAL MEDICINE

## 2024-11-08 PROCEDURE — 90480 ADMN SARSCOV2 VAC 1/ONLY CMP: CPT | Performed by: INTERNAL MEDICINE

## 2024-11-08 RX ORDER — SIMVASTATIN 20 MG
20 TABLET ORAL AT BEDTIME
Qty: 90 TABLET | Refills: 3 | Status: SHIPPED | OUTPATIENT
Start: 2024-11-08

## 2024-11-08 RX ORDER — TIRZEPATIDE 12.5 MG/.5ML
12.5 INJECTION, SOLUTION SUBCUTANEOUS
Qty: 2 ML | Refills: 5 | Status: SHIPPED | OUTPATIENT
Start: 2024-12-31

## 2024-11-08 RX ORDER — INSULIN DEGLUDEC 200 U/ML
58 INJECTION, SOLUTION SUBCUTANEOUS 2 TIMES DAILY
Qty: 60 ML | Refills: 11 | Status: SHIPPED | OUTPATIENT
Start: 2024-11-08

## 2024-11-08 RX ORDER — TIRZEPATIDE 10 MG/.5ML
10 INJECTION, SOLUTION SUBCUTANEOUS
Qty: 2 ML | Refills: 0 | Status: SHIPPED | OUTPATIENT
Start: 2024-12-01

## 2024-11-08 ASSESSMENT — PATIENT HEALTH QUESTIONNAIRE - PHQ9: SUM OF ALL RESPONSES TO PHQ QUESTIONS 1-9: 1

## 2024-11-08 NOTE — PATIENT INSTRUCTIONS
"Lab work today:  We can do labs in the exam room today, or you can get them done downstairs in the lab.      If you are going downstairs:  Directions:  As you walk through the first floor, you'll see (on the right) first the pharmacy, then some bathrooms, then the \"Lab and Imaging\" area. Give them your name at the window there and wait for them to call you.     2 shots today.    Increase mounjaro to 10 mg for 1 mo, then 12.5 mg until follow up with me for complete physcial exam in 3 months.     No changes in other meds for now.   "

## 2024-11-08 NOTE — PROGRESS NOTES
"  Assessment & Plan     Type 2 diabetes mellitus with hyperglycemia, with long-term current use of insulin (H)  Parameters well controlled.  Continue secondary risk factor modification for BP, cholesterol, anticoagulation, and smoking cessation.   Uptaper mounjaro for weight control.  Had more significant side effects on ozempic.   - HEMOGLOBIN A1C; Future  - MOUNJARO 10 MG/0.5ML SOAJ; Inject 0.5 mLs (10 mg) subcutaneously every 7 days.  - MOUNJARO 12.5 MG/0.5ML SOAJ; Inject 0.5 mLs (12.5 mg) subcutaneously every 7 days.  - empagliflozin (JARDIANCE) 25 MG TABS tablet; Take 1 tablet (25 mg) by mouth daily.  - insulin aspart (NOVOLOG PEN) 100 UNIT/ML pen; Inject 28 Units subcutaneously 3 times daily (with meals). Along with adjustment scale of 1:50 when blood sugar over 150, max 100 units daily  - insulin degludec (TRESIBA FLEXTOUCH) 200 UNIT/ML pen; Inject 58 Units subcutaneously 2 times daily.  - HEMOGLOBIN A1C    Non-pressure chronic ulcer of other part of left foot with unspecified severity (H)  Resolved.     Hyperlipidemia LDL goal <100  Ongoing statin  - simvastatin (ZOCOR) 20 MG tablet; Take 1 tablet (20 mg) by mouth at bedtime.      Patient Instructions   Lab work today:  We can do labs in the exam room today, or you can get them done downstairs in the lab.      If you are going downstairs:  Directions:  As you walk through the first floor, you'll see (on the right) first the pharmacy, then some bathrooms, then the \"Lab and Imaging\" area. Give them your name at the window there and wait for them to call you.     2 shots today.    Increase mounjaro to 10 mg for 1 mo, then 12.5 mg until follow up with me for complete physcial exam in 3 months.     No changes in other meds for now.          BMI  Estimated body mass index is 40.86 kg/m  as calculated from the following:    Height as of this encounter: 1.778 m (5' 10\").    Weight as of this encounter: 129.2 kg (284 lb 12.8 oz).   Weight management plan: Discussed " "healthy diet and exercise guidelines      See Patient Instructions    Subjective   Jairo is a 54 year old, presenting for the following health issues:  Diabetes      11/8/2024     9:11 AM   Additional Questions   Roomed by Silvia Finch CMA     History of Present Illness       Diabetes:   He presents for follow up of diabetes.   He is checking home blood glucose with a continuous glucose monitor.   He checks blood glucose before and after meals and at bedtime.  Blood glucose is sometimes over 200 and never under 70. He is aware of hypoglycemia symptoms including weakness and lethargy.    He has no concerns regarding his diabetes at this time.  He is having numbness in feet, burning in feet, excessive thirst and blurry vision.              Has been on Mounjaro for last 2 months.  On 7.5.  Has not had any stomach issues like on Ozempic.    Still with occasional gas but much less.  Usually worse on the day after the dose.    TAKING TRESIBA 58 twice daily, and SHORT ACING INSULIN 28 three times daily.      Sugars:  Running excellent.  CGM notes 99% in range.   Feels like mounjaro and jardiance have helped the most.    Has helped with the diet; appetite has been much better; feels \"not constantly overeating.\"    Not had follow up with Dr. Pate; seems to have remained stable with respect to foot ulcer.   Does not some pain on left lateral/extensor ankle area.     Left elbow pain and weakness, with gripping things.                       Objective    /64 (BP Location: Right arm, Patient Position: Sitting, Cuff Size: Adult Large)   Pulse 82   Temp 97.5  F (36.4  C) (Temporal)   Resp 14   Ht 1.778 m (5' 10\")   Wt 129.2 kg (284 lb 12.8 oz)   SpO2 99%   BMI 40.86 kg/m    Body mass index is 40.86 kg/m .  Physical Exam   GENERAL: alert and no distress  EYES: Eyes grossly normal to inspection, PERRL and conjunctivae and sclerae normal  HENT: ear canals and TM's normal, nose and mouth without ulcers or lesions  NECK: no " adenopathy, no asymmetry, masses, or scars  RESP: lungs clear to auscultation - no rales, rhonchi or wheezes  CV: regular rate and rhythm, normal S1 S2, no S3 or S4, no murmur, click or rub, no peripheral edema  ABDOMEN: soft, nontender, no hepatosplenomegaly, no masses and bowel sounds normal  MS: no gross musculoskeletal defects noted, no edema  SKIN: no suspicious lesions or rashes  NEURO: Normal strength and tone, mentation intact and speech normal  PSYCH: mentation appears normal, affect normal/bright            Signed Electronically by: Sandoval Wilkinson MD

## 2024-11-12 ENCOUNTER — TELEPHONE (OUTPATIENT)
Dept: PEDIATRICS | Facility: CLINIC | Age: 54
End: 2024-11-12
Payer: COMMERCIAL

## 2024-11-12 NOTE — TELEPHONE ENCOUNTER
PRIOR AUTHORIZATION DENIED    Medication: MOUNJARO 10 MG/0.5ML SC SOAJ  Insurance Company: [employer] - Phone [phone] Fax [phone]  Denial Date: 11/11/2024  Denial Reason(s):     Appeal Information:     Patient Notified: No

## 2024-11-18 ASSESSMENT — ANXIETY QUESTIONNAIRES
4. TROUBLE RELAXING: NOT AT ALL
GAD7 TOTAL SCORE: 6
GAD7 TOTAL SCORE: 6
6. BECOMING EASILY ANNOYED OR IRRITABLE: SEVERAL DAYS
2. NOT BEING ABLE TO STOP OR CONTROL WORRYING: SEVERAL DAYS
3. WORRYING TOO MUCH ABOUT DIFFERENT THINGS: SEVERAL DAYS
1. FEELING NERVOUS, ANXIOUS, OR ON EDGE: MORE THAN HALF THE DAYS
5. BEING SO RESTLESS THAT IT IS HARD TO SIT STILL: NOT AT ALL
7. FEELING AFRAID AS IF SOMETHING AWFUL MIGHT HAPPEN: SEVERAL DAYS
IF YOU CHECKED OFF ANY PROBLEMS ON THIS QUESTIONNAIRE, HOW DIFFICULT HAVE THESE PROBLEMS MADE IT FOR YOU TO DO YOUR WORK, TAKE CARE OF THINGS AT HOME, OR GET ALONG WITH OTHER PEOPLE: SOMEWHAT DIFFICULT
8. IF YOU CHECKED OFF ANY PROBLEMS, HOW DIFFICULT HAVE THESE MADE IT FOR YOU TO DO YOUR WORK, TAKE CARE OF THINGS AT HOME, OR GET ALONG WITH OTHER PEOPLE?: SOMEWHAT DIFFICULT

## 2024-11-19 ENCOUNTER — VIRTUAL VISIT (OUTPATIENT)
Dept: PSYCHOLOGY | Facility: CLINIC | Age: 54
End: 2024-11-19
Payer: COMMERCIAL

## 2024-11-19 DIAGNOSIS — F43.23 ADJUSTMENT DISORDER WITH MIXED ANXIETY AND DEPRESSED MOOD: Primary | ICD-10-CM

## 2024-11-19 PROCEDURE — 90837 PSYTX W PT 60 MINUTES: CPT | Mod: 95 | Performed by: COUNSELOR

## 2024-11-19 PROCEDURE — 90785 PSYTX COMPLEX INTERACTIVE: CPT | Mod: 95 | Performed by: COUNSELOR

## 2024-11-19 NOTE — PROGRESS NOTES
[facility]                                     Progress Note    Patient Name: Jairo Escamilla  Date: 24         Service Type: Individual      Session Start Time: 1200  Session End Time: 1300     Session Length: 60 minutes    Session #: 3    Attendees: Client    Service Modality:  Video Visit:      Provider verified identity through the following two step process.  Patient provided:  Patient  and Patient address    Telemedicine Visit: The patient's condition can be safely assessed and treated via synchronous audio and visual telemedicine encounter.      Reason for Telemedicine Visit: Services only offered telehealth    Originating Site (Patient Location): Patient's home    Distant Site (Provider Location): Provider Remote Setting- Home Office    Consent:  The patient/guardian has verbally consented to: the potential risks and benefits of telemedicine (video visit) versus in person care; bill my insurance or make self-payment for services provided; and responsibility for payment of non-covered services.     Patient would like the video invitation sent by:  My Chart    Mode of Communication:  Video Conference via [employer]    Distant Location (Provider):  Off-site    As the provider I attest to compliance with applicable laws and regulations related to telemedicine.    DATA  Interactive Complexity: Yes, visit entailed Interactive Complexity evidenced by:  -The need to manage maladaptive communication (related to, e.g., high anxiety, high reactivity, repeated questions, or disagreement) among participants that complicates delivery of care  Crisis: No        Progress Since Last Session (Related to Symptoms / Goals / Homework):   Symptoms: Improving      Homework: Partially completed      Episode of Care Goals: Minimal progress - ACTION (Actively working towards change); Intervened by reinforcing change plan / affirming steps taken     Current / Ongoing Stressors and Concerns:   Job loss,  familial conflict     Treatment Objective(s) Addressed in This Session:   use distraction each time intrusive worry surfaces       Intervention:   DBT: WISE MIND    Assessments completed prior to visit:  The following assessments were completed by patient for this visit:      PHQ9:       1/24/2023    10:14 AM 4/24/2023    10:15 AM 7/31/2023    11:12 AM 2/1/2024     7:10 AM 5/3/2024     8:43 AM 6/6/2024    10:35 AM 11/8/2024     9:12 AM   PHQ-9 SCORE   PHQ-9 Total Score MyChart  6 (Mild depression) 5 (Mild depression) 9 (Mild depression) 2 (Minimal depression) 9 (Mild depression)    PHQ-9 Total Score 3 6 5 9 2 9 1     GAD2:       6/3/2024     3:52 PM 11/18/2024    11:12 PM   SAGE-2   Feeling nervous, anxious, or on edge 1  2    Not being able to stop or control worrying 1  1    SAGE-2 Total Score 2 3        Patient-reported       PROMIS 10-Global Health (only subscores and total score):       5/17/2024     3:55 PM 6/3/2024     3:54 PM 11/18/2024    11:15 PM   PROMIS-10 Scores Only   Global Mental Health Score 12 12 8    Global Physical Health Score 8 10 9    PROMIS TOTAL - SUBSCORES 20 22 17        Patient-reported         ASSESSMENT: Current Emotional / Mental Status (status of significant symptoms):   Risk status (Self / Other harm or suicidal ideation)   Patient denies current fears or concerns for personal safety.   Patient denies current or recent suicidal ideation or behaviors.   Patient denies current or recent homicidal ideation or behaviors.   Patient denies current or recent self injurious behavior or ideation.   Patient denies other safety concerns.   Patient reports there has been no change in risk factors since their last session.     Patient reports there has been no change in protective factors since their last session.     Recommended that patient call 911 or go to the local ED should there be a change in any of these risk factors     Appearance:   Appropriate    Eye Contact:   Good    Psychomotor  Behavior: Normal    Attitude:   Cooperative    Orientation:   All   Speech    Rate / Production: Normal/ Responsive Normal     Volume:  Normal    Mood:    Normal   Affect:    Appropriate    Thought Content:  Clear    Thought Form:  Coherent  Logical    Insight:    Good      Medication Review:   No current psychiatric medications prescribed     Medication Compliance:   Yes     Changes in Health Issues:   None reported     Chemical Use Review:   Substance Use: Chemical use reviewed, no active concerns identified      Tobacco Use: No current tobacco use.      Diagnosis:  1. Adjustment disorder with mixed anxiety and depressed mood        Collateral Reports Completed:   Not Applicable    PLAN: (Patient Tasks / Therapist Tasks / Other)  Patient to practice opposite to emotion action until next session.        Alicia Verma, [facility]                                            Individual Treatment Plan    Patient's Name: Jairo Escamilla  YOB: 1970    Date of Creation: 11/19/24  Date Treatment Plan Last Reviewed/Revised: NA    DSM5 Diagnoses: Adjustment Disorders  309.28 (F43.23) With mixed anxiety and depressed mood  Psychosocial / Contextual Factors: familial conflict  PROMIS (reviewed every 90 days): 11/18/24    Referral / Collaboration:  Referral to another professional/service is not indicated at this time..    Anticipated number of session for this episode of care: 3-6 sessions  Anticipation frequency of session: Every other week  Anticipated Duration of each session: 53 or more minutes  Treatment plan will be reviewed in 90 days or when goals have been changed.       MeasurableTreatment Goal(s) related to diagnosis / functional impairment(s)  Goal 1: Patient will develop and implement strategies to manage mood symptoms.    I will know I've met my goal when I feel less stressed and more calm.      Objective #A (Patient Action)    Patient will identify 2 initial signs or symptoms of anxiety.  Status: New -  Date: 11/19/24      Intervention(s)  Therapist will teach emotional recognition/identification.   .    Objective #B  Patient will compile a list of boundaries that they would like to set with others.   .  Status: New - Date: 11/19/24      Intervention(s)  Therapist will teach about healthy boundaries.   .    Objective #C  Patient will Feel less tired and more energy during the day .  Status: New - Date: 11/19/24      Intervention(s)  Therapist will teach emotional regulation skills.   .        Patient has reviewed and agreed to the above plan.      Alicia Verma, [facility]  November 19, 2024

## 2024-11-25 ENCOUNTER — MYC REFILL (OUTPATIENT)
Dept: PEDIATRICS | Facility: CLINIC | Age: 54
End: 2024-11-25
Payer: COMMERCIAL

## 2024-11-25 DIAGNOSIS — I10 HYPERTENSION, UNSPECIFIED TYPE: ICD-10-CM

## 2024-11-25 RX ORDER — LOSARTAN POTASSIUM 25 MG/1
25 TABLET ORAL DAILY
Qty: 90 TABLET | Refills: 1 | Status: SHIPPED | OUTPATIENT
Start: 2024-11-25

## 2024-12-18 ENCOUNTER — MYC MEDICAL ADVICE (OUTPATIENT)
Dept: PEDIATRICS | Facility: CLINIC | Age: 54
End: 2024-12-18
Payer: COMMERCIAL

## 2024-12-18 ENCOUNTER — MYC REFILL (OUTPATIENT)
Dept: PEDIATRICS | Facility: CLINIC | Age: 54
End: 2024-12-18
Payer: COMMERCIAL

## 2024-12-18 DIAGNOSIS — G47.30 HYPERSOMNIA WITH SLEEP APNEA: ICD-10-CM

## 2024-12-18 DIAGNOSIS — E11.65 TYPE 2 DIABETES MELLITUS WITH HYPERGLYCEMIA, WITH LONG-TERM CURRENT USE OF INSULIN (H): ICD-10-CM

## 2024-12-18 DIAGNOSIS — K30 INDIGESTION: ICD-10-CM

## 2024-12-18 DIAGNOSIS — Z79.4 TYPE 2 DIABETES MELLITUS WITH HYPERGLYCEMIA, WITH LONG-TERM CURRENT USE OF INSULIN (H): ICD-10-CM

## 2024-12-18 DIAGNOSIS — G47.33 OSA (OBSTRUCTIVE SLEEP APNEA): ICD-10-CM

## 2024-12-18 DIAGNOSIS — G47.10 HYPERSOMNIA WITH SLEEP APNEA: ICD-10-CM

## 2024-12-18 RX ORDER — MODAFINIL 200 MG/1
200 TABLET ORAL DAILY
Qty: 90 TABLET | Refills: 1 | Status: SHIPPED | OUTPATIENT
Start: 2024-12-18

## 2024-12-18 RX ORDER — FAMOTIDINE 40 MG/1
40 TABLET, FILM COATED ORAL DAILY PRN
Qty: 90 TABLET | Refills: 0 | Status: SHIPPED | OUTPATIENT
Start: 2024-12-18

## 2024-12-18 NOTE — TELEPHONE ENCOUNTER
provider needs to review pharmacy/patient note. Provider, please approve or deny the prescription.

## 2024-12-18 NOTE — TELEPHONE ENCOUNTER
Please see patient MyChart message regarding side effects experienced when increasing to 10mg dose of Mounjaro. Do you recommend patient return to 7.5mg dosing or retry 10mg?     Evin BREEN RN 12/18/2024 at 3:20 PM

## 2024-12-19 RX ORDER — TIRZEPATIDE 7.5 MG/.5ML
7.5 INJECTION, SOLUTION SUBCUTANEOUS WEEKLY
Qty: 2 ML | Refills: 5 | Status: SHIPPED | OUTPATIENT
Start: 2024-12-19

## 2025-01-27 ENCOUNTER — PATIENT OUTREACH (OUTPATIENT)
Dept: CARE COORDINATION | Facility: CLINIC | Age: 55
End: 2025-01-27
Payer: COMMERCIAL

## 2025-01-27 ENCOUNTER — MYC MEDICAL ADVICE (OUTPATIENT)
Dept: PEDIATRICS | Facility: CLINIC | Age: 55
End: 2025-01-27
Payer: COMMERCIAL

## 2025-01-27 DIAGNOSIS — E11.65 TYPE 2 DIABETES MELLITUS WITH HYPERGLYCEMIA, WITH LONG-TERM CURRENT USE OF INSULIN (H): ICD-10-CM

## 2025-01-27 DIAGNOSIS — Z79.4 TYPE 2 DIABETES MELLITUS WITH HYPERGLYCEMIA, WITH LONG-TERM CURRENT USE OF INSULIN (H): ICD-10-CM

## 2025-01-28 NOTE — TELEPHONE ENCOUNTER
What dose is needed? If 7.5 mf ok to pend and route to refill pool. Any other dose must go to provider as a med request. Thank you.

## 2025-01-28 NOTE — TELEPHONE ENCOUNTER
See patient's MyChart message   - Patient requesting to increase to the 10 mg dose of his Tirzepatide (MOUNJARO) medication     - Pended Tirzepatide (MOUNJARO) 10 mg with the [facility] - [id number]     Appointments in Next Year      Feb 11, 2025 10:00 AM  (Arrive by 9:40 AM)  Preventative Adult Visit with Sandoval Wilkinson MD  [facility] ) [phone]     Dr. Wilkinson, please review and order as appropriate.     Vaishnavi SCHULTE RN   [facility] [place]

## 2025-01-29 RX ORDER — TIRZEPATIDE 10 MG/.5ML
10 INJECTION, SOLUTION SUBCUTANEOUS
Qty: 2 ML | Refills: 2 | Status: SHIPPED | OUTPATIENT
Start: 2025-01-29

## 2025-02-10 SDOH — HEALTH STABILITY: PHYSICAL HEALTH: ON AVERAGE, HOW MANY DAYS PER WEEK DO YOU ENGAGE IN MODERATE TO STRENUOUS EXERCISE (LIKE A BRISK WALK)?: 2 DAYS

## 2025-02-10 SDOH — HEALTH STABILITY: PHYSICAL HEALTH: ON AVERAGE, HOW MANY MINUTES DO YOU ENGAGE IN EXERCISE AT THIS LEVEL?: 20 MIN

## 2025-02-10 ASSESSMENT — SOCIAL DETERMINANTS OF HEALTH (SDOH): HOW OFTEN DO YOU GET TOGETHER WITH FRIENDS OR RELATIVES?: ONCE A WEEK

## 2025-02-10 ASSESSMENT — PATIENT HEALTH QUESTIONNAIRE - PHQ9
SUM OF ALL RESPONSES TO PHQ QUESTIONS 1-9: 4
10. IF YOU CHECKED OFF ANY PROBLEMS, HOW DIFFICULT HAVE THESE PROBLEMS MADE IT FOR YOU TO DO YOUR WORK, TAKE CARE OF THINGS AT HOME, OR GET ALONG WITH OTHER PEOPLE: NOT DIFFICULT AT ALL
SUM OF ALL RESPONSES TO PHQ QUESTIONS 1-9: 4

## 2025-02-11 ENCOUNTER — OFFICE VISIT (OUTPATIENT)
Dept: PEDIATRICS | Facility: CLINIC | Age: 55
End: 2025-02-11
Attending: INTERNAL MEDICINE
Payer: COMMERCIAL

## 2025-02-11 VITALS
OXYGEN SATURATION: 96 % | SYSTOLIC BLOOD PRESSURE: 137 MMHG | BODY MASS INDEX: 40.87 KG/M2 | RESPIRATION RATE: 16 BRPM | HEIGHT: 70 IN | WEIGHT: 285.5 LBS | HEART RATE: 83 BPM | DIASTOLIC BLOOD PRESSURE: 86 MMHG | TEMPERATURE: 96.9 F

## 2025-02-11 DIAGNOSIS — S06.5XAA SDH (SUBDURAL HEMATOMA) (H): ICD-10-CM

## 2025-02-11 DIAGNOSIS — E11.65 TYPE 2 DIABETES MELLITUS WITH HYPERGLYCEMIA, WITH LONG-TERM CURRENT USE OF INSULIN (H): ICD-10-CM

## 2025-02-11 DIAGNOSIS — E29.1 HYPOGONADISM MALE: ICD-10-CM

## 2025-02-11 DIAGNOSIS — Z79.4 TYPE 2 DIABETES MELLITUS WITH HYPERGLYCEMIA, WITH LONG-TERM CURRENT USE OF INSULIN (H): ICD-10-CM

## 2025-02-11 DIAGNOSIS — I10 HYPERTENSION, UNSPECIFIED TYPE: ICD-10-CM

## 2025-02-11 DIAGNOSIS — Z00.00 ROUTINE GENERAL MEDICAL EXAMINATION AT A HEALTH CARE FACILITY: Primary | ICD-10-CM

## 2025-02-11 DIAGNOSIS — E11.42 DIABETIC POLYNEUROPATHY ASSOCIATED WITH TYPE 2 DIABETES MELLITUS (H): ICD-10-CM

## 2025-02-11 DIAGNOSIS — E66.01 MORBID OBESITY (H): ICD-10-CM

## 2025-02-11 DIAGNOSIS — E78.5 HYPERLIPIDEMIA LDL GOAL <100: ICD-10-CM

## 2025-02-11 DIAGNOSIS — Z12.5 SCREENING FOR PROSTATE CANCER: ICD-10-CM

## 2025-02-11 DIAGNOSIS — K30 INDIGESTION: ICD-10-CM

## 2025-02-11 PROBLEM — L97.529 NON-PRESSURE CHRONIC ULCER OF OTHER PART OF LEFT FOOT WITH UNSPECIFIED SEVERITY (H): Status: RESOLVED | Noted: 2024-11-08 | Resolved: 2025-02-11

## 2025-02-11 LAB
ALBUMIN SERPL BCG-MCNC: 4.4 G/DL (ref 3.5–5.2)
ALP SERPL-CCNC: 49 U/L (ref 40–150)
ALT SERPL W P-5'-P-CCNC: 53 U/L (ref 0–70)
ANION GAP SERPL CALCULATED.3IONS-SCNC: 15 MMOL/L (ref 7–15)
AST SERPL W P-5'-P-CCNC: 43 U/L (ref 0–45)
BASOPHILS # BLD AUTO: 0 10E3/UL (ref 0–0.2)
BASOPHILS NFR BLD AUTO: 0 %
BILIRUB SERPL-MCNC: 0.7 MG/DL
BUN SERPL-MCNC: 16 MG/DL (ref 6–20)
CALCIUM SERPL-MCNC: 10.1 MG/DL (ref 8.8–10.4)
CHLORIDE SERPL-SCNC: 103 MMOL/L (ref 98–107)
CHOLEST SERPL-MCNC: 133 MG/DL
CREAT SERPL-MCNC: 0.79 MG/DL (ref 0.67–1.17)
CREAT UR-MCNC: 58.5 MG/DL
EGFRCR SERPLBLD CKD-EPI 2021: >90 ML/MIN/1.73M2
EOSINOPHIL # BLD AUTO: 0.1 10E3/UL (ref 0–0.7)
EOSINOPHIL NFR BLD AUTO: 2 %
ERYTHROCYTE [DISTWIDTH] IN BLOOD BY AUTOMATED COUNT: 13.6 % (ref 10–15)
EST. AVERAGE GLUCOSE BLD GHB EST-MCNC: 120 MG/DL
FASTING STATUS PATIENT QL REPORTED: YES
FASTING STATUS PATIENT QL REPORTED: YES
GLUCOSE SERPL-MCNC: 135 MG/DL (ref 70–99)
HBA1C MFR BLD: 5.8 % (ref 0–5.6)
HCO3 SERPL-SCNC: 23 MMOL/L (ref 22–29)
HCT VFR BLD AUTO: 51.5 % (ref 40–53)
HDLC SERPL-MCNC: 27 MG/DL
HGB BLD-MCNC: 17 G/DL (ref 13.3–17.7)
IMM GRANULOCYTES # BLD: 0 10E3/UL
IMM GRANULOCYTES NFR BLD: 0 %
LDLC SERPL CALC-MCNC: ABNORMAL MG/DL
LDLC SERPL DIRECT ASSAY-MCNC: 65 MG/DL
LYMPHOCYTES # BLD AUTO: 1.4 10E3/UL (ref 0.8–5.3)
LYMPHOCYTES NFR BLD AUTO: 27 %
MCH RBC QN AUTO: 28.4 PG (ref 26.5–33)
MCHC RBC AUTO-ENTMCNC: 33 G/DL (ref 31.5–36.5)
MCV RBC AUTO: 86 FL (ref 78–100)
MICROALBUMIN UR-MCNC: 18.5 MG/L
MICROALBUMIN/CREAT UR: 31.62 MG/G CR (ref 0–17)
MONOCYTES # BLD AUTO: 0.5 10E3/UL (ref 0–1.3)
MONOCYTES NFR BLD AUTO: 8 %
NEUTROPHILS # BLD AUTO: 3.3 10E3/UL (ref 1.6–8.3)
NEUTROPHILS NFR BLD AUTO: 62 %
NONHDLC SERPL-MCNC: 106 MG/DL
PLATELET # BLD AUTO: 189 10E3/UL (ref 150–450)
POTASSIUM SERPL-SCNC: 4.2 MMOL/L (ref 3.4–5.3)
PROT SERPL-MCNC: 7.8 G/DL (ref 6.4–8.3)
PSA SERPL DL<=0.01 NG/ML-MCNC: 0.61 NG/ML (ref 0–3.5)
RBC # BLD AUTO: 5.99 10E6/UL (ref 4.4–5.9)
SODIUM SERPL-SCNC: 141 MMOL/L (ref 135–145)
TRIGL SERPL-MCNC: 433 MG/DL
WBC # BLD AUTO: 5.3 10E3/UL (ref 4–11)

## 2025-02-11 RX ORDER — SIMETHICONE 125 MG
125 TABLET,CHEWABLE ORAL 4 TIMES DAILY PRN
Qty: 90 TABLET | Refills: 1 | Status: SHIPPED | OUTPATIENT
Start: 2025-02-11

## 2025-02-11 RX ORDER — FAMOTIDINE 40 MG/1
40 TABLET, FILM COATED ORAL DAILY PRN
Qty: 90 TABLET | Refills: 3 | Status: SHIPPED | OUTPATIENT
Start: 2025-02-11

## 2025-02-11 RX ORDER — LOSARTAN POTASSIUM 25 MG/1
25 TABLET ORAL DAILY
Qty: 90 TABLET | Refills: 3 | Status: SHIPPED | OUTPATIENT
Start: 2025-02-11

## 2025-02-11 RX ORDER — FAMOTIDINE 40 MG/1
40 TABLET, FILM COATED ORAL DAILY PRN
Qty: 90 TABLET | Refills: 0 | Status: SHIPPED | OUTPATIENT
Start: 2025-02-11 | End: 2025-02-11

## 2025-02-11 RX ORDER — TESTOSTERONE CYPIONATE 200 MG/ML
200 INJECTION, SOLUTION INTRAMUSCULAR
Qty: 2 ML | Refills: 3 | Status: SHIPPED | OUTPATIENT
Start: 2025-02-11

## 2025-02-11 ASSESSMENT — PAIN SCALES - GENERAL: PAINLEVEL_OUTOF10: MODERATE PAIN (4)

## 2025-02-11 NOTE — PATIENT INSTRUCTIONS
"With diabetes, you need an diabetic eye exam every year!  Please have your eye doctor forward your yearly eye exam results to me: Dr. Wilkinson, fax . (You could instead just start seeing our eye clinic here at [place] by calling [phone], Dr. Elayne Croft. [facility] .)     Due in June.    Discuss colonoscopy in 2027.    Lab work today:  We can do labs in the exam room today, or you can get them done downstairs in the lab.      If you are going downstairs:  Directions:  As you walk through the first floor, you'll see (on the right) first the pharmacy, then some bathrooms, then the \"Lab and Imaging\" area. Give them your name at the window there and wait for them to call you.     We can continue the 10 mg Mounjaro as long as you are tolerating it.    Try to limit complex carbs (rice, bread, pasta, potatoes, cereals) and simple carbs (pop, juice, alcohol, and even milk.)  Eating more lean proteins (chicken, fish, eggs, beans, nuts) and unlimited vegetables and fruits can definitely help as well.     In general, try to spread meals out during the day, eating smaller breakfasts, lunches and dinners--and having healthy snacks in between.  It's a good idea to limit your carbs at mealtimes to 60-75 grams of carbs, and to limit your carbs at snacktimes to 15-30 grams of carbs.  (Check the food labels to add up these carbs.)       Patient Education   Preventive Care Advice   This is general advice given by our system to help you stay healthy. However, your care team may have specific advice just for you. Please talk to your care team about your preventive care needs.  Nutrition  Eat 5 or more servings of fruits and vegetables each day.  Try wheat bread, brown rice and whole grain pasta (instead of white bread, rice, and pasta).  Get enough calcium and vitamin D. Check the label on foods and aim for 100% of the RDA (recommended daily allowance).  Lifestyle  Exercise at least 150 minutes each " week  (30 minutes a day, 5 days a week).  Do muscle strengthening activities 2 days a week. These help control your weight and prevent disease.  No smoking.  Wear sunscreen to prevent skin cancer.  Have a dental exam and cleaning every 6 months.  Yearly exams  See your health care team every year to talk about:  Any changes in your health.  Any medicines your care team has prescribed.  Preventive care, family planning, and ways to prevent chronic diseases.  Shots (vaccines)   HPV shots (up to age 26), if you've never had them before.  Hepatitis B shots (up to age 59), if you've never had them before.  COVID-19 shot: Get this shot when it's due.  Flu shot: Get a flu shot every year.  Tetanus shot: Get a tetanus shot every 10 years.  Pneumococcal, hepatitis A, and RSV shots: Ask your care team if you need these based on your risk.  Shingles shot (for age 50 and up)  General health tests  Diabetes screening:  Starting at age 35, Get screened for diabetes at least every 3 years.  If you are younger than age 35, ask your care team if you should be screened for diabetes.  Cholesterol test: At age 39, start having a cholesterol test every 5 years, or more often if advised.  Bone density scan (DEXA): At age 50, ask your care team if you should have this scan for osteoporosis (brittle bones).  Hepatitis C: Get tested at least once in your life.  STIs (sexually transmitted infections)  Before age 24: Ask your care team if you should be screened for STIs.  After age 24: Get screened for STIs if you're at risk. You are at risk for STIs (including HIV) if:  You are sexually active with more than one person.  You don't use condoms every time.  You or a partner was diagnosed with a sexually transmitted infection.  If you are at risk for HIV, ask about PrEP medicine to prevent HIV.  Get tested for HIV at least once in your life, whether you are at risk for HIV or not.  Cancer screening tests  Cervical cancer screening: If you have  a cervix, begin getting regular cervical cancer screening tests starting at age 21.  Breast cancer scan (mammogram): If you've ever had breasts, begin having regular mammograms starting at age 40. This is a scan to check for breast cancer.  Colon cancer screening: It is important to start screening for colon cancer at age 45.  Have a colonoscopy test every 10 years (or more often if you're at risk) Or, ask your provider about stool tests like a FIT test every year or Cologuard test every 3 years.  To learn more about your testing options, visit:   .  For help making a decision, visit:   https://bit.ly/vx21655.  Prostate cancer screening test: If you have a prostate, ask your care team if a prostate cancer screening test (PSA) at age 55 is right for you.  Lung cancer screening: If you are a current or former smoker ages 50 to 80, ask your care team if ongoing lung cancer screenings are right for you.  For informational purposes only. Not to replace the advice of your health care provider. Copyright   2023 [employer]. All rights reserved. Clinically reviewed by the  [employer] [facility] [facility]. Nadanu [id number] - REV 01/24.  Preventing Falls: Care Instructions  Injuries and health problems such as trouble walking or poor eyesight can increase your risk of falling. So can some medicines. But there are things you can do to help prevent falls. You can exercise to get stronger. You can also arrange your home to make it safer.    Talk to your doctor about the medicines you take. Ask if any of them increase the risk of falls and whether they can be changed or stopped.   Try to exercise regularly. It can help improve your strength and balance. This can help lower your risk of falling.         Practice fall safety and prevention.   Wear low-heeled shoes that fit well and give your feet good support. Talk to your doctor if you have foot problems that make this hard.  Carry a cellphone or wear a medical  "alert device that you can use to call for help.  Use stepladders instead of chairs to reach high objects. Don't climb if you're at risk for falls. Ask for help, if needed.  Wear the correct eyeglasses, if you need them.        Make your home safer.   Remove rugs, cords, clutter, and furniture from walkways.  Keep your house well lit. Use night-lights in hallways and bathrooms.  Install and use sturdy handrails on stairways.  Wear nonskid footwear, even inside. Don't walk barefoot or in socks without shoes.        Be safe outside.   Use handrails, curb cuts, and ramps whenever possible.  Keep your hands free by using a shoulder bag or backpack.  Try to walk in well-lit areas. Watch out for uneven ground, changes in pavement, and debris.  Be careful in the winter. Walk on the grass or gravel when sidewalks are slippery. Use de-icer on steps and walkways. Add non-slip devices to shoes.    Put grab bars and nonskid mats in your shower or tub and near the toilet. Try to use a shower chair or bath bench when bathing.   Get into a tub or shower by putting in your weaker leg first. Get out with your strong side first. Have a phone or medical alert device in the bathroom with you.   Where can you learn more?  Go to https://www.[employer].net/patiented  Enter G117 in the search box to learn more about \"Preventing Falls: Care Instructions.\"  Current as of: July 31, 2024  Content Version: 14.3    2024 [employer].   Care instructions adapted under license by your healthcare professional. If you have questions about a medical condition or this instruction, always ask your healthcare professional. [employer] disclaims any warranty or liability for your use of this information.       "

## 2025-02-11 NOTE — PROGRESS NOTES
Preventive Care Visit  [facility] DANIELA Wilkinson MD, Internal Medicine - Pediatrics  Feb 11, 2025      Assessment & Plan     Type 2 diabetes mellitus with hyperglycemia, with long-term current use of insulin (H)  At goal, but still significant obesity.  Titrate up moujaro to 10 mg.  Higher doses unlikely to be tolerated.   - Lipid panel reflex to direct LDL Non-fasting; Future  - Albumin Random Urine Quantitative with Creat Ratio; Future  - Tirzepatide 10 MG/0.5ML SOAJ; Inject 0.5 mLs (10 mg) subcutaneously every 7 days.  - Hemoglobin A1c; Future  - Comprehensive metabolic panel (BMP + Alb, Alk Phos, ALT, AST, Total. Bili, TP); Future  - CBC with platelets and differential; Future  - Lipid panel reflex to direct LDL Non-fasting  - Albumin Random Urine Quantitative with Creat Ratio  - Hemoglobin A1c  - Comprehensive metabolic panel (BMP + Alb, Alk Phos, ALT, AST, Total. Bili, TP)  - CBC with platelets and differential  - insulin aspart (NOVOLOG PEN) 100 UNIT/ML pen; Inject 28 Units subcutaneously 3 times daily (with meals). Along with adjustment scale of 1:50 when blood sugar over 150, max 100 units daily    Indigestion  Refilled.   - simethicone (MYLICON) 125 MG chewable tablet; Take 1 tablet (125 mg) by mouth 4 times daily as needed for intestinal gas.  - famotidine (PEPCID) 40 MG tablet; Take 1 tablet (40 mg) by mouth daily as needed (indigestion).    Diabetic polyneuropathy associated with type 2 diabetes mellitus (H)  Stable, significant.      SDH (subdural hematoma) (H)  Asymptomatic.      Morbid obesity (H)  Mounjaro not yet helping at current dose.     Hyperlipidemia LDL goal <100  Ongoing statin.     Hypertension, unspecified type  Blood pressure at goal.   - losartan (COZAAR) 25 MG tablet; Take 1 tablet (25 mg) by mouth daily.    Hypogonadism male  Labs today.   - Testosterone, total; Future  - Testosterone, total  - testosterone cypionate (DEPOTESTOSTERONE) 200 MG/ML injection; Inject 1  "mL (200 mg) into the muscle every 14 days.    Routine general medical examination at a health care facility  Discussed diet, exercise, testicular self exam, blood pressure, cholesterol, and need for cancer surveillance at appropriate ages.     Screening for prostate cancer    - PSA, screen; Future  - PSA, screen    Patient has been advised of split billing requirements and indicates understanding: Yes        Counseling  Appropriate preventive services were addressed with this patient via screening, questionnaire, or discussion as appropriate for fall prevention, nutrition, physical activity, Tobacco-use cessation, social engagement, weight loss and cognition.  Checklist reviewing preventive services available has been given to the patient.  Reviewed patient's diet, addressing concerns and/or questions.   He is at risk for lack of exercise and has been provided with information to increase physical activity for the benefit of his well-being.   The patient was instructed to see the dentist every 6 months.           Mireya Gill is a 55 year old, presenting for the following:  Physical        2/11/2025     9:44 AM   Additional Questions   Roomed by BB Vf   Accompanied by no         2/11/2025     9:44 AM   Patient Reported Additional Medications   Patient reports taking the following new medications no          HPI  Mounjaro 10 caused significant side effects.  THen returned to 7.5, and then back to 10 mg first dose again yesterday.  \"Not terrible today\".              Health Care Directive      2/10/2025   General Health   How would you rate your overall physical health? (!) POOR   Feel stress (tense, anxious, or unable to sleep) Only a little   (!) STRESS CONCERN      2/10/2025   Nutrition   Three or more servings of calcium each day? (!) NO   Diet: Diabetic   How many servings of fruit and vegetables per day? (!) 0-1   How many sweetened beverages each day? 0-1         2/10/2025   Exercise   Days per week of " moderate/strenous exercise 2 days   Average minutes spent exercising at this level 20 min   (!) EXERCISE CONCERN      2/10/2025   Social Factors   Frequency of gathering with friends or relatives Once a week   Worry food won't last until get money to buy more No   Food not last or not have enough money for food? No   Do you have housing? (Housing is defined as stable permanent housing and does not include staying ouside in a car, in a tent, in an abandoned building, in an overnight shelter, or couch-surfing.) Yes   Are you worried about losing your housing? No   Lack of transportation? No   Unable to get utilities (heat,electricity)? No         2/11/2025   Fall Risk   Gait Speed Test (Document in seconds) 4.37   Gait Speed Test Interpretation Less than or equal to 5.00 seconds - PASS          2/10/2025   Dental   Dentist two times every year? (!) NO          Today's PHQ-9 Score:       2/10/2025     5:54 PM   PHQ-9 SCORE   PHQ-9 Total Score MyChart 4 (Minimal depression)   PHQ-9 Total Score 4        Patient-reported         2/10/2025   Substance Use   Alcohol more than 3/day or more than 7/wk No   Do you use any other substances recreationally? No     Social History     Tobacco Use    Smoking status: Never     Passive exposure: Never    Smokeless tobacco: Never   Vaping Use    Vaping status: Never Used   Substance Use Topics    Alcohol use: Yes     Comment: Very little and infrequently    Drug use: Never           2/10/2025   STI Screening   New sexual partner(s) since last STI/HIV test? No   ASCVD Risk   The 10-year ASCVD risk score (Maine ERAZO, et al., 2019) is: 13%    Values used to calculate the score:      Age: 55 years      Sex: Male      Is Non- : No      Diabetic: Yes      Tobacco smoker: No      Systolic Blood Pressure: 137 mmHg      Is BP treated: Yes      HDL Cholesterol: 27 mg/dL      Total Cholesterol: 139 mg/dL           Reviewed and updated as needed this visit by  "Provider                    Past Medical History:   Diagnosis Date    Diabetes mellitus (adult onset)     Diabetes mellitus, type 2 (H) 05/14/2008    Fracture of rib 2022    High cholesterol     Hypertension     Obese     Peripheral neuropathy     Pneumonia     Sleep apnea     Subdural hematoma (H)      Past Surgical History:   Procedure Laterality Date    AMPUTATE TOE(S) Left 8/19/2023    Procedure: Left hallux amputation;  Surgeon: Filiberto Mercedes DPM;  Location: [facility] OR    AMPUTATE TOE(S) Left 9/30/2023    Procedure: Left foot first ray resection;  Surgeon: Wendy Pate DPM, Podiatry/Foot and Ankle Surgery;  Location: [facility] OR    COLONOSCOPY N/A 12/22/2022    Procedure: COLONOSCOPY, FLEXIBLE, WITH LESION REMOVAL USING SNARE;  Surgeon: Josh Reeves MD;  Location:  GI    IRRIGATION AND DEBRIDEMENT FOOT, COMBINED Left 8/21/2023    Procedure: IRRIGATION AND DEBRIDEMENT LEFT FOOT, REVISION OF AMPUTATION;  Surgeon: Michelle Marc DPM;  Location:  OR    NO HISTORY OF SURGERY           Review of Systems  Constitutional, HEENT, cardiovascular, pulmonary, GI, , musculoskeletal, neuro, skin, endocrine and psych systems are negative, except as otherwise noted.     Objective    Exam  /86 (BP Location: Right arm, Patient Position: Sitting, Cuff Size: Adult Regular)   Pulse 83   Temp 96.9  F (36.1  C) (Temporal)   Resp 16   Ht 1.77 m (5' 9.69\")   Wt 129.5 kg (285 lb 8 oz)   SpO2 96%   BMI 41.34 kg/m     Estimated body mass index is 41.34 kg/m  as calculated from the following:    Height as of this encounter: 1.77 m (5' 9.69\").    Weight as of this encounter: 129.5 kg (285 lb 8 oz).    Physical Exam  GENERAL: alert and no distress  EYES: Eyes grossly normal to inspection, PERRL and conjunctivae and sclerae normal  HENT: ear canals and TM's normal, nose and mouth without ulcers or lesions  NECK: no adenopathy, no asymmetry, masses, or scars  RESP: lungs clear to auscultation - no rales, rhonchi or " wheezes  CV: regular rate and rhythm, normal S1 S2, no S3 or S4, no murmur, click or rub, no peripheral edema  ABDOMEN: soft, nontender, no hepatosplenomegaly, no masses and bowel sounds normal  MS: no gross musculoskeletal defects noted, no edema  SKIN: no suspicious lesions or rashes  NEURO: Normal strength and tone, mentation intact and speech normal  PSYCH: mentation appears normal, affect normal/bright        Signed Electronically by: Sandoval Wilkinson MD    Answers submitted by the patient for this visit:  Patient Health Questionnaire (Submitted on 2/10/2025)  If you checked off any problems, how difficult have these problems made it for you to do your work, take care of things at home, or get along with other people?: Not difficult at all  PHQ9 TOTAL SCORE: 4    The longitudinal plan of care for the diagnosis(es)/condition(s) as documented were addressed during this visit. Due to the added complexity in care, I will continue to support Jairo in the subsequent management and with ongoing continuity of care.

## 2025-02-17 LAB — TESTOST SERPL-MCNC: 613 NG/DL (ref 240–950)

## 2025-04-29 ENCOUNTER — VIRTUAL VISIT (OUTPATIENT)
Dept: PHARMACY | Facility: CLINIC | Age: 55
End: 2025-04-29
Payer: COMMERCIAL

## 2025-04-29 DIAGNOSIS — K30 INDIGESTION: ICD-10-CM

## 2025-04-29 DIAGNOSIS — E66.01 MORBID OBESITY (H): ICD-10-CM

## 2025-04-29 DIAGNOSIS — R11.0 NAUSEA: ICD-10-CM

## 2025-04-29 DIAGNOSIS — E11.65 TYPE 2 DIABETES MELLITUS WITH HYPERGLYCEMIA, WITH LONG-TERM CURRENT USE OF INSULIN (H): Primary | ICD-10-CM

## 2025-04-29 DIAGNOSIS — Z79.4 TYPE 2 DIABETES MELLITUS WITH HYPERGLYCEMIA, WITH LONG-TERM CURRENT USE OF INSULIN (H): Primary | ICD-10-CM

## 2025-04-29 PROCEDURE — 99605 MTMS BY PHARM NP 15 MIN: CPT | Mod: 95 | Performed by: PHARMACIST

## 2025-04-29 PROCEDURE — 99607 MTMS BY PHARM ADDL 15 MIN: CPT | Performed by: PHARMACIST

## 2025-04-29 RX ORDER — HYDROCHLOROTHIAZIDE 12.5 MG/1
CAPSULE ORAL
Qty: 6 EACH | Refills: 3 | Status: SHIPPED | OUTPATIENT
Start: 2025-04-29

## 2025-04-29 RX ORDER — OMEPRAZOLE 40 MG/1
40 CAPSULE, DELAYED RELEASE ORAL DAILY
Qty: 90 CAPSULE | Refills: 0 | Status: CANCELLED | OUTPATIENT
Start: 2025-04-29

## 2025-04-29 NOTE — PROGRESS NOTES
Medication Therapy Management (MTM) Encounter    ASSESSMENT:                            Medication Adherence/Access: No issues identified.    Diabetes and elevated BMI  A1c is at goal and last a1c was actually < 6%. Infrequent lows observed with cgm form past 2 weeks. Patient continues to have side effects from Mounjaro and is not interested in increasing dose further at this time which I think is very reasonable. He is interested trial off metformin to see if GI symptoms are any better. Will trial but the timing of his symptoms are very likely specific to Mounjaro.   Unfortunately no change in weight but blood sugar much better therefore patient would like to stay on Mounjaro.    GI bloating/indigestion and nausea  Noted about will see if metformin make any difference. If not recommend changing H2RA to PPI. He could also trial alternate therapy from simethicone to Beano to see if better for bloating.  PLAN:                            We will complete a very short trial to see if GI symptoms any different:  Reduce metformin XR 1 tablet twice daily for 1 week, then check-in with Marta via [employer] to check on blood sugar and symptoms.  - If no improvement, would return back to baseline metformin dose and change famotidine to omeprazole.    Try over-the-counter Beano for gas. See if this works better than simethicone.    Rx for Libre3+ sensors.     Follow-up: Return in about 1 week (around 5/6/2025) for MyChart and then prn in the future.    SUBJECTIVE/OBJECTIVE:                          Jairo Escamilla is a 55 year old male seen for a follow-up visit.       Reason for visit: diabetes.    Allergies/ADRs: Reviewed in chart  Past Medical History: Reviewed in chart  Tobacco: He reports that he has never smoked. He has never been exposed to tobacco smoke. He has never used smokeless tobacco.  Alcohol: not currently using, last drink 9 months     Medication Adherence/Access: no issues reported.    Diabetes and elevated  BMI  Tresiba 58 units twice daily   Novolog before meals 28 units 3 times daily with meals  Metformin 1000 mg twice daily   Mounjaro 10 mg weekly   Jardiance 25 mg in the morning     Weight prior to starting semaglutide 2/8/22 (previously on liraglutide 1.2mg dose) ~277 lbs.    He notices he feels the worst after the first 48 hours after the dose does not feel well, then feels better by the end of the week.   Denies any vomiting. Diarrhea is less with Mounjaro compared with Ozempic, very infrequent at this point.   He is very fearful of increasing Mounjaro further.      Blood sugar monitoring: Continuous Glucose Monitor Time in Range Last 14 Days - 96%, Above (>180mg/dl) 3%, Below (<70mg/dl) 1%  He doesn't if that is a true low because was laying on his side during sleep.   Eye exam is up to date  Foot exam: due    GI bloating/indigestion and nausea  Famotidine 20 mg twice daily   Simethicone chewable as needed   Pepto Bismol as needed     Patient has not been on PPI before. Was on cimetidine prior to famotidine. See symptoms above. Has a lot of bloating discomfort. Simethicone helps somewhat.        Today's Vitals: There were no vitals taken for this visit.  ----------------      I spent 14 minutes with this patient today. All changes were made via collaborative practice agreement with Sandoval Wilkinson MD.     A summary of these recommendations was sent via [employer].    Marta Cardoza, PharmD  Medication Therapy Management Pharmacist        Telemedicine Visit Details  The patient's medications can be safely assessed via a telemedicine encounter.  Type of service:  Video Conference via [employer]  Originating Location (pt. Location): Home    Distant Location (provider location):  On-site  Start Time:  11:36 AM  End Time: 11:50 AM     Medication Therapy Recommendations  Indigestion   1 Current Medication: simethicone (MYLICON) 125 MG chewable tablet   Current Medication Sig: Take 1 tablet (125 mg) by mouth 4 times daily as  needed for intestinal gas.   Rationale: More effective medication available - Ineffective medication - Effectiveness   Recommendation: Change Medication - BEANO PO   Status: Accepted - no CPA Needed   Identified Date: 2025 Completed Date: 2025         Type 2 diabetes mellitus with hyperglycemia (H)   1 Current Medication: Continuous Blood Gluc Sensor (FREESTYLE MARGARETTE 3 SENSOR) MISC   Current Medication Si each every 14 days   Rationale: Medication product not available - Adherence - Adherence   Recommendation: Change Medication - FreeStyle Margarette 3 Plus Sensor Misc   Status: Accepted per CPA   Identified Date: 2025 Completed Date: 2025         2 Current Medication: metFORMIN (GLUCOPHAGE XR) 500 MG 24 hr tablet   Current Medication Sig: TAKE 2 TABLETS BY MOUTH TWICE DAILY   Rationale: Patient prefers not to take - Adherence - Adherence   Recommendation: Decrease Dose   Status: Patient Agreed - Adherence/Education   Identified Date: 2025 Completed Date: 2025

## 2025-04-29 NOTE — PATIENT INSTRUCTIONS
"Recommendation(s) from today's visit:                                                      We will complete a very short trial to see if GI symptoms any different:  Reduce metformin XR 1 tablet twice daily for 1 week, then check-in with Marta via [employer] to check on blood sugar and symptoms.  - If no improvement, would return back to baseline metformin dose and change famotidine to omeprazole.    Try over-the-counter Beano for gas. See if this works better than simethicone.    Rx for Libre3+ sensors.     Follow-up: Return in about 1 week (around 5/6/2025) for MyChart and then prn in the future.    My Clinical Pharmacist's contact information:                                                      Marta Cardoza, PharmD  Medication therapy management clinical pharmacist    It was great speaking with you today.  I value your experience and would be very thankful for your time in providing feedback in our clinic survey. In the next few days, you may receive an email or text message from [employer] with a link to a survey related to your  clinical pharmacist.\"     To schedule another MTM appointment, please call the clinic directly [phone] or you may call the MTM scheduling line at [phone].    "

## 2025-06-10 ENCOUNTER — MYC REFILL (OUTPATIENT)
Dept: PEDIATRICS | Facility: CLINIC | Age: 55
End: 2025-06-10
Payer: COMMERCIAL

## 2025-06-10 DIAGNOSIS — N52.9 ERECTILE DYSFUNCTION, UNSPECIFIED ERECTILE DYSFUNCTION TYPE: ICD-10-CM

## 2025-06-10 RX ORDER — SILDENAFIL 100 MG/1
100 TABLET, FILM COATED ORAL DAILY PRN
Qty: 30 TABLET | Refills: 3 | Status: SHIPPED | OUTPATIENT
Start: 2025-06-10

## 2025-06-18 ENCOUNTER — OFFICE VISIT (OUTPATIENT)
Dept: OPTOMETRY | Facility: CLINIC | Age: 55
End: 2025-06-18
Payer: COMMERCIAL

## 2025-06-18 DIAGNOSIS — H52.4 PRESBYOPIA: ICD-10-CM

## 2025-06-18 DIAGNOSIS — E11.3299 NONPROLIFERATIVE DIABETIC RETINOPATHY ASSOCIATED WITH TYPE 2 DIABETES MELLITUS (H): Primary | ICD-10-CM

## 2025-06-18 DIAGNOSIS — H01.003 BLEPHARITIS OF BOTH EYES, UNSPECIFIED EYELID, UNSPECIFIED TYPE: ICD-10-CM

## 2025-06-18 DIAGNOSIS — H01.006 BLEPHARITIS OF BOTH EYES, UNSPECIFIED EYELID, UNSPECIFIED TYPE: ICD-10-CM

## 2025-06-18 PROCEDURE — 92014 COMPRE OPH EXAM EST PT 1/>: CPT | Performed by: OPTOMETRIST

## 2025-06-18 PROCEDURE — 2022F DILAT RTA XM EVC RTNOPTHY: CPT | Performed by: OPTOMETRIST

## 2025-06-18 ASSESSMENT — CONF VISUAL FIELD
OD_NORMAL: 1
OD_SUPERIOR_TEMPORAL_RESTRICTION: 0
METHOD: COUNTING FINGERS
OD_INFERIOR_NASAL_RESTRICTION: 0
OS_SUPERIOR_TEMPORAL_RESTRICTION: 0
OD_SUPERIOR_NASAL_RESTRICTION: 0
OS_INFERIOR_NASAL_RESTRICTION: 0
OD_INFERIOR_TEMPORAL_RESTRICTION: 0
OS_NORMAL: 1
OS_INFERIOR_TEMPORAL_RESTRICTION: 0
OS_SUPERIOR_NASAL_RESTRICTION: 0

## 2025-06-18 ASSESSMENT — EXTERNAL EXAM - LEFT EYE: OS_EXAM: NORMAL

## 2025-06-18 ASSESSMENT — REFRACTION_MANIFEST
OD_ADD: +2.25
OS_SPHERE: PLANO
OD_SPHERE: PLANO

## 2025-06-18 ASSESSMENT — TONOMETRY
OD_IOP_MMHG: 19
IOP_METHOD: APPLANATION
OS_IOP_MMHG: 19

## 2025-06-18 ASSESSMENT — VISUAL ACUITY
OD_SC: 20/60
OS_SC: 20/20
OD_SC: 20/25
METHOD: SNELLEN - LINEAR
OS_SC: 20/80

## 2025-06-18 ASSESSMENT — CUP TO DISC RATIO
OD_RATIO: 0.6
OS_RATIO: 0.6

## 2025-06-18 ASSESSMENT — EXTERNAL EXAM - RIGHT EYE: OD_EXAM: NORMAL

## 2025-06-18 NOTE — LETTER
6/18/2025      Jairo Escamilla  [address] [place] [address]      Dear Colleague,    Thank you for referring your patient, Jairo Escamilla, to the [facility] DANIELA. Please see a copy of my visit note below.    Chief Complaint   Patient presents with     Diabetic Eye Exam       Lab Results   Component Value Date    A1C 5.8 02/11/2025    A1C 6.3 11/08/2024    A1C 6.4 05/03/2024    A1C 8.0 02/01/2024    A1C 7.5 11/03/2023    A1C 7.6 08/17/2020    A1C 8.2 05/18/2020    A1C 8.2 04/22/2019    A1C 8.1 01/11/2019    A1C 10.1 12/24/2018     No refraction today - noncovered service        Last Eye Exam: 06/2024  Dilated Previously: Yes, side effects of dilation explained today    What are you currently using to see?  readers    Distance Vision Acuity: Satisfied with vision    Near Vision Acuity: Satisfied with vision while reading and using computer with readers    Eye Comfort: mattery and watery in the morning   Do you use eye drops? : No      Julissa Cruz - Optometric Assistant      Medical, surgical and family histories reviewed and updated 6/18/2025.       OBJECTIVE: See Ophthalmology exam    ASSESSMENT:    ICD-10-CM    1. Nonproliferative diabetic retinopathy associated with type 2 diabetes mellitus (H) - Both Eyes  E11.3299       2. Blepharitis of both eyes, unspecified eyelid, unspecified type  H01.003     H01.006       3. Presbyopia  H52.4         Mild, stable no macular edema   PLAN:  Warm compresses/ tea tree lid scrubs for at least 6 weeks  Aquaphor to irritated eyelid skin   Ongoing glucose control  Over the counter readers   Jairo Escamilla aware  eye exam results will be sent to Sandoval Wilkinson OD          Again, thank you for allowing me to participate in the care of your patient.        Sincerely,        Devika Croft, DICK    Electronically signed

## 2025-06-18 NOTE — PROGRESS NOTES
Chief Complaint   Patient presents with    Diabetic Eye Exam       Lab Results   Component Value Date    A1C 5.8 02/11/2025    A1C 6.3 11/08/2024    A1C 6.4 05/03/2024    A1C 8.0 02/01/2024    A1C 7.5 11/03/2023    A1C 7.6 08/17/2020    A1C 8.2 05/18/2020    A1C 8.2 04/22/2019    A1C 8.1 01/11/2019    A1C 10.1 12/24/2018     No refraction today - noncovered service        Last Eye Exam: 06/2024  Dilated Previously: Yes, side effects of dilation explained today    What are you currently using to see?  readers    Distance Vision Acuity: Satisfied with vision    Near Vision Acuity: Satisfied with vision while reading and using computer with readers    Eye Comfort: mattery and watery in the morning   Do you use eye drops? : No      Julissa Cruz - Optometric Assistant      Medical, surgical and family histories reviewed and updated 6/18/2025.       OBJECTIVE: See Ophthalmology exam    ASSESSMENT:    ICD-10-CM    1. Nonproliferative diabetic retinopathy associated with type 2 diabetes mellitus (H) - Both Eyes  E11.3299       2. Blepharitis of both eyes, unspecified eyelid, unspecified type  H01.003     H01.006       3. Presbyopia  H52.4         Mild, stable no macular edema   PLAN:  Warm compresses/ tea tree lid scrubs for at least 6 weeks  Aquaphor to irritated eyelid skin   Ongoing glucose control  Over the counter salty Escamilla aware  eye exam results will be sent to Sandoval Wilkinson OD

## 2025-06-18 NOTE — PATIENT INSTRUCTIONS
Blepharitis is a chronic or long term inflammation of the eyelids and eyelashes. It affects all ages. Causes include poor eyelid hygiene, excess oil production, staph bacteria or an allergic reaction.    Blepharitis may appear as greasy flakes on the base of the eyelashes, crusting of eyelashes and mild redness of the eyelid margins.  Sometimes it may result in an acute infection of a gland in the eyelid called a stye and sometimes painless firm nodules can form in the eyelid that do not resolve on their own and must be surgically removed.    Treatment includes warm compresses and lid hygiene with an antimicrobial lid scrub      Hot compresses/ warm soaks  Warm compresses are very beneficial to the normal functioning of the eye.   They help loosen up the eyelid debris that has collected on the eyelash follicles.  Overabundance of bacterial microorganisms along the eyelashes and lid margins induce stress on the tear film and promote inflammation.  Regular lid hygiene helps diminish the bacterial population to prevent inflammation and infection.  Cleanse lids once daily with a lid cleansing product as directed such as Ocusoft or Sterilid which can be purchased at most pharmacies. Eyelid cleansers maintain clean and healthy eyelid margins. Ocusoft or sterilid are commercial products that are available as individual wrapped cleansing pads.      Demodex blepharitis      This is an infestation of mites in the lash follicle and is very common.     There are several over the counter lid wipes that can be used to treat demodex blepharitis  Most contain tea tree oil  Cliradex  https://www.cliradex.com/ or at [employer].    There are also lid wipes called Ocusoft Oust or Ocusoft plus.  A less costly version is to use diluted tea tree oil, THIS HAS TO BE DILUTED with coconut oil or olive oil or water and can not be used on broken skin, aquaphor ointment can be applied to affected skin    This is how to carefully use DILUTED Tea  Tree Oil on your eyelashes (or skin which has no ulcers or cuts). Ideally cleaning your eyelids with diluted Tea Tree Oil should be done after you have applied warm/hot compresses (not hot enough to burn skin) to your eyelids/eyelashes for 2-5 minutes before.    1. Buy a bottle of Tea Tree Oil from a pharmacy, [facility], [facility], etc.  2. With clean hands, put 1 drops of Tea Tree Oil in a cup and 2-3 drops of water (or olive oil or coconut oil)  3. CLOSE YOUR EYES  4. Rub the corner of your eyelashes with YOUR EYES CLOSED to test to see if this Tea Tree Oil is not too strong. Dilute the towel (or Q-tip with water more, if you have too much burning).   5. Gently cleanse your closed eyelashes.    6. Leave on for about 1 minute or let air dry if there is minimal burning.    7. Wash off after a minute or so if still burning.     It will take 6 weeks to resolve.  Meibomian gland dysfunction or Posterior Blepharitis, is characterized by inflammation along both the uppper and lower eyelid margins. A single row of these glands is present in each lid with openings along the lid margins.  It is often found in association with skin conditions such as rosacea and seborrheic dermatitis.    Symptoms include:  ?Red eyes  ?Gritty or burning sensation  ?Excessive tearing  ?Itchy eyelids  ?Red, swollen eyelids  ?Crusting or matting of eyelashes in the morning  ?Light sensitivity  ?Blurred vision    It is important to keep cosmetics from blocking these oil glands. If blocked, they do not   excrete oil into the tear film, which causes the tears to evaporate quickly.   This may result in watery eyes.  There is also an increase of bacterial growth when the tear film is unstable, leading to further ocular surface inflammation.    Treatment:  1. Warm compresses for 5-10 minutes twice daily     2.  Keep the eyelid margins clean by using a commercial eye scrub or mild baby shampoo on a washcloth 1-2x daily    3. Use preservative free artificial  tears 4-8x daily     For warm compresses    Moisten a washcloth with hot water, or microwave for 10 seconds, being careful to not get the cloth too hot.   Then put the washcloth onto your eyelids for 5 minutes. It will cool quickly so a rice pack or eyemask that can be heated and laid on top of the washcloth will help retain the heat.    Omega 3 fatty acid supplements taken once to twice daily and artificial tears such as Soothe xp, Refresh optive , Retaine and systane balance are also an additional treatment to control inflammation and help soothe your eyes.   You have a mild mixture of all three forms of blepharitis     Patient Education   Diabetes weakens the blood vessels all over the body, including the eyes. Damage to the blood vessels in the eyes can cause swelling or bleeding into part of the eye (called the retina). This is called diabetic retinopathy (JEFFERSON-[id number]-AH-pu-thee). If not treated, this disease can cause vision loss or blindness.   Symptoms may include blurred or distorted vision, but many people have no symptoms. It's important to see your eye doctor regularly to check for problems.   Early treatment and good control can help protect your vision. Here are the things you can do to help prevent vision loss:      1. Keep your blood sugar levels under tight control.      2. Bring high blood pressure under control.      3. No smoking.      4. Have yearly dilated eye exams.        Directions for warm soaks  There are few methods for hot compresses. Moisten a washcloth with hot water, or microwave for 10 seconds, being careful to not get the cloth too hot.   Then put the washcloth onto your eyelids for 5 minutes. It will cool quickly so a rice pack or eyemask that can be heated and laid on top of the washcloth will help retain the heat.

## 2025-06-19 DIAGNOSIS — E29.1 HYPOGONADISM MALE: ICD-10-CM

## 2025-06-23 RX ORDER — SYRINGE-NEEDLE,INSULIN,0.5 ML 27GX1/2"
SYRINGE, EMPTY DISPOSABLE MISCELLANEOUS
Qty: 100 EACH | Refills: 11 | Status: SHIPPED | OUTPATIENT
Start: 2025-06-23

## 2025-06-28 ENCOUNTER — FCC EXTENDED DOCUMENTATION (OUTPATIENT)
Dept: PSYCHOLOGY | Facility: CLINIC | Age: 55
End: 2025-06-28
Payer: COMMERCIAL

## 2025-06-28 DIAGNOSIS — F43.23 ADJUSTMENT DISORDER WITH MIXED ANXIETY AND DEPRESSED MOOD: Primary | ICD-10-CM

## 2025-06-28 NOTE — PROGRESS NOTES
Discharge Summary  Multiple Sessions    Client Name: Jairo Escamilla MRN#: [id number] YOB: 1970      Intake / Discharge Date: 6/6/24, discharge date 6/28/25 with patient last seen 11/19/24      DSM5 Diagnoses: (Sustained by DSM5 Criteria Listed Above)  Diagnoses: Adjustment Disorders  309.28 (F43.23) With mixed anxiety and depressed mood  Psychosocial & Contextual Factors: History of Trauma  WHODAS 2.0 (12 item) Score: NA          Presenting Concern:  Patient presented with concerns related to familial relationships, financial stress.    Reason for Discharge:  Client did not return and Insurance: Employment Concerns      Disposition at Time of Last Encounter:   Comments:   Patient did well during session and to implement strategies.     Risk Management:   Client denies a history of suicidal ideation, suicide attempts, self-injurious behavior, homicidal ideation, homicidal behavior, and and other safety concerns  Recommended that patient call 911 or go to the local ED should there be a change in any of these risk factors      Referred To:  Primary Care        PINO Reyes   6/28/2025

## 2025-07-01 ENCOUNTER — DOCUMENTATION ONLY (OUTPATIENT)
Dept: SLEEP MEDICINE | Facility: CLINIC | Age: 55
End: 2025-07-01
Payer: COMMERCIAL

## 2025-07-01 DIAGNOSIS — G47.33 OBSTRUCTIVE SLEEP APNEA (ADULT) (PEDIATRIC): Primary | ICD-10-CM

## 2025-07-08 NOTE — PROGRESS NOTES
CPAP Follow-Up Visit:    Date on this visit: 7/10/2025    Jairo Escamilla has a  follow-up visit today to review his CPAP use for TIANNA. He was initially seen for snoring and daytime fatigue and sleepiness.      Previous Study Results:   04/22/2016 showed an apnea-hypopnea index of 113.7 events per hour and a lowest oxygen saturation of 55%. There was sleep-related hypoxemia with a total of 89.4 minutes with a saturation below 89%. CPAP was titrated to 9 cm with some residual post-arousal centrals. Wt: 285#.            [facility] (5/23/24)  Auto-PAP 7.0 - 14.0 cmH2O 30 day usage data:    90% of days with > 4 hours of use. 2/30 days with no use (he was out of town).   Average use 362 minutes per day.   95%ile Leak 1.45 L/min.   CPAP 95% pressure 14 cm.   AHI 4.3 events per hour.         I prescribed 200 mg modafinil for residual sleepiness in the afternoon despite getting regular 7 hours of CPAP use and apparently well-controlled apnea     He is doing better with CPAP than in the past. He is getting much more consistent use. He has had mask leak but will be picking up new supplies after this visit. He does mouth breathe, so needs a full face mask. He has to tighten it very tight to reduce leak. He got some strap pads, which has helped. He plans to try the AirTouch F20. The mask sits close to his eyes and he also gets sore at the bridge of his nose.        DME [facility]    Do you use a CPAP Machine at home: (Patient-Rptd) Yes  Overall, on a scale of 0-10 how would you rate your CPAP (0 poor, 10 great): (Patient-Rptd) 6    What type of mask do you use: (Patient-Rptd) Full Face Mask AirFit F20, Lisandra. He does not remember why his mask was switched from the Lisandra.  Is your mask comfortable: (Patient-Rptd) No  If not, why: (Patient-Rptd) Headgear is too tight, if I loosen it then I get really bad air leaks  How often do you replace supplies: every 6 months. He cleans with a spray he bought online and sometimes soap and water. He also  has a sterilizing machine (UV light).      Is your mask leaking: (Patient-Rptd) No  If yes, where do you feel it:    How many night per week does the mask leak (0-7):      Do you notice snoring with mask on: (Patient-Rptd) No  Do you notice gasping arousals with mask on: (Patient-Rptd) No  Are you having significant oral or nasal dryness: (Patient-Rptd) No  Are you using the humidifier: yes  Does the water chamber run out before the night is over: no  Do you get condensation in the mask or hose: if he turns the hose temp down.   Is the pressure setting comfortable: (Patient-Rptd) Yes  If not, why:      Typical bedtime: (Patient-Rptd) 1-2am He has things he is trying to complete that keep him up. He gets home 9-10 PM and it takes 2-3 hours to wind down. But he can doze on the couch at 11:30 PM to midnight.  He then goes to bed and watches TV. He finds it hard to wear the mask over 6-7 hours.   Sleep latency on PAP therapy: (Patient-Rptd) 5-10 minutes  Typical wake time: (Patient-Rptd) 8am  Wakes 2-3 times per night for 2 minutes. Reason for waking: restroom  How many hours on average per night are you using PAP therapy: (Patient-Rptd) 5-6  How many hours are you sleeping per night: (Patient-Rptd) 6-8  Do you feel well rested in the morning: (Patient-Rptd) No    Naps: whenever he can, variable times per week for 60 minutes.   He keeps it early in the afternoon to avoid interfering with nighttime sleep. He still feels his energy is not great.  He has modafinil which he takes 1-2 times per week. It does help. He may take it around noon and then he is up until 4 AM.       Weight change since sleep study: 289 lbs      Past medical/surgical history, family history, social history, medications and allergies were reviewed.      Problem List:  Patient Active Problem List    Diagnosis Date Noted    Adjustment disorder with mixed anxiety and depressed mood 06/28/2025     Priority: Medium    Diabetic polyneuropathy associated  with type 2 diabetes mellitus (H) 02/11/2025     Priority: Medium    S/P amputation of lesser toe, left 02/01/2024     Priority: Medium    Type 2 diabetes mellitus with hyperglycemia, with long-term current use of insulin (H) 08/18/2023     Priority: Medium    Mild episode of recurrent major depressive disorder 04/24/2023     Priority: Medium    Hypogonadism male 04/21/2023     Priority: Medium    Gastroenteritis 12/24/2018     Priority: Medium    Peripheral polyneuropathy 05/22/2018     Priority: Medium    Type 2 diabetes mellitus with hyperglycemia (H) 03/06/2017     Priority: Medium    SDH (subdural hematoma) (H) 02/27/2016     Priority: Medium    Morbid obesity (H) 10/21/2015     Priority: Medium    Hyperlipidemia LDL goal <100 03/28/2013     Priority: Medium    Hypertension  BP goal <140/90 03/28/2013     Priority: Medium        Impression/Plan:    (G47.33) TIANNA (obstructive sleep apnea)  (primary encounter diagnosis), (G47.10,  G47.30) Hypersomnia with sleep apnea  Comment: Jairo is using CPAP nightly and benefits from use. He continues to feel tired in the day, but his total use has dropped a little to 6.5 hours on average. He naps for about an hour without CPAP.  Modafinil was helpful, but he was taking it at noon and it would keep him up until 4 AM. He has to strap the mask uncomfortably tight. He seemed to have a good seal with the Lisandra mask in the past. His pressure is often at the upper limit. His AHI is 4/hr on avg, but some days may be slightly higher.   Plan: Comprehensive DME        I changed pressures to 8-16 cm. Consider going back to the Lisandra mask. Use CPAP when napping. Try to get more sleep at night, advance bedtime gradually (15 minutes per week). May continue to use modafinil as long as you are getting sufficient sleep. Take it upon awakening rather than midday.           He will follow up with me in about 1 year(s).     38 minutes were spent on the date of the encounter doing chart review,  history and exam, documentation and further activities as noted above.     Bennett Goltz, PA-C    CC: No ref. provider found

## 2025-07-09 ASSESSMENT — SLEEP AND FATIGUE QUESTIONNAIRES
HOW LIKELY ARE YOU TO NOD OFF OR FALL ASLEEP WHILE LYING DOWN TO REST IN THE AFTERNOON WHEN CIRCUMSTANCES PERMIT: HIGH CHANCE OF DOZING
HOW LIKELY ARE YOU TO NOD OFF OR FALL ASLEEP IN A CAR, WHILE STOPPED FOR A FEW MINUTES IN TRAFFIC: WOULD NEVER DOZE
HOW LIKELY ARE YOU TO NOD OFF OR FALL ASLEEP WHILE SITTING INACTIVE IN A PUBLIC PLACE: SLIGHT CHANCE OF DOZING
HOW LIKELY ARE YOU TO NOD OFF OR FALL ASLEEP WHILE SITTING AND READING: HIGH CHANCE OF DOZING
HOW LIKELY ARE YOU TO NOD OFF OR FALL ASLEEP WHILE SITTING AND TALKING TO SOMEONE: SLIGHT CHANCE OF DOZING
HOW LIKELY ARE YOU TO NOD OFF OR FALL ASLEEP WHEN YOU ARE A PASSENGER IN A CAR FOR AN HOUR WITHOUT A BREAK: SLIGHT CHANCE OF DOZING
HOW LIKELY ARE YOU TO NOD OFF OR FALL ASLEEP WHILE SITTING QUIETLY AFTER LUNCH WITHOUT ALCOHOL: MODERATE CHANCE OF DOZING
HOW LIKELY ARE YOU TO NOD OFF OR FALL ASLEEP WHILE WATCHING TV: MODERATE CHANCE OF DOZING

## 2025-07-10 ENCOUNTER — OFFICE VISIT (OUTPATIENT)
Dept: SLEEP MEDICINE | Facility: CLINIC | Age: 55
End: 2025-07-10
Payer: COMMERCIAL

## 2025-07-10 VITALS
SYSTOLIC BLOOD PRESSURE: 137 MMHG | WEIGHT: 289.1 LBS | BODY MASS INDEX: 41.39 KG/M2 | HEIGHT: 70 IN | HEART RATE: 90 BPM | DIASTOLIC BLOOD PRESSURE: 85 MMHG | OXYGEN SATURATION: 95 %

## 2025-07-10 DIAGNOSIS — G47.10 HYPERSOMNIA WITH SLEEP APNEA: ICD-10-CM

## 2025-07-10 DIAGNOSIS — G47.33 OSA (OBSTRUCTIVE SLEEP APNEA): Primary | ICD-10-CM

## 2025-07-10 DIAGNOSIS — G47.30 HYPERSOMNIA WITH SLEEP APNEA: ICD-10-CM

## 2025-07-10 NOTE — NURSING NOTE
"Chief Complaint   Patient presents with    CPAP Follow Up     TIANNA, cpap follow up        Initial /85   Pulse 90   Ht 1.778 m (5' 10\")   Wt 131.1 kg (289 lb 1.6 oz)   SpO2 95%   BMI 41.48 kg/m   Estimated body mass index is 41.48 kg/m  as calculated from the following:    Height as of this encounter: 1.778 m (5' 10\").    Weight as of this encounter: 131.1 kg (289 lb 1.6 oz).    Medication Reconciliation: complete  ESS 13  WENDI 8  DME: PJ Blair MA      "

## 2025-08-05 ENCOUNTER — DOCUMENTATION ONLY (OUTPATIENT)
Dept: SLEEP MEDICINE | Facility: CLINIC | Age: 55
End: 2025-08-05
Payer: COMMERCIAL

## 2025-08-11 ASSESSMENT — PATIENT HEALTH QUESTIONNAIRE - PHQ9
SUM OF ALL RESPONSES TO PHQ QUESTIONS 1-9: 2
10. IF YOU CHECKED OFF ANY PROBLEMS, HOW DIFFICULT HAVE THESE PROBLEMS MADE IT FOR YOU TO DO YOUR WORK, TAKE CARE OF THINGS AT HOME, OR GET ALONG WITH OTHER PEOPLE: SOMEWHAT DIFFICULT
SUM OF ALL RESPONSES TO PHQ QUESTIONS 1-9: 2

## 2025-08-12 ENCOUNTER — OFFICE VISIT (OUTPATIENT)
Dept: PEDIATRICS | Facility: CLINIC | Age: 55
End: 2025-08-12
Payer: COMMERCIAL

## 2025-08-12 VITALS
WEIGHT: 290.1 LBS | SYSTOLIC BLOOD PRESSURE: 124 MMHG | HEIGHT: 70 IN | OXYGEN SATURATION: 98 % | DIASTOLIC BLOOD PRESSURE: 75 MMHG | HEART RATE: 83 BPM | RESPIRATION RATE: 14 BRPM | TEMPERATURE: 97.8 F | BODY MASS INDEX: 41.53 KG/M2

## 2025-08-12 DIAGNOSIS — E11.65 TYPE 2 DIABETES MELLITUS WITH HYPERGLYCEMIA, WITH LONG-TERM CURRENT USE OF INSULIN (H): ICD-10-CM

## 2025-08-12 DIAGNOSIS — E78.5 HYPERLIPIDEMIA LDL GOAL <100: ICD-10-CM

## 2025-08-12 DIAGNOSIS — E29.1 HYPOGONADISM MALE: ICD-10-CM

## 2025-08-12 DIAGNOSIS — E11.42 DIABETIC POLYNEUROPATHY ASSOCIATED WITH TYPE 2 DIABETES MELLITUS (H): Primary | ICD-10-CM

## 2025-08-12 DIAGNOSIS — Z79.4 TYPE 2 DIABETES MELLITUS WITH HYPERGLYCEMIA, WITH LONG-TERM CURRENT USE OF INSULIN (H): ICD-10-CM

## 2025-08-12 LAB
EST. AVERAGE GLUCOSE BLD GHB EST-MCNC: 163 MG/DL
HBA1C MFR BLD: 7.3 % (ref 0–5.6)

## 2025-08-12 PROCEDURE — 1126F AMNT PAIN NOTED NONE PRSNT: CPT | Performed by: INTERNAL MEDICINE

## 2025-08-12 PROCEDURE — 36415 COLL VENOUS BLD VENIPUNCTURE: CPT | Performed by: INTERNAL MEDICINE

## 2025-08-12 PROCEDURE — 99214 OFFICE O/P EST MOD 30 MIN: CPT | Performed by: INTERNAL MEDICINE

## 2025-08-12 PROCEDURE — 3051F HG A1C>EQUAL 7.0%<8.0%: CPT | Performed by: INTERNAL MEDICINE

## 2025-08-12 PROCEDURE — 3074F SYST BP LT 130 MM HG: CPT | Performed by: INTERNAL MEDICINE

## 2025-08-12 PROCEDURE — 3078F DIAST BP <80 MM HG: CPT | Performed by: INTERNAL MEDICINE

## 2025-08-12 PROCEDURE — 83036 HEMOGLOBIN GLYCOSYLATED A1C: CPT | Performed by: INTERNAL MEDICINE

## 2025-08-12 RX ORDER — INSULIN DEGLUDEC 200 U/ML
58 INJECTION, SOLUTION SUBCUTANEOUS 2 TIMES DAILY
Qty: 60 ML | Refills: 11 | Status: SHIPPED | OUTPATIENT
Start: 2025-08-12

## 2025-08-12 RX ORDER — SIMVASTATIN 20 MG
20 TABLET ORAL AT BEDTIME
Qty: 90 TABLET | Refills: 3 | Status: SHIPPED | OUTPATIENT
Start: 2025-08-12

## 2025-08-12 RX ORDER — TESTOSTERONE CYPIONATE 200 MG/ML
200 INJECTION, SOLUTION INTRAMUSCULAR
Qty: 2 ML | Refills: 3 | Status: SHIPPED | OUTPATIENT
Start: 2025-09-11

## 2025-08-12 RX ORDER — METFORMIN HYDROCHLORIDE 500 MG/1
500 TABLET, EXTENDED RELEASE ORAL 2 TIMES DAILY WITH MEALS
COMMUNITY
Start: 2025-08-12 | End: 2025-08-12

## 2025-08-12 RX ORDER — METFORMIN HYDROCHLORIDE 500 MG/1
500 TABLET, EXTENDED RELEASE ORAL 2 TIMES DAILY WITH MEALS
Qty: 180 TABLET | Refills: 3 | Status: SHIPPED | OUTPATIENT
Start: 2025-08-12

## 2025-08-12 ASSESSMENT — PAIN SCALES - GENERAL: PAINLEVEL_OUTOF10: NO PAIN (0)

## 2025-08-18 DIAGNOSIS — E29.1 HYPOGONADISM MALE: ICD-10-CM

## (undated) DEVICE — GLOVE BIOGEL PI MICRO INDICATOR UNDERGLOVE SZ 7.0 48970

## (undated) DEVICE — BLADE KNIFE SURG 15 371115

## (undated) DEVICE — DRAPE STERI TOWEL LG 1010

## (undated) DEVICE — NDL 19GA 1.5"

## (undated) DEVICE — BNDG ELASTIC 4"X5YDS UNSTERILE 6611-40

## (undated) DEVICE — LINEN TOWEL PACK X5 5464

## (undated) DEVICE — ESU GROUND PAD E7506

## (undated) DEVICE — DECANTER BAG 2002S

## (undated) DEVICE — SU ETHILON 4-0 PS-4 18" 1662G

## (undated) DEVICE — PREP SKIN SCRUB TRAY 4461A

## (undated) DEVICE — CAST PADDING 4" STERILE 9044S

## (undated) DEVICE — DRAPE POUCH IRR 1016

## (undated) DEVICE — SPONGE RAY-TEC 4X8" 7318

## (undated) DEVICE — PACK EXTREMITY SOP15EXFSD

## (undated) DEVICE — ESU PENCIL W/HOLSTER E2350H

## (undated) DEVICE — DRSG KERLIX 4 1/2"X4YDS ROLL 6715

## (undated) DEVICE — DRSG ADAPTIC 3X8" 6113

## (undated) DEVICE — GLOVE BIOGEL PI SZ 6.5 40865

## (undated) DEVICE — SU PROLENE 2-0 FS 18" 8685H

## (undated) DEVICE — PACKING NUGAUZE 1/4" PLAIN 7631

## (undated) DEVICE — NDL 25GA 1.5" 305127

## (undated) DEVICE — SU PROLENE 3-0 FS-1 18" 8684G

## (undated) DEVICE — BLADE SAW SAGITTAL 25.5X9.5X.4MM FINE LINVATEC 5023-138

## (undated) DEVICE — ESU PENCIL W/HOLSTER

## (undated) DEVICE — DRSG KERLIX FLUFFS X5

## (undated) DEVICE — SU PROLENE 3-0 PS-2 18" 8687H

## (undated) DEVICE — ESU GROUND PAD UNIVERSAL W/O CORD

## (undated) DEVICE — IMM LIMB ELEVATOR DC40-0203

## (undated) DEVICE — CAST PADDING 6" STERILE 9046S

## (undated) DEVICE — DECANTER VIAL 2006S

## (undated) DEVICE — SOL WATER IRRIG 1000ML BOTTLE 2F7114

## (undated) DEVICE — GLOVE BIOGEL PI MICRO INDICATOR UNDERGLOVE SZ 6.5 48965

## (undated) DEVICE — BLADE SAW OSCILLATING STRYK MED 9.0X25X0.38MM 2296-003-111

## (undated) DEVICE — SYR 10ML LL W/O NDL 302995

## (undated) RX ORDER — FENTANYL CITRATE 50 UG/ML
INJECTION, SOLUTION INTRAMUSCULAR; INTRAVENOUS
Status: DISPENSED
Start: 2023-09-30

## (undated) RX ORDER — FENTANYL CITRATE 50 UG/ML
INJECTION, SOLUTION INTRAMUSCULAR; INTRAVENOUS
Status: DISPENSED
Start: 2022-12-22

## (undated) RX ORDER — DEXAMETHASONE SODIUM PHOSPHATE 4 MG/ML
INJECTION, SOLUTION INTRA-ARTICULAR; INTRALESIONAL; INTRAMUSCULAR; INTRAVENOUS; SOFT TISSUE
Status: DISPENSED
Start: 2023-08-21

## (undated) RX ORDER — ONDANSETRON 2 MG/ML
INJECTION INTRAMUSCULAR; INTRAVENOUS
Status: DISPENSED
Start: 2023-09-30

## (undated) RX ORDER — LIDOCAINE HYDROCHLORIDE 10 MG/ML
INJECTION, SOLUTION EPIDURAL; INFILTRATION; INTRACAUDAL; PERINEURAL
Status: DISPENSED
Start: 2023-08-21

## (undated) RX ORDER — BUPIVACAINE HYDROCHLORIDE 5 MG/ML
INJECTION, SOLUTION EPIDURAL; INTRACAUDAL
Status: DISPENSED
Start: 2023-08-21

## (undated) RX ORDER — PROPOFOL 10 MG/ML
INJECTION, EMULSION INTRAVENOUS
Status: DISPENSED
Start: 2023-09-30

## (undated) RX ORDER — PROPOFOL 10 MG/ML
INJECTION, EMULSION INTRAVENOUS
Status: DISPENSED
Start: 2023-08-21

## (undated) RX ORDER — ONDANSETRON 2 MG/ML
INJECTION INTRAMUSCULAR; INTRAVENOUS
Status: DISPENSED
Start: 2023-08-21

## (undated) RX ORDER — FENTANYL CITRATE 50 UG/ML
INJECTION, SOLUTION INTRAMUSCULAR; INTRAVENOUS
Status: DISPENSED
Start: 2023-08-21